# Patient Record
Sex: FEMALE | Race: WHITE | NOT HISPANIC OR LATINO | Employment: OTHER | URBAN - NONMETROPOLITAN AREA
[De-identification: names, ages, dates, MRNs, and addresses within clinical notes are randomized per-mention and may not be internally consistent; named-entity substitution may affect disease eponyms.]

---

## 2017-01-04 RX ORDER — SPIRONOLACTONE 25 MG/1
TABLET ORAL
Qty: 30 TABLET | Refills: 6 | Status: SHIPPED | OUTPATIENT
Start: 2017-01-04 | End: 2017-10-24 | Stop reason: SDUPTHER

## 2017-01-17 ENCOUNTER — OFFICE VISIT (OUTPATIENT)
Dept: CARDIOLOGY | Facility: CLINIC | Age: 32
End: 2017-01-17

## 2017-01-17 VITALS
DIASTOLIC BLOOD PRESSURE: 84 MMHG | OXYGEN SATURATION: 98 % | HEIGHT: 64 IN | SYSTOLIC BLOOD PRESSURE: 163 MMHG | HEART RATE: 125 BPM | WEIGHT: 224 LBS | BODY MASS INDEX: 38.24 KG/M2

## 2017-01-17 DIAGNOSIS — R00.0 INAPPROPRIATE SINUS TACHYCARDIA: ICD-10-CM

## 2017-01-17 DIAGNOSIS — Z13.220 SCREENING CHOLESTEROL LEVEL: ICD-10-CM

## 2017-01-17 DIAGNOSIS — I10 ESSENTIAL HYPERTENSION: ICD-10-CM

## 2017-01-17 DIAGNOSIS — R42 DIZZINESS: ICD-10-CM

## 2017-01-17 DIAGNOSIS — R00.2 PALPITATIONS: Primary | ICD-10-CM

## 2017-01-17 DIAGNOSIS — Z13.1 DIABETES MELLITUS SCREENING: ICD-10-CM

## 2017-01-17 LAB
ALBUMIN SERPL-MCNC: 4.1 GM/DL (ref 3.4–4.8)
ALP SERPL-CCNC: 76 U/L (ref 38–126)
ALT SERPL-CCNC: 32 U/L (ref 9–52)
ANION GAP SERPL CALCULATED.3IONS-SCNC: 12 MMOL/L (ref 5–15)
AST SERPL-CCNC: 28 U/L (ref 14–36)
BASOPHILS # BLD AUTO: 0.05 X1000/UL (ref 0–0.2)
BASOPHILS NFR BLD AUTO: 0.5 % (ref 0–2)
BILIRUB SERPL-MCNC: 0.5 MG/DL (ref 0.2–1.3)
BUN SERPL-MCNC: 11 MG/DL (ref 7–21)
CALCIUM SERPL-MCNC: 9.4 MG/DL (ref 8.4–10.2)
CHLORIDE SERPL-SCNC: 100 MMOL/L (ref 95–110)
CHOLEST SERPL-MCNC: 245 MG/DL (ref 0–199)
CO2 SERPL-SCNC: 28 MMOL/L (ref 22–31)
CONV AUTO IG#: 0.04 X1000/UL (ref 0.01–0.02)
CONV AUTO IG%: 0.4 % (ref 0–0.5)
CREAT SERPL-MCNC: 0.8 MG/DL (ref 0.5–1)
EOSINOPHIL # BLD AUTO: 0.07 X1000/UL (ref 0–0.7)
EOSINOPHIL NFR BLD AUTO: 0.7 % (ref 0–7)
ERYTHROCYTE [DISTWIDTH] IN BLOOD: 12.6 % (ref 11.5–14.5)
GLUCOSE SERPL-MCNC: 103 MG/DL (ref 60–100)
GRANULOCYTES # BLD AUTO: 5.96 X1000/UL (ref 2–8.6)
GRANULOCYTES NFR BLD AUTO: 58 % (ref 37–80)
HBA1C MFR BLD CALC: 5.4 %TOTHGB (ref 4–5.6)
HCT VFR BLD CALC: 40.1 % (ref 35–45)
HDLC SERPL-MCNC: 38 MG/DL (ref 60–200)
HGB BLD-MCNC: 13.5 GM/DL (ref 12–15.5)
HIV1+2 AB SERPL QL IA: NON REACTIVE
LDLC SERPL CALC-MCNC: 148 MG/DL (ref 0–129)
LYMPHOCYTES # BLD AUTO: 3.5 X1000/UL (ref 0.6–4.2)
LYMPHOCYTES NFR BLD AUTO: 34 % (ref 10–50)
MCH RBC QN: 29.2 PG (ref 26–34)
MCHC RBC-ENTMCNC: 33.7 GM/DL (ref 31.4–36)
MCV RBC: 86.6 FL (ref 80–98)
MONOCYTES # BLD AUTO: 0.66 X1000/UL (ref 0–0.9)
MONOCYTES NFR BLD AUTO: 6.4 % (ref 0–12)
PLATELET # BLD: 338 X1000/MM3 (ref 150–450)
PMV BLD: 8.9 FL (ref 8–12)
POTASSIUM SERPL-SCNC: 4.2 MMOL/L (ref 3.5–5.1)
PROT SERPL-MCNC: 8.3 GM/DL (ref 6.3–8.6)
RBC # BLD: 4.63 MEGA/MM3 (ref 3.77–5.16)
SODIUM SERPL-SCNC: 140 MMOL/L (ref 137–145)
TRIGL SERPL-MCNC: 296 MG/DL (ref 20–199)
WBC # BLD: 10.3 X1000/UL (ref 3.2–9.8)

## 2017-01-17 PROCEDURE — 99214 OFFICE O/P EST MOD 30 MIN: CPT | Performed by: INTERNAL MEDICINE

## 2017-01-17 PROCEDURE — 93000 ELECTROCARDIOGRAM COMPLETE: CPT | Performed by: INTERNAL MEDICINE

## 2017-01-17 RX ORDER — TRAZODONE HYDROCHLORIDE 150 MG/1
150 TABLET ORAL
COMMUNITY
End: 2017-10-23 | Stop reason: ALTCHOICE

## 2017-01-17 RX ORDER — LISINOPRIL 10 MG/1
10 TABLET ORAL DAILY
Qty: 30 TABLET | Refills: 11 | Status: SHIPPED | OUTPATIENT
Start: 2017-01-17 | End: 2017-05-03 | Stop reason: CLARIF

## 2017-01-17 RX ORDER — PROMETHAZINE HYDROCHLORIDE 25 MG/1
25 TABLET ORAL EVERY 6 HOURS PRN
COMMUNITY
End: 2017-02-22

## 2017-01-17 NOTE — MR AVS SNAPSHOT
Chelita Rosenbaum   1/17/2017 2:15 PM   Office Visit    Dept Phone:  [phone]   Encounter #:  [id number]    Provider:  Hollis Amin MD PhD   Department:  [facility] CARDIOLOGY                Your Full Care Plan              Today's Medication Changes          These changes are accurate as of: 1/17/17  2:16 PM.  If you have any questions, ask your nurse or doctor.               Medication(s)that have changed:     HYDROcodone-acetaminophen 7.5-325 MG per tablet   Commonly known as:  NORCO   Take 1 tablet by mouth 4 (Four) Times a Day for 30 days.   What changed:  Another medication with the same name was removed. Continue taking this medication, and follow the directions you see here.   Changed by:  Abhishek Altamirano MD         Stop taking medication(s)listed here:     omeprazole 40 MG capsule   Commonly known as:  priLOSEC   Stopped by:  Hollis Amin MD PhD           ondansetron 4 MG tablet   Commonly known as:  ZOFRAN   Stopped by:  Hollis Amin MD PhD           prazosin 5 MG capsule   Commonly known as:  MINIPRESS   Stopped by:  Hollis Amin MD PhD                      Your Updated Medication List          This list is accurate as of: 1/17/17  2:16 PM.  Always use your most recent med list.                albuterol 108 (90 BASE) MCG/ACT inhaler   Commonly known as:  PROVENTIL HFA;VENTOLIN HFA       DULoxetine 60 MG capsule   Commonly known as:  CYMBALTA       HYDROcodone-acetaminophen 7.5-325 MG per tablet   Commonly known as:  NORCO   Take 1 tablet by mouth 4 (Four) Times a Day for 30 days.       metFORMIN 500 MG tablet   Commonly known as:  GLUCOPHAGE       metoprolol succinate  MG 24 hr tablet   Commonly known as:  TOPROL-XL       promethazine 25 MG tablet   Commonly known as:  PHENERGAN       QUEtiapine 400 MG tablet   Commonly known as:  SEROquel       spironolactone 25 MG tablet   Commonly known as:  ALDACTONE   TAKE ONE  TABLET BY MOUTH DAILY       tiZANidine 4 MG tablet   Commonly known as:  ZANAFLEX       traZODone 150 MG tablet   Commonly known as:  DESYREL               We Performed the Following     Adult Transthoracic Echo Complete     CBC & Differential     Comprehensive Metabolic Panel     Duplex Carotid Ultrasound CAR     Hemoglobin A1c     HIV-1 & HIV-2 Antibodies     Holter / Event ZIO Patch     Lipid Panel     MicroAlbumin, Urine, Random     Tilt Table       You Were Diagnosed With        Codes Comments    Palpitations    -  Primary ICD-10-CM: R00.2  ICD-9-CM: 785.1     Essential hypertension     ICD-10-CM: I10  ICD-9-CM: 401.9     Inappropriate sinus tachycardia     ICD-10-CM: R00.0  ICD-9-CM: 427.89     Dizziness     ICD-10-CM: R42  ICD-9-CM: 780.4     Diabetes mellitus screening     ICD-10-CM: Z13.1  ICD-9-CM: V77.1     Screening cholesterol level     ICD-10-CM: Z13.220  ICD-9-CM: V77.91       Instructions     None    Patient Instructions History      Upcoming Appointments     Visit Type Date Time Department    NEW PATIENT 1/17/2017  2:15 PM [facility] CARDIOLOGY [facility]    FOLLOW UP 2/22/2017  3:10 PM [facility] PAIN MNGT [facility]    FOLLOW UP 2/28/2017  3:30 PM [facility] CARDIOLOGY [facility]    OFFICE VISIT 5/30/2017  1:30 PM [facility]      MyChart Signup     Our records indicate that you have declined [facility] [employer] signup. If you would like to sign up for [employer], please email [email or web address] or call [phone] to obtain an activation code.             Other Info from Your Visit           Your Appointments     Feb 22, 2017  3:10 PM CST   Follow Up with Abhishek Altamirano MD   [facility] PAIN MANAGEMENT (--)    [address]  [place] [address] [place] [address] [phone]           Arrive 15 minutes prior to appointment.            Feb 28, 2017  3:30 PM CST   Follow Up with Hollis Amin MD PhD   [facility] CARDIOLOGY (--)    [address] [place] [address] [address]  [address] [place]  [address]  [facility] [address] [phone]           Arrive 15 minutes prior to appointment.            May 30, 2017  1:30 PM CDT   Office Visit with Brian Glover MD   [facility] CARDIOLOGY (--)    [address]  [facility] [address] [phone]           Arrive 15 minutes prior to appointment.              Allergies     Apriso [Mesalamine Er]      Tramadol        Reason for Visit     Rapid Heart Rate     Hypertension     2nd opinion           Vital Signs     Blood Pressure Pulse Height Weight Oxygen Saturation Body Mass Index    163/84 (BP Location: Right arm, Patient Position: Sitting, Cuff Size: Large Adult) 125 64\" (162.6 cm) 224 lb (102 kg) 98% 38.45 kg/m2    Smoking Status                   Former Smoker           Problems and Diagnoses Noted     Palpitations    -  Primary    High blood pressure        Irregular heartbeat        Dizziness        Screening for diabetes mellitus        Screening for cholesterol level            "

## 2017-01-17 NOTE — PROGRESS NOTES
Cardiovascular Medicine   Hollis Amin M.D., Ph.D., [facility]    Thank you for asking me to see Chelita Rosenbaum for Second opinion.    History of Present Illness  This is a 31 y.o. female with:    1. DM2  2. PCOS  3. HTN  4. IST or POTS  A. Toprol XL  B. Was seeing Dr. Glover    Arrhythmia  Patient is a 31 y.o. female who presents for evaluation of IST and palpitations. Patient has a history of  IST.  She had an EPS that did not define her mechanism.  She had a TTE which were structurally normal.  She actually saw Dr. Glover.  He continued her on her home medications and asked her to follow up in 6-8 months.  She presents today for a second opinion. She states she has had a fast heart rate for several years. She has been seeing Dr. Glover. She has increased cardiac awareness. She had a holter with sinus tachycardia (I do not have these records). She has not had a DENI. She was tried on low-dose Corlanor. She tells me this had little effect. She was Flecainide with up-titration, but this did not help. She is now on Toprol XL with little effect. She does snore when she sleeps. No witnessed apnea episodes. She has not had a sleep study. She does endorse dizziness, lightheadedness and episodes of syncope. There are preceding symptoms.     HTN  Concerning the patient's hypertension, the patient is currently taking Toprol and Spironolactone.  The patient is medication compliant.  Denies side effects.  Patient's laboratory evaluations are followed closely by the PCP.      The following portions of the patient's history were reviewed and updated as appropriate: allergies, current medications, past family history, past medical history, past social history, past surgical history and problem list.    Review of Systems - History obtained from chart review and the patient  General ROS: negative  Respiratory ROS: positive for - shortness of breath  Cardiovascular ROS: positive for - palpitations    family history includes Asthma  in her father; Diabetes in her father; Heart disease in her father and mother; Hypertension in her father.     reports that she has quit smoking. She has quit using smokeless tobacco. She reports that she does not drink alcohol or use illicit drugs.    Allergies   Allergen Reactions   • Apriso [Mesalamine Er]    • Tramadol          Current Outpatient Prescriptions:   •  albuterol (PROVENTIL HFA;VENTOLIN HFA) 108 (90 BASE) MCG/ACT inhaler, Inhale 2 puffs every 4 (four) hours as needed for wheezing., Disp: , Rfl:   •  DULoxetine (CYMBALTA) 60 MG capsule, , Disp: , Rfl:   •  HYDROcodone-acetaminophen (NORCO) 7.5-325 MG per tablet, Take 1 tablet by mouth 4 (Four) Times a Day for 30 days., Disp: 120 tablet, Rfl: 0  •  metFORMIN (GLUCOPHAGE) 500 MG tablet, Take 500 mg by mouth 2 (two) times a day with meals., Disp: , Rfl:   •  promethazine (PHENERGAN) 25 MG tablet, Take 25 mg by mouth Every 6 (Six) Hours As Needed for nausea or vomiting., Disp: , Rfl:   •  QUEtiapine (SEROquel) 400 MG tablet, Take 1 tablet by mouth Daily., Disp: , Rfl:   •  spironolactone (ALDACTONE) 25 MG tablet, TAKE ONE TABLET BY MOUTH DAILY, Disp: 30 tablet, Rfl: 6  •  tiZANidine (ZANAFLEX) 4 MG tablet, , Disp: , Rfl:   •  traZODone (DESYREL) 150 MG tablet, Take 150 mg by mouth. 2 tab nightly, Disp: , Rfl:   •  metoprolol succinate XL (TOPROL-XL) 100 MG 24 hr tablet, 100 mg 2 (Two) Times a Day., Disp: , Rfl:     Physical Exam:  Vitals:    01/17/17 1324   BP: 163/84   Pulse: (!) 125   SpO2: 98%     Body mass index is 38.45 kg/(m^2).    GEN: alert, appears stated age and cooperative  Body Habitus: obese  HEENT: Head: Normocephalic, no lesions, without obvious abnormality.  Neck / Thyroid: Supple, no masses, nodes, nodules or enlargement. No arcus senilis, xanthelasma or xanthomas. PERRL. Normal external ears. No drainage. No thyromegaly. Neck supple. No LAD. Trachea midline. Nose, normal.  JVP: 6 cm of water at 45 degrees HJR: absent      Carotid:   Upstroke: easily palpated bilaterally Volume: Normal.    Carotid Bruit:  None  Subclavian Bruit: Not present.    Lymph: No overt LAD.   Back: Normal.  Chest:  Normal Excursion: Good    I:E: Good  Pulmonary:clear to auscultation, no wheezes, rales or rhonchi, symmetric air entry. Equal chest excursion. Chest physical exam is normal. No tenderness.        Precordium:  No palpable heaves or thrusts. P2 is not palpable.   [place]:  normal size and placement Palpable S4: Not present.   Heart rate: normal  Heart Rhythm: regular     Heart Sounds: S1: normal intensity  S2: normal intensity  S3: absent   S4: absent  Opening Snap: Absent  A2-OS:  N/A  Pericardial rub: absent    Ejection click: None      Murmurs: Systolic: none  Diastolic: none  Abdomen; S/NT/ND. (+) BS. I did not appreciate the abdominal aorta. No abdominal bruits.   Extremity: no edema, cyanosis  Trophic changes: None   Pallor on elevation: Absent.    Rubor on dependency: None  Neuro: CN II-XII grossly intact.   Skin: Normal.  Pulses: Right radial artery has 2+ (normal) pulse, Left radial artery has 2+ (normal) pulse, Right posterior tibial artery has 2+ (normal) pulse and Left posterior tibial artery has 2+ (normal) pulse      DATA REVIEWED:     T-CTA THORAX/PULMONARY W/CONTR Patient Name- JE LANGE   Patient Name-  JE LANGE  Patient ID-  [id number]   Ordering-  NATHAN BURRIS MD  Attending-  DR TEMP  Referring-       ------------------------------------------------     PROCEDURE- CT -CTA Thorax/PE with contrast      REASON FOR EXAM- chest pain     FINDINGS- Comparison study dated  January 23, 2014. Axial computer  tomography sequential imaging was performed from the thoracic inlet  through the diaphragms after administration of IV contrast .3-D chest  sagittal and coronal reformates was performed.      The pulmonary arteries appear normal. No filling defect is identified  to suggest pulmonary embolus. Mediastinal structures of the chest  are  normal. No lymphadenopathy or pericardial effusion. Lungs are clear.  Pleural spaces are normal. Visualized upper abdominal structures are  normal. No acute osseous abnormality..     IMPRESSION-   1.No evidence of pulmonary embolus..  2.Unremarkable CT chest study..      3/2016: EPS  1. Normal sinus node recovery time. 2. Antegrade normal AH and HV timing. 3. Antegrade AV node Wenckebach cycle length was approximately 310 milliseconds. 4. Retrograde VA conduction was decremental, midline, and concentric in nature. 5. No evidence of any preexcitation as described above. 6. No tachycardia was induced while pacing from the high right atrium, coronary sinus, and from the right ventricular apex with the protocol as described above at baseline and after starting the patient on IV atropine and epinephrine. 7. Periodic IV heparin was given. 8. Throughout the procedure, patient's heart rate, blood pressure, and saturation were monitored. There was no immediate complication and stable upon discharge to recovery.        Adult ECG Report     Name: Chelita Rosenbaum   Age: 31 y.o.   Gender: female       Rate: 135   Rhythm: sinus tachycardia   QRS Axis: -23   OH Interval: wnl   QRS Duration: 78   QTc: 552   Voltages: wnl   Conduction Disturbances: none   Other Abnormalities: Minimal criteria for LVH     Narrative Interpretation: Performed and interpreted today. No prior. Sinus tach.     Assessment/Plan     1. Sinus tachycardia with palpitations. Reportedly normal EPS. Holter with ST. No DENI. She does have dizziness and some episodes of syncope. This may be IST, but EVELYN will need to excluded. Additionally, this could be POTS, but will need DENI.   TTE, Zio, DENI  For now, continue Toprol XL 100mg BID    2. HTN, essential.  HD BP noted to be well controlled today in office.    DASH; medication compliance  TTE for LVH assessment  Microalbumin, CBC, BMP, EKG   Continue current medications; Start Lisinopril 10mg    3. Cardiac Risk  Assessment: Unknown  Check labs    4. Tobacco status: Non-smoker      Plan for follow-up: in 6 week

## 2017-01-26 ENCOUNTER — TELEPHONE (OUTPATIENT)
Dept: CARDIOLOGY | Facility: CLINIC | Age: 32
End: 2017-01-26

## 2017-01-26 ENCOUNTER — OFFICE VISIT (OUTPATIENT)
Dept: OBSTETRICS AND GYNECOLOGY | Facility: CLINIC | Age: 32
End: 2017-01-26

## 2017-01-26 VITALS — DIASTOLIC BLOOD PRESSURE: 64 MMHG | SYSTOLIC BLOOD PRESSURE: 112 MMHG

## 2017-01-26 DIAGNOSIS — M94.0 COSTOCHONDRITIS: ICD-10-CM

## 2017-01-26 DIAGNOSIS — N64.4 BREAST PAIN: Primary | ICD-10-CM

## 2017-01-26 PROCEDURE — 99212 OFFICE O/P EST SF 10 MIN: CPT | Performed by: OBSTETRICS & GYNECOLOGY

## 2017-01-26 NOTE — PROGRESS NOTES
Subjective   Chief Complaint   Patient presents with   • Breast Problem     Chelita Rosenbaum is a 31 y.o. year old No obstetric history on file..  No LMP recorded (lmp unknown). Patient has had an implant.  She presents with a chief complaint of breast pain.  It's been ongoing for the past 2 months.  Nothing seems to make it better and nothing seems to make it worse.  She describes as sharp.  It's moderate to severe in intensity.  She's had some associated nipple itching but no discharge from the nipples.  She also notes that she's had an increase in her breast size and she had to buy new bras..         Past Medical History   Diagnosis Date   • Arthropathy of lumbar facet joint    • Asthma    • Bipolar disorder    • Cervico-occipital neuralgia    • Constipation    • Disorder of small intestine      thicking jejunum, delayed transit      • Diverticular disease of colon    • Drug therapy      Other long term (current) drug therapy      • Dysphagia    • Encounter for contraceptive management      other   • Encounter for surveillance of contraceptives      other   • Epigastric pain    • Generalized anxiety disorder    • H/O colonoscopy with polypectomy    • Hirsutism    • Hyperprolactinemia    • Low back pain    • Menstruation disorder      Other disorders of menstruation and other abnormal bleeding from female genital tract      • Migraine aura, persistent      Migraine - w/ aura of spots      • Myofascial pain    • Nausea    • Nausea and vomiting    • Pap smear for cervical cancer screening    • Periumbilical pain    • Surgical follow-up care    • Tachycardia      Past Surgical History   Procedure Laterality Date   • Abdominal surgery  2014     Anesth, surgery of abdomen (1)      •  section  2010      Section (3)      • Cholecystectomy  05/15/2003     Cholecystectomy, laparoscopic (1)      • Dilatation and curettage  10/21/2013     D&C (1)      • Endoscopy  2015     EGD w/ biopsy 41944 (1)       • Capsule endoscopy  04/20/2015     GI Imaging, capsule endoscopy 48129 (1)      • Injection of medication  05/19/2016     Injection for nerve block (1)          Current Outpatient Prescriptions:   •  albuterol (PROVENTIL HFA;VENTOLIN HFA) 108 (90 BASE) MCG/ACT inhaler, Inhale 2 puffs every 4 (four) hours as needed for wheezing., Disp: , Rfl:   •  DULoxetine (CYMBALTA) 60 MG capsule, , Disp: , Rfl:   •  lisinopril (PRINIVIL,ZESTRIL) 10 MG tablet, Take 1 tablet by mouth Daily., Disp: 30 tablet, Rfl: 11  •  metFORMIN (GLUCOPHAGE) 500 MG tablet, Take 500 mg by mouth 2 (two) times a day with meals., Disp: , Rfl:   •  metoprolol succinate XL (TOPROL-XL) 100 MG 24 hr tablet, 100 mg 2 (Two) Times a Day., Disp: , Rfl:   •  promethazine (PHENERGAN) 25 MG tablet, Take 25 mg by mouth Every 6 (Six) Hours As Needed for nausea or vomiting., Disp: , Rfl:   •  spironolactone (ALDACTONE) 25 MG tablet, TAKE ONE TABLET BY MOUTH DAILY, Disp: 30 tablet, Rfl: 6  •  tiZANidine (ZANAFLEX) 4 MG tablet, , Disp: , Rfl:   •  traZODone (DESYREL) 150 MG tablet, Take 150 mg by mouth. 2 tab nightly, Disp: , Rfl:   •  QUEtiapine (SEROquel) 400 MG tablet, Take 1 tablet by mouth Daily., Disp: , Rfl:   Allergies   Allergen Reactions   • Apriso [Mesalamine Er]    • Tramadol      Smoking status: Former Smoker                                                              Packs/day: 0.00      Years: 0.00      Smokeless status: Never Used                        Review of Systems   Constitutional: Negative for activity change, appetite change, chills and fever.   Gastrointestinal: Negative for abdominal distention and abdominal pain.   Genitourinary: Negative for difficulty urinating, dysuria, flank pain, genital sores, pelvic pain, vaginal bleeding, vaginal discharge and vaginal pain.           Objective   Visit Vitals   • /64   • LMP  (LMP Unknown)       General:  well developed; well nourished  no acute distress   Thyroid: not examined   Lungs:   breathing is unlabored   Heart:  Not performed.   Breasts:  Examined in supine position  Symmetric without masses or skin dimpling  Nipples normal without inversion, lesions or discharge  There are no palpable axillary nodes  There is tenderness to palpation at the rib external junction   Abdomen: Not performed.   Pelvis: Not performed.     Lab Review   No data reviewed      Imaging   No data reviewed       Assessment      Diagnosis Plan   1. Breast pain     2. Costochondritis            Plan   1. Breast pain secondary to costochondritis.  The patient understands that the primary treatment for this as Motrin, however she can take this due to other medical problems.  So Tylenol as needed.         This note was electronically signed.    Julius Laguerre MD  January 26, 2017

## 2017-02-02 ENCOUNTER — HOSPITAL ENCOUNTER (OUTPATIENT)
Dept: CARDIOLOGY | Facility: HOSPITAL | Age: 32
Discharge: HOME | End: 2017-02-02
Attending: INTERNAL MEDICINE | Admitting: INTERNAL MEDICINE

## 2017-02-02 VITALS
HEIGHT: 64 IN | WEIGHT: 220 LBS | DIASTOLIC BLOOD PRESSURE: 65 MMHG | SYSTOLIC BLOOD PRESSURE: 106 MMHG | BODY MASS INDEX: 37.56 KG/M2 | HEART RATE: 100 BPM

## 2017-02-02 LAB
MAXIMAL PREDICTED HEART RATE: 188 BPM
STRESS TARGET HR: 160 BPM

## 2017-02-02 PROCEDURE — 93660 TILT TABLE EVALUATION: CPT | Performed by: INTERNAL MEDICINE

## 2017-02-02 PROCEDURE — 93660 TILT TABLE EVALUATION: CPT

## 2017-02-06 DIAGNOSIS — R00.0 TACHYCARDIA: ICD-10-CM

## 2017-02-06 DIAGNOSIS — R00.2 HEART PALPITATIONS: Primary | ICD-10-CM

## 2017-02-07 ENCOUNTER — OFFICE VISIT (OUTPATIENT)
Dept: CARDIOLOGY | Facility: CLINIC | Age: 32
End: 2017-02-07

## 2017-02-07 LAB
BH CV ECHO MEAS - ACS: 1.8 CM
BH CV ECHO MEAS - AO MAX PG (FULL): 4.3 MMHG
BH CV ECHO MEAS - AO MAX PG: 6.5 MMHG
BH CV ECHO MEAS - AO MEAN PG (FULL): 3 MMHG
BH CV ECHO MEAS - AO MEAN PG: 4 MMHG
BH CV ECHO MEAS - AO ROOT AREA: 4.9 CM^2
BH CV ECHO MEAS - AO ROOT DIAM: 2.5 CM
BH CV ECHO MEAS - AO V2 MAX: 127 CM/SEC
BH CV ECHO MEAS - AO V2 MEAN: 98.2 CM/SEC
BH CV ECHO MEAS - AO V2 VTI: 20 CM
BH CV ECHO MEAS - AVA(I,A): 2.2 CM^2
BH CV ECHO MEAS - AVA(I,D): 2.2 CM^2
BH CV ECHO MEAS - AVA(V,A): 2.2 CM^2
BH CV ECHO MEAS - AVA(V,D): 2.2 CM^2
BH CV ECHO MEAS - EDV(CUBED): 97.3 ML
BH CV ECHO MEAS - EDV(TEICH): 97.3 ML
BH CV ECHO MEAS - EF(CUBED): 72.3 %
BH CV ECHO MEAS - EF(TEICH): 64 %
BH CV ECHO MEAS - ESV(CUBED): 27 ML
BH CV ECHO MEAS - ESV(TEICH): 35 ML
BH CV ECHO MEAS - FS: 34.8 %
BH CV ECHO MEAS - IVS/LVPW: 1
BH CV ECHO MEAS - IVSD: 0.8 CM
BH CV ECHO MEAS - LA DIMENSION: 3.6 CM
BH CV ECHO MEAS - LA/AO: 1.4
BH CV ECHO MEAS - LV MASS(C)D: 117.9 GRAMS
BH CV ECHO MEAS - LV MAX PG: 2.2 MMHG
BH CV ECHO MEAS - LV MEAN PG: 1 MMHG
BH CV ECHO MEAS - LV V1 MAX: 73.9 CM/SEC
BH CV ECHO MEAS - LV V1 MEAN: 54.3 CM/SEC
BH CV ECHO MEAS - LV V1 VTI: 11.7 CM
BH CV ECHO MEAS - LVIDD: 4.6 CM
BH CV ECHO MEAS - LVIDS: 3 CM
BH CV ECHO MEAS - LVOT AREA (M): 3.8 CM^2
BH CV ECHO MEAS - LVOT AREA: 3.8 CM^2
BH CV ECHO MEAS - LVOT DIAM: 2.2 CM
BH CV ECHO MEAS - LVPWD: 0.8 CM
BH CV ECHO MEAS - MV A MAX VEL: 72.3 CM/SEC
BH CV ECHO MEAS - MV DEC SLOPE: 310 CM/SEC^2
BH CV ECHO MEAS - MV E MAX VEL: 50.4 CM/SEC
BH CV ECHO MEAS - MV E/A: 0.7
BH CV ECHO MEAS - MV P1/2T MAX VEL: 72.6 CM/SEC
BH CV ECHO MEAS - MV P1/2T: 68.6 MSEC
BH CV ECHO MEAS - MVA P1/2T LCG: 3 CM^2
BH CV ECHO MEAS - MVA(P1/2T): 3.2 CM^2
BH CV ECHO MEAS - PA MAX PG: 2 MMHG
BH CV ECHO MEAS - PA V2 MAX: 70.6 CM/SEC
BH CV ECHO MEAS - SV(AO): 98.2 ML
BH CV ECHO MEAS - SV(CUBED): 70.3 ML
BH CV ECHO MEAS - SV(LVOT): 44.5 ML
BH CV ECHO MEAS - SV(TEICH): 62.3 ML
BH CV ECHO MEAS - TR MAX VEL: 222 CM/SEC

## 2017-02-22 ENCOUNTER — OFFICE VISIT (OUTPATIENT)
Dept: PAIN MEDICINE | Facility: CLINIC | Age: 32
End: 2017-02-22

## 2017-02-22 VITALS — BODY MASS INDEX: 37.63 KG/M2 | DIASTOLIC BLOOD PRESSURE: 80 MMHG | SYSTOLIC BLOOD PRESSURE: 142 MMHG | WEIGHT: 219.2 LBS

## 2017-02-22 DIAGNOSIS — M54.16 LUMBAR RADICULOPATHY: ICD-10-CM

## 2017-02-22 DIAGNOSIS — M46.1 SACROILIITIS (HCC): Primary | ICD-10-CM

## 2017-02-22 DIAGNOSIS — Z79.899 HIGH RISK MEDICATIONS (NOT ANTICOAGULANTS) LONG-TERM USE: ICD-10-CM

## 2017-02-22 PROCEDURE — 99213 OFFICE O/P EST LOW 20 MIN: CPT | Performed by: PAIN MEDICINE

## 2017-02-22 RX ORDER — HYDROCODONE BITARTRATE AND ACETAMINOPHEN 7.5; 325 MG/1; MG/1
1 TABLET ORAL 4 TIMES DAILY
Qty: 120 TABLET | Refills: 0 | Status: SHIPPED | OUTPATIENT
Start: 2017-02-22 | End: 2017-02-28

## 2017-02-22 RX ORDER — OMEPRAZOLE 40 MG/1
40 CAPSULE, DELAYED RELEASE ORAL DAILY
COMMUNITY
Start: 2017-02-15 | End: 2021-01-29

## 2017-02-22 RX ORDER — HYDROCODONE BITARTRATE AND ACETAMINOPHEN 7.5; 325 MG/1; MG/1
TABLET ORAL
COMMUNITY
Start: 2017-02-10 | End: 2017-02-28

## 2017-02-22 RX ORDER — QUETIAPINE FUMARATE 300 MG/1
300 TABLET, FILM COATED ORAL
COMMUNITY
Start: 2017-02-07 | End: 2017-10-23 | Stop reason: ALTCHOICE

## 2017-02-22 RX ORDER — HYDROCODONE BITARTRATE AND ACETAMINOPHEN 7.5; 325 MG/1; MG/1
1 TABLET ORAL 4 TIMES DAILY
Qty: 120 TABLET | Refills: 0 | Status: SHIPPED | OUTPATIENT
Start: 2017-02-22 | End: 2017-03-24

## 2017-02-22 RX ORDER — PRAZOSIN HYDROCHLORIDE 5 MG/1
CAPSULE ORAL
COMMUNITY
Start: 2017-02-15 | End: 2017-10-23 | Stop reason: ALTCHOICE

## 2017-02-22 NOTE — PROGRESS NOTES
Chelita Rosenbaum is a 31 y.o. female.   1985    HPI:   Location: lower back and bilateral hip,migraines  Quality: aching, sharp and dull  Severity: 6/10  Timing: constant  Alleviating: pain medication  Aggravating: bending and increased activity    Following up with cardiologist soon.  Has been undergoing a workup.  Patient reports that the opioid medication still provides her good relief and allows increased activity than she would have without the opioid medication.  She denies side effects.  Trying to lose wt.      The following portions of the patient's history were reviewed by me and updated as appropriate: allergies, current medications, past family history, past medical history, past social history, past surgical history and problem list.    Past Medical History   Diagnosis Date   • Arthropathy of lumbar facet joint    • Asthma    • Bipolar disorder    • Cervico-occipital neuralgia    • Constipation    • Disorder of small intestine      thicking jejunum, delayed transit      • Diverticular disease of colon    • Drug therapy      Other long term (current) drug therapy      • Dysphagia    • Encounter for contraceptive management      other   • Encounter for surveillance of contraceptives      other   • Epigastric pain    • Generalized anxiety disorder    • H/O colonoscopy with polypectomy    • Hirsutism    • Hyperprolactinemia    • Low back pain    • Menstruation disorder      Other disorders of menstruation and other abnormal bleeding from female genital tract      • Migraine aura, persistent      Migraine - w/ aura of spots      • Myofascial pain    • Nausea    • Nausea and vomiting    • Pap smear for cervical cancer screening    • Periumbilical pain    • Surgical follow-up care    • Tachycardia        Social History     Social History   • Marital status:      Spouse name: N/A   • Number of children: N/A   • Years of education: N/A     Occupational History   • Not on file.     Social History Main  Topics   • Smoking status: Former Smoker   • Smokeless tobacco: Never Used   • Alcohol use No   • Drug use: No   • Sexual activity: Defer     Other Topics Concern   • Not on file     Social History Narrative       Family History   Problem Relation Age of Onset   • Heart disease Mother    • Asthma Father    • Diabetes Father    • Heart disease Father    • Hypertension Father          Current Outpatient Prescriptions:   •  albuterol (PROVENTIL HFA;VENTOLIN HFA) 108 (90 BASE) MCG/ACT inhaler, Inhale 2 puffs every 4 (four) hours as needed for wheezing., Disp: , Rfl:   •  lisinopril (PRINIVIL,ZESTRIL) 10 MG tablet, Take 1 tablet by mouth Daily., Disp: 30 tablet, Rfl: 11  •  metFORMIN (GLUCOPHAGE) 500 MG tablet, Take 500 mg by mouth 2 (two) times a day with meals., Disp: , Rfl:   •  QUEtiapine (SEROquel) 400 MG tablet, Take 1 tablet by mouth Daily., Disp: , Rfl:   •  spironolactone (ALDACTONE) 25 MG tablet, TAKE ONE TABLET BY MOUTH DAILY, Disp: 30 tablet, Rfl: 6  •  tiZANidine (ZANAFLEX) 4 MG tablet, , Disp: , Rfl:   •  traZODone (DESYREL) 150 MG tablet, Take 150 mg by mouth. 2 tab nightly, Disp: , Rfl:   •  HYDROcodone-acetaminophen (NORCO) 7.5-325 MG per tablet, , Disp: , Rfl:   •  HYDROcodone-acetaminophen (NORCO) 7.5-325 MG per tablet, Take 1 tablet by mouth 4 (Four) Times a Day for 30 days., Disp: 120 tablet, Rfl: 0  •  HYDROcodone-acetaminophen (NORCO) 7.5-325 MG per tablet, Take 1 tablet by mouth 4 (Four) Times a Day for 30 days., Disp: 120 tablet, Rfl: 0  •  omeprazole (priLOSEC) 40 MG capsule, , Disp: , Rfl:   •  prazosin (MINIPRESS) 5 MG capsule, , Disp: , Rfl:   •  QUEtiapine (SEROquel) 300 MG tablet, , Disp: , Rfl:   •  sertraline (ZOLOFT) 50 MG tablet, , Disp: , Rfl:     Allergies   Allergen Reactions   • Apriso [Mesalamine Er]    • Tramadol          Review of Systems   Musculoskeletal: Positive for back pain.        B.hip     Neurological:        Migraines     10 system review of systems was reviewed and  negative except for above.    Physical Exam   Constitutional: She appears well-developed and well-nourished. No distress.   Musculoskeletal:        Lumbar back: She exhibits decreased range of motion (ext to 5 deg with facet loading   flexion to about 30deg).   Neurological: She is alert. She has normal strength. No sensory deficit.   Psychiatric: She has a normal mood and affect. Her behavior is normal. Judgment normal.       Chelita was seen today for back pain, pain and migraine.    Diagnoses and all orders for this visit:    Sacroiliitis    Lumbar radiculopathy    High risk medications (not anticoagulants) long-term use    Other orders  -     HYDROcodone-acetaminophen (NORCO) 7.5-325 MG per tablet; Take 1 tablet by mouth 4 (Four) Times a Day for 30 days.  -     HYDROcodone-acetaminophen (NORCO) 7.5-325 MG per tablet; Take 1 tablet by mouth 4 (Four) Times a Day for 30 days.        Medication: Patient reports no negative side effects, Patient reports appropriate usage and storage habits, Patient's opioid provides enough reflief to be more active and perform activities of daily living with less discomfort. and Refill opioid medication as above    Interventional: none at this time.  No interventions for now.  Pt undergoing cardiac workup.    Rehab: none at this time    Behavioral: No aberrant behavior noted. PREM Report #[id number] was reviewed and is consistent with stated history    Urine drug screen None at this time          This document has been electronically signed by Abhishek Altamirano MD on February 22, 2017 4:10 PM

## 2017-02-28 ENCOUNTER — TELEPHONE (OUTPATIENT)
Dept: CARDIOLOGY | Facility: CLINIC | Age: 32
End: 2017-02-28

## 2017-02-28 ENCOUNTER — OFFICE VISIT (OUTPATIENT)
Dept: CARDIOLOGY | Facility: CLINIC | Age: 32
End: 2017-02-28

## 2017-02-28 VITALS
WEIGHT: 218.2 LBS | DIASTOLIC BLOOD PRESSURE: 89 MMHG | SYSTOLIC BLOOD PRESSURE: 129 MMHG | BODY MASS INDEX: 36.35 KG/M2 | HEART RATE: 130 BPM | OXYGEN SATURATION: 98 % | HEIGHT: 65 IN

## 2017-02-28 DIAGNOSIS — I95.1 ORTHOSTATIC HYPOTENSION: Primary | ICD-10-CM

## 2017-02-28 DIAGNOSIS — R00.0 SINUS TACHYCARDIA: ICD-10-CM

## 2017-02-28 DIAGNOSIS — I10 ESSENTIAL HYPERTENSION: ICD-10-CM

## 2017-02-28 DIAGNOSIS — R06.83 SNORES: ICD-10-CM

## 2017-02-28 PROCEDURE — 99214 OFFICE O/P EST MOD 30 MIN: CPT | Performed by: INTERNAL MEDICINE

## 2017-02-28 RX ORDER — PROPRANOLOL HYDROCHLORIDE 80 MG/1
80 CAPSULE, EXTENDED RELEASE ORAL DAILY
Qty: 31 CAPSULE | Refills: 3 | Status: SHIPPED | OUTPATIENT
Start: 2017-02-28 | End: 2017-03-29

## 2017-02-28 NOTE — PATIENT INSTRUCTIONS
How to Use Compression Stockings  Compression stockings are elastic socks that squeeze the legs. They help to increase blood flow to the legs, decrease swelling in the legs, and reduce the chance of developing blood clots in the lower legs. Compression stockings are often used by people who:  · Are recovering from surgery.  · Have poor circulation in their legs.  · Are prone to getting blood clots in their legs.  · Have varicose veins.  · Sit or stay in bed for long periods of time.  HOW TO USE COMPRESSION STOCKINGS  Before you put on your compression stockings:  · Make sure that they are the correct size. If you do not know your size, ask your health care provider.  · Make sure that they are clean, dry, and in good condition.  · Check them for rips and tears. Do not put them on if they are ripped or torn.  Put your stockings on first thing in the morning, before you get out of bed. Keep them on for as long as your health care provider advises. When you are wearing your stockings:  · Keep them as smooth as possible. Do not allow them to bunch up. It is especially important to prevent the stockings from bunching up around your toes or behind your knees.  · Do not roll the stockings downward and leave them rolled down. This can decrease blood flow to your leg.  · Change them right away if they become wet or dirty.  When you take off your stockings, inspect your legs and feet. Anything that does not seem normal may require medical attention. Look for:  · Open sores.  · Red spots.  · Swelling.  INFORMATION AND TIPS  · Do not stop wearing your compression stockings without talking to your health care provider first.  · Wash your stockings everyday with mild detergent in cold or warm water. Do not use bleach. Air-dry your stockings or dry them in a clothes dryer on low heat.  · Replace your stockings every 3-6 months.  · If skin moisturizing is part of your treatment plan, apply lotion or cream at night so that your skin  will be dry when you put on the stockings in the morning. It is harder to put the stockings on when you have lotion on your legs or feet.  SEEK MEDICAL CARE IF:  Remove your stockings and seek medical care if:  · You have a feeling of pins and needles in your feet or legs.  · You have any new changes in your skin.  · You have skin lesions that are getting worse.  · You have swelling or pain that is getting worse.  SEEK IMMEDIATE MEDICAL CARE IF:  · You have numbness or tingling in your lower legs that does not get better immediately after you take the stockings off.  · Your toes or feet become cold and blue.  · You develop open sores or red spots on your legs that do not go away.  · You see or feel a warm spot on your leg.  · You have new swelling or soreness in your leg.  · You are short of breath or you have chest pain for no reason.  · You have a rapid or irregular heartbeat.  · You feel light-headed or dizzy.     This information is not intended to replace advice given to you by your health care provider. Make sure you discuss any questions you have with your health care provider.     Document Released: 10/15/2010 Document Revised: 05/03/2016 Document Reviewed: 11/25/2015  [employer] Interactive Patient Education ©2016 [employer]  Orthostatic Hypotension  Orthostatic hypotension is a sudden drop in blood pressure. It happens when you quickly stand up from a seated or lying position. You may feel dizzy or light-headed. This can last for just a few seconds or for up to a few minutes. It is usually not a serious problem. However, if this happens frequently or gets worse, it can be a sign of something more serious.  CAUSES   Different things can cause orthostatic hypotension, including:   · Loss of body fluids (dehydration).  · Medicines that lower blood pressure.  · Sudden changes in posture, such as standing up quickly after you have been sitting or lying down.  · Taking too much of your medicine.  SIGNS AND  SYMPTOMS   · Light-headedness or dizziness.    · Fainting or near-fainting.    · A fast heart rate.    · Weakness.    · Feeling tired (fatigue).    DIAGNOSIS   Your health care provider may do several things to help diagnose your condition and identify the cause. These may include:   · Taking a medical history and doing a physical exam.  · Checking your blood pressure. Your health care provider will check your blood pressure when you are:    Lying down.    Sitting.    Standing.  · Using tilt table testing. In this test, you lie down on a table that moves from a lying position to a standing position. You will be strapped onto the table. This test monitors your blood pressure and heart rate when you are in different positions.  TREATMENT   Treatment will vary depending on the cause. Possible treatments include:   · Changing the dosage of your medicines.   · Wearing compression stockings on your lower legs.  · Standing up slowly after sitting or lying down.  · Eating more salt.  · Eating frequent, small meals.  · In some cases, getting IV fluids.  · Taking medicine to enhance fluid retention.  HOME CARE INSTRUCTIONS  · Only take over-the-counter or prescription medicines as directed by your health care provider.    Follow your health care provider's instructions for changing the dosage of your current medicines.    Do not stop or adjust your medicine on your own.  · Stand up slowly after sitting or lying down. This allows your body to adjust to the different position.  · Wear compression stockings as directed.  · Eat extra salt as directed.    Do not add extra salt to your diet unless directed to by your health care provider.  · Eat frequent, small meals.  · Avoid standing suddenly after eating.  · Avoid hot showers or excessive heat as directed by your health care provider.  · Keep all follow-up appointments.  SEEK MEDICAL CARE IF:  · You continue to feel dizzy or light-headed after standing.  · You feel groggy or  confused.  · You feel cold, clammy, or sick to your stomach (nauseous).  · You have blurred vision.  · You feel short of breath.  SEEK IMMEDIATE MEDICAL CARE IF:   · You faint after standing.  · You have chest pain.   · You have difficulty breathing.    · You lose feeling or movement in your arms or legs.    · You have slurred speech or difficulty talking, or you are unable to talk.    MAKE SURE YOU:   · Understand these instructions.  · Will watch your condition.  · Will get help right away if you are not doing well or get worse.     This information is not intended to replace advice given to you by your health care provider. Make sure you discuss any questions you have with your health care provider.     Document Released: 12/08/2003 Document Revised: 12/23/2014 Document Reviewed: 10/10/2014  [employer] Patient Education ©2016 [employer]

## 2017-02-28 NOTE — TELEPHONE ENCOUNTER
Patient contacted with appt date and time to see matt renee at [facility] for eval on lesly. Date is 3/14/17 at 11 am

## 2017-02-28 NOTE — PROGRESS NOTES
Cardiovascular Medicine   Hollis Amin M.D., Ph.D., [facility]      History of Present Illness  This is a 31 y.o. female with:    1. DM2  2. PCOS  3. HTN  4. IST or POTS  A. Toprol XL  HEAVEN Was seeing Dr. Glover    Sinus tachycardia  Patient is a 31 y.o. female who presents for follow-up of IST and palpitations. Patient has a history of IST.  She had an EPS that did not define any significant pathology She had a TTE which was structurally normal.  She actually saw Dr. Glover.  He continued her on her home medications and asked her to follow up in 6-8 months.  She felt this was too far, so she presented last time for second opinion.  She states she has had a fast heart rate for several years. She has increased cardiac awareness. She had a holter with sinus tachycardia (I do not have these records). She was tried on low-dose Corlanor. She tells me this had little effect. She was on Flecainide with up-titration, but this did not help. She is now on Toprol XL with little effect. She does snore when she sleeps. No witnessed apnea episodes. She has not had a sleep study. She does endorse dizziness, lightheadedness and episodes of syncope. There are preceding symptoms.  At her last appointment, I recommended a repeat Holter.  This showed episodes of sinus tachycardia.  I also recommended a tilt table.  This was consistent with OH.     HTN  Concerning the patient's hypertension, the patient is currently taking Toprol and Spironolactone.  The patient is medication compliant.  Denies side effects.  Patient's laboratory evaluations are followed closely by the PCP.      The following portions of the patient's history were reviewed and updated as appropriate: allergies, current medications, past family history, past medical history, past social history, past surgical history and problem list.    Review of Systems - History obtained from chart review and the patient  General ROS: negative  Respiratory ROS: positive for - shortness  of breath  Cardiovascular ROS: positive for - palpitations    family history includes Asthma in her father; Diabetes in her father; Heart disease in her father and mother; Hypertension in her father.     reports that she has quit smoking. She has never used smokeless tobacco. She reports that she does not drink alcohol or use illicit drugs.    Allergies   Allergen Reactions   • Apriso [Mesalamine Er]    • Tramadol          Current Outpatient Prescriptions:   •  albuterol (PROVENTIL HFA;VENTOLIN HFA) 108 (90 BASE) MCG/ACT inhaler, Inhale 2 puffs every 4 (four) hours as needed for wheezing., Disp: , Rfl:   •  HYDROcodone-acetaminophen (NORCO) 7.5-325 MG per tablet, Take 1 tablet by mouth 4 (Four) Times a Day for 30 days., Disp: 120 tablet, Rfl: 0  •  lisinopril (PRINIVIL,ZESTRIL) 10 MG tablet, Take 1 tablet by mouth Daily., Disp: 30 tablet, Rfl: 11  •  metFORMIN (GLUCOPHAGE) 500 MG tablet, Take 500 mg by mouth 2 (two) times a day with meals., Disp: , Rfl:   •  omeprazole (priLOSEC) 40 MG capsule, , Disp: , Rfl:   •  prazosin (MINIPRESS) 5 MG capsule, , Disp: , Rfl:   •  QUEtiapine (SEROquel) 300 MG tablet, , Disp: , Rfl:   •  QUEtiapine (SEROquel) 400 MG tablet, Take 1 tablet by mouth Daily., Disp: , Rfl:   •  sertraline (ZOLOFT) 50 MG tablet, , Disp: , Rfl:   •  spironolactone (ALDACTONE) 25 MG tablet, TAKE ONE TABLET BY MOUTH DAILY, Disp: 30 tablet, Rfl: 6  •  tiZANidine (ZANAFLEX) 4 MG tablet, , Disp: , Rfl:   •  traZODone (DESYREL) 150 MG tablet, Take 150 mg by mouth. 2 tab nightly, Disp: , Rfl:   •  propranolol LA (INDERAL LA) 80 MG 24 hr capsule, Take 1 capsule by mouth Daily., Disp: 31 capsule, Rfl: 3    Physical Exam:  Vitals:    02/28/17 1455   BP: 129/89   Pulse: (!) 130   SpO2: 98%     Body mass index is 36.88 kg/(m^2).    GEN: alert, appears stated age and cooperative  Body Habitus: obese  HEENT: Head: Normocephalic, no lesions, without obvious abnormality.  Neck / Thyroid: Supple, no masses, nodes,  nodules or enlargement. No arcus senilis, xanthelasma or xanthomas. PERRL. Normal external ears. No drainage. No thyromegaly. Neck supple. No LAD. Trachea midline. Nose, normal.  JVP: 6 cm of water at 45 degrees HJR: absent      Carotid:  Upstroke: easily palpated bilaterally Volume: Normal.    Carotid Bruit:  None  Subclavian Bruit: Not present.    Lymph: No overt LAD.   Back: Normal.  Chest:  Normal Excursion: Good    I:E: Good  Pulmonary:clear to auscultation, no wheezes, rales or rhonchi, symmetric air entry. Equal chest excursion. Chest physical exam is normal. No tenderness.        Precordium:  No palpable heaves or thrusts. P2 is not palpable.   Haworth:  normal size and placement Palpable S4: Not present.   Heart rate: normal  Heart Rhythm: regular     Heart Sounds: S1: normal intensity  S2: normal intensity  S3: absent   S4: absent  Opening Snap: Absent  A2-OS:  N/A  Pericardial rub: absent    Ejection click: None      Murmurs: Systolic: none  Diastolic: none  Abdomen; S/NT/ND. (+) BS. I did not appreciate the abdominal aorta. No abdominal bruits.   Extremity: no edema, cyanosis  Trophic changes: None   Pallor on elevation: Absent.    Rubor on dependency: None  Neuro: CN II-XII grossly intact.   Skin: Normal.  Pulses: Right radial artery has 2+ (normal) pulse, Left radial artery has 2+ (normal) pulse, Right posterior tibial artery has 2+ (normal) pulse and Left posterior tibial artery has 2+ (normal) pulse      DATA REVIEWED:     Holter, 2017  · Predominant rhythm sinus tavhycardia with elevated average heart rate in the 109  · Normal burden of ventricular and supraventricular ectopic events  · Symptoms correlated with sinus mechanism      TTE, 2017  · Left ventricular function is normal.  · Estimated EF appears to be in the range of 61 - 65%.  · Left ventricular diastolic function is normal.  · RV is normal in size and shape with normal systolic function  · No evidence of pulmonary hypertension  · No  significant valvular abnormality.      DENI    · DENI test was performed.  · Tilt table was abnormal and consistent with orthostatic hypotension  · Excessive heart rate rise          T-CTA THORAX/PULMONARY W/CONTR Patient Name- JE LANGE   Patient Name-  JE LANGE  Patient ID-  [id number]   Ordering-  NATHAN BURRIS MD  Attending-  DR TEMP  Referring-       ------------------------------------------------     PROCEDURE- CT -CTA Thorax/PE with contrast      REASON FOR EXAM- chest pain     FINDINGS- Comparison study dated  January 23, 2014. Axial computer  tomography sequential imaging was performed from the thoracic inlet  through the diaphragms after administration of IV contrast .3-D chest  sagittal and coronal reformates was performed.      The pulmonary arteries appear normal. No filling defect is identified  to suggest pulmonary embolus. Mediastinal structures of the chest are  normal. No lymphadenopathy or pericardial effusion. Lungs are clear.  Pleural spaces are normal. Visualized upper abdominal structures are  normal. No acute osseous abnormality..     IMPRESSION-   1.No evidence of pulmonary embolus..  2.Unremarkable CT chest study..      3/2016: EPS  1. Normal sinus node recovery time. 2. Antegrade normal AH and HV timing. 3. Antegrade AV node Wenckebach cycle length was approximately 310 milliseconds. 4. Retrograde VA conduction was decremental, midline, and concentric in nature. 5. No evidence of any preexcitation as described above. 6. No tachycardia was induced while pacing from the high right atrium, coronary sinus, and from the right ventricular apex with the protocol as described above at baseline and after starting the patient on IV atropine and epinephrine. 7. Periodic IV heparin was given. 8. Throughout the procedure, patient's heart rate, blood pressure, and saturation were monitored. There was no immediate complication and stable upon discharge to recovery.        Adult ECG  Report     Name: Chelita Rosenbaum   Age: 31 y.o.   Gender: female       Rate: 135   Rhythm: sinus tachycardia   QRS Axis: -23   MS Interval: wnl   QRS Duration: 78   QTc: 552   Voltages: wnl   Conduction Disturbances: none   Other Abnormalities: Minimal criteria for LVH     Narrative Interpretation: Performed and interpreted today. No prior. Sinus tach.     Assessment/Plan     1. Sinus tachycardia with palpitations. Reportedly normal EPS. Holter with ST. DENI consistent with OH, but not POTS.   -Start Inderal  -I recommended she see sleep medicine and have an evaluation of EVELYN    2. OH: Abnormal DENI: She has a mixed picture. There may be an element of POTS.   Because of the patient's postural hypotension and orthostasis, the following information was provided :    *Avoid standing for long periods. If you must stand, make sure that you keep your feet moving to help increase circulation  *Get up slowly from either sitting or lying down (this is especially important when you are getting out of the bath)  *Eat regularly. Avoid long periods between meals; it is better to snack throughout the day  *Avoid hot baths or showers  *Wear loose, comfortable clothing to avoid restricting circulation    If the patient were to feel faint, the following was provided:  sit or lie down and lower your head  take deep breaths  loosen any tight clothing  open windows and move towards circulating air  eat foods rich in iron  -Prescription for compression stockings was provided    3. HTN, essential.  BP noted to be well controlled today in office.  I discussed that Prazosin may worsen her symptoms.   DASH; medication compliance  Continue current medications; Lisinopril 10mg, Aldactone.     3. Cardiac Risk Assessment: Low  No current indication for aspirin or statin  Check labs    4. Tobacco status: Non-smoker      Plan for follow-up:  One month with Meron; 6 months with me

## 2017-03-14 PROBLEM — R40.0 SOMNOLENCE, DAYTIME: Status: ACTIVE | Noted: 2017-03-14

## 2017-03-14 PROBLEM — G47.33 OBSTRUCTIVE SLEEP APNEA (ADULT) (PEDIATRIC): Status: ACTIVE | Noted: 2017-03-14

## 2017-03-14 PROBLEM — R06.83 SNORING: Status: ACTIVE | Noted: 2017-03-14

## 2017-03-14 PROBLEM — G25.81 RESTLESS LEG SYNDROME: Status: ACTIVE | Noted: 2017-03-14

## 2017-03-29 ENCOUNTER — OFFICE VISIT (OUTPATIENT)
Dept: CARDIOLOGY | Facility: CLINIC | Age: 32
End: 2017-03-29

## 2017-03-29 VITALS
BODY MASS INDEX: 37.95 KG/M2 | OXYGEN SATURATION: 98 % | SYSTOLIC BLOOD PRESSURE: 132 MMHG | DIASTOLIC BLOOD PRESSURE: 80 MMHG | HEIGHT: 64 IN | HEART RATE: 120 BPM | WEIGHT: 222.31 LBS

## 2017-03-29 DIAGNOSIS — R42 VERTIGO: ICD-10-CM

## 2017-03-29 DIAGNOSIS — R00.0 TACHYCARDIA: Primary | ICD-10-CM

## 2017-03-29 DIAGNOSIS — E78.2 MIXED HYPERLIPIDEMIA: ICD-10-CM

## 2017-03-29 DIAGNOSIS — J45.30 MILD PERSISTENT ASTHMA WITHOUT COMPLICATION: ICD-10-CM

## 2017-03-29 DIAGNOSIS — I95.1 ORTHOSTATIC HYPOTENSION: ICD-10-CM

## 2017-03-29 PROBLEM — J45.909 ASTHMA: Status: ACTIVE | Noted: 2017-03-29

## 2017-03-29 PROBLEM — E78.5 HLD (HYPERLIPIDEMIA): Status: ACTIVE | Noted: 2017-03-29

## 2017-03-29 PROBLEM — G47.33 OSA (OBSTRUCTIVE SLEEP APNEA): Status: ACTIVE | Noted: 2017-03-29

## 2017-03-29 PROBLEM — E28.2 PCOS (POLYCYSTIC OVARIAN SYNDROME): Status: ACTIVE | Noted: 2017-03-29

## 2017-03-29 PROBLEM — I10 BENIGN ESSENTIAL HTN: Status: ACTIVE | Noted: 2017-03-29

## 2017-03-29 PROCEDURE — 99214 OFFICE O/P EST MOD 30 MIN: CPT | Performed by: NURSE PRACTITIONER

## 2017-03-29 RX ORDER — MECLIZINE HYDROCHLORIDE 25 MG/1
25 TABLET ORAL 3 TIMES DAILY PRN
Qty: 90 TABLET | Refills: 1 | Status: SHIPPED | OUTPATIENT
Start: 2017-03-29 | End: 2022-05-18 | Stop reason: SDUPTHER

## 2017-03-29 RX ORDER — PRAVASTATIN SODIUM 10 MG
10 TABLET ORAL DAILY
Qty: 30 TABLET | Refills: 3 | Status: SHIPPED | OUTPATIENT
Start: 2017-03-29 | End: 2017-10-23 | Stop reason: SDUPTHER

## 2017-03-29 RX ORDER — PROPRANOLOL HYDROCHLORIDE 80 MG/1
80 TABLET ORAL 2 TIMES DAILY
Qty: 60 TABLET | Refills: 3 | Status: SHIPPED | OUTPATIENT
Start: 2017-03-29 | End: 2017-10-23 | Stop reason: CLARIF

## 2017-03-29 NOTE — PROGRESS NOTES
Subjective:     Follow-up (chief complaint)  Vertigo, tachycardia    Dizziness   This is a chronic problem. The current episode started more than 1 year ago. The problem occurs every several days. The problem has been gradually worsening. Associated symptoms include fatigue, headaches, nausea, vertigo and vomiting. Pertinent negatives include no abdominal pain, change in bowel habit, chest pain, chills, coughing, fever, myalgias, rash or weakness. The symptoms are aggravated by bending, exertion, walking and standing (riding in car, motion sickness). She has tried position changes, rest, lying down and drinking for the symptoms. The treatment provided mild relief.     Sinus tachycardia  Patient is a 31 y.o. female who presents for follow-up of IST and palpitations. Patient has a history of IST.  She had an EPS that did not define any significant pathology. She had a TTE which was structurally normal.  She actually saw Dr. Glover.  He continued her on her home medications and asked her to follow up in 6-8 months.  She felt this was too far, and is seeing Dr. Amin for a second opinion. She states she has had a fast heart rate for several years. She has increased cardiac awareness. She was tried on low-dose Corlanor. She tells me this had little effect. She was on Flecainide with up-titration, but this did not help. She tried Toprol XL with little effect. She has tried Labetolol. Sleep study scheduled.   She does endorse dizziness, lightheadedness and episodes of syncope. There are preceding symptoms.    At her last appointment, Dr. Amin repeated a Holter.  This showed episodes of sinus tachycardia.  He also recommended a tilt table. This was consistent with OH.       Review of Systems   Constitution: Positive for fatigue, malaise/fatigue and weight gain. Negative for chills, decreased appetite, fever and weakness.   HENT: Positive for headaches.    Eyes: Negative.    Cardiovascular: Positive for palpitations  and syncope. Negative for chest pain, claudication, dyspnea on exertion, irregular heartbeat and leg swelling.   Respiratory: Positive for sleep disturbances due to breathing and snoring. Negative for cough, shortness of breath and wheezing.    Endocrine: Negative.    Skin: Negative for dry skin, flushing and rash.   Musculoskeletal: Positive for arthritis, back pain and falls. Negative for myalgias.   Gastrointestinal: Positive for nausea and vomiting. Negative for abdominal pain, change in bowel habit and melena.   Genitourinary: Negative for frequency and hematuria.   Neurological: Positive for dizziness, light-headedness, loss of balance and vertigo.   Psychiatric/Behavioral: Negative for altered mental status and memory loss. The patient is not nervous/anxious.        Current Outpatient Prescriptions   Medication Sig Dispense Refill   • albuterol (PROVENTIL HFA;VENTOLIN HFA) 108 (90 BASE) MCG/ACT inhaler Inhale 2 puffs every 4 (four) hours as needed for wheezing.     • lisinopril (PRINIVIL,ZESTRIL) 10 MG tablet Take 1 tablet by mouth Daily. 30 tablet 11   • metFORMIN (GLUCOPHAGE) 500 MG tablet Take 500 mg by mouth 2 (two) times a day with meals.     • omeprazole (priLOSEC) 40 MG capsule      • prazosin (MINIPRESS) 5 MG capsule      • QUEtiapine (SEROquel) 300 MG tablet      • QUEtiapine (SEROquel) 400 MG tablet Take 1 tablet by mouth Daily.     • sertraline (ZOLOFT) 50 MG tablet      • spironolactone (ALDACTONE) 25 MG tablet TAKE ONE TABLET BY MOUTH DAILY 30 tablet 6   • tiZANidine (ZANAFLEX) 4 MG tablet      • traZODone (DESYREL) 150 MG tablet Take 150 mg by mouth. 2 tab nightly     • meclizine (ANTIVERT) 25 MG tablet Take 1 tablet by mouth 3 (Three) Times a Day As Needed for dizziness. 90 tablet 1   • mometasone-formoterol (DULERA 100) 100-5 MCG/ACT inhaler Inhale 2 puffs 2 (Two) Times a Day. 1 inhaler 3   • pravastatin (PRAVACHOL) 10 MG tablet Take 1 tablet by mouth Daily. 30 tablet 3   • propranolol  "(INDERAL) 80 MG tablet Take 1 tablet by mouth 2 (Two) Times a Day. 60 tablet 3     No current facility-administered medications for this visit.         Objective:     Vitals:    03/29/17 1427   BP: 132/80   BP Location: Left arm   Patient Position: Sitting   Cuff Size: Adult   Pulse: 120   SpO2: 98%   Weight: 222 lb 5 oz (101 kg)   Height: 64\" (162.6 cm)        Physical Exam   Constitutional: She is oriented to person, place, and time. She appears well-developed and well-nourished. No distress.   HENT:   Head: Normocephalic.   Neck: No JVD present.   Cardiovascular: Regular rhythm, S1 normal, S2 normal, normal heart sounds and intact distal pulses.  Tachycardia present.    No murmur heard.  Pulmonary/Chest: Effort normal and breath sounds normal. No respiratory distress. She has no wheezes. She has no rales.   Abdominal: Soft. Bowel sounds are normal.   Musculoskeletal: Normal range of motion. She exhibits no edema.   Neurological: She is alert and oriented to person, place, and time.   Skin: Skin is warm and dry. No erythema.   Psychiatric: She has a normal mood and affect. Her behavior is normal. Judgment and thought content normal.     DATA REVIEWED:    Holter, 2017  · Predominant rhythm sinus tavhycardia with elevated average heart rate in the 109  · Normal burden of ventricular and supraventricular ectopic events  · Symptoms correlated with sinus mechanism      TTE, 2017  · Left ventricular function is normal.  · Estimated EF appears to be in the range of 61 - 65%.  · Left ventricular diastolic function is normal.  · RV is normal in size and shape with normal systolic function  · No evidence of pulmonary hypertension  · No significant valvular abnormality.      DENI  · DENI test was performed.  · Tilt table was abnormal and consistent with orthostatic hypotension  · Excessive heart rate rise      T-CTA THORAX/PULMONARY W/CONTR Patient Name- JE LANGE   IMPRESSION-   1.No evidence of pulmonary " embolus..  2.Unremarkable CT chest study..    3/2016: EPS  1. Normal sinus node recovery time.   2. Antegrade normal AH and HV timing.   3. Antegrade AV node Wenckebach cycle length was approximately 310 milliseconds.   4. Retrograde VA conduction was decremental, midline, and concentric in nature.   5. No evidence of any preexcitation as described above.   6. No tachycardia was induced while pacing from the high right atrium, coronary sinus, and from the right ventricular apex with the protocol as described above at baseline and after starting the patient on IV atropine and epinephrine.   7. Periodic IV heparin was given.   8. Throughout the procedure, patient's heart rate, blood pressure, and saturation were monitored. There was no immediate complication and stable upon discharge to recovery.      Adult ECG Report     Name: Chelita Rosenbaum   Age: 31 y.o.   Gender: female   Rate: 135   Rhythm: sinus tachycardia   QRS Axis: -23   HI Interval: wnl   QRS Duration: 78   QTc: 552   Voltages: wnl   Conduction Disturbances: none   Other Abnormalities: Minimal criteria for LVH     Narrative Interpretation: Performed and interpreted today. No prior. Sinus tach.     Lab Review    Ref. Range 1/17/2017 14:59   Glucose Latest Ref Range: 60 - 100 mg/dl 103 (H)   Sodium Latest Ref Range: 137 - 145 mmol/L 140   Potassium Latest Ref Range: 3.5 - 5.1 mmol/L 4.2   CO2 Latest Ref Range: 22 - 31 mmol/L 28   Chloride Latest Ref Range: 95 - 110 mmol/L 100   Anion Gap Latest Ref Range: 5.0 - 15.0 mmol/L 12.0   Creatinine Latest Ref Range: 0.5 - 1.0 mg/dl 0.8   BUN Latest Ref Range: 7 - 21 mg/dl 11   Calcium Latest Ref Range: 8.4 - 10.2 mg/dl 9.4   GFR MDRD Non  Latest Ref Range: 64 - 149 mL/min/1.73 sq.M 83   GFR MDRD  Latest Ref Range: 64 - 149 mL/min/1.73 sq.M 101   Alkaline Phosphatase Latest Ref Range: 38 - 126 U/L 76   Total Protein Latest Ref Range: 6.3 - 8.6 gm/dl 8.3   ALT (SGPT) Latest Ref Range: 9  - 52 U/L 32   AST (SGOT) Latest Ref Range: 14 - 36 U/L 28   Total Bilirubin Latest Ref Range: 0.2 - 1.3 mg/dl 0.5   Albumin Latest Ref Range: 3.4 - 4.8 gm/dl 4.1   Hemoglobin A1C Latest Ref Range: 4.0 - 5.6 %TotHgb 5.4   Total Cholesterol Latest Ref Range: 0 - 199 mg/dl 245 (H)   HDL Cholesterol Latest Ref Range: 60 - 200 mg/dl 38 (L)   LDL Cholesterol  Latest Ref Range: 0 - 129 mg/dl 148 (H)   Triglycerides Latest Ref Range: 20 - 199 mg/dl 296 (H)   WBC Latest Ref Range: 3.2 - 9.8 x1000/uL 10.3 (H)   RBC Latest Ref Range: 3.77 - 5.16 maryellen/mm3 4.63   Hemoglobin Latest Ref Range: 12.0 - 15.5 gm/dl 13.5   Hematocrit Latest Ref Range: 35.0 - 45.0 % 40.1   RDW Latest Ref Range: 11.5 - 14.5 % 12.6   MCV Latest Ref Range: 80.0 - 98.0 fl 86.6   MCH Latest Ref Range: 26.0 - 34.0 pg 29.2   MCHC Latest Ref Range: 31.4 - 36.0 gm/dl 33.7   MPV Latest Ref Range: 8.0 - 12.0 fl 8.9   Platelets Latest Ref Range: 150 - 450 x1000/mm3 338   Neutrophil % Latest Ref Range: 37.0 - 80.0 % 58.0   Lymphocyte % Latest Ref Range: 10.0 - 50.0 % 34.0   Monocyte % Latest Ref Range: 0.0 - 12.0 % 6.4   Eosinophil % Latest Ref Range: 0.0 - 7.0 % 0.7   Basophil % Latest Ref Range: 0.0 - 2.0 % 0.5   Immature Grans % Latest Ref Range: 0.00 - 0.50 % 0.40   Neutrophils, Absolute Latest Ref Range: 2.00 - 8.60 x1000/uL 5.96   Lymphocytes, Absolute Latest Ref Range: 0.60 - 4.20 x1000/uL 3.50   Monocytes, Absolute Latest Ref Range: 0.00 - 0.90 x1000/uL 0.66   Eosinophils, Absolute Latest Ref Range: 0.00 - 0.70 x1000/uL 0.07   Basophils, Absolute Latest Ref Range: 0.00 - 0.20 x1000/uL 0.05   Immature Grans, Absolute Latest Ref Range: 0.005 - 0.022 x1000/uL 0.040 (H)   Creatinine, Urine Latest Units: mg/dl 297.6   Microalbumin/Creatinine Ratio Latest Ref Range: 0 - 30 mg/g 11     The following portions of the patient's history were reviewed and updated as appropriate: allergies, current medications, past family history, past medical history, past social  history, past surgical history and problem list.     Assessment/Plan:        Diagnosis Plan   1. Tachycardia  Sinus. Chronic.   Instructed on the importance of weight loss and conditioning of the heart. Explained the effect that persistent tachycardia can have on the heart muscle.   Increased Propranolol to 80mg BID (d/c the ER capsule)  She has shown resistance to many other rate lowering medications.      2. Orthostatic hypotension  Reviewed with her Dr. Amin's instructions outlined last visit. She is following. Hydration, slow position changes, mindful of bath time, warm weather, and medications that can drop BP quickly.     3. Vertigo  Dizziness and other symptoms provided very consistent with Vertigo. Will try Meclizine 25mg TID PRN to see if any improvement. She was given printed off instructions on head/tilt exercises to improve vertigo symptoms. If symptoms persist, she could benefit from an ENT consult.      4. Mild persistent asthma without complication  She is using Albuterol inhaler daily. Asthma symptoms are now persistent. Rx for low dose Dulera 2puffs BID. Use albuterol inhaler sparingly. May need to change out for Symbicort if no improvement. Defer any needed changes to Dr. Loredo       5. Mixed hyperlipidemia  Dyslipidemia under unsatisfactory control.  Statin:  Yes. Not actually recommended due to age and low cardiac risk. However, she is unable to commit to diet and exercise changes to lower levels. She is requesting Statin Therapy.  Recommended low-intensity statin therapy  Lipid-lowering medications: Pravastatin 10mg QHS         Follow up in one month. Will see sooner if needed for symptoms management. Ms. Rosenbaum uses My Chart and will communicate any needs.

## 2017-03-29 NOTE — PATIENT INSTRUCTIONS
Vertigo  Vertigo is the feeling that you or your surroundings are moving when they are not. Vertigo can be dangerous if it occurs while you are doing something that could endanger you or others, such as driving.  CAUSES  This condition is caused by a disturbance in the signals that are sent by your body's sensory systems to your brain. Different causes of a disturbance can lead to vertigo, including:  · Infections, especially in the inner ear.  · A bad reaction to a drug, or misuse of alcohol and medicines.  · Withdrawal from drugs or alcohol.  · Quickly changing positions, as when lying down or rolling over in bed.  · Migraine headaches.  · Decreased blood flow to the brain.  · Decreased blood pressure.  · Increased pressure in the brain from a head or neck injury, stroke, infection, tumor, or bleeding.  · Central nervous system disorders.  SYMPTOMS  Symptoms of this condition usually occur when you move your head or your eyes in different directions. Symptoms may start suddenly, and they usually last for less than a minute. Symptoms may include:  · Loss of balance and falling.  · Feeling like you are spinning or moving.  · Feeling like your surroundings are spinning or moving.  · Nausea and vomiting.  · Blurred vision or double vision.  · Difficulty hearing.  · Slurred speech.  · Dizziness.  · Involuntary eye movement (nystagmus).  Symptoms can be mild and cause only slight annoyance, or they can be severe and interfere with daily life. Episodes of vertigo may return (recur) over time, and they are often triggered by certain movements. Symptoms may improve over time.  DIAGNOSIS  This condition may be diagnosed based on medical history and the quality of your nystagmus. Your health care provider may test your eye movements by asking you to quickly change positions to trigger the nystagmus. This may be called the Jessica-Hallpike test, head thrust test, or roll test. You may be referred to a health care provider who  specializes in ear, nose, and throat (ENT) problems (otolaryngologist) or a provider who specializes in disorders of the central nervous system (neurologist).  You may have additional testing, including:  · A physical exam.  · Blood tests.  · MRI.  · A CT scan.  · An electrocardiogram (ECG). This records electrical activity in your heart.  · An electroencephalogram (EEG). This records electrical activity in your brain.  · Hearing tests.  TREATMENT  Treatment for this condition depends on the cause and the severity of the symptoms. Treatment options include:  · Medicines to treat nausea or vertigo. These are usually used for severe cases. Some medicines that are used to treat other conditions may also reduce or eliminate vertigo symptoms. These include:    Medicines that control allergies (antihistamines).    Medicines that control seizures (anticonvulsants).    Medicines that relieve depression (antidepressants).    Medicines that relieve anxiety (sedatives).  · Head movements to adjust your inner ear back to normal. If your vertigo is caused by an ear problem, your health care provider may recommend certain movements to correct the problem.  · Surgery. This is rare.  HOME CARE INSTRUCTIONS  Safety   · Move slowly. Avoid sudden body or head movements.  · Avoid driving.  · Avoid operating heavy machinery.  · Avoid doing any tasks that would cause danger to you or others if you would have a vertigo episode during the task.  · If you have trouble walking or keeping your balance, try using a cane for stability. If you feel dizzy or unstable, sit down right away.  · Return to your normal activities as told by your health care provider. Ask your health care provider what activities are safe for you.  General Instructions   · Take over-the-counter and prescription medicines only as told by your health care provider.  · Avoid certain positions or movements as told by your health care provider.  · Drink enough fluid to keep  "your urine clear or pale yellow.  · Keep all follow-up visits as told by your health care provider. This is important.  SEEK MEDICAL CARE IF:  · Your medicines do not relieve your vertigo or they make it worse.  · You have a fever.  · Your condition gets worse or you develop new symptoms.  · Your family or friends notice any behavioral changes.  · Your nausea or vomiting gets worse.  · You have numbness or a \"pins and needles\" sensation in part of your body.  SEEK IMMEDIATE MEDICAL CARE IF:  · You have difficulty moving or speaking.  · You are always dizzy.  · You faint.  · You develop severe headaches.  · You have weakness in your hands, arms, or legs.  · You have changes in your hearing or vision.  · You develop a stiff neck.  · You develop sensitivity to light.     This information is not intended to replace advice given to you by your health care provider. Make sure you discuss any questions you have with your health care provider.     Document Released: 09/27/2006 Document Revised: 01/13/2017 Document Reviewed: 04/11/2016  [employer] Interactive Patient Education ©2016 [employer]  Benign Positional Vertigo  Vertigo is the feeling that you or your surroundings are moving when they are not. Benign positional vertigo is the most common form of vertigo. The cause of this condition is not serious (is benign). This condition is triggered by certain movements and positions (is positional). This condition can be dangerous if it occurs while you are doing something that could endanger you or others, such as driving.   CAUSES  In many cases, the cause of this condition is not known. It may be caused by a disturbance in an area of the inner ear that helps your brain to sense movement and balance. This disturbance can be caused by a viral infection (labyrinthitis), head injury, or repetitive motion.  RISK FACTORS  This condition is more likely to develop in:  · Women.  · People who are 50 years of age or " older.  SYMPTOMS  Symptoms of this condition usually happen when you move your head or your eyes in different directions. Symptoms may start suddenly, and they usually last for less than a minute. Symptoms may include:  · Loss of balance and falling.  · Feeling like you are spinning or moving.  · Feeling like your surroundings are spinning or moving.  · Nausea and vomiting.  · Blurred vision.  · Dizziness.  · Involuntary eye movement (nystagmus).  Symptoms can be mild and cause only slight annoyance, or they can be severe and interfere with daily life. Episodes of benign positional vertigo may return (recur) over time, and they may be triggered by certain movements. Symptoms may improve over time.  DIAGNOSIS  This condition is usually diagnosed by medical history and a physical exam of the head, neck, and ears. You may be referred to a health care provider who specializes in ear, nose, and throat (ENT) problems (otolaryngologist) or a provider who specializes in disorders of the nervous system (neurologist). You may have additional testing, including:  · MRI.  · A CT scan.  · Eye movement tests. Your health care provider may ask you to change positions quickly while he or she watches you for symptoms of benign positional vertigo, such as nystagmus. Eye movement may be tested with an electronystagmogram (ENG), caloric stimulation, the Jessica-Hallpike test, or the roll test.  · An electroencephalogram (EEG). This records electrical activity in your brain.  · Hearing tests.  TREATMENT  Usually, your health care provider will treat this by moving your head in specific positions to adjust your inner ear back to normal. Surgery may be needed in severe cases, but this is rare. In some cases, benign positional vertigo may resolve on its own in 2-4 weeks.  HOME CARE INSTRUCTIONS  Safety  · Move slowly. Avoid sudden body or head movements.  · Avoid driving.  · Avoid operating heavy machinery.  · Avoid doing any tasks that would  "be dangerous to you or others if a vertigo episode would occur.  · If you have trouble walking or keeping your balance, try using a cane for stability. If you feel dizzy or unstable, sit down right away.  · Return to your normal activities as told by your health care provider. Ask your health care provider what activities are safe for you.  General Instructions  · Take over-the-counter and prescription medicines only as told by your health care provider.  · Avoid certain positions or movements as told by your health care provider.  · Drink enough fluid to keep your urine clear or pale yellow.  · Keep all follow-up visits as told by your health care provider. This is important.  SEEK MEDICAL CARE IF:  · You have a fever.  · Your condition gets worse or you develop new symptoms.  · Your family or friends notice any behavioral changes.  · Your nausea or vomiting gets worse.  · You have numbness or a \"pins and needles\" sensation.  SEEK IMMEDIATE MEDICAL CARE IF:  · You have difficulty speaking or moving.  · You are always dizzy.  · You faint.  · You develop severe headaches.  · You have weakness in your legs or arms.  · You have changes in your hearing or vision.  · You develop a stiff neck.  · You develop sensitivity to light.     This information is not intended to replace advice given to you by your health care provider. Make sure you discuss any questions you have with your health care provider.     Document Released: 09/25/2007 Document Revised: 09/07/2016 Document Reviewed: 04/11/2016  [employer] Interactive Patient Education ©2016 [employer]    "

## 2017-04-16 LAB
BH CV XLRA MEAS LEFT DIST CCA EDV: 28 CM/SEC
BH CV XLRA MEAS LEFT DIST CCA PSV: 79 CM/SEC
BH CV XLRA MEAS LEFT DIST ICA EDV: 28 CM/SEC
BH CV XLRA MEAS LEFT DIST ICA PSV: 61 CM/SEC
BH CV XLRA MEAS LEFT MID ICA EDV: 33 CM/SEC
BH CV XLRA MEAS LEFT MID ICA PSV: 63 CM/SEC
BH CV XLRA MEAS LEFT PROX CCA EDV: 29 CM/SEC
BH CV XLRA MEAS LEFT PROX CCA PSV: 99 CM/SEC
BH CV XLRA MEAS LEFT PROX ECA EDV: 16 CM/SEC
BH CV XLRA MEAS LEFT PROX ECA PSV: 92 CM/SEC
BH CV XLRA MEAS LEFT PROX ICA EDV: 24 CM/SEC
BH CV XLRA MEAS LEFT PROX ICA PSV: 56 CM/SEC
BH CV XLRA MEAS LEFT VERTEBRAL A EDV: 11 CM/SEC
BH CV XLRA MEAS LEFT VERTEBRAL A PSV: 44 CM/SEC
BH CV XLRA MEAS RIGHT DIST CCA EDV: 26 CM/SEC
BH CV XLRA MEAS RIGHT DIST CCA PSV: 84 CM/SEC
BH CV XLRA MEAS RIGHT DIST ICA EDV: 40 CM/SEC
BH CV XLRA MEAS RIGHT DIST ICA PSV: 78 CM/SEC
BH CV XLRA MEAS RIGHT MID ICA EDV: 32 CM/SEC
BH CV XLRA MEAS RIGHT MID ICA PSV: 67 CM/SEC
BH CV XLRA MEAS RIGHT PROX CCA EDV: 24 CM/SEC
BH CV XLRA MEAS RIGHT PROX CCA PSV: 93 CM/SEC
BH CV XLRA MEAS RIGHT PROX ECA EDV: 13 CM/SEC
BH CV XLRA MEAS RIGHT PROX ECA PSV: 81 CM/SEC
BH CV XLRA MEAS RIGHT PROX ICA EDV: 38 CM/SEC
BH CV XLRA MEAS RIGHT PROX ICA PSV: 64 CM/SEC
BH CV XLRA MEAS RIGHT VERTEBRAL A EDV: 27 CM/SEC
BH CV XLRA MEAS RIGHT VERTEBRAL A PSV: 64 CM/SEC

## 2017-05-03 ENCOUNTER — HOSPITAL ENCOUNTER (OUTPATIENT)
Dept: PULMONOLOGY | Facility: HOSPITAL | Age: 32
Discharge: HOME | End: 2017-05-03
Admitting: NURSE PRACTITIONER

## 2017-05-03 ENCOUNTER — OFFICE VISIT (OUTPATIENT)
Dept: PAIN MEDICINE | Facility: CLINIC | Age: 32
End: 2017-05-03

## 2017-05-03 ENCOUNTER — APPOINTMENT (OUTPATIENT)
Dept: LAB | Facility: HOSPITAL | Age: 32
End: 2017-05-03

## 2017-05-03 ENCOUNTER — OFFICE VISIT (OUTPATIENT)
Dept: CARDIOLOGY | Facility: CLINIC | Age: 32
End: 2017-05-03

## 2017-05-03 VITALS
WEIGHT: 222.1 LBS | SYSTOLIC BLOOD PRESSURE: 132 MMHG | HEIGHT: 64 IN | BODY MASS INDEX: 37.92 KG/M2 | DIASTOLIC BLOOD PRESSURE: 74 MMHG

## 2017-05-03 VITALS
SYSTOLIC BLOOD PRESSURE: 132 MMHG | HEIGHT: 64 IN | DIASTOLIC BLOOD PRESSURE: 74 MMHG | OXYGEN SATURATION: 98 % | BODY MASS INDEX: 37.95 KG/M2 | HEART RATE: 126 BPM | WEIGHT: 222.31 LBS

## 2017-05-03 DIAGNOSIS — I95.1 ORTHOSTATIC HYPOTENSION: ICD-10-CM

## 2017-05-03 DIAGNOSIS — R00.0 TACHYCARDIA: Primary | ICD-10-CM

## 2017-05-03 DIAGNOSIS — J45.30 MILD PERSISTENT ASTHMA WITHOUT COMPLICATION: ICD-10-CM

## 2017-05-03 DIAGNOSIS — R42 VERTIGO: ICD-10-CM

## 2017-05-03 DIAGNOSIS — I10 BENIGN ESSENTIAL HTN: ICD-10-CM

## 2017-05-03 DIAGNOSIS — M51.36 DDD (DEGENERATIVE DISC DISEASE), LUMBAR: ICD-10-CM

## 2017-05-03 DIAGNOSIS — M46.1 SACROILIITIS (HCC): ICD-10-CM

## 2017-05-03 DIAGNOSIS — Z79.899 HIGH RISK MEDICATIONS (NOT ANTICOAGULANTS) LONG-TERM USE: ICD-10-CM

## 2017-05-03 DIAGNOSIS — M47.817 LUMBOSACRAL SPONDYLOSIS WITHOUT MYELOPATHY: Primary | ICD-10-CM

## 2017-05-03 DIAGNOSIS — G25.81 RLS (RESTLESS LEGS SYNDROME): ICD-10-CM

## 2017-05-03 DIAGNOSIS — J45.20 MILD INTERMITTENT ASTHMA WITHOUT COMPLICATION: ICD-10-CM

## 2017-05-03 PROBLEM — G89.29 CHRONIC PAIN: Status: ACTIVE | Noted: 2017-05-03

## 2017-05-03 PROCEDURE — 94729 DIFFUSING CAPACITY: CPT

## 2017-05-03 PROCEDURE — 94727 GAS DIL/WSHOT DETER LNG VOL: CPT | Performed by: INTERNAL MEDICINE

## 2017-05-03 PROCEDURE — 94729 DIFFUSING CAPACITY: CPT | Performed by: INTERNAL MEDICINE

## 2017-05-03 PROCEDURE — 94060 EVALUATION OF WHEEZING: CPT

## 2017-05-03 PROCEDURE — 99214 OFFICE O/P EST MOD 30 MIN: CPT | Performed by: NURSE PRACTITIONER

## 2017-05-03 PROCEDURE — G0481 DRUG TEST DEF 8-14 CLASSES: HCPCS | Performed by: PAIN MEDICINE

## 2017-05-03 PROCEDURE — 99214 OFFICE O/P EST MOD 30 MIN: CPT | Performed by: PAIN MEDICINE

## 2017-05-03 PROCEDURE — 80307 DRUG TEST PRSMV CHEM ANLYZR: CPT | Performed by: PAIN MEDICINE

## 2017-05-03 PROCEDURE — 94727 GAS DIL/WSHOT DETER LNG VOL: CPT

## 2017-05-03 PROCEDURE — 94060 EVALUATION OF WHEEZING: CPT | Performed by: INTERNAL MEDICINE

## 2017-05-03 RX ORDER — HYDROCODONE BITARTRATE AND ACETAMINOPHEN 7.5; 325 MG/1; MG/1
1 TABLET ORAL EVERY 6 HOURS PRN
COMMUNITY
End: 2019-04-11

## 2017-05-03 RX ORDER — HYDROCODONE BITARTRATE AND ACETAMINOPHEN 7.5; 325 MG/1; MG/1
1 TABLET ORAL 4 TIMES DAILY
Qty: 120 TABLET | Refills: 0 | Status: SHIPPED | OUTPATIENT
Start: 2017-05-03 | End: 2017-06-02

## 2017-05-03 RX ORDER — PRAMIPEXOLE DIHYDROCHLORIDE 0.25 MG/1
0.12 TABLET ORAL NIGHTLY
Qty: 30 TABLET | Refills: 3 | Status: SHIPPED | OUTPATIENT
Start: 2017-05-03 | End: 2017-10-23 | Stop reason: SDUPTHER

## 2017-05-03 RX ORDER — DILTIAZEM HYDROCHLORIDE 120 MG/1
120 CAPSULE, COATED, EXTENDED RELEASE ORAL DAILY
Qty: 30 CAPSULE | Refills: 6 | Status: SHIPPED | OUTPATIENT
Start: 2017-05-03 | End: 2017-10-23 | Stop reason: ALTCHOICE

## 2017-05-11 ENCOUNTER — APPOINTMENT (OUTPATIENT)
Dept: INTERVENTIONAL RADIOLOGY/VASCULAR | Facility: HOSPITAL | Age: 32
End: 2017-05-11
Attending: PAIN MEDICINE

## 2017-05-11 ENCOUNTER — HOSPITAL ENCOUNTER (OUTPATIENT)
Dept: INTERVENTIONAL RADIOLOGY/VASCULAR | Facility: HOSPITAL | Age: 32
Discharge: HOME | End: 2017-05-11
Attending: PAIN MEDICINE | Admitting: PAIN MEDICINE

## 2017-05-11 VITALS
DIASTOLIC BLOOD PRESSURE: 88 MMHG | RESPIRATION RATE: 20 BRPM | SYSTOLIC BLOOD PRESSURE: 123 MMHG | OXYGEN SATURATION: 97 % | HEART RATE: 107 BPM

## 2017-05-11 DIAGNOSIS — M47.817 LUMBOSACRAL SPONDYLOSIS WITHOUT MYELOPATHY: ICD-10-CM

## 2017-05-11 LAB — CONV REPORT SUMMARY: NORMAL

## 2017-05-11 PROCEDURE — 64636 DESTROY L/S FACET JNT ADDL: CPT | Performed by: PAIN MEDICINE

## 2017-05-11 PROCEDURE — 25010000002 METHYLPREDNISOLONE PER 40 MG: Performed by: PAIN MEDICINE

## 2017-05-11 PROCEDURE — 64635 DESTROY LUMB/SAC FACET JNT: CPT | Performed by: PAIN MEDICINE

## 2017-05-11 RX ORDER — BUPIVACAINE HYDROCHLORIDE 5 MG/ML
INJECTION, SOLUTION PERINEURAL
Status: DISCONTINUED | OUTPATIENT
Start: 2017-05-11 | End: 2017-05-12 | Stop reason: HOSPADM

## 2017-05-11 RX ORDER — METHYLPREDNISOLONE ACETATE 40 MG/ML
INJECTION, SUSPENSION INTRA-ARTICULAR; INTRALESIONAL; INTRAMUSCULAR; SOFT TISSUE
Status: DISCONTINUED | OUTPATIENT
Start: 2017-05-11 | End: 2017-05-12 | Stop reason: HOSPADM

## 2017-05-11 RX ORDER — LIDOCAINE HYDROCHLORIDE 10 MG/ML
INJECTION, SOLUTION INFILTRATION; PERINEURAL
Status: DISCONTINUED | OUTPATIENT
Start: 2017-05-11 | End: 2017-05-12 | Stop reason: HOSPADM

## 2017-05-11 RX ADMIN — METHYLPREDNISOLONE ACETATE 24 MG: 40 INJECTION, SUSPENSION INTRA-ARTICULAR; INTRALESIONAL; INTRAMUSCULAR; SOFT TISSUE at 13:41

## 2017-05-11 RX ADMIN — LIDOCAINE HYDROCHLORIDE 12 ML: 10 INJECTION, SOLUTION INFILTRATION; PERINEURAL at 13:40

## 2017-05-11 RX ADMIN — BUPIVACAINE HYDROCHLORIDE 6 ML: 5 INJECTION, SOLUTION PERINEURAL at 13:41

## 2017-06-27 ENCOUNTER — OFFICE VISIT (OUTPATIENT)
Dept: PAIN MEDICINE | Facility: CLINIC | Age: 32
End: 2017-06-27

## 2017-06-27 VITALS
SYSTOLIC BLOOD PRESSURE: 122 MMHG | DIASTOLIC BLOOD PRESSURE: 84 MMHG | HEIGHT: 64 IN | WEIGHT: 221.2 LBS | BODY MASS INDEX: 37.76 KG/M2

## 2017-06-27 DIAGNOSIS — G89.29 CHRONIC LOW BACK PAIN, UNSPECIFIED BACK PAIN LATERALITY, WITH SCIATICA PRESENCE UNSPECIFIED: ICD-10-CM

## 2017-06-27 DIAGNOSIS — M51.36 DDD (DEGENERATIVE DISC DISEASE), LUMBAR: Primary | ICD-10-CM

## 2017-06-27 DIAGNOSIS — M47.817 LUMBOSACRAL SPONDYLOSIS WITHOUT MYELOPATHY: ICD-10-CM

## 2017-06-27 DIAGNOSIS — M54.5 CHRONIC LOW BACK PAIN, UNSPECIFIED BACK PAIN LATERALITY, WITH SCIATICA PRESENCE UNSPECIFIED: ICD-10-CM

## 2017-06-27 DIAGNOSIS — Z79.891 LONG TERM (CURRENT) USE OF OPIATE ANALGESIC: ICD-10-CM

## 2017-06-27 DIAGNOSIS — M79.18 MYOFACIAL MUSCLE PAIN: ICD-10-CM

## 2017-06-27 PROCEDURE — 99213 OFFICE O/P EST LOW 20 MIN: CPT | Performed by: NURSE PRACTITIONER

## 2017-06-27 RX ORDER — PROMETHAZINE HYDROCHLORIDE 25 MG/1
TABLET ORAL
COMMUNITY
Start: 2017-02-15 | End: 2019-04-11 | Stop reason: SDUPTHER

## 2017-06-27 NOTE — PROGRESS NOTES
Chelita Rosenbaum is a 31 y.o. female.   1985    HPI:   Location: lower back migranesQuality: aching, sharp and dull  Severity: 6/10  Timing: constant  Alleviating: pain medication  Aggravating: bending and increased activity    Pt reports recent RF 80% effective til this point. Pt recently had Laparoscopic Hysterectomy May 26th, remains healing well. Opiate medications provides enough relief for daily activity and ambulation. NO SE. Pt happy with pain management visit and regimen.         The following portions of the patient's history were reviewed by me and updated as appropriate: allergies, current medications, past family history, past medical history, past social history, past surgical history and problem list.    Past Medical History:   Diagnosis Date   • Arthropathy of lumbar facet joint    • Asthma    • Bipolar disorder    • Cervico-occipital neuralgia    • Constipation    • Disorder of small intestine     thicking jejunum, delayed transit      • Diverticular disease of colon    • Drug therapy     Other long term (current) drug therapy      • Dysphagia    • Encounter for contraceptive management     other   • Encounter for surveillance of contraceptives     other   • Epigastric pain    • Generalized anxiety disorder    • H/O colonoscopy with polypectomy    • Hirsutism    • Hyperprolactinemia    • Low back pain    • Menstruation disorder     Other disorders of menstruation and other abnormal bleeding from female genital tract      • Migraine aura, persistent     Migraine - w/ aura of spots      • Myofascial pain    • Nausea    • Nausea and vomiting    • Pap smear for cervical cancer screening    • Periumbilical pain    • Surgical follow-up care    • Tachycardia        Social History     Social History   • Marital status:      Spouse name: N/A   • Number of children: N/A   • Years of education: N/A     Occupational History   • Not on file.     Social History Main Topics   • Smoking status: Former  Smoker   • Smokeless tobacco: Never Used   • Alcohol use No   • Drug use: No   • Sexual activity: Defer     Other Topics Concern   • Not on file     Social History Narrative       Family History   Problem Relation Age of Onset   • Heart disease Mother    • Asthma Father    • Diabetes Father    • Heart disease Father    • Hypertension Father          Current Outpatient Prescriptions:   •  albuterol (PROVENTIL HFA;VENTOLIN HFA) 108 (90 BASE) MCG/ACT inhaler, Inhale 2 puffs every 4 (four) hours as needed for wheezing., Disp: , Rfl:   •  diltiaZEM CD (CARDIZEM CD) 120 MG 24 hr capsule, Take 1 capsule by mouth Daily., Disp: 30 capsule, Rfl: 6  •  HYDROcodone-acetaminophen (NORCO) 7.5-325 MG per tablet, Take 1 tablet by mouth Every 6 (Six) Hours As Needed for Moderate Pain (4-6)., Disp: , Rfl:   •  meclizine (ANTIVERT) 25 MG tablet, Take 1 tablet by mouth 3 (Three) Times a Day As Needed for dizziness., Disp: 90 tablet, Rfl: 1  •  metFORMIN (GLUCOPHAGE) 500 MG tablet, Take 500 mg by mouth 2 (two) times a day with meals., Disp: , Rfl:   •  mometasone-formoterol (DULERA 100) 100-5 MCG/ACT inhaler, Inhale 2 puffs 2 (Two) Times a Day., Disp: 1 inhaler, Rfl: 3  •  omeprazole (priLOSEC) 40 MG capsule, , Disp: , Rfl:   •  pramipexole (MIRAPEX) 0.25 MG tablet, Take 0.5 tablets by mouth Every Night. After 1 week, may take whole tablet if not improvement, Disp: 30 tablet, Rfl: 3  •  pravastatin (PRAVACHOL) 10 MG tablet, Take 1 tablet by mouth Daily., Disp: 30 tablet, Rfl: 3  •  prazosin (MINIPRESS) 5 MG capsule, , Disp: , Rfl:   •  promethazine (PHENERGAN) 25 MG tablet, Take  by mouth., Disp: , Rfl:   •  propranolol (INDERAL) 80 MG tablet, Take 1 tablet by mouth 2 (Two) Times a Day., Disp: 60 tablet, Rfl: 3  •  QUEtiapine (SEROquel) 300 MG tablet, , Disp: , Rfl:   •  QUEtiapine (SEROquel) 400 MG tablet, Take 1 tablet by mouth Daily., Disp: , Rfl:   •  sertraline (ZOLOFT) 50 MG tablet, , Disp: , Rfl:   •  spironolactone (ALDACTONE)  25 MG tablet, TAKE ONE TABLET BY MOUTH DAILY, Disp: 30 tablet, Rfl: 6  •  tiZANidine (ZANAFLEX) 4 MG tablet, , Disp: , Rfl:   •  traZODone (DESYREL) 150 MG tablet, Take 150 mg by mouth. 2 tab nightly, Disp: , Rfl:     Allergies   Allergen Reactions   • Apriso [Mesalamine Er] Nausea And Vomiting     Vomiting    • Tramadol Other (See Comments)     Hard to breath       Review of Systems   Musculoskeletal: Positive for back pain (lower).   Neurological: Positive for headaches (migranes).   All other systems reviewed and are negative.    All systems reviewed and negative except for above.    Physical Exam   Constitutional: She is oriented to person, place, and time. She appears well-developed and well-nourished. No distress.   Neck: Normal range of motion.   Cardiovascular: Normal rate.    Pulmonary/Chest: Effort normal and breath sounds normal. No respiratory distress. She has no wheezes.   Abdominal: Bowel sounds are normal.   Recent laparoscopic surgical scar healing well   Musculoskeletal:        Lumbar back: She exhibits decreased range of motion (Flex 40 deg and 10 deg ext with maggie FL- passive rom) and tenderness ( midline and TTP SI joint maggie).   Neurological: She is alert and oriented to person, place, and time. She has normal strength. She displays no tremor. No sensory deficit. She displays no seizure activity. Coordination and gait normal.   Reflex Scores:       Tricep reflexes are 2+ on the right side and 2+ on the left side.       Bicep reflexes are 2+ on the right side and 2+ on the left side.       Brachioradialis reflexes are 2+ on the right side and 2+ on the left side.       Patellar reflexes are 2+ on the right side and 2+ on the left side.       Achilles reflexes are 1+ on the right side and 1+ on the left side.  Skin: Skin is warm and dry. No rash noted. No pallor.   Psychiatric: She has a normal mood and affect. Her behavior is normal. Judgment normal. She expresses no homicidal and no suicidal  ideation. She expresses no suicidal plans and no homicidal plans.   Nursing note and vitals reviewed.      Chelita was seen today for back pain and migraine.    Diagnoses and all orders for this visit:    DDD (degenerative disc disease), lumbar    Lumbosacral spondylosis without myelopathy    Chronic low back pain, unspecified back pain laterality, with sciatica presence unspecified    Myofacial muscle pain    Long term (current) use of opiate analgesic      Medication: Patient reports no negative side effects, Patient reports appropriate usage and storage habits and Patient's opioid provides enough reflief to be more active and perform activities of daily living with less discomfort. norco 7.5mg tid. Discussed case with Donell Altamirano, opiate medication refilled ×2 hand written scripts.      Interventional: Recovering from Lap hysterectomy as well as RF (80% effective). GLENN and any neurological impairments discussed with patient that may need of emergency evaluation.      Rehab: home stretching to back when able.     Behavioral: No aberrant behavior noted. PREM Report # [id number]  was reviewed and is consistent with stated history    Urine drug screen None at this time          This document has been electronically signed by ARIELLE Santizo on June 27, 2017 12:05 PM          This document has been electronically signed by ARIELLE Santizo on June 27, 2017 12:05 PM

## 2017-07-05 DIAGNOSIS — R00.0 TACHYCARDIA: Primary | ICD-10-CM

## 2017-07-06 ENCOUNTER — OFFICE VISIT (OUTPATIENT)
Dept: CARDIOLOGY | Facility: CLINIC | Age: 32
End: 2017-07-06

## 2017-07-06 VITALS
HEIGHT: 64 IN | OXYGEN SATURATION: 98 % | SYSTOLIC BLOOD PRESSURE: 118 MMHG | WEIGHT: 222.3 LBS | BODY MASS INDEX: 37.95 KG/M2 | HEART RATE: 118 BPM | DIASTOLIC BLOOD PRESSURE: 90 MMHG

## 2017-07-06 DIAGNOSIS — G25.81 RESTLESS LEG SYNDROME: ICD-10-CM

## 2017-07-06 DIAGNOSIS — R00.0 SINUS TACHYCARDIA: Primary | ICD-10-CM

## 2017-07-06 PROCEDURE — 93000 ELECTROCARDIOGRAM COMPLETE: CPT | Performed by: INTERNAL MEDICINE

## 2017-07-06 PROCEDURE — 99214 OFFICE O/P EST MOD 30 MIN: CPT | Performed by: INTERNAL MEDICINE

## 2017-07-06 NOTE — PROGRESS NOTES
Cardiovascular Medicine   Hollis Amin M.D., Ph.D., [facility]      History of Present Illness  This is a 31 y.o. female with:    1. DM2  2. PCOS  3. HTN  4. IST or POTS  A. Toprol XL  HEAVEN Was seeing Dr. Glover    Sinus tachycardia  Patient is a 31 y.o. female who presents for follow-up of IST and palpitations. Patient has a history of IST.  She had an EPS that did not define any significant pathology She had a TTE which was structurally normal.  She actually saw Dr. Glover.  He continued her on her home medications and asked her to follow up in 6-8 months.  She felt this was too far, so she presented last time for second opinion.  She states she has had a fast heart rate for several years. She has increased cardiac awareness. She had a holter with sinus tachycardia (I do not have these records). She was tried on low-dose Corlanor. She tells me this had little effect. She was on Flecainide with up-titration, but this did not help. She is now on Toprol XL with little effect. She does snore when she sleeps. No witnessed apnea episodes. She has not had a sleep study. She does endorse dizziness, lightheadedness and episodes of syncope. There are preceding symptoms.  At her last appointment, I recommended a repeat Holter.  This showed episodes of sinus tachycardia.  I also recommended a tilt table.  This was consistent with OH.  Since her last visit, she is now on Propranolol and Diltiazem at night. She was told she has RLS.     The following portions of the patient's history were reviewed and updated as appropriate: allergies, current medications, past family history, past medical history, past social history, past surgical history and problem list.    Review of Systems - History obtained from chart review and the patient  General ROS: negative  Respiratory ROS: positive for - shortness of breath  Cardiovascular ROS: positive for - palpitations    family history includes Asthma in her father; Diabetes in her father;  Heart disease in her father and mother; Hypertension in her father.     reports that she has quit smoking. She has never used smokeless tobacco. She reports that she does not drink alcohol or use illicit drugs.    Allergies   Allergen Reactions   • Apriso [Mesalamine Er] Nausea And Vomiting     Vomiting    • Tramadol Other (See Comments)     Hard to breath         Current Outpatient Prescriptions:   •  albuterol (PROVENTIL HFA;VENTOLIN HFA) 108 (90 BASE) MCG/ACT inhaler, Inhale 2 puffs every 4 (four) hours as needed for wheezing., Disp: , Rfl:   •  diltiaZEM CD (CARDIZEM CD) 120 MG 24 hr capsule, Take 1 capsule by mouth Daily., Disp: 30 capsule, Rfl: 6  •  HYDROcodone-acetaminophen (NORCO) 7.5-325 MG per tablet, Take 1 tablet by mouth Every 6 (Six) Hours As Needed for Moderate Pain (4-6)., Disp: , Rfl:   •  meclizine (ANTIVERT) 25 MG tablet, Take 1 tablet by mouth 3 (Three) Times a Day As Needed for dizziness., Disp: 90 tablet, Rfl: 1  •  metFORMIN (GLUCOPHAGE) 500 MG tablet, Take 500 mg by mouth 2 (two) times a day with meals., Disp: , Rfl:   •  mometasone-formoterol (DULERA 100) 100-5 MCG/ACT inhaler, Inhale 2 puffs 2 (Two) Times a Day., Disp: 1 inhaler, Rfl: 3  •  omeprazole (priLOSEC) 40 MG capsule, , Disp: , Rfl:   •  pramipexole (MIRAPEX) 0.25 MG tablet, Take 0.5 tablets by mouth Every Night. After 1 week, may take whole tablet if not improvement, Disp: 30 tablet, Rfl: 3  •  pravastatin (PRAVACHOL) 10 MG tablet, Take 1 tablet by mouth Daily., Disp: 30 tablet, Rfl: 3  •  prazosin (MINIPRESS) 5 MG capsule, , Disp: , Rfl:   •  promethazine (PHENERGAN) 25 MG tablet, Take  by mouth., Disp: , Rfl:   •  propranolol (INDERAL) 80 MG tablet, Take 1 tablet by mouth 2 (Two) Times a Day., Disp: 60 tablet, Rfl: 3  •  QUEtiapine (SEROquel) 300 MG tablet, , Disp: , Rfl:   •  QUEtiapine (SEROquel) 400 MG tablet, Take 1 tablet by mouth Daily., Disp: , Rfl:   •  sertraline (ZOLOFT) 50 MG tablet, , Disp: , Rfl:   •   spironolactone (ALDACTONE) 25 MG tablet, TAKE ONE TABLET BY MOUTH DAILY, Disp: 30 tablet, Rfl: 6  •  tiZANidine (ZANAFLEX) 4 MG tablet, , Disp: , Rfl:   •  traZODone (DESYREL) 150 MG tablet, Take 150 mg by mouth. 2 tab nightly, Disp: , Rfl:     Physical Exam:  Vitals:    07/06/17 1328   BP: 118/90   Pulse: 118   SpO2: 98%     Body mass index is 38.16 kg/(m^2).    GEN: alert, appears stated age and cooperative  Body Habitus: obese  JVP: 6 cm of water at 45 degrees HJR: absent      Carotid:  Upstroke: easily palpated bilaterally Volume: Normal.    Carotid Bruit:  None  Subclavian Bruit: Not present.    Lymph: No overt LAD.   Back: Normal.  Chest:  Normal Excursion: Good    I:E: Good  Pulmonary:clear to auscultation, no wheezes, rales or rhonchi, symmetric air entry. Equal chest excursion. Chest physical exam is normal. No tenderness.        Precordium:  No palpable heaves or thrusts. P2 is not palpable.   [place]:  normal size and placement Palpable S4: Not present.   Heart rate: normal  Heart Rhythm: regular     Heart Sounds: S1: normal intensity  S2: normal intensity  S3: absent   S4: absent  Opening Snap: Absent  A2-OS:  N/A  Pericardial rub: absent    Ejection click: None      Murmurs: Systolic: none  Extremity: no edema, cyanosis    Pulses: Right radial artery has 2+ (normal) pulse, Left radial artery has 2+ (normal) pulse, Right posterior tibial artery has 2+ (normal) pulse and Left posterior tibial artery has 2+ (normal) pulse      DATA REVIEWED:     Holter, 2017  · Predominant rhythm sinus tavhycardia with elevated average heart rate in the 109  · Normal burden of ventricular and supraventricular ectopic events  · Symptoms correlated with sinus mechanism      TTE, 2017  · Left ventricular function is normal.  · Estimated EF appears to be in the range of 61 - 65%.  · Left ventricular diastolic function is normal.  · RV is normal in size and shape with normal systolic function  · No evidence of pulmonary  hypertension  · No significant valvular abnormality.      DENI    · DENI test was performed.  · Tilt table was abnormal and consistent with orthostatic hypotension  · Excessive heart rate rise          T-CTA THORAX/PULMONARY W/CONTR Patient Name- JE LANGE   Patient Name-  JE LANGE  Patient ID-  [id number]   Ordering-  NATHAN BURRIS MD  Attending-  DR TEMP  Referring-       ------------------------------------------------     PROCEDURE- CT -CTA Thorax/PE with contrast      REASON FOR EXAM- chest pain     FINDINGS- Comparison study dated  January 23, 2014. Axial computer  tomography sequential imaging was performed from the thoracic inlet  through the diaphragms after administration of IV contrast .3-D chest  sagittal and coronal reformates was performed.      The pulmonary arteries appear normal. No filling defect is identified  to suggest pulmonary embolus. Mediastinal structures of the chest are  normal. No lymphadenopathy or pericardial effusion. Lungs are clear.  Pleural spaces are normal. Visualized upper abdominal structures are  normal. No acute osseous abnormality..     IMPRESSION-   1.No evidence of pulmonary embolus..  2.Unremarkable CT chest study..      3/2016: EPS  1. Normal sinus node recovery time. 2. Antegrade normal AH and HV timing. 3. Antegrade AV node Wenckebach cycle length was approximately 310 milliseconds. 4. Retrograde VA conduction was decremental, midline, and concentric in nature. 5. No evidence of any preexcitation as described above. 6. No tachycardia was induced while pacing from the high right atrium, coronary sinus, and from the right ventricular apex with the protocol as described above at baseline and after starting the patient on IV atropine and epinephrine. 7. Periodic IV heparin was given. 8. Throughout the procedure, patient's heart rate, blood pressure, and saturation were monitored. There was no immediate complication and stable upon discharge to recovery.      EKG: Sinus tach.       Narrative Interpretation: Performed and interpreted today. No prior. Sinus tach.     Assessment/Plan     1. Sinus tachycardia with palpitations. Reportedly normal EPS. Holter with ST. DENI consistent with OH, but not POTS. I still do not think this is IST. She likely has sleep disordered breathing, as well remains obese with little physicial activity. I do not want to initiate Corlanor at this point. I do think she should see sleep medicine, as she has not had a formal sleep study.   -Continue her current medications.   -I recommended she see sleep medicine and have an evaluation of EVELYN    2. OH: Abnormal DENI: She has a mixed picture.  Because of the patient's postural hypotension and orthostasis, the following information was provided :    *Avoid standing for long periods. If you must stand, make sure that you keep your feet moving to help increase circulation  *Get up slowly from either sitting or lying down (this is especially important when you are getting out of the bath)  *Eat regularly. Avoid long periods between meals; it is better to snack throughout the day  *Avoid hot baths or showers  *Wear loose, comfortable clothing to avoid restricting circulation    If the patient were to feel faint, the following was provided:  sit or lie down and lower your head  take deep breaths  loosen any tight clothing  open windows and move towards circulating air  eat foods rich in iron  -Prescription for compression stockings was provided    3. Cardiac Risk Assessment: Low  No current indication for aspirin or statin  Check labs    4. Tobacco status: Non-smoker      Plan for follow-up:  Three months with Meron

## 2017-08-17 ENCOUNTER — APPOINTMENT (OUTPATIENT)
Dept: LAB | Facility: HOSPITAL | Age: 32
End: 2017-08-17

## 2017-08-17 ENCOUNTER — CONSULT (OUTPATIENT)
Dept: SLEEP MEDICINE | Facility: HOSPITAL | Age: 32
End: 2017-08-17

## 2017-08-17 VITALS
OXYGEN SATURATION: 98 % | DIASTOLIC BLOOD PRESSURE: 72 MMHG | HEIGHT: 64 IN | BODY MASS INDEX: 37.56 KG/M2 | SYSTOLIC BLOOD PRESSURE: 132 MMHG | WEIGHT: 220 LBS | HEART RATE: 123 BPM

## 2017-08-17 DIAGNOSIS — G47.51 CONFUSIONAL AROUSALS: ICD-10-CM

## 2017-08-17 DIAGNOSIS — F51.4 SLEEP TERRORS: ICD-10-CM

## 2017-08-17 DIAGNOSIS — G47.33 OBSTRUCTIVE SLEEP APNEA, ADULT: Primary | ICD-10-CM

## 2017-08-17 DIAGNOSIS — E66.09 NON MORBID OBESITY DUE TO EXCESS CALORIES: ICD-10-CM

## 2017-08-17 DIAGNOSIS — G47.54 PARASOMNIA IN CONDITIONS CLASSIFIED ELSEWHERE: ICD-10-CM

## 2017-08-17 DIAGNOSIS — G25.81 RESTLESS LEGS SYNDROME (RLS): ICD-10-CM

## 2017-08-17 LAB — FERRITIN SERPL-MCNC: 55.7 NG/ML (ref 6.2–137)

## 2017-08-17 PROCEDURE — 82728 ASSAY OF FERRITIN: CPT | Performed by: INTERNAL MEDICINE

## 2017-08-17 PROCEDURE — 99203 OFFICE O/P NEW LOW 30 MIN: CPT | Performed by: INTERNAL MEDICINE

## 2017-08-17 PROCEDURE — 36415 COLL VENOUS BLD VENIPUNCTURE: CPT | Performed by: INTERNAL MEDICINE
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
## 2017-09-06 ENCOUNTER — HOSPITAL ENCOUNTER (OUTPATIENT)
Dept: SLEEP MEDICINE | Facility: HOSPITAL | Age: 32
Discharge: HOME | End: 2017-09-06
Admitting: INTERNAL MEDICINE

## 2017-09-06 VITALS — BODY MASS INDEX: 37.56 KG/M2 | WEIGHT: 220 LBS | HEIGHT: 64 IN

## 2017-09-06 DIAGNOSIS — G47.33 OBSTRUCTIVE SLEEP APNEA, ADULT: ICD-10-CM

## 2017-09-06 PROCEDURE — 95810 POLYSOM 6/> YRS 4/> PARAM: CPT

## 2017-09-06 PROCEDURE — 95810 POLYSOM 6/> YRS 4/> PARAM: CPT | Performed by: INTERNAL MEDICINE

## 2017-09-11 DIAGNOSIS — G47.33 OSA (OBSTRUCTIVE SLEEP APNEA): Primary | ICD-10-CM

## 2017-09-25 ENCOUNTER — APPOINTMENT (OUTPATIENT)
Dept: LAB | Facility: HOSPITAL | Age: 32
End: 2017-09-25

## 2017-09-25 ENCOUNTER — OFFICE VISIT (OUTPATIENT)
Dept: PAIN MEDICINE | Facility: CLINIC | Age: 32
End: 2017-09-25

## 2017-09-25 VITALS
HEIGHT: 64 IN | SYSTOLIC BLOOD PRESSURE: 120 MMHG | WEIGHT: 214.5 LBS | BODY MASS INDEX: 36.62 KG/M2 | DIASTOLIC BLOOD PRESSURE: 70 MMHG

## 2017-09-25 DIAGNOSIS — M51.36 DDD (DEGENERATIVE DISC DISEASE), LUMBAR: Primary | ICD-10-CM

## 2017-09-25 DIAGNOSIS — M79.18 MYOFACIAL MUSCLE PAIN: ICD-10-CM

## 2017-09-25 DIAGNOSIS — M47.817 LUMBOSACRAL SPONDYLOSIS WITHOUT MYELOPATHY: ICD-10-CM

## 2017-09-25 DIAGNOSIS — G89.29 CHRONIC LOW BACK PAIN, UNSPECIFIED BACK PAIN LATERALITY, WITH SCIATICA PRESENCE UNSPECIFIED: ICD-10-CM

## 2017-09-25 DIAGNOSIS — Z79.899 HIGH RISK MEDICATIONS (NOT ANTICOAGULANTS) LONG-TERM USE: ICD-10-CM

## 2017-09-25 DIAGNOSIS — M54.5 CHRONIC LOW BACK PAIN, UNSPECIFIED BACK PAIN LATERALITY, WITH SCIATICA PRESENCE UNSPECIFIED: ICD-10-CM

## 2017-09-25 PROCEDURE — 99214 OFFICE O/P EST MOD 30 MIN: CPT | Performed by: PAIN MEDICINE

## 2017-09-25 PROCEDURE — G0481 DRUG TEST DEF 8-14 CLASSES: HCPCS | Performed by: NURSE PRACTITIONER

## 2017-09-25 PROCEDURE — 80307 DRUG TEST PRSMV CHEM ANLYZR: CPT | Performed by: NURSE PRACTITIONER

## 2017-09-25 RX ORDER — TIZANIDINE 4 MG/1
2 TABLET ORAL 2 TIMES DAILY PRN
Qty: 60 TABLET | Refills: 1 | Status: SHIPPED | OUTPATIENT
Start: 2017-09-25 | End: 2019-04-11 | Stop reason: SDUPTHER

## 2017-09-25 NOTE — PROGRESS NOTES
"Chelita Rosenbaum is a 31 y.o. female.   1985    HPI:   Location: lower back,migranes  Quality: aching, sharp and dull  Severity: 6/10  Timing: constant  Alleviating: pain medication  Aggravating: bending and increased activity    Pt reports continuing coverage from RF- 5/11/2017. Will revisit. Pt reports \"missed appt due to sleep apnea\"- She has stretched out med BID for 2 weeks, pt reports did decent BID instead of qid. I will restart her Norco 7.5mg 2-3 x day PRN.  Opiate medications providing just  enough relief for daily activities and ambulation. NO SE noted. Pt happy with pain management regimen and treatment.       The following portions of the patient's history were reviewed by me and updated as appropriate: allergies, current medications, past family history, past medical history, past social history, past surgical history and problem list.    Past Medical History:   Diagnosis Date   • Arthropathy of lumbar facet joint    • Asthma    • Bipolar disorder    • Cervico-occipital neuralgia    • Constipation    • Disorder of small intestine     thicking jejunum, delayed transit      • Diverticular disease of colon    • Drug therapy     Other long term (current) drug therapy      • Dysphagia    • Encounter for contraceptive management     other   • Encounter for surveillance of contraceptives     other   • Epigastric pain    • Generalized anxiety disorder    • H/O colonoscopy with polypectomy    • Hirsutism    • Hyperprolactinemia    • Low back pain    • Menstruation disorder     Other disorders of menstruation and other abnormal bleeding from female genital tract      • Migraine aura, persistent     Migraine - w/ aura of spots      • Myofascial pain    • Nausea    • Nausea and vomiting    • Pap smear for cervical cancer screening    • Periumbilical pain    • Surgical follow-up care    • Tachycardia        Social History     Social History   • Marital status:      Spouse name: N/A   • Number of " children: N/A   • Years of education: N/A     Occupational History   • Not on file.     Social History Main Topics   • Smoking status: Former Smoker   • Smokeless tobacco: Never Used   • Alcohol use No   • Drug use: No   • Sexual activity: Defer     Other Topics Concern   • Not on file     Social History Narrative       Family History   Problem Relation Age of Onset   • Heart disease Mother    • Asthma Father    • Diabetes Father    • Heart disease Father    • Hypertension Father          Current Outpatient Prescriptions:   •  albuterol (PROVENTIL HFA;VENTOLIN HFA) 108 (90 BASE) MCG/ACT inhaler, Inhale 2 puffs every 4 (four) hours as needed for wheezing., Disp: , Rfl:   •  diltiaZEM CD (CARDIZEM CD) 120 MG 24 hr capsule, Take 1 capsule by mouth Daily., Disp: 30 capsule, Rfl: 6  •  HYDROcodone-acetaminophen (NORCO) 7.5-325 MG per tablet, Take 1 tablet by mouth Every 6 (Six) Hours As Needed for Moderate Pain (4-6)., Disp: , Rfl:   •  meclizine (ANTIVERT) 25 MG tablet, Take 1 tablet by mouth 3 (Three) Times a Day As Needed for dizziness., Disp: 90 tablet, Rfl: 1  •  metFORMIN (GLUCOPHAGE) 500 MG tablet, Take 500 mg by mouth 2 (two) times a day with meals., Disp: , Rfl:   •  mometasone-formoterol (DULERA 100) 100-5 MCG/ACT inhaler, Inhale 2 puffs 2 (Two) Times a Day., Disp: 1 inhaler, Rfl: 3  •  omeprazole (priLOSEC) 40 MG capsule, , Disp: , Rfl:   •  pramipexole (MIRAPEX) 0.25 MG tablet, Take 0.5 tablets by mouth Every Night. After 1 week, may take whole tablet if not improvement, Disp: 30 tablet, Rfl: 3  •  pravastatin (PRAVACHOL) 10 MG tablet, Take 1 tablet by mouth Daily., Disp: 30 tablet, Rfl: 3  •  prazosin (MINIPRESS) 5 MG capsule, , Disp: , Rfl:   •  promethazine (PHENERGAN) 25 MG tablet, Take  by mouth., Disp: , Rfl:   •  propranolol (INDERAL) 80 MG tablet, Take 1 tablet by mouth 2 (Two) Times a Day., Disp: 60 tablet, Rfl: 3  •  QUEtiapine (SEROquel) 300 MG tablet, 300 mg. 2 hs, Disp: , Rfl:   •  sertraline  (ZOLOFT) 50 MG tablet, , Disp: , Rfl:   •  spironolactone (ALDACTONE) 25 MG tablet, TAKE ONE TABLET BY MOUTH DAILY, Disp: 30 tablet, Rfl: 6  •  tiZANidine (ZANAFLEX) 4 MG tablet, Take 0.5 tablets by mouth 2 (Two) Times a Day As Needed for Muscle Spasms., Disp: 60 tablet, Rfl: 1  •  traZODone (DESYREL) 150 MG tablet, Take 150 mg by mouth. 2 tab nightly, Disp: , Rfl:     Allergies   Allergen Reactions   • Apriso [Mesalamine Er] Nausea And Vomiting     Vomiting    • Tramadol Other (See Comments)     Hard to breath       Review of Systems   Musculoskeletal: Positive for back pain.   Neurological: Positive for headaches.   All other systems reviewed and are negative.    All systems reviewed and negative except for above.    Physical Exam   Constitutional: She is oriented to person, place, and time. She appears well-developed and well-nourished. No distress.   Cardiovascular: Normal rate.    Pulmonary/Chest: Effort normal. No respiratory distress.   Abdominal: Bowel sounds are normal.   Musculoskeletal:        Lumbar back: She exhibits decreased range of motion (Flex 60 deg and 10 deg ext with bilateral facet loading) and tenderness (mildine TTP).   Neurological: She is alert and oriented to person, place, and time. She has normal strength. She displays no tremor. No sensory deficit. She displays no seizure activity. Coordination and gait normal.   Reflex Scores:       Tricep reflexes are 2+ on the right side and 2+ on the left side.       Bicep reflexes are 2+ on the right side and 2+ on the left side.       Brachioradialis reflexes are 2+ on the right side and 2+ on the left side.       Patellar reflexes are 2+ on the right side and 2+ on the left side.       Achilles reflexes are 1+ on the right side and 1+ on the left side.  Skin: Skin is warm and dry. No rash noted. No pallor.   Psychiatric: She has a normal mood and affect. Her behavior is normal. Judgment normal. She expresses no homicidal and no suicidal ideation.  She expresses no suicidal plans and no homicidal plans.   Nursing note and vitals reviewed.      Chelita was seen today for migraine and back pain.    Diagnoses and all orders for this visit:    DDD (degenerative disc disease), lumbar    Lumbosacral spondylosis without myelopathy    Chronic low back pain, unspecified back pain laterality, with sciatica presence unspecified    Myofacial muscle pain  -     tiZANidine (ZANAFLEX) 4 MG tablet; Take 0.5 tablets by mouth 2 (Two) Times a Day As Needed for Muscle Spasms.    High risk medications (not anticoagulants) long-term use  -     ToxASSURE Select 13 (MW)        Medication: Patient reports no negative side effects, Patient reports appropriate usage and storage habits and Patient's opioid provides enough relief to be more active and perform activities of daily living with less discomfort. Norco 7.5mg 2-3 x day PRN #75 . Discussed case with Donell Altamirano, opiate medication refilled ×2 hand written scripts. I have refilled zanaflex 2mg bid with no SE noted.       Interventional: Pt recently moved to [place]= family PCP french now.  Will restart Norco 7.5mg 2-3 x day PRN  - eval next visit. GLENN and any neurological impairments discussed with patient that may need of emergency evaluation.      Rehab: home stretching exercises.     Behavioral: No aberrant behavior noted. PREM Report #[id number]  was reviewed and is consistent with stated history    Urine drug screen Ordered today to test for drugs of abuse and prescribed medications        This document has been electronically signed by ARIELLE Santizo on September 25, 2017 11:23 AM          This document has been electronically signed by ARIELLE Santizo on September 25, 2017 11:23 AM

## 2017-10-01 LAB — CONV REPORT SUMMARY: NORMAL

## 2017-10-23 ENCOUNTER — OFFICE VISIT (OUTPATIENT)
Dept: CARDIOLOGY | Facility: CLINIC | Age: 32
End: 2017-10-23

## 2017-10-23 ENCOUNTER — LAB (OUTPATIENT)
Dept: LAB | Facility: HOSPITAL | Age: 32
End: 2017-10-23

## 2017-10-23 VITALS
HEIGHT: 64 IN | SYSTOLIC BLOOD PRESSURE: 110 MMHG | DIASTOLIC BLOOD PRESSURE: 80 MMHG | WEIGHT: 220 LBS | OXYGEN SATURATION: 96 % | BODY MASS INDEX: 37.56 KG/M2 | HEART RATE: 133 BPM

## 2017-10-23 DIAGNOSIS — I95.1 ORTHOSTATIC HYPOTENSION: Primary | ICD-10-CM

## 2017-10-23 DIAGNOSIS — Z13.6 ENCOUNTER FOR LIPID SCREENING FOR CARDIOVASCULAR DISEASE: ICD-10-CM

## 2017-10-23 DIAGNOSIS — R00.0 TACHYCARDIA: ICD-10-CM

## 2017-10-23 DIAGNOSIS — Z13.220 ENCOUNTER FOR LIPID SCREENING FOR CARDIOVASCULAR DISEASE: ICD-10-CM

## 2017-10-23 DIAGNOSIS — E11.9 TYPE 2 DIABETES MELLITUS WITHOUT COMPLICATION, WITHOUT LONG-TERM CURRENT USE OF INSULIN (HCC): ICD-10-CM

## 2017-10-23 DIAGNOSIS — G47.33 OSA (OBSTRUCTIVE SLEEP APNEA): ICD-10-CM

## 2017-10-23 DIAGNOSIS — I95.1 ORTHOSTATIC HYPOTENSION: ICD-10-CM

## 2017-10-23 LAB
ALBUMIN SERPL-MCNC: 4.4 G/DL (ref 3.4–4.8)
ALBUMIN/GLOB SERPL: 1 G/DL (ref 1.1–1.8)
ALP SERPL-CCNC: 101 U/L (ref 38–126)
ALT SERPL W P-5'-P-CCNC: 38 U/L (ref 9–52)
ANION GAP SERPL CALCULATED.3IONS-SCNC: 14 MMOL/L (ref 5–15)
ARTICHOKE IGE QN: 191 MG/DL (ref 1–129)
AST SERPL-CCNC: 28 U/L (ref 14–36)
BILIRUB SERPL-MCNC: 0.4 MG/DL (ref 0.2–1.3)
BUN BLD-MCNC: 8 MG/DL (ref 7–21)
BUN/CREAT SERPL: 11 (ref 7–25)
CALCIUM SPEC-SCNC: 9.6 MG/DL (ref 8.4–10.2)
CHLORIDE SERPL-SCNC: 99 MMOL/L (ref 95–110)
CHOLEST SERPL-MCNC: 277 MG/DL (ref 0–199)
CO2 SERPL-SCNC: 27 MMOL/L (ref 22–31)
CREAT BLD-MCNC: 0.73 MG/DL (ref 0.5–1)
DEPRECATED RDW RBC AUTO: 42.6 FL (ref 36.4–46.3)
ERYTHROCYTE [DISTWIDTH] IN BLOOD BY AUTOMATED COUNT: 13.7 % (ref 11.5–14.5)
GFR SERPL CREATININE-BSD FRML MDRD: 92 ML/MIN/1.73 (ref 64–149)
GLOBULIN UR ELPH-MCNC: 4.4 GM/DL (ref 2.3–3.5)
GLUCOSE BLD-MCNC: 102 MG/DL (ref 60–100)
HBA1C MFR BLD: 5.5 % (ref 4–5.6)
HCT VFR BLD AUTO: 40.8 % (ref 35–45)
HDLC SERPL-MCNC: 42 MG/DL (ref 60–200)
HGB BLD-MCNC: 13.9 G/DL (ref 12–15.5)
LDLC/HDLC SERPL: 4.27 {RATIO} (ref 0–3.22)
MCH RBC QN AUTO: 28.7 PG (ref 26.5–34)
MCHC RBC AUTO-ENTMCNC: 34.1 G/DL (ref 31.4–36)
MCV RBC AUTO: 84.3 FL (ref 80–98)
PLATELET # BLD AUTO: 405 10*3/MM3 (ref 150–450)
PMV BLD AUTO: 8.4 FL (ref 8–12)
POTASSIUM BLD-SCNC: 4.3 MMOL/L (ref 3.5–5.1)
PROT SERPL-MCNC: 8.8 G/DL (ref 6.3–8.6)
RBC # BLD AUTO: 4.84 10*6/MM3 (ref 3.77–5.16)
SODIUM BLD-SCNC: 140 MMOL/L (ref 137–145)
TRIGL SERPL-MCNC: 278 MG/DL (ref 20–199)
TSH SERPL DL<=0.05 MIU/L-ACNC: 2.95 MIU/ML (ref 0.46–4.68)
WBC NRBC COR # BLD: 13.12 10*3/MM3 (ref 3.2–9.8)

## 2017-10-23 PROCEDURE — 80053 COMPREHEN METABOLIC PANEL: CPT | Performed by: NURSE PRACTITIONER

## 2017-10-23 PROCEDURE — 85027 COMPLETE CBC AUTOMATED: CPT | Performed by: NURSE PRACTITIONER

## 2017-10-23 PROCEDURE — 84443 ASSAY THYROID STIM HORMONE: CPT | Performed by: NURSE PRACTITIONER

## 2017-10-23 PROCEDURE — 36415 COLL VENOUS BLD VENIPUNCTURE: CPT

## 2017-10-23 PROCEDURE — 99214 OFFICE O/P EST MOD 30 MIN: CPT | Performed by: NURSE PRACTITIONER

## 2017-10-23 PROCEDURE — 83036 HEMOGLOBIN GLYCOSYLATED A1C: CPT | Performed by: NURSE PRACTITIONER

## 2017-10-23 PROCEDURE — 80061 LIPID PANEL: CPT | Performed by: NURSE PRACTITIONER

## 2017-10-23 RX ORDER — CHLORPROMAZINE HYDROCHLORIDE 100 MG/1
100 TABLET, FILM COATED ORAL 3 TIMES DAILY
COMMUNITY
End: 2019-04-11

## 2017-10-23 RX ORDER — PRAMIPEXOLE DIHYDROCHLORIDE 0.25 MG/1
0.25 TABLET ORAL NIGHTLY
Qty: 30 TABLET | Refills: 6 | Status: SHIPPED | OUTPATIENT
Start: 2017-10-23 | End: 2019-06-17 | Stop reason: SDUPTHER

## 2017-10-23 RX ORDER — PRAVASTATIN SODIUM 10 MG
40 TABLET ORAL DAILY
Qty: 30 TABLET | Refills: 11 | Status: SHIPPED | OUTPATIENT
Start: 2017-10-23 | End: 2017-10-24 | Stop reason: SDUPTHER

## 2017-10-23 RX ORDER — LANCETS
EACH MISCELLANEOUS
Qty: 102 EACH | Refills: 11 | Status: SHIPPED | OUTPATIENT
Start: 2017-10-23 | End: 2022-05-18 | Stop reason: ALTCHOICE

## 2017-10-23 RX ORDER — ATENOLOL 25 MG/1
25 TABLET ORAL DAILY
Qty: 30 TABLET | Refills: 11 | Status: SHIPPED | OUTPATIENT
Start: 2017-10-23 | End: 2019-04-11 | Stop reason: DRUGHIGH

## 2017-10-23 NOTE — PROGRESS NOTES
Subjective:     Follow-up (chief complaint)  Vertigo, tachycardia    Palpitations    Associated symptoms include dizziness, malaise/fatigue, nausea, syncope and vomiting. Pertinent negatives include no anxiety, chest pain, coughing, fever, irregular heartbeat, shortness of breath or weakness.   Dizziness   This is a chronic problem. The current episode started more than 1 year ago. The problem occurs every several days. The problem has been gradually worsening. Associated symptoms include fatigue, headaches, nausea, vertigo and vomiting. Pertinent negatives include no abdominal pain, change in bowel habit, chest pain, chills, coughing, fever, myalgias, rash or weakness. The symptoms are aggravated by bending, exertion, walking and standing (riding in car, motion sickness). She has tried position changes, rest, lying down and drinking for the symptoms. The treatment provided mild relief.     Sinus tachycardia  Patient is a 31 y.o. female who presents for follow-up of IST and palpitations. Patient has a history of IST.  She had an EPS that did not define any significant pathology. She had a TTE which was structurally normal.  She actually saw Dr. Glover.  He continued her on her home medications and asked her to follow up in 6-8 months.  She felt this was too far, and is seeing Dr. Amin for a second opinion. She states she has had a fast heart rate for several years. She has increased cardiac awareness. She was tried on low-dose Corlanor. She tells me this had little effect. She was on Flecainide with up-titration, but this did not help. She tried Toprol XL with little effect. She has tried Labetolol. She has tried Propranolol. She has tried this in combination with Cardizem.  Sleep study completed.  She does endorse dizziness, lightheadedness and episodes of syncope/vertigo. There are preceding symptoms.    At her last appointment, Dr. Amin repeated a Holter.  This showed episodes of sinus tachycardia.  He  also recommended a tilt table. This was consistent with OH.     Persistent tachycardia at rest. She was 133 on arrival to office today.     Review of Systems   Constitution: Positive for fatigue, malaise/fatigue and weight gain. Negative for chills, decreased appetite, fever and weakness.   Eyes: Negative.    Cardiovascular: Positive for palpitations and syncope. Negative for chest pain, claudication, dyspnea on exertion, irregular heartbeat and leg swelling.   Respiratory: Positive for sleep disturbances due to breathing and snoring. Negative for cough, shortness of breath and wheezing.    Endocrine: Negative.    Skin: Negative for dry skin, flushing and rash.   Musculoskeletal: Positive for arthritis, back pain and falls. Negative for myalgias.   Gastrointestinal: Positive for nausea and vomiting. Negative for abdominal pain, change in bowel habit and melena.   Genitourinary: Negative for frequency and hematuria.   Neurological: Positive for dizziness, headaches, light-headedness, loss of balance and vertigo.   Psychiatric/Behavioral: Negative for altered mental status and memory loss. The patient is not nervous/anxious.        Current Outpatient Prescriptions   Medication Sig Dispense Refill   • albuterol (PROVENTIL HFA;VENTOLIN HFA) 108 (90 BASE) MCG/ACT inhaler Inhale 2 puffs every 4 (four) hours as needed for wheezing.     • chlorproMAZINE (THORAZINE) 100 MG tablet Take 100 mg by mouth 3 (Three) Times a Day. Take 1 tablet AM, 1 tablet at noon, and  2 tablets at night     • HYDROcodone-acetaminophen (NORCO) 7.5-325 MG per tablet Take 1 tablet by mouth Every 6 (Six) Hours As Needed for Moderate Pain (4-6).     • meclizine (ANTIVERT) 25 MG tablet Take 1 tablet by mouth 3 (Three) Times a Day As Needed for dizziness. 90 tablet 1   • metFORMIN (GLUCOPHAGE) 500 MG tablet Take 500 mg by mouth 2 (two) times a day with meals.     • mometasone-formoterol (DULERA 100) 100-5 MCG/ACT inhaler Inhale 2 puffs 2 (Two) Times a  "Day. 1 inhaler 3   • omeprazole (priLOSEC) 40 MG capsule      • pramipexole (MIRAPEX) 0.25 MG tablet Take 1 tablet by mouth Every Night. 30 tablet 6   • pravastatin (PRAVACHOL) 10 MG tablet Take 1 tablet by mouth Daily. 30 tablet 3   • promethazine (PHENERGAN) 25 MG tablet Take  by mouth.     • spironolactone (ALDACTONE) 25 MG tablet TAKE ONE TABLET BY MOUTH DAILY 30 tablet 6   • tiZANidine (ZANAFLEX) 4 MG tablet Take 0.5 tablets by mouth 2 (Two) Times a Day As Needed for Muscle Spasms. 60 tablet 1   • atenolol (TENORMIN) 25 MG tablet Take 1 tablet by mouth Daily. 30 tablet 11     No current facility-administered medications for this visit.         Objective:     Vitals:    10/23/17 1344   BP: 110/80   BP Location: Left arm   Patient Position: Sitting   Cuff Size: Large Adult   Pulse: (!) 133   SpO2: 96%   Weight: 220 lb (99.8 kg)   Height: 64\" (162.6 cm)        Physical Exam   Constitutional: She is oriented to person, place, and time. She appears well-developed and well-nourished. No distress.   HENT:   Head: Normocephalic.   Neck: No JVD present.   Cardiovascular: Normal rate, regular rhythm, S1 normal, S2 normal, normal heart sounds and intact distal pulses.    No murmur heard.  Pulmonary/Chest: Effort normal and breath sounds normal. No respiratory distress. She has no wheezes. She has no rales.   Abdominal: Soft. Bowel sounds are normal.   Musculoskeletal: Normal range of motion. She exhibits no edema.   Neurological: She is alert and oriented to person, place, and time.   Skin: Skin is warm and dry. No erythema.   Psychiatric: She has a normal mood and affect. Her behavior is normal. Judgment and thought content normal.     DATA REVIEWED:    Holter, 2/7/2017  · Predominant rhythm sinus tavhycardia with elevated average heart rate in the 109  · Normal burden of ventricular and supraventricular ectopic events  · Symptoms correlated with sinus mechanism      TTE, 2/7/2017  · Left ventricular function is " normal.  · Estimated EF appears to be in the range of 61 - 65%.  · Left ventricular diastolic function is normal.  · RV is normal in size and shape with normal systolic function  · No evidence of pulmonary hypertension  · No significant valvular abnormality.      DENI 2/2/2017  · DENI test was performed.  · Tilt table was abnormal and consistent with orthostatic hypotension  · Excessive heart rate rise      CTA THORAX/PULMONARY W/CONTR 10/26/2016  IMPRESSION-   1.No evidence of pulmonary embolus..  2.Unremarkable CT chest study..    3/2016: EPS  1. Normal sinus node recovery time.   2. Antegrade normal AH and HV timing.   3. Antegrade AV node Wenckebach cycle length was approximately 310 milliseconds.   4. Retrograde VA conduction was decremental, midline, and concentric in nature.   5. No evidence of any preexcitation as described above.   6. No tachycardia was induced while pacing from the high right atrium, coronary sinus, and from the right ventricular apex with the protocol as described above at baseline and after starting the patient on IV atropine and epinephrine.   7. Periodic IV heparin was given.   8. Throughout the procedure, patient's heart rate, blood pressure, and saturation were monitored. There was no immediate complication and stable upon discharge to recovery.      Adult ECG Report     Name: Chelita Rosenbaum   Age: 31 y.o.   Gender: female   Rate: 135   Rhythm: sinus tachycardia   QRS Axis: -23   IN Interval: wnl   QRS Duration: 78   QTc: 552   Voltages: wnl   Conduction Disturbances: none   Other Abnormalities: Minimal criteria for LVH     Narrative Interpretation: Performed and interpreted today. No prior. Sinus tach.     Carotid Duplex 2/7/2017  Interpretation Summary   · Normal proximal right internal carotid artery.  · Normal proximal left internal carotid artery.           Lab Review    Ref. Range 10/23/2017 14:40   Glucose Latest Ref Range: 60 - 100 mg/dL 102 (H)   Sodium Latest Ref Range: 137  - 145 mmol/L 140   Potassium Latest Ref Range: 3.5 - 5.1 mmol/L 4.3   CO2 Latest Ref Range: 22.0 - 31.0 mmol/L 27.0   Chloride Latest Ref Range: 95 - 110 mmol/L 99   Anion Gap Latest Ref Range: 5.0 - 15.0 mmol/L 14.0   Creatinine Latest Ref Range: 0.50 - 1.00 mg/dL 0.73   BUN Latest Ref Range: 7 - 21 mg/dL 8   BUN/Creatinine Ratio Latest Ref Range: 7.0 - 25.0  11.0   Calcium Latest Ref Range: 8.4 - 10.2 mg/dL 9.6   eGFR Non African Amer Latest Ref Range: 64 - 149 mL/min/1.73 92   Alkaline Phosphatase Latest Ref Range: 38 - 126 U/L 101   Total Protein Latest Ref Range: 6.3 - 8.6 g/dL 8.8 (H)   ALT (SGPT) Latest Ref Range: 9 - 52 U/L 38   AST (SGOT) Latest Ref Range: 14 - 36 U/L 28   Total Bilirubin Latest Ref Range: 0.2 - 1.3 mg/dL 0.4   Albumin Latest Ref Range: 3.40 - 4.80 g/dL 4.40   Globulin Latest Ref Range: 2.3 - 3.5 gm/dL 4.4 (H)   A/G Ratio Latest Ref Range: 1.1 - 1.8 g/dL 1.0 (L)   TSH Baseline Latest Ref Range: 0.460 - 4.680 mIU/mL 2.950   Total Cholesterol Latest Ref Range: 0 - 199 mg/dL 277 (H)   HDL Cholesterol Latest Ref Range: 60 - 200 mg/dL 42 (L)   LDL Cholesterol  Latest Ref Range: 1 - 129 mg/dL 191 (H)   Triglycerides Latest Ref Range: 20 - 199 mg/dL 278 (H)   LDL/HDL Ratio Latest Ref Range: 0.00 - 3.22  4.27 (H)   WBC Latest Ref Range: 3.20 - 9.80 10*3/mm3 13.12 (H)   RBC Latest Ref Range: 3.77 - 5.16 10*6/mm3 4.84   Hemoglobin Latest Ref Range: 12.0 - 15.5 g/dL 13.9   Hematocrit Latest Ref Range: 35.0 - 45.0 % 40.8   RDW Latest Ref Range: 11.5 - 14.5 % 13.7   MCV Latest Ref Range: 80.0 - 98.0 fL 84.3   MCH Latest Ref Range: 26.5 - 34.0 pg 28.7   MCHC Latest Ref Range: 31.4 - 36.0 g/dL 34.1   MPV Latest Ref Range: 8.0 - 12.0 fL 8.4   Platelets Latest Ref Range: 150 - 450 10*3/mm3 405     The following portions of the patient's history were reviewed and updated as appropriate: allergies, current medications, past family history, past medical history, past social history, past surgical history and  "problem list.     Assessment/Plan:   Miss Rosenbaum has recently moved to [place] and will be establishing care with Dr. Gannon. Dr. Gannon did not order primary labs on Ms. Rosenbaum. Labs ordered today due to the complexity of her medication list and ongoing symptoms. He would also not reorder her glucose strips and lancets. This service was provided today.     Reportedly normal EPS. Holter with ST. DENI consistent with OH, but not POTS. I still do not think this is Inappropriate Sinus Tach syndrome. I do not want to initiate Corlanor at this point, as she has tried in past.      Diagnosis Plan   1. Tachycardia  Sinus. Chronic.   Instructed on the importance of weight loss and conditioning of the heart. Explained the effect that persistent tachycardia can have on the heart muscle.     Atenolol 25mg daily. Will increase dose as BP tolerates    She has shown resistance to many other rate lowering medications listed in HPI.     TSH  CMP  CBC  Lipid  A1c        EVELYN with CPAP   Repeated sleep study here at [facility] showed multiple apneic episodes. Wearing CPAP now. With automatic settings she is still \"having 13 episodes a night\".        2. Orthostatic hypotension  Reviewed with her Dr. Amin's instructions outlined last visit. She is following. Hydration, slow position changes, mindful of bath time, warm weather, and medications that can drop BP quickly.       3. Vertigo  Dizziness and other symptoms provided very consistent with Vertigo.   Meclizine 25mg TID PRN to see if any improvement.   She was given printed off instructions on head/tilt exercises to improve vertigo symptoms. If symptoms persist, she could benefit from an ENT consult.        4. Mild persistent asthma without complication  She is using Albuterol inhaler daily. Asthma symptoms are now persistent. Rx for low dose Dulera 2puffs BID. Use albuterol inhaler sparingly.  PFTs without abnormalities         5. Mixed hyperlipidemia  Dyslipidemia under " "unsatisfactory control.  Statin:  Yes. Not actually recommended due to age and low cardiac risk. However, she is unable to commit to diet and exercise changes to lower levels. She is requesting Statin Therapy.  Recommended low-intensity statin therapy  Lipid-lowering medications: Pravastatin 10mg QHS   Repeated Lipid profile- will increase Pravastatin to 40mg.        6. Restless Leg syndrome Sleep study she had done at home was interpretted as \"excessive snoring and severe restless leg syndrome\". She is requesting medication for restless leg due to recommendation by Sleep Study physician.  Rx for Mirapex 0.25mg nightly.           Referral to EP in [place]. [facility] does not offer this service, so referral made to Dr. Duarte from [facility].   "

## 2017-10-24 ENCOUNTER — TELEPHONE (OUTPATIENT)
Dept: CARDIOLOGY | Facility: CLINIC | Age: 32
End: 2017-10-24

## 2017-10-24 RX ORDER — PRAVASTATIN SODIUM 40 MG
40 TABLET ORAL NIGHTLY
Qty: 30 TABLET | Refills: 11 | Status: SHIPPED | OUTPATIENT
Start: 2017-10-24 | End: 2018-11-21 | Stop reason: SDUPTHER

## 2017-10-24 NOTE — TELEPHONE ENCOUNTER
Called with lab results. Will mail results so she can share with PCP.   Increased Pravastatin to 40mg nightly due to LDL of 191.   Recheck in 3-6 months to see if additional lipid lowering medications are required.     appt with dr. gibbons in [place], [place]. EP

## 2017-10-25 ENCOUNTER — DOCUMENTATION (OUTPATIENT)
Dept: OBSTETRICS AND GYNECOLOGY | Facility: CLINIC | Age: 32
End: 2017-10-25

## 2017-10-25 RX ORDER — SPIRONOLACTONE 25 MG/1
TABLET ORAL
Qty: 30 TABLET | Refills: 4 | Status: SHIPPED | OUTPATIENT
Start: 2017-10-25 | End: 2018-11-21 | Stop reason: SDUPTHER

## 2017-10-25 NOTE — PROGRESS NOTES
Refill request was sent from [email or web address].  It was approved.  After speaking with the patient, she has been seeing another facility for her GYN care.  The pharmacy was called and refills were cancelled.

## 2017-10-31 RX ORDER — SPIRONOLACTONE 25 MG/1
TABLET ORAL
Qty: 30 TABLET | Refills: 4 | OUTPATIENT
Start: 2017-10-31

## 2017-11-02 PROBLEM — R00.0 TACHYCARDIA: Status: ACTIVE | Noted: 2017-11-02

## 2017-11-21 ENCOUNTER — OFFICE VISIT (OUTPATIENT)
Dept: PAIN MEDICINE | Facility: CLINIC | Age: 32
End: 2017-11-21

## 2017-11-21 ENCOUNTER — OFFICE VISIT (OUTPATIENT)
Dept: SLEEP MEDICINE | Facility: HOSPITAL | Age: 32
End: 2017-11-21

## 2017-11-21 VITALS
BODY MASS INDEX: 36.88 KG/M2 | SYSTOLIC BLOOD PRESSURE: 122 MMHG | WEIGHT: 216 LBS | HEIGHT: 64 IN | DIASTOLIC BLOOD PRESSURE: 80 MMHG

## 2017-11-21 VITALS
BODY MASS INDEX: 37.25 KG/M2 | DIASTOLIC BLOOD PRESSURE: 72 MMHG | SYSTOLIC BLOOD PRESSURE: 128 MMHG | WEIGHT: 218.2 LBS | HEIGHT: 64 IN

## 2017-11-21 DIAGNOSIS — M51.36 DDD (DEGENERATIVE DISC DISEASE), LUMBAR: Primary | ICD-10-CM

## 2017-11-21 DIAGNOSIS — M79.18 MYOFACIAL MUSCLE PAIN: ICD-10-CM

## 2017-11-21 DIAGNOSIS — M47.817 LUMBOSACRAL SPONDYLOSIS WITHOUT MYELOPATHY: ICD-10-CM

## 2017-11-21 DIAGNOSIS — M79.18 MYOFASCIAL PAIN: ICD-10-CM

## 2017-11-21 DIAGNOSIS — F51.04 PSYCHOPHYSIOLOGICAL INSOMNIA: ICD-10-CM

## 2017-11-21 DIAGNOSIS — G25.81 RESTLESS LEGS SYNDROME (RLS): ICD-10-CM

## 2017-11-21 DIAGNOSIS — G47.33 OBSTRUCTIVE SLEEP APNEA, ADULT: Primary | ICD-10-CM

## 2017-11-21 PROCEDURE — 99214 OFFICE O/P EST MOD 30 MIN: CPT | Performed by: NURSE PRACTITIONER

## 2017-11-21 PROCEDURE — 99213 OFFICE O/P EST LOW 20 MIN: CPT | Performed by: INTERNAL MEDICINE

## 2017-11-21 RX ORDER — HYDROCODONE BITARTRATE AND ACETAMINOPHEN 7.5; 325 MG/1; MG/1
TABLET ORAL
Qty: 75 TABLET | Refills: 0 | Status: SHIPPED | OUTPATIENT
Start: 2017-11-21 | End: 2019-04-11

## 2017-11-21 RX ORDER — TIZANIDINE 4 MG/1
4 TABLET ORAL DAILY PRN
Qty: 30 TABLET | Refills: 2 | Status: SHIPPED | OUTPATIENT
Start: 2017-11-21 | End: 2017-12-21

## 2017-11-21 NOTE — PROGRESS NOTES
CHIEF COMPLAINT: follow up sleep study results    HPI:The patient is a 32 y.o. female.  She returns to sleep clinic to discuss the results of the recent sleep study.  She had a diagnostic polysomnogram on 09/08/2017.  The AHI was 6.9, REM AHI 19.8. The patient had a periodic limb movement index of 18.31.  The patient did not have any significant cardiac arrhythmias    INTERVAL MEDICAL HISTORY: her trazodone ans seroquel are being held. She is sleeping less and has started on CPAP. Her FFM is to large and she is working on getting a better fit. She has had very dry mouth and nose (nose bleeds).    MEDICATIONS:   Current Outpatient Prescriptions:   •  albuterol (PROVENTIL HFA;VENTOLIN HFA) 108 (90 BASE) MCG/ACT inhaler, Inhale 2 puffs every 4 (four) hours as needed for wheezing., Disp: , Rfl:   •  atenolol (TENORMIN) 25 MG tablet, Take 1 tablet by mouth Daily., Disp: 30 tablet, Rfl: 11  •  Blood Glucose Monitoring Suppl (ONE TOUCH BASIC SYSTEM) w/Device kit, 1 Device Daily., Disp: 1 each, Rfl: 0  •  chlorproMAZINE (THORAZINE) 100 MG tablet, Take 100 mg by mouth 3 (Three) Times a Day. Take 1 tablet AM, 1 tablet at noon, and  2 tablets at night, Disp: , Rfl:   •  glucose blood (ONE TOUCH ULTRA TEST) test strip, Use as instructed, Disp: 50 each, Rfl: 12  •  HYDROcodone-acetaminophen (NORCO) 7.5-325 MG per tablet, Take 1 tablet by mouth Every 6 (Six) Hours As Needed for Moderate Pain (4-6)., Disp: , Rfl:   •  Lancets (ACCU-CHEK MULTICLIX) lancets, BID blood sugar check, Disp: 102 each, Rfl: 11  •  meclizine (ANTIVERT) 25 MG tablet, Take 1 tablet by mouth 3 (Three) Times a Day As Needed for dizziness., Disp: 90 tablet, Rfl: 1  •  metFORMIN (GLUCOPHAGE) 500 MG tablet, Take 500 mg by mouth 2 (two) times a day with meals., Disp: , Rfl:   •  mometasone-formoterol (DULERA 100) 100-5 MCG/ACT inhaler, Inhale 2 puffs 2 (Two) Times a Day., Disp: 1 inhaler, Rfl: 3  •  omeprazole (priLOSEC) 40 MG capsule, , Disp: , Rfl:   •   pramipexole (MIRAPEX) 0.25 MG tablet, Take 1 tablet by mouth Every Night., Disp: 30 tablet, Rfl: 6  •  pravastatin (PRAVACHOL) 40 MG tablet, Take 1 tablet by mouth Every Night., Disp: 30 tablet, Rfl: 11  •  promethazine (PHENERGAN) 25 MG tablet, Take  by mouth., Disp: , Rfl:   •  spironolactone (ALDACTONE) 25 MG tablet, TAKE ONE TABLET BY MOUTH EVERY DAY, Disp: 30 tablet, Rfl: 4  •  tiZANidine (ZANAFLEX) 4 MG tablet, Take 0.5 tablets by mouth 2 (Two) Times a Day As Needed for Muscle Spasms., Disp: 60 tablet, Rfl: 1    PHYSICAL EXAM  Vital Signs (last 24 hours)       11/20 0700  -  11/21 0659 11/21 0700  -  11/21 1011   Most Recent    BP     122/80     122/80          Gen:  No distress, appears stated age, alert, oriented to person, place, and time    Heent:   NC/AT, PERRLA, EOMI, anicteric sclera    External ears/nose normal, OP clear, Mallamati 4    LUNGS: Clear breath sounds bilaterally, nonlabored breathing    CV:  Normal S1/S2, without murmur, no peripheral edema    ABD:  Non tender, no enlarged liver or masses, Bowel sounds not appreciated    EXT:  No cyanosis or clubbing      IMPRESSION AND PLAN:    1. Obstructive sleep apnea   1. New mask, waiting on compliance report  2. Needs better seal  2. Restless leg syndrome/ /Doe-Ekbom disease (RLS/WED)   1. Still present, will increase mirapex up to 1 mg   3. Parasomnia - Night terrors (still present)  1. on prazosin  4. PTSD and depression -   1. Being managed by [facility]  5. Insomnia -   1. Suggest resuming seroquel and trazodone if ok with psychiatry  2. RTC in 3 months  3.         All of the patient's questions were answered. She states understanding and agreement with my assessment and plan as above.  Total time 15 min, more than half spent in face to face counseling and coordination of care.           This document has been electronically signed by Mega Romero MD on November 21, 2017           CC: Best Gannon MD          No ref.  provider found

## 2017-11-21 NOTE — PROGRESS NOTES
Chelita Rosenbaum is a 32 y.o. female.   1985    HPI:   Location: lower back,migraines  Quality: aching, sharp and dull  Severity: 6/10  Timing: constant  Alleviating: pain medication  Aggravating: bending and increased activity    Patient denies side effects, she reports that her opioid medication still provides significant relief and allows her to be more active.      The following portions of the patient's history were reviewed by me and updated as appropriate: allergies, current medications, past family history, past medical history, past social history, past surgical history and problem list.    Past Medical History:   Diagnosis Date   • Arthropathy of lumbar facet joint    • Asthma    • Bipolar disorder    • Cervico-occipital neuralgia    • Constipation    • Disorder of small intestine     thicking jejunum, delayed transit      • Diverticular disease of colon    • Drug therapy     Other long term (current) drug therapy      • Dysphagia    • Encounter for contraceptive management     other   • Encounter for surveillance of contraceptives     other   • Epigastric pain    • Generalized anxiety disorder    • H/O colonoscopy with polypectomy    • Hirsutism    • Hyperprolactinemia    • Low back pain    • Menstruation disorder     Other disorders of menstruation and other abnormal bleeding from female genital tract      • Migraine aura, persistent     Migraine - w/ aura of spots      • Myofascial pain    • Nausea    • Nausea and vomiting    • Pap smear for cervical cancer screening    • Periumbilical pain    • Surgical follow-up care    • Tachycardia        Social History     Social History   • Marital status:      Spouse name: N/A   • Number of children: N/A   • Years of education: N/A     Occupational History   • Not on file.     Social History Main Topics   • Smoking status: Former Smoker   • Smokeless tobacco: Never Used   • Alcohol use No   • Drug use: No   • Sexual activity: Defer     Other Topics  Concern   • Not on file     Social History Narrative       Family History   Problem Relation Age of Onset   • Heart disease Mother    • Asthma Father    • Diabetes Father    • Heart disease Father    • Hypertension Father          Current Outpatient Prescriptions:   •  albuterol (PROVENTIL HFA;VENTOLIN HFA) 108 (90 BASE) MCG/ACT inhaler, Inhale 2 puffs every 4 (four) hours as needed for wheezing., Disp: , Rfl:   •  atenolol (TENORMIN) 25 MG tablet, Take 1 tablet by mouth Daily., Disp: 30 tablet, Rfl: 11  •  Blood Glucose Monitoring Suppl (ONE TOUCH BASIC SYSTEM) w/Device kit, 1 Device Daily., Disp: 1 each, Rfl: 0  •  chlorproMAZINE (THORAZINE) 100 MG tablet, Take 100 mg by mouth 3 (Three) Times a Day. Take 1 tablet AM, 1 tablet at noon, and  2 tablets at night, Disp: , Rfl:   •  glucose blood (ONE TOUCH ULTRA TEST) test strip, Use as instructed, Disp: 50 each, Rfl: 12  •  HYDROcodone-acetaminophen (NORCO) 7.5-325 MG per tablet, Take 1 tablet by mouth Every 6 (Six) Hours As Needed for Moderate Pain (4-6)., Disp: , Rfl:   •  Lancets (ACCU-CHEK MULTICLIX) lancets, BID blood sugar check, Disp: 102 each, Rfl: 11  •  meclizine (ANTIVERT) 25 MG tablet, Take 1 tablet by mouth 3 (Three) Times a Day As Needed for dizziness., Disp: 90 tablet, Rfl: 1  •  metFORMIN (GLUCOPHAGE) 500 MG tablet, Take 500 mg by mouth 2 (two) times a day with meals., Disp: , Rfl:   •  mometasone-formoterol (DULERA 100) 100-5 MCG/ACT inhaler, Inhale 2 puffs 2 (Two) Times a Day., Disp: 1 inhaler, Rfl: 3  •  omeprazole (priLOSEC) 40 MG capsule, , Disp: , Rfl:   •  pramipexole (MIRAPEX) 0.25 MG tablet, Take 1 tablet by mouth Every Night., Disp: 30 tablet, Rfl: 6  •  pravastatin (PRAVACHOL) 40 MG tablet, Take 1 tablet by mouth Every Night., Disp: 30 tablet, Rfl: 11  •  promethazine (PHENERGAN) 25 MG tablet, Take  by mouth., Disp: , Rfl:   •  spironolactone (ALDACTONE) 25 MG tablet, TAKE ONE TABLET BY MOUTH EVERY DAY, Disp: 30 tablet, Rfl: 4  •  tiZANidine  (ZANAFLEX) 4 MG tablet, Take 0.5 tablets by mouth 2 (Two) Times a Day As Needed for Muscle Spasms., Disp: 60 tablet, Rfl: 1  •  HYDROcodone-acetaminophen (NORCO) 7.5-325 MG per tablet, 1 po 2-3x per day, Disp: 75 tablet, Rfl: 0  •  HYDROcodone-acetaminophen (NORCO) 7.5-325 MG per tablet, 1 po 2-3x per day, Disp: 75 tablet, Rfl: 0  •  tiZANidine (ZANAFLEX) 4 MG tablet, Take 1 tablet by mouth Daily As Needed for Muscle Spasms for up to 30 days., Disp: 30 tablet, Rfl: 2    Allergies   Allergen Reactions   • Apriso [Mesalamine Er] Nausea And Vomiting     Vomiting    • Tramadol Other (See Comments)     Hard to breath   • Lithium Nausea And Vomiting       Review of Systems   Musculoskeletal: Positive for back pain.   Neurological: Positive for headaches.   All other systems reviewed and are negative.    All systems reviewed and negative except for above.    Physical Exam   Constitutional: She appears well-developed and well-nourished. No distress.   Musculoskeletal:        Lumbar back: She exhibits decreased range of motion (flexion to about 45 deg with ext limited to approx 15 deg with pain in both directions.).   ttp b si joints   Neurological: She is alert. She has normal strength. No sensory deficit.   Psychiatric: She has a normal mood and affect. Her behavior is normal. Judgment normal.       Chelita was seen today for back pain and headache.    Diagnoses and all orders for this visit:    DDD (degenerative disc disease), lumbar  -     Ambulatory Referral to Neurosurgery    Lumbosacral spondylosis without myelopathy  -     Ambulatory Referral to Neurosurgery    Myofascial pain    Myofacial muscle pain    Other orders  -     tiZANidine (ZANAFLEX) 4 MG tablet; Take 1 tablet by mouth Daily As Needed for Muscle Spasms for up to 30 days.  -     HYDROcodone-acetaminophen (NORCO) 7.5-325 MG per tablet; 1 po 2-3x per day  -     HYDROcodone-acetaminophen (NORCO) 7.5-325 MG per tablet; 1 po 2-3x per day        Medication: Patient  reports no negative side effects, Patient reports appropriate usage and storage habits, Patient's opioid provides enough relief to be more active and perform activities of daily living with less discomfort. and Refill opioid medication as above.  Pt would like second opinion on epidural lipomatosis.  I will be happy to arrange.   I explained that the pain clinic will be closing after the first of the year.  I suggested pt discuss what to do next with  pcp and that pt may call back in about a month to find out if there is going to be a new clinic in the area. .    Interventional: none at this time    Rehab: none at this time    Behavioral: No aberrant behavior noted. PREM Report #[id number]  was reviewed and is consistent with stated history    Urine drug screen Reviewed from last visit and is appropriate          This document has been electronically signed by bAhishek Altamirano MD on November 21, 2017 2:58 PM

## 2017-11-27 RX ORDER — SPIRONOLACTONE 25 MG/1
TABLET ORAL
Qty: 30 TABLET | Refills: 4 | OUTPATIENT
Start: 2017-11-27

## 2017-12-01 DIAGNOSIS — R00.0 TACHYCARDIA: ICD-10-CM

## 2017-12-01 LAB
ANION GAP SERPL CALCULATED.3IONS-SCNC: 13 MMOL/L (ref 7–19)
BUN BLDV-MCNC: 7 MG/DL (ref 6–20)
CALCIUM SERPL-MCNC: 9.2 MG/DL (ref 8.6–10)
CHLORIDE BLD-SCNC: 102 MMOL/L (ref 98–111)
CO2: 25 MMOL/L (ref 22–29)
CREAT SERPL-MCNC: 0.7 MG/DL (ref 0.5–0.9)
GFR NON-AFRICAN AMERICAN: >60
GLUCOSE BLD-MCNC: 81 MG/DL (ref 74–109)
POTASSIUM SERPL-SCNC: 4.2 MMOL/L (ref 3.5–5)
SODIUM BLD-SCNC: 140 MMOL/L (ref 136–145)

## 2017-12-13 ENCOUNTER — OFFICE VISIT (OUTPATIENT)
Dept: NEUROSURGERY | Facility: CLINIC | Age: 32
End: 2017-12-13

## 2017-12-13 VITALS
BODY MASS INDEX: 35.85 KG/M2 | DIASTOLIC BLOOD PRESSURE: 98 MMHG | WEIGHT: 210 LBS | HEIGHT: 64 IN | SYSTOLIC BLOOD PRESSURE: 138 MMHG

## 2017-12-13 DIAGNOSIS — M54.50 CHRONIC BILATERAL LOW BACK PAIN WITHOUT SCIATICA: Primary | ICD-10-CM

## 2017-12-13 DIAGNOSIS — Z87.891 FORMER SMOKER: ICD-10-CM

## 2017-12-13 DIAGNOSIS — G89.29 CHRONIC BILATERAL LOW BACK PAIN WITHOUT SCIATICA: Primary | ICD-10-CM

## 2017-12-13 PROCEDURE — 99214 OFFICE O/P EST MOD 30 MIN: CPT | Performed by: NURSE PRACTITIONER

## 2017-12-13 NOTE — PROGRESS NOTES
Chief complaint:   Chief Complaint   Patient presents with   • Back Pain     Chelita is referred here from Dr. Short for her back, hip and knee pain.  She was in pain managment with Dr. Short but he is leaving his practice and was referred here for follow up, she has an MRI in July done with [facility].  Did try therapy earlier but the therapist discontinued because it was making her worse.        Subjective     HPI: This is a 32-year-old male patient who was referred to us by Dr. Short for back pain.  She is here to be evaluated today.  She says that the pain has been going on for several years.  She denies any recent accident or injury.  She does state that she did have a accident as a child.  She says that the pain in her back is constant.  She says its better with heat and worse with cold..  She says that it is worse when she is walking but does not get any measurable relief whenever she is sitting she states that she can get some radicular pain into her right lower extremity that is intermittent again its better with heat and worse with cold.  Denies any bowel or bladder incontinence.  She has not done any recent chiropractic care or physical therapy.  She says anytime she has done physical therapy so is made her worse.  She was involved with pain management but is now trying to get involved with a new pain management as her doctor is no longer going to be involved with pain management.  She is right-hand dominant.  She currently does not work.  She is .    Review of Systems   Respiratory: Positive for shortness of breath.    Cardiovascular: Positive for palpitations and leg swelling.   Gastrointestinal: Positive for constipation.   Musculoskeletal: Positive for back pain.   Neurological: Positive for dizziness, weakness, light-headedness and numbness.   Psychiatric/Behavioral: Positive for decreased concentration, hallucinations and sleep disturbance. The patient is nervous/anxious.    All other  systems reviewed and are negative.       Past Medical History:   Diagnosis Date   • Arthropathy of lumbar facet joint    • Asthma    • Bipolar disorder    • Cervico-occipital neuralgia    • Constipation    • Disorder of small intestine     thicking jejunum, delayed transit      • Diverticular disease of colon    • Drug therapy     Other long term (current) drug therapy      • Dysphagia    • Encounter for contraceptive management     other   • Encounter for surveillance of contraceptives     other   • Epigastric pain    • Generalized anxiety disorder    • H/O colonoscopy with polypectomy    • Hirsutism    • Hyperprolactinemia    • Low back pain    • Menstruation disorder     Other disorders of menstruation and other abnormal bleeding from female genital tract      • Migraine aura, persistent     Migraine - w/ aura of spots      • Myofascial pain    • Nausea    • Nausea and vomiting    • Pap smear for cervical cancer screening    • Periumbilical pain    • Surgical follow-up care    • Tachycardia      Past Surgical History:   Procedure Laterality Date   • ABDOMINAL SURGERY  2014    Anesth, surgery of abdomen (1)      • CAPSULE ENDOSCOPY  2015    [facility], capsule endoscopy 69536 (1)      •  SECTION  2010     Section (3)      • CHOLECYSTECTOMY  05/15/2003    Cholecystectomy, laparoscopic (1)      • DILATATION AND CURETTAGE  10/21/2013    D&C (1)      • ENDOSCOPY  2015    EGD w/ biopsy [id number] (1)      • INJECTION OF MEDICATION  2016    Injection for nerve block (1)        Family History   Problem Relation Age of Onset   • Heart disease Mother    • Asthma Father    • Diabetes Father    • Heart disease Father    • Hypertension Father    • No Known Problems Brother      Social History   Substance Use Topics   • Smoking status: Former Smoker   • Smokeless tobacco: Never Used   • Alcohol use No       (Not in a hospital admission)  Allergies:  Apriso [mesalamine er]; Tramadol;  "Lithium; and Ultram [tramadol hcl]    Objective      Vital Signs  /98 (BP Location: Right arm, Patient Position: Sitting)  Ht 162.6 cm (64\")  Wt 95.3 kg (210 lb)  BMI 36.05 kg/m2    Physical Exam   Constitutional: She is oriented to person, place, and time. She appears well-developed and well-nourished.   HENT:   Head: Normocephalic.   Eyes: Conjunctivae, EOM and lids are normal. Pupils are equal, round, and reactive to light.   Neck: Normal range of motion.   Cardiovascular: Normal rate, regular rhythm and normal heart sounds.    Pulmonary/Chest: Effort normal and breath sounds normal.   Abdominal: Normal appearance.   Musculoskeletal: Normal range of motion.        Lumbar back: She exhibits pain.   Neurological: She is alert and oriented to person, place, and time. She has normal strength and normal reflexes. She displays normal reflexes. No cranial nerve deficit or sensory deficit. GCS eye subscore is 4. GCS verbal subscore is 5. GCS motor subscore is 6.   Skin: Skin is warm.   Psychiatric: She has a normal mood and affect. Her speech is normal and behavior is normal. Thought content normal. Cognition and memory are normal.       Results Review: MRI from July 2016 shows that the patient does have epidural lipomatosis.  There is no other structural problem with her back.  No significant stenosis present no fracture is less.  No malalignment visualized.          Assessment/Plan: At this point the patient was offered to re-workup regarding her spine however she was told that in order to get a new MRI she will need to go through physical therapy.  She declined doing this at this time and does want to go to pain management at [facility] with Dr. Bhupendra trujillo.  We will set this up for her.  We will need see her on an as any basis.  BMI shows that she is very overweight.  BMI chart was given the patient.  She is a nonsmoker      Chelita was seen today for back pain.    Diagnoses and all orders for this visit:    Chronic " bilateral low back pain without sciatica  -     Ambulatory Referral to Pain Management    BMI 36.0-36.9,adult    Former smoker        I discussed the patients findings and my recommendations with patient    Kieran Parks, APRN  12/13/17  1:36 PM

## 2018-04-17 ENCOUNTER — OFFICE VISIT (OUTPATIENT)
Dept: SLEEP MEDICINE | Facility: HOSPITAL | Age: 33
End: 2018-04-17

## 2018-04-17 VITALS
HEART RATE: 114 BPM | DIASTOLIC BLOOD PRESSURE: 110 MMHG | HEIGHT: 64 IN | BODY MASS INDEX: 39.09 KG/M2 | SYSTOLIC BLOOD PRESSURE: 146 MMHG | WEIGHT: 229 LBS

## 2018-04-17 DIAGNOSIS — G47.33 OBSTRUCTIVE SLEEP APNEA, ADULT: Primary | ICD-10-CM

## 2018-04-17 DIAGNOSIS — F51.04 PSYCHOPHYSIOLOGICAL INSOMNIA: ICD-10-CM

## 2018-04-17 PROCEDURE — 99213 OFFICE O/P EST LOW 20 MIN: CPT | Performed by: INTERNAL MEDICINE

## 2018-04-17 NOTE — PROGRESS NOTES
Sleep Clinic Follow Up    Date: 4/17/2018  Primary Care Physician: Best Gannon MD      Interim History (1/3):  Since the last visit on 11/21/2017, patient has:      1)  EVELYN - had cpap confiscated. She had switched to nasal mask and admits to doing well. She was sleeping at night and not during the day.. Unfortunately, CPAP was confiscated. Shes uses [facility] in [place].    Bed time: 2000  Sleep latency:  minutes  Number of times awakens during the night: 2  Wake time: 8158-1142  Estimated total sleep time at night: 8 hours  Caffeine intake: none  Alcohol intake: none  Nap time: none  Sleepiness with Driving: N/A    2) Patient denies RLS symptoms.     PMHx, FH, SH reviewed and pertinent changes are: unchanged from last office visit on 11/21/2017      REVIEW OF SYSTEMS:   Negative for chest pain, fever, chills, SOA, abdominal pain. Smoking: none      Exam (6-11/12):    Vitals:    04/17/18 1314   BP: (!) 146/110   Pulse: 114     Body mass index is 39.31 kg/m². Patient's Body mass index is 39.31 kg/m². BMI is above normal parameters. Follow-up plan includes:  referral to primary care.      Gen:  No distress, conversant, pleasant, appears stated age, alert, oriented  Eyes:   Anicteric sclera, moist conjunctiva, no lid lag     PERRLA, EOMI   Heent:   NC/AT    Oropharynx clear, Mallampati 4, poor teeth    normal hearing  Lungs:  Normal effort, non-labored breathing    Clear to auscultation    CV:  Normal S1/S2, no murmur    no lower extremity edema  ABD:  Soft, normal bowel sounds    Psych:  Appropriate affect  Neuro:  CN 2-12 intact    Past Medical History:   Diagnosis Date   • Arthropathy of lumbar facet joint    • Asthma    • Bipolar disorder    • Cervico-occipital neuralgia    • Constipation    • Disorder of small intestine     thicking jejunum, delayed transit      • Diverticular disease of colon    • Drug therapy     Other long term (current) drug therapy      • Dysphagia    • Encounter for  contraceptive management     other   • Encounter for surveillance of contraceptives     other   • Epigastric pain    • Generalized anxiety disorder    • H/O colonoscopy with polypectomy    • Hirsutism    • Hyperprolactinemia    • Low back pain    • Menstruation disorder     Other disorders of menstruation and other abnormal bleeding from female genital tract      • Migraine aura, persistent     Migraine - w/ aura of spots      • Myofascial pain    • Nausea    • Nausea and vomiting    • Pap smear for cervical cancer screening    • Periumbilical pain    • Surgical follow-up care    • Tachycardia        Current Outpatient Prescriptions:   •  albuterol (PROVENTIL HFA;VENTOLIN HFA) 108 (90 BASE) MCG/ACT inhaler, Inhale 2 puffs every 4 (four) hours as needed for wheezing., Disp: , Rfl:   •  atenolol (TENORMIN) 25 MG tablet, Take 1 tablet by mouth Daily., Disp: 30 tablet, Rfl: 11  •  Blood Glucose Monitoring Suppl (ONE TOUCH BASIC SYSTEM) w/Device kit, 1 Device Daily., Disp: 1 each, Rfl: 0  •  chlorproMAZINE (THORAZINE) 100 MG tablet, Take 100 mg by mouth 3 (Three) Times a Day. Take 1 tablet AM, 1 tablet at noon, and  2 tablets at night, Disp: , Rfl:   •  glucose blood (ONE TOUCH ULTRA TEST) test strip, Use as instructed, Disp: 50 each, Rfl: 12  •  HYDROcodone-acetaminophen (NORCO) 7.5-325 MG per tablet, Take 1 tablet by mouth Every 6 (Six) Hours As Needed for Moderate Pain (4-6)., Disp: , Rfl:   •  HYDROcodone-acetaminophen (NORCO) 7.5-325 MG per tablet, 1 po 2-3x per day, Disp: 75 tablet, Rfl: 0  •  HYDROcodone-acetaminophen (NORCO) 7.5-325 MG per tablet, 1 po 2-3x per day, Disp: 75 tablet, Rfl: 0  •  Lancets (ACCU-CHEK MULTICLIX) lancets, BID blood sugar check, Disp: 102 each, Rfl: 11  •  meclizine (ANTIVERT) 25 MG tablet, Take 1 tablet by mouth 3 (Three) Times a Day As Needed for dizziness., Disp: 90 tablet, Rfl: 1  •  metFORMIN (GLUCOPHAGE) 500 MG tablet, Take 500 mg by mouth 2 (two) times a day with meals., Disp: ,  Rfl:   •  mometasone-formoterol (DULERA 100) 100-5 MCG/ACT inhaler, Inhale 2 puffs 2 (Two) Times a Day., Disp: 1 inhaler, Rfl: 3  •  omeprazole (priLOSEC) 40 MG capsule, , Disp: , Rfl:   •  pramipexole (MIRAPEX) 0.25 MG tablet, Take 1 tablet by mouth Every Night., Disp: 30 tablet, Rfl: 6  •  pravastatin (PRAVACHOL) 40 MG tablet, Take 1 tablet by mouth Every Night., Disp: 30 tablet, Rfl: 11  •  promethazine (PHENERGAN) 25 MG tablet, Take  by mouth., Disp: , Rfl:   •  spironolactone (ALDACTONE) 25 MG tablet, TAKE ONE TABLET BY MOUTH EVERY DAY, Disp: 30 tablet, Rfl: 4  •  tiZANidine (ZANAFLEX) 4 MG tablet, Take 0.5 tablets by mouth 2 (Two) Times a Day As Needed for Muscle Spasms., Disp: 60 tablet, Rfl: 1      ASSESSMENT / PLAN:     1. Obstructive sleep apnea  1. PSG on 09/08/2017, AHI of 6.9, AHI of 19.8  2. CPAP confiscated  3. Will try to get [employer] machine  2. Restless leg syndrome/ /Doe-Ekbom disease (RLS/WED)  - PLMI of 18.31  3. Night terrors  1. Start melatonin at 8 pm, go to bed after 11 pm  4. PTSD and depression - managed by [facility]   5. Insomnia  1. Not on seroquel or trazodone.  2. Try limiting time in bed to 7 hours  6. PCOS  7. HTN    Total time 15 min, more than half spent in face to face counseling and coordination of care.    RTC in 6 months     This document has been electronically signed by Mega Romero MD on April 17, 2018         CC: Best Gannon MD          No ref. provider found

## 2018-06-21 ENCOUNTER — TELEPHONE (OUTPATIENT)
Dept: CARDIOLOGY | Facility: CLINIC | Age: 33
End: 2018-06-21

## 2018-06-21 NOTE — TELEPHONE ENCOUNTER
Called and spoke to zaida at the pharmacy and informed her that this patient had moved and werent following with us anymore. Zaida was thankful for the phone call and said that she would contact the patient to see who she needed to send her refills to

## 2018-08-26 PROBLEM — L51.1: Status: ACTIVE | Noted: 2018-08-26

## 2018-11-07 RX ORDER — FLUDROCORTISONE ACETATE 0.1 MG/1
0.1 TABLET ORAL DAILY
Qty: 30 TABLET | Refills: 5 | Status: SHIPPED | OUTPATIENT
Start: 2018-11-07 | End: 2019-05-03 | Stop reason: SDUPTHER

## 2018-11-12 RX ORDER — ATENOLOL 50 MG/1
TABLET ORAL
Qty: 60 TABLET | Refills: 5 | Status: SHIPPED | OUTPATIENT
Start: 2018-11-12 | End: 2019-04-11

## 2018-11-21 ENCOUNTER — RESULTS ENCOUNTER (OUTPATIENT)
Dept: FAMILY MEDICINE CLINIC | Facility: CLINIC | Age: 33
End: 2018-11-21

## 2018-11-21 ENCOUNTER — CLINICAL SUPPORT (OUTPATIENT)
Dept: FAMILY MEDICINE CLINIC | Facility: CLINIC | Age: 33
End: 2018-11-21

## 2018-11-21 DIAGNOSIS — I10 BENIGN ESSENTIAL HTN: ICD-10-CM

## 2018-11-21 DIAGNOSIS — E78.5 HYPERLIPIDEMIA, UNSPECIFIED HYPERLIPIDEMIA TYPE: ICD-10-CM

## 2018-11-21 DIAGNOSIS — I10 BENIGN ESSENTIAL HTN: Primary | ICD-10-CM

## 2018-11-21 RX ORDER — PRAVASTATIN SODIUM 40 MG
40 TABLET ORAL NIGHTLY
Qty: 30 TABLET | Refills: 1 | Status: SHIPPED | OUTPATIENT
Start: 2018-11-21 | End: 2019-01-08 | Stop reason: SDUPTHER

## 2018-11-21 RX ORDER — SPIRONOLACTONE 25 MG/1
25 TABLET ORAL DAILY
Qty: 30 TABLET | Refills: 1 | Status: SHIPPED | OUTPATIENT
Start: 2018-11-21 | End: 2019-01-08 | Stop reason: SDUPTHER

## 2018-11-26 ENCOUNTER — TELEPHONE (OUTPATIENT)
Dept: FAMILY MEDICINE CLINIC | Facility: CLINIC | Age: 33
End: 2018-11-26

## 2019-01-08 RX ORDER — SPIRONOLACTONE 25 MG/1
TABLET ORAL
Qty: 30 TABLET | Refills: 1 | Status: SHIPPED | OUTPATIENT
Start: 2019-01-08 | End: 2019-03-08 | Stop reason: SDUPTHER

## 2019-01-08 RX ORDER — PRAVASTATIN SODIUM 40 MG
TABLET ORAL
Qty: 30 TABLET | Refills: 1 | Status: SHIPPED | OUTPATIENT
Start: 2019-01-08 | End: 2019-03-08 | Stop reason: SDUPTHER

## 2019-03-11 RX ORDER — PRAVASTATIN SODIUM 40 MG
TABLET ORAL
Qty: 30 TABLET | Refills: 1 | Status: SHIPPED | OUTPATIENT
Start: 2019-03-11 | End: 2019-05-03 | Stop reason: SDUPTHER

## 2019-03-11 RX ORDER — SPIRONOLACTONE 25 MG/1
TABLET ORAL
Qty: 30 TABLET | Refills: 1 | Status: SHIPPED | OUTPATIENT
Start: 2019-03-11 | End: 2019-05-03 | Stop reason: SDUPTHER

## 2019-04-11 ENCOUNTER — OFFICE VISIT (OUTPATIENT)
Dept: FAMILY MEDICINE CLINIC | Facility: CLINIC | Age: 34
End: 2019-04-11

## 2019-04-11 VITALS
TEMPERATURE: 97.6 F | HEART RATE: 125 BPM | DIASTOLIC BLOOD PRESSURE: 103 MMHG | HEIGHT: 64 IN | WEIGHT: 236.6 LBS | OXYGEN SATURATION: 97 % | BODY MASS INDEX: 40.39 KG/M2 | SYSTOLIC BLOOD PRESSURE: 140 MMHG

## 2019-04-11 DIAGNOSIS — E66.01 MORBIDLY OBESE (HCC): ICD-10-CM

## 2019-04-11 DIAGNOSIS — M79.18 MYOFASCIAL MUSCLE PAIN: ICD-10-CM

## 2019-04-11 DIAGNOSIS — M62.838 MUSCLE SPASMS OF NECK: ICD-10-CM

## 2019-04-11 DIAGNOSIS — I10 ESSENTIAL HYPERTENSION: Primary | ICD-10-CM

## 2019-04-11 DIAGNOSIS — E11.9 TYPE 2 DIABETES MELLITUS WITHOUT COMPLICATION, WITHOUT LONG-TERM CURRENT USE OF INSULIN (HCC): ICD-10-CM

## 2019-04-11 PROCEDURE — 99213 OFFICE O/P EST LOW 20 MIN: CPT | Performed by: NURSE PRACTITIONER

## 2019-04-11 RX ORDER — TIZANIDINE 4 MG/1
2 TABLET ORAL 2 TIMES DAILY PRN
Qty: 90 TABLET | Refills: 1 | Status: SHIPPED | OUTPATIENT
Start: 2019-04-11 | End: 2020-06-10

## 2019-04-11 RX ORDER — PROMETHAZINE HYDROCHLORIDE 25 MG/1
25 TABLET ORAL EVERY 6 HOURS PRN
Qty: 30 TABLET | Refills: 0 | Status: SHIPPED | OUTPATIENT
Start: 2019-04-11 | End: 2020-10-29 | Stop reason: SDUPTHER

## 2019-04-11 RX ORDER — ATENOLOL 50 MG/1
50 TABLET ORAL 2 TIMES DAILY
Qty: 60 TABLET | Refills: 5 | Status: SHIPPED | OUTPATIENT
Start: 2019-04-11 | End: 2019-05-09 | Stop reason: ALTCHOICE

## 2019-04-11 NOTE — PROGRESS NOTES
Chief Complaint   Patient presents with   • Hypertension     Been higher than normal for the past few days        Subjective   Chelita Rosenbaum is a 33 y.o.  female who presents today for hypertension.    HPI:   HTN:  She does not routinely check her blood pressure.  She was out last week and had it checked and it was 150/130 as reported by patient.  She has not been to any ER or Urgent Care for her blood pressure.  She states she can tell when it's high because she becomes short of breath and sometimes is really aware of her heart rate (which is always high).  She says she takes her blood pressure medication daily but cannot recall if it is in the morning or evening.    CERVICAL MUSCLE SPASM:  She has cervical muscle spasms and it is quite bothersome lately.  She has been out of her zanaflex.  She has a history of cervical DDD.    BLOOD SUGAR:  She has a history of checking her blood sugar but she has been out of testing supplies and (per patient) has reached her limit for her insurance so that any additional will have to be private pay and she cannot afford them.    Chelita Rosenbaum  has a past medical history of Arthropathy of lumbar facet joint, Asthma, Bipolar disorder (CMS/HCC), Cervico-occipital neuralgia, Constipation, Disorder of small intestine, Diverticular disease of colon, Drug therapy, Dysphagia, Encounter for contraceptive management, Encounter for surveillance of contraceptives, Epigastric pain, Generalized anxiety disorder, H/O colonoscopy with polypectomy, Hirsutism, Hyperprolactinemia (CMS/HCC), Low back pain, Menstruation disorder, Migraine aura, persistent, Myofascial pain, Nausea, Nausea and vomiting, Pap smear for cervical cancer screening, Periumbilical pain, Surgical follow-up care, and Tachycardia.    Allergies   Allergen Reactions   • Apriso [Mesalamine Er] Nausea And Vomiting     Vomiting    • Lamotrigine Rash   • Lithium Nausea And Vomiting     Difficulty breathing   • Tramadol  Other (See Comments)     Hard to breath   • Ultram [Tramadol Hcl]        Current Outpatient Medications:   •  albuterol (PROVENTIL HFA;VENTOLIN HFA) 108 (90 BASE) MCG/ACT inhaler, Inhale 2 puffs every 4 (four) hours as needed for wheezing., Disp: , Rfl:   •  atenolol (TENORMIN) 50 MG tablet, TAKE ONE TABLET BY MOUTH TWICE A DAY, Disp: 60 tablet, Rfl: 5  •  Blood Glucose Monitoring Suppl (ONE TOUCH BASIC SYSTEM) w/Device kit, 1 Device Daily., Disp: 1 each, Rfl: 0  •  fludrocortisone 0.1 MG tablet, Take 1 tablet by mouth Daily., Disp: 30 tablet, Rfl: 5  •  glucose blood (ONE TOUCH ULTRA TEST) test strip, Use as instructed, Disp: 50 each, Rfl: 12  •  Lancets (ACCU-CHEK MULTICLIX) lancets, BID blood sugar check, Disp: 102 each, Rfl: 11  •  meclizine (ANTIVERT) 25 MG tablet, Take 1 tablet by mouth 3 (Three) Times a Day As Needed for dizziness., Disp: 90 tablet, Rfl: 1  •  metFORMIN (GLUCOPHAGE) 500 MG tablet, TAKE ONE TABLET BY MOUTH TWICE A DAY, Disp: 60 tablet, Rfl: 5  •  mometasone-formoterol (DULERA 100) 100-5 MCG/ACT inhaler, Inhale 2 puffs 2 (Two) Times a Day., Disp: 1 inhaler, Rfl: 3  •  omeprazole (priLOSEC) 40 MG capsule, , Disp: , Rfl:   •  pramipexole (MIRAPEX) 0.25 MG tablet, Take 1 tablet by mouth Every Night., Disp: 30 tablet, Rfl: 6  •  pravastatin (PRAVACHOL) 40 MG tablet, TAKE ONE TABLET BY MOUTH EVERY EVENING, Disp: 30 tablet, Rfl: 1  •  promethazine (PHENERGAN) 25 MG tablet, Take  by mouth., Disp: , Rfl:   •  spironolactone (ALDACTONE) 25 MG tablet, TAKE ONE TABLET BY MOUTH EVERY DAY, Disp: 30 tablet, Rfl: 1  •  tiZANidine (ZANAFLEX) 4 MG tablet, Take 0.5 tablets by mouth 2 (Two) Times a Day As Needed for Muscle Spasms., Disp: 60 tablet, Rfl: 1  Past Medical History:   Diagnosis Date   • Arthropathy of lumbar facet joint    • Asthma    • Bipolar disorder (CMS/HCC)    • Cervico-occipital neuralgia    • Constipation    • Disorder of small intestine     thicking jejunum, delayed transit      •  Diverticular disease of colon    • Drug therapy     Other long term (current) drug therapy      • Dysphagia    • Encounter for contraceptive management     other   • Encounter for surveillance of contraceptives     other   • Epigastric pain    • Generalized anxiety disorder    • H/O colonoscopy with polypectomy    • Hirsutism    • Hyperprolactinemia (CMS/HCC)    • Low back pain    • Menstruation disorder     Other disorders of menstruation and other abnormal bleeding from female genital tract      • Migraine aura, persistent     Migraine - w/ aura of spots      • Myofascial pain    • Nausea    • Nausea and vomiting    • Pap smear for cervical cancer screening    • Periumbilical pain    • Surgical follow-up care    • Tachycardia      Past Surgical History:   Procedure Laterality Date   • ABDOMINAL SURGERY  2014    Anesth, surgery of abdomen (1)      • CAPSULE ENDOSCOPY  2015    [facility], capsule endoscopy 44803 (1)      •  SECTION  2010     Section (3)      • CHOLECYSTECTOMY  05/15/2003    Cholecystectomy, laparoscopic (1)      • DILATATION AND CURETTAGE  10/21/2013    D&C (1)      • ENDOSCOPY  2015    EGD w/ biopsy 20920 (1)      • INJECTION OF MEDICATION  2016    Injection for nerve block (1)        Social History     Socioeconomic History   • Marital status:      Spouse name: Not on file   • Number of children: Not on file   • Years of education: Not on file   • Highest education level: Not on file   Tobacco Use   • Smoking status: Never Smoker   • Smokeless tobacco: Never Used   Substance and Sexual Activity   • Alcohol use: No   • Drug use: No   • Sexual activity: Defer     Family History   Problem Relation Age of Onset   • Heart disease Mother    • Asthma Father    • Diabetes Father    • Heart disease Father    • Hypertension Father    • No Known Problems Brother        Family history, surgical history, past medical history, Allergies and med's reviewed with  "patient today and updated in [facility] EMR.     ROS:  Review of Systems   Constitutional: Negative.    HENT: Negative.    Eyes: Negative.    Respiratory: Positive for chest tightness.    Cardiovascular: Positive for leg swelling.   Gastrointestinal: Negative.    Endocrine: Negative.    Genitourinary: Negative.    Musculoskeletal: Negative.    Skin: Negative.    Allergic/Immunologic: Negative.    Neurological: Negative.    Hematological: Negative.    Psychiatric/Behavioral: Negative.        OBJECTIVE:  Vitals:    04/11/19 1048   BP: (!) 140/103   BP Location: Left arm   Patient Position: Sitting   Cuff Size: Large Adult   Pulse: (!) 125   Temp: 97.6 °F (36.4 °C)   SpO2: 97%   Weight: 107 kg (236 lb 9.6 oz)   Height: 162.6 cm (64.02\")     Physical Exam   Constitutional: She is oriented to person, place, and time. She appears well-developed and well-nourished.   HENT:   Head: Normocephalic and atraumatic.   Eyes: Conjunctivae and EOM are normal. Pupils are equal, round, and reactive to light.   Neck: Normal range of motion. Neck supple.   Cardiovascular: Normal rate, regular rhythm, normal heart sounds and intact distal pulses.   Pulmonary/Chest: Effort normal and breath sounds normal.   Abdominal: Soft. Bowel sounds are normal.   Musculoskeletal: Normal range of motion.   Neurological: She is alert and oriented to person, place, and time.   Skin: Skin is warm and dry. Capillary refill takes less than 2 seconds.   Psychiatric: She has a normal mood and affect. Her behavior is normal. Judgment and thought content normal.   Nursing note and vitals reviewed.      ASSESSMENT/ PLAN:    Chelita was seen today for hypertension.    Diagnoses and all orders for this visit:    Essential hypertension  -     atenolol (TENORMIN) 50 MG tablet; Take 1 tablet by mouth 2 (Two) Times a Day.    Type 2 diabetes mellitus without complication, without long-term current use of insulin (CMS/[facility])    Muscle spasms of neck    Morbidly obese " (CMS/[facility])        Orders Placed Today:     New Medications Ordered This Visit   Medications   • atenolol (TENORMIN) 50 MG tablet     Sig: Take 1 tablet by mouth 2 (Two) Times a Day.     Dispense:  60 tablet     Refill:  5        Management Plan:     An After Visit Summary was printed and given to the patient at discharge.    Follow-up: Return in about 4 weeks (around 5/9/2019) for Annual physical with labs prior.    Idalmis Mcdermott, ARIELLE 4/11/2019 11:22 AM  This note was electronically signed.

## 2019-05-02 ENCOUNTER — CLINICAL SUPPORT (OUTPATIENT)
Dept: FAMILY MEDICINE CLINIC | Facility: CLINIC | Age: 34
End: 2019-05-02

## 2019-05-02 ENCOUNTER — RESULTS ENCOUNTER (OUTPATIENT)
Dept: FAMILY MEDICINE CLINIC | Facility: CLINIC | Age: 34
End: 2019-05-02

## 2019-05-02 DIAGNOSIS — E11.9 TYPE 2 DIABETES MELLITUS WITHOUT COMPLICATION, WITHOUT LONG-TERM CURRENT USE OF INSULIN (HCC): Primary | ICD-10-CM

## 2019-05-02 DIAGNOSIS — E78.2 MIXED HYPERLIPIDEMIA: ICD-10-CM

## 2019-05-02 DIAGNOSIS — Z00.00 ANNUAL PHYSICAL EXAM: ICD-10-CM

## 2019-05-02 DIAGNOSIS — E11.9 TYPE 2 DIABETES MELLITUS WITHOUT COMPLICATION, WITHOUT LONG-TERM CURRENT USE OF INSULIN (HCC): ICD-10-CM

## 2019-05-02 DIAGNOSIS — I10 BENIGN ESSENTIAL HTN: ICD-10-CM

## 2019-05-03 RX ORDER — FLUDROCORTISONE ACETATE 0.1 MG/1
TABLET ORAL
Qty: 90 TABLET | Refills: 1 | Status: SHIPPED | OUTPATIENT
Start: 2019-05-03 | End: 2021-02-22

## 2019-05-03 RX ORDER — SPIRONOLACTONE 25 MG/1
TABLET ORAL
Qty: 90 TABLET | Refills: 1 | Status: SHIPPED | OUTPATIENT
Start: 2019-05-03 | End: 2019-10-28 | Stop reason: SDUPTHER

## 2019-05-03 RX ORDER — PRAVASTATIN SODIUM 40 MG
TABLET ORAL
Qty: 90 TABLET | Refills: 1 | Status: SHIPPED | OUTPATIENT
Start: 2019-05-03 | End: 2019-05-09 | Stop reason: ALTCHOICE

## 2019-05-09 ENCOUNTER — OFFICE VISIT (OUTPATIENT)
Dept: FAMILY MEDICINE CLINIC | Facility: CLINIC | Age: 34
End: 2019-05-09

## 2019-05-09 VITALS
RESPIRATION RATE: 19 BRPM | TEMPERATURE: 97.3 F | DIASTOLIC BLOOD PRESSURE: 84 MMHG | BODY MASS INDEX: 39.78 KG/M2 | HEIGHT: 64 IN | HEART RATE: 132 BPM | SYSTOLIC BLOOD PRESSURE: 123 MMHG | WEIGHT: 233 LBS | OXYGEN SATURATION: 97 %

## 2019-05-09 DIAGNOSIS — Z00.00 ENCOUNTER FOR WELLNESS EXAMINATION IN ADULT: Primary | ICD-10-CM

## 2019-05-09 DIAGNOSIS — I10 ESSENTIAL HYPERTENSION: ICD-10-CM

## 2019-05-09 DIAGNOSIS — Z80.3 FAMILY HISTORY OF BREAST CANCER: ICD-10-CM

## 2019-05-09 DIAGNOSIS — E78.2 MIXED HYPERLIPIDEMIA: ICD-10-CM

## 2019-05-09 DIAGNOSIS — Z12.31 ENCOUNTER FOR MAMMOGRAM TO ESTABLISH BASELINE MAMMOGRAM: ICD-10-CM

## 2019-05-09 DIAGNOSIS — G90.A POTS (POSTURAL ORTHOSTATIC TACHYCARDIA SYNDROME): ICD-10-CM

## 2019-05-09 PROCEDURE — 99395 PREV VISIT EST AGE 18-39: CPT | Performed by: NURSE PRACTITIONER

## 2019-05-09 RX ORDER — TRAZODONE HYDROCHLORIDE 100 MG/1
150 TABLET ORAL
COMMUNITY
Start: 2019-05-04 | End: 2021-01-29 | Stop reason: DRUGHIGH

## 2019-05-09 RX ORDER — DIAZEPAM 5 MG/1
5 TABLET ORAL 3 TIMES DAILY
COMMUNITY
Start: 2019-05-07 | End: 2021-01-29

## 2019-05-09 RX ORDER — LURASIDONE HYDROCHLORIDE 40 MG/1
40 TABLET, FILM COATED ORAL
COMMUNITY
End: 2020-03-25 | Stop reason: DRUGHIGH

## 2019-05-09 RX ORDER — QUETIAPINE FUMARATE 300 MG/1
2 TABLET, FILM COATED ORAL
COMMUNITY
Start: 2019-05-07 | End: 2021-01-29 | Stop reason: DRUGHIGH

## 2019-05-09 RX ORDER — CARVEDILOL 6.25 MG/1
6.25 TABLET ORAL 2 TIMES DAILY WITH MEALS
Qty: 60 TABLET | Refills: 5 | Status: SHIPPED | OUTPATIENT
Start: 2019-05-09 | End: 2019-06-17

## 2019-05-09 RX ORDER — ATORVASTATIN CALCIUM 40 MG/1
40 TABLET, FILM COATED ORAL DAILY
Qty: 30 TABLET | Refills: 5 | Status: SHIPPED | OUTPATIENT
Start: 2019-05-09 | End: 2019-12-23

## 2019-05-09 NOTE — PROGRESS NOTES
Chief Complaint   Patient presents with   • Annual Exam     with possible pap        Subjective   Chelita Rosenbaum is a 33 y.o.  female who presents today for annual wellness.    PATIENT CONCERNS:  Pain = Patient states she has multiple problems with her low back and previously was treated at [facility].  They dropped her when she moved here because she was too far away for random drug testing.  She is interested in establishing somewhere closer for pain management.    Blood Glucose = 120-160 in the morning, fasting is typically her highest number of the day followed by mid afternoon.  She has had blurred vision and dizziness which she relates to her high blood glucose.    POTS = She bends over with her work and she has severe dizziness with these positional changes which is likely from the POTS.  She requires a note for work that she is unable to complete this task.    Patient underwent a Pap 2 years ago prior to her hysterectomy.  She will need another Pap in 3 years.    Chelita Rosenbaum  has a past medical history of Arthropathy of lumbar facet joint, Asthma, Bipolar disorder (CMS/HCC), Cervico-occipital neuralgia, Constipation, Disorder of small intestine, Diverticular disease of colon, Drug therapy, Dysphagia, Encounter for contraceptive management, Encounter for surveillance of contraceptives, Epigastric pain, Generalized anxiety disorder, H/O colonoscopy with polypectomy, Hirsutism, Hyperprolactinemia (CMS/HCC), Low back pain, Menstruation disorder, Migraine aura, persistent, Myofascial pain, Nausea, Nausea and vomiting, Pap smear for cervical cancer screening, PCOS (polycystic ovarian syndrome), Periumbilical pain, POTS (postural orthostatic tachycardia syndrome), Surgical follow-up care, and Tachycardia.    Allergies   Allergen Reactions   • Apriso [Mesalamine Er] Nausea And Vomiting     Vomiting    • Lamotrigine Rash   • Lithium Nausea And Vomiting     Difficulty breathing   •  Tramadol Other (See Comments)     Hard to breath   • Ultram [Tramadol Hcl]        Current Outpatient Medications:   •  albuterol (PROVENTIL HFA;VENTOLIN HFA) 108 (90 BASE) MCG/ACT inhaler, Inhale 2 puffs every 4 (four) hours as needed for wheezing., Disp: , Rfl:   •  Blood Glucose Monitoring Suppl (ONE TOUCH BASIC SYSTEM) w/Device kit, 1 Device Daily., Disp: 1 each, Rfl: 0  •  diazePAM (VALIUM) 5 MG tablet, Take 5 mg by mouth 3 (Three) Times a Day., Disp: , Rfl:   •  fludrocortisone 0.1 MG tablet, TAKE ONE TABLET BY MOUTH EVERY DAY, Disp: 90 tablet, Rfl: 1  •  glucose blood (ONE TOUCH ULTRA TEST) test strip, Use as instructed, Disp: 50 each, Rfl: 12  •  Lancets (ACCU-CHEK MULTICLIX) lancets, BID blood sugar check, Disp: 102 each, Rfl: 11  •  lurasidone (LATUDA) 40 MG tablet tablet, Take 40 mg by mouth every night at bedtime., Disp: , Rfl:   •  meclizine (ANTIVERT) 25 MG tablet, Take 1 tablet by mouth 3 (Three) Times a Day As Needed for dizziness., Disp: 90 tablet, Rfl: 1  •  metFORMIN (GLUCOPHAGE) 500 MG tablet, TAKE ONE TABLET BY MOUTH TWICE A DAY, Disp: 60 tablet, Rfl: 5  •  mometasone-formoterol (DULERA 100) 100-5 MCG/ACT inhaler, Inhale 2 puffs 2 (Two) Times a Day., Disp: 1 inhaler, Rfl: 3  •  omeprazole (priLOSEC) 40 MG capsule, Take 40 mg by mouth Daily., Disp: , Rfl:   •  pramipexole (MIRAPEX) 0.25 MG tablet, Take 1 tablet by mouth Every Night., Disp: 30 tablet, Rfl: 6  •  promethazine (PHENERGAN) 25 MG tablet, Take 1 tablet by mouth Every 6 (Six) Hours As Needed for Nausea or Vomiting., Disp: 30 tablet, Rfl: 0  •  QUEtiapine (SEROquel) 300 MG tablet, Take 2 tablets by mouth every night at bedtime., Disp: , Rfl:   •  spironolactone (ALDACTONE) 25 MG tablet, TAKE ONE TABLET BY MOUTH EVERY DAY, Disp: 90 tablet, Rfl: 1  •  tiZANidine (ZANAFLEX) 4 MG tablet, Take 0.5 tablets by mouth 2 (Two) Times a Day As Needed for Muscle Spasms., Disp: 90 tablet, Rfl: 1  •  traZODone (DESYREL) 100 MG tablet, Take 2 tablets by  mouth every night at bedtime., Disp: , Rfl:   •  atorvastatin (LIPITOR) 40 MG tablet, Take 1 tablet by mouth Daily., Disp: 30 tablet, Rfl: 5  •  carvedilol (COREG) 6.25 MG tablet, Take 1 tablet by mouth 2 (Two) Times a Day With Meals., Disp: 60 tablet, Rfl: 5  Past Medical History:   Diagnosis Date   • Arthropathy of lumbar facet joint    • Asthma    • Bipolar disorder (CMS/HCC)    • Cervico-occipital neuralgia    • Constipation    • Disorder of small intestine     thicking jejunum, delayed transit      • Diverticular disease of colon    • Drug therapy     Other long term (current) drug therapy      • Dysphagia    • Encounter for contraceptive management     other   • Encounter for surveillance of contraceptives     other   • Epigastric pain    • Generalized anxiety disorder    • H/O colonoscopy with polypectomy    • Hirsutism    • Hyperprolactinemia (CMS/HCC)    • Low back pain    • Menstruation disorder     Other disorders of menstruation and other abnormal bleeding from female genital tract      • Migraine aura, persistent     Migraine - w/ aura of spots      • Myofascial pain    • Nausea    • Nausea and vomiting    • Pap smear for cervical cancer screening    • PCOS (polycystic ovarian syndrome)    • Periumbilical pain    • POTS (postural orthostatic tachycardia syndrome)    • Surgical follow-up care    • Tachycardia      Past Surgical History:   Procedure Laterality Date   • ABDOMINAL SURGERY  2014    Anesth, surgery of abdomen (1)      • CAPSULE ENDOSCOPY  2015    GI Imaging, capsule endoscopy 34522 (1)      •  SECTION  2010     Section (3)      • CHOLECYSTECTOMY  05/15/2003    Cholecystectomy, laparoscopic (1)      • COLON RESECTION     • COLONOSCOPY     • DILATATION AND CURETTAGE  10/21/2013    D&C (1)      • ENDOSCOPY  2015    EGD w/ biopsy 16548 (1)      • INJECTION OF MEDICATION  2016    Injection for nerve block (1)      • PARTIAL HYSTERECTOMY       Social  History     Socioeconomic History   • Marital status:      Spouse name: Not on file   • Number of children: Not on file   • Years of education: Not on file   • Highest education level: Not on file   Tobacco Use   • Smoking status: Never Smoker   • Smokeless tobacco: Never Used   Substance and Sexual Activity   • Alcohol use: No   • Drug use: No   • Sexual activity: No     Birth control/protection: Surgical     Family History   Problem Relation Age of Onset   • Heart disease Mother    • Asthma Father    • Diabetes Father    • Heart disease Father    • Hypertension Father    • No Known Problems Brother        Family history, surgical history, past medical history, Allergies and med's reviewed with patient today and updated in [employer] [facility] EMR.     ROS:  Review of Systems   Constitutional: Negative for fatigue, fever and unexpected weight change.   HENT: Negative for facial swelling, sore throat and trouble swallowing.    Eyes: Negative.  Negative for photophobia, discharge and visual disturbance.   Respiratory: Negative.  Negative for cough, chest tightness and shortness of breath.    Cardiovascular: Negative.  Negative for chest pain and palpitations.   Gastrointestinal: Negative.  Negative for abdominal pain, diarrhea, nausea and vomiting.   Endocrine: Negative.  Negative for polydipsia, polyphagia and polyuria.   Genitourinary: Negative.  Negative for dysuria, flank pain and frequency.   Musculoskeletal: Positive for back pain and gait problem. Negative for neck pain.   Skin: Negative.  Negative for rash.   Allergic/Immunologic: Negative.    Neurological: Positive for dizziness, weakness, numbness and headaches. Negative for light-headedness.   Hematological: Negative.    Psychiatric/Behavioral: Negative.  Negative for self-injury and suicidal ideas.       OBJECTIVE:  Vitals:    05/09/19 1338   BP: 123/84   BP Location: Left arm   Patient Position: Sitting   Cuff Size: Large Adult   Pulse: (!) 132   Resp: 19   Temp:  "97.3 °F (36.3 °C)   TempSrc: Tympanic   SpO2: 97%   Weight: 106 kg (233 lb)   Height: 162.6 cm (64.02\")     Physical Exam   Constitutional: She is oriented to person, place, and time. She appears well-developed and well-nourished. No distress.   HENT:   Head: Normocephalic and atraumatic.   Right Ear: External ear normal.   Left Ear: External ear normal.   Nose: Nose normal.   Mouth/Throat: Oropharynx is clear and moist.   Eyes: Conjunctivae and EOM are normal. Pupils are equal, round, and reactive to light.   Neck: Normal range of motion. Neck supple.   Cardiovascular: Regular rhythm, normal heart sounds and intact distal pulses.   No murmur heard.  Tachycardia.   Pulmonary/Chest: Effort normal and breath sounds normal. No respiratory distress.   Normal breast tissue per manual exam.   Abdominal: Soft. Bowel sounds are normal. She exhibits no distension. There is no tenderness.   Genitourinary:   Genitourinary Comments: Deferred.  Hysterectomy, last Pap 2 years ago, negative.   Musculoskeletal: Normal range of motion. She exhibits no edema.   Neurological: She is alert and oriented to person, place, and time.   Skin: Skin is warm and dry. Capillary refill takes less than 2 seconds. She is not diaphoretic. No erythema.   Psychiatric: She has a normal mood and affect. Her behavior is normal. Judgment and thought content normal.   Nursing note and vitals reviewed.      ASSESSMENT/ PLAN:    Chelita was seen today for annual exam.    Diagnoses and all orders for this visit:    Encounter for wellness examination in adult    Encounter for mammogram to establish baseline mammogram  -     Mammo Screening Bilateral With CAD; Future    Family history of breast cancer  -     Mammo Screening Bilateral With CAD; Future    POTS (postural orthostatic tachycardia syndrome)  -     carvedilol (COREG) 6.25 MG tablet; Take 1 tablet by mouth 2 (Two) Times a Day With Meals.    Essential hypertension  -     carvedilol (COREG) 6.25 MG tablet; " Take 1 tablet by mouth 2 (Two) Times a Day With Meals.    Mixed hyperlipidemia  -     atorvastatin (LIPITOR) 40 MG tablet; Take 1 tablet by mouth Daily.        Orders Placed Today:     New Medications Ordered This Visit   Medications   • carvedilol (COREG) 6.25 MG tablet     Sig: Take 1 tablet by mouth 2 (Two) Times a Day With Meals.     Dispense:  60 tablet     Refill:  5     D/C rx for atenolol   • atorvastatin (LIPITOR) 40 MG tablet     Sig: Take 1 tablet by mouth Daily.     Dispense:  30 tablet     Refill:  5     D/C rx for Pravastatin        Management Plan:     An After Visit Summary was printed and given to the patient at discharge.    Follow-up: Return in about 4 weeks (around 6/6/2019) for Next scheduled follow up with Dr. Gannon.    Idalmis Mcdermott, APRN 5/9/2019 2:51 PM  This note was electronically signed.

## 2019-06-17 ENCOUNTER — OFFICE VISIT (OUTPATIENT)
Dept: FAMILY MEDICINE CLINIC | Facility: CLINIC | Age: 34
End: 2019-06-17

## 2019-06-17 ENCOUNTER — RESULTS ENCOUNTER (OUTPATIENT)
Dept: FAMILY MEDICINE CLINIC | Facility: CLINIC | Age: 34
End: 2019-06-17

## 2019-06-17 VITALS
DIASTOLIC BLOOD PRESSURE: 80 MMHG | SYSTOLIC BLOOD PRESSURE: 140 MMHG | TEMPERATURE: 97.9 F | HEIGHT: 64 IN | WEIGHT: 236.4 LBS | OXYGEN SATURATION: 96 % | HEART RATE: 123 BPM | BODY MASS INDEX: 40.36 KG/M2

## 2019-06-17 DIAGNOSIS — M54.50 CHRONIC BILATERAL LOW BACK PAIN WITHOUT SCIATICA: ICD-10-CM

## 2019-06-17 DIAGNOSIS — E11.9 TYPE 2 DIABETES MELLITUS WITHOUT COMPLICATION, WITHOUT LONG-TERM CURRENT USE OF INSULIN (HCC): ICD-10-CM

## 2019-06-17 DIAGNOSIS — G25.81 RLS (RESTLESS LEGS SYNDROME): ICD-10-CM

## 2019-06-17 DIAGNOSIS — I10 ESSENTIAL HYPERTENSION: ICD-10-CM

## 2019-06-17 DIAGNOSIS — G89.29 CHRONIC BILATERAL LOW BACK PAIN WITHOUT SCIATICA: ICD-10-CM

## 2019-06-17 DIAGNOSIS — M51.37 DDD (DEGENERATIVE DISC DISEASE), LUMBOSACRAL: Primary | ICD-10-CM

## 2019-06-17 DIAGNOSIS — G90.A POTS (POSTURAL ORTHOSTATIC TACHYCARDIA SYNDROME): ICD-10-CM

## 2019-06-17 DIAGNOSIS — M46.1 SACROILIITIS (HCC): ICD-10-CM

## 2019-06-17 DIAGNOSIS — M51.37 DDD (DEGENERATIVE DISC DISEASE), LUMBOSACRAL: ICD-10-CM

## 2019-06-17 PROBLEM — K75.9 HEPATITIS: Status: ACTIVE | Noted: 2018-08-29

## 2019-06-17 PROBLEM — R40.0 SOMNOLENCE, DAYTIME: Status: ACTIVE | Noted: 2017-03-14

## 2019-06-17 PROBLEM — L51.1: Status: ACTIVE | Noted: 2018-08-26

## 2019-06-17 PROBLEM — R06.83 SNORING: Status: ACTIVE | Noted: 2017-03-14

## 2019-06-17 PROBLEM — T50.905A DRESS SYNDROME: Status: ACTIVE | Noted: 2018-08-26

## 2019-06-17 PROBLEM — T78.40XA DRUG-INDUCED HYPERSENSITIVITY REACTION: Status: ACTIVE | Noted: 2018-08-29

## 2019-06-17 PROBLEM — D72.12 DRESS SYNDROME: Status: ACTIVE | Noted: 2018-08-26

## 2019-06-17 PROCEDURE — 99214 OFFICE O/P EST MOD 30 MIN: CPT | Performed by: FAMILY MEDICINE

## 2019-06-17 RX ORDER — CARVEDILOL 12.5 MG/1
12.5 TABLET ORAL 2 TIMES DAILY WITH MEALS
Qty: 60 TABLET | Refills: 5 | Status: SHIPPED | OUTPATIENT
Start: 2019-06-17 | End: 2019-08-26

## 2019-06-17 RX ORDER — PRAMIPEXOLE DIHYDROCHLORIDE 0.25 MG/1
0.25 TABLET ORAL NIGHTLY
Qty: 30 TABLET | Refills: 6 | Status: SHIPPED | OUTPATIENT
Start: 2019-06-17 | End: 2020-01-22

## 2019-06-17 RX ORDER — MELOXICAM 7.5 MG/1
7.5 TABLET ORAL 2 TIMES DAILY
Qty: 60 TABLET | Refills: 2 | Status: SHIPPED | OUTPATIENT
Start: 2019-06-17 | End: 2019-07-15 | Stop reason: SINTOL

## 2019-06-17 NOTE — PATIENT INSTRUCTIONS
Diabetes: Insulin non dependent. Nephropathy, Retinopathy, Neuropahty status discussed.  Discussed goals of Diabetes today.  Goal Hgb A1C <7.0 for most patient.  Good BP control is encouraged with Goal BP based on JNC 8 guidelines 2014 <140/90.  Discussed role of Ace-I and ARB with DM.  DM imparts risk equivalence for CAD based on ATP III.  Current guidelines support moderate intensity statin with goal of 30-50% reduction in LDL unless 10 yr risk ASCVD >7.5 then high intensity should be used. Close monitoring of Lipid levels encouraged. Recommend once yearly eye evaluation by optometry or ophthalmology.  Good foot health discussed and foot exam completed.  Recommended toe health, wear good shoes, cut nails straight across and tend calluses if present. Take medications as encouraged.  Monitor blood sugars as encouraged and bring log to future meetings. Weight needs to be monitored. Monitor portions and caloric intake.  Pneumovax frequency discussed.   a. Labs: CMP, Microalbumin, A1C  b. Encouraged pt to bring glucose logs to each appointment  c. Encouraged self foot exams, and yearly eye exams.  d. Encouraged to lose weight/please see information provided  e. Recommend regular exercise   f. Medications as listed in today's visit      Suspect Essential HTN.Good BP control is encouraged with Goal BP based on JNC 8 guidelines 2014 <140/90 for patients with known cardiac disease and diabetes. (IRINA. 2014:322 (5):507-520. doi:10.1001/irina.2013.42284): general population <60 yr old goal BP <140/90 and for those >60 <150/90.  For patients of all ages with Diabetes, CKD, Known CAD <140/90. Recommended to the patient to obtain electronic home BP machine with upper arm blood pressure cuff and to check regularly as instructed.  Keep BP log and bring to subsequent visits. Stable, at goal.  a. LABS: routine for hypertension recommended and ordered if necessary.  b. Recommend if you do not have a home BP machine to obtain an  electronic machine with arm blood pressure cuff.      c. Monitor BP over the next week and keep log to bring back to office. Discussed medication therapy however pt wants to try to control with diet exercise. .  Your provider  has recommended self-monitoring of your blood pressure.  If you do not have a blood pressure cuff you may purchase one from the local pharmacy.  You may ask the pharmacist which brand and model they recommend.  Obtain your blood pressure measurement at least 2x per week.  You should also check your blood pressure if you experience any symptoms of blurred visit, dizziness or headache.  Please record all blood pressure measurements and bring them to next office visit.  If you have any questions about the accuracy of your blood pressure machine please bring it in to the office and our staff will be happy to check accuracy.   d. Encouraged to eat a low sodium heart healthy diet  e. Offered handout on HTN educational topics.  These were provided if patient requested these today.  f. MEDS: as listed in today's visit.  g. Risks/benefits of current and new medications discussed with the patient and or family today.  The patient/family are aware and accept that if there any side effects they should call or return to clinic as soon as possible.  Appropriate F/U discussed for topics addressed today. All questions were answered to the  satisfactory state of patient/family.  Should symptoms fail to improve or worsen they agree to call or return to clinic or to go to the ER. Education handouts were offered on any new Rx if requested.  Discussed the importance of following up with any needed screening tests/labs/specialist appointments and any requested follow-up recommended by me today.  Importance of maintaining follow-up discussed and patient accepts that missed appointments can delay diagnosis and potentially lead to worsening of conditions.      Chronic Back Pain  When back pain lasts longer than 3  months, it is called chronic back pain. The cause of your back pain may not be known. Some common causes include:  · Wear and tear (degenerative disease) of the bones, ligaments, or disks in your back.  · Inflammation and stiffness in your back (arthritis).    People who have chronic back pain often go through certain periods in which the pain is more intense (flare-ups). Many people can learn to manage the pain with home care.  Follow these instructions at home:  Pay attention to any changes in your symptoms. Take these actions to help with your pain:  Activity  · Avoid bending and other activities that make the problem worse.  · Maintain a proper position when standing or sitting:  ? When standing, keep your upper back and neck straight, with your shoulders pulled back. Avoid slouching.  ? When sitting, keep your back straight and relax your shoulders. Do not round your shoulders or pull them backward.  · Do not sit or  one place for long periods of time.  · Take brief periods of rest throughout the day. This will reduce your pain. Resting in a lying or standing position is usually better than sitting to rest.  · When you are resting for longer periods, mix in some mild activity or stretching between periods of rest. This will help to prevent stiffness and pain.  · Get regular exercise. Ask your health care provider what activities are safe for you.  · Do not lift anything that is heavier than 10 lb (4.5 kg). Always use proper lifting technique, which includes:  ? Bending your knees.  ? Keeping the load close to your body.  ? Avoiding twisting.  · Sleep on a firm mattress in a comfortable position. Try lying on your side with your knees slightly bent. If you lie on your back, put a pillow under your knees.  Managing pain  · If directed, apply ice to the painful area. Your health care provider may recommend applying ice during the first 24-48 hours after a flare-up begins.  ? Put ice in a plastic  bag.  ? Place a towel between your skin and the bag.  ? Leave the ice on for 20 minutes, 2-3 times per day.  · If directed, apply heat to the affected area as often as told by your health care provider. Use the heat source that your health care provider recommends, such as a moist heat pack or a heating pad.  ? Place a towel between your skin and the heat source.  ? Leave the heat on for 20-30 minutes.  ? Remove the heat if your skin turns bright red. This is especially important if you are unable to feel pain, heat, or cold. You may have a greater risk of getting burned.  · Try soaking in a warm tub.  · Take over-the-counter and prescription medicines only as told by your health care provider.  · Keep all follow-up visits as told by your health care provider. This is important.  Contact a health care provider if:  · You have pain that is not relieved with rest or medicine.  Get help right away if:  · You have weakness or numbness in one or both of your legs or feet.  · You have trouble controlling your bladder or your bowels.  · You have nausea or vomiting.  · You have pain in your abdomen.  · You have shortness of breath or you faint.  This information is not intended to replace advice given to you by your health care provider. Make sure you discuss any questions you have with your health care provider.  Document Released: 01/25/2006 Document Revised: 06/27/2018 Document Reviewed: 06/27/2018  [employer] Patient Education © 2019 [employer]

## 2019-06-17 NOTE — PROGRESS NOTES
OFFICE VISIT NOTE:    Chelita Rosenbaum is a 33 y.o. female who presents today for Back Pain (chronic) and Osteoarthritis (chronic).     Needs something to help with her arthritic pain - trying ES Tylenol, but not helping - back, hips, and generalized pain.   Was seeing Dr. Altamirano with pain management in [place].   Also, needs refills for several meds for chronic stable conditions.        Back Pain   This is a chronic problem. The current episode started more than 1 year ago. The problem occurs constantly. The problem is unchanged. The pain is present in the lumbar spine and sacro-iliac. The quality of the pain is described as cramping and aching. The pain does not radiate. The pain is severe. The pain is the same all the time. The symptoms are aggravated by standing, twisting, position and bending. Stiffness is present all day. Pertinent negatives include no abdominal pain, bladder incontinence, bowel incontinence, chest pain, fever, leg pain or weight loss. She has tried analgesics, bed rest and home exercises for the symptoms. The treatment provided moderate relief.        Past medical/surgical history, Family history, Social history, Allergies and Medications have been reviewed with the patient today and are updated in [facility] EMR. See below.    Past Medical History:   Diagnosis Date   • Arthropathy of lumbar facet joint    • Asthma    • Bipolar disorder (CMS/HCC)    • Cervico-occipital neuralgia    • Constipation    • Disorder of small intestine     thicking jejunum, delayed transit      • Diverticular disease of colon    • Drug therapy     Other long term (current) drug therapy      • Dysphagia    • Encounter for contraceptive management     other   • Encounter for surveillance of contraceptives     other   • Epigastric pain    • Generalized anxiety disorder    • H/O colonoscopy with polypectomy    • Hirsutism    • Hyperprolactinemia (CMS/HCC)    • Low back pain    • Menstruation disorder     Other disorders of  menstruation and other abnormal bleeding from female genital tract      • Migraine aura, persistent     Migraine - w/ aura of spots      • Myofascial pain    • Nausea    • Nausea and vomiting    • Pap smear for cervical cancer screening    • PCOS (polycystic ovarian syndrome)    • Periumbilical pain    • POTS (postural orthostatic tachycardia syndrome)    • Surgical follow-up care    • Tachycardia      Past Surgical History:   Procedure Laterality Date   • ABDOMINAL SURGERY  2014    Anesth, surgery of abdomen (1)      • CAPSULE ENDOSCOPY  2015    [facility], capsule endoscopy 88547 (1)      •  SECTION  2010     Section (3)      • CHOLECYSTECTOMY  05/15/2003    Cholecystectomy, laparoscopic (1)      • COLON RESECTION     • COLONOSCOPY     • DILATATION AND CURETTAGE  10/21/2013    D&C (1)      • ENDOSCOPY  2015    EGD w/ biopsy 93495 (1)      • INJECTION OF MEDICATION  2016    Injection for nerve block (1)      • PARTIAL HYSTERECTOMY       Family History   Problem Relation Age of Onset   • Heart disease Mother    • Asthma Father    • Diabetes Father    • Heart disease Father    • Hypertension Father    • No Known Problems Brother      Social History     Tobacco Use   • Smoking status: Never Smoker   • Smokeless tobacco: Never Used   Substance Use Topics   • Alcohol use: No     Frequency: Never     Binge frequency: Never   • Drug use: No       Allergies:  Apriso [mesalamine er]; Lamotrigine; Lithium; Tramadol; and Ultram [tramadol hcl]    Current Meds:    Current Outpatient Medications:   •  albuterol (PROVENTIL HFA;VENTOLIN HFA) 108 (90 BASE) MCG/ACT inhaler, Inhale 2 puffs every 4 (four) hours as needed for wheezing., Disp: , Rfl:   •  atorvastatin (LIPITOR) 40 MG tablet, Take 1 tablet by mouth Daily., Disp: 30 tablet, Rfl: 5  •  Blood Glucose Monitoring Suppl (ONE TOUCH BASIC SYSTEM) w/Device kit, 1 Device Daily., Disp: 1 each, Rfl: 0  •  carvedilol (COREG) 12.5 MG tablet,  Take 1 tablet by mouth 2 (Two) Times a Day With Meals., Disp: 60 tablet, Rfl: 5  •  diazePAM (VALIUM) 5 MG tablet, Take 5 mg by mouth 3 (Three) Times a Day., Disp: , Rfl:   •  glucose blood (ONE TOUCH ULTRA TEST) test strip, Use as instructed, Disp: 50 each, Rfl: 12  •  Lancets (ACCU-CHEK MULTICLIX) lancets, BID blood sugar check, Disp: 102 each, Rfl: 11  •  lurasidone (LATUDA) 40 MG tablet tablet, Take 40 mg by mouth every night at bedtime., Disp: , Rfl:   •  meclizine (ANTIVERT) 25 MG tablet, Take 1 tablet by mouth 3 (Three) Times a Day As Needed for dizziness., Disp: 90 tablet, Rfl: 1  •  metFORMIN (GLUCOPHAGE) 1000 MG tablet, Take 1 tablet by mouth 2 (Two) Times a Day., Disp: 60 tablet, Rfl: 5  •  mometasone-formoterol (DULERA 100) 100-5 MCG/ACT inhaler, Inhale 2 puffs 2 (Two) Times a Day., Disp: 1 inhaler, Rfl: 3  •  omeprazole (priLOSEC) 40 MG capsule, Take 40 mg by mouth Daily., Disp: , Rfl:   •  pramipexole (MIRAPEX) 0.25 MG tablet, Take 1 tablet by mouth Every Night., Disp: 30 tablet, Rfl: 6  •  promethazine (PHENERGAN) 25 MG tablet, Take 1 tablet by mouth Every 6 (Six) Hours As Needed for Nausea or Vomiting., Disp: 30 tablet, Rfl: 0  •  QUEtiapine (SEROquel) 300 MG tablet, Take 2 tablets by mouth every night at bedtime., Disp: , Rfl:   •  spironolactone (ALDACTONE) 25 MG tablet, TAKE ONE TABLET BY MOUTH EVERY DAY, Disp: 90 tablet, Rfl: 1  •  tiZANidine (ZANAFLEX) 4 MG tablet, Take 0.5 tablets by mouth 2 (Two) Times a Day As Needed for Muscle Spasms., Disp: 90 tablet, Rfl: 1  •  traZODone (DESYREL) 100 MG tablet, Take 2 tablets by mouth every night at bedtime., Disp: , Rfl:   •  fludrocortisone 0.1 MG tablet, TAKE ONE TABLET BY MOUTH EVERY DAY, Disp: 90 tablet, Rfl: 1  •  meloxicam (MOBIC) 7.5 MG tablet, Take 1 tablet by mouth 2 (Two) Times a Day., Disp: 60 tablet, Rfl: 2    Review of Systems:  Review of Systems   Constitutional: Negative for activity change, fatigue, fever, unexpected weight gain and  "unexpected weight loss.   Respiratory: Negative for shortness of breath.    Cardiovascular: Negative for chest pain.   Gastrointestinal: Negative for abdominal pain and bowel incontinence.   Genitourinary: Negative for urinary incontinence and difficulty urinating.   Musculoskeletal: Positive for back pain.   Skin: Negative for rash.   Neurological: Negative for syncope and headache.       Physical Examination:  Vital Signs:  /80 (BP Location: Left arm, Patient Position: Sitting, Cuff Size: Adult)   Pulse (!) 123   Temp 97.9 °F (36.6 °C) (Tympanic)   Ht 162.6 cm (64\")   Wt 107 kg (236 lb 6.4 oz)   LMP  (LMP Unknown)   SpO2 96%   Breastfeeding? No   BMI 40.58 kg/m²   Physical Exam   Constitutional: She is oriented to person, place, and time. She appears well-developed and well-nourished. No distress.   HENT:   Head: Normocephalic and atraumatic.   Mouth/Throat: Oropharynx is clear and moist.   Neck: Normal range of motion. Neck supple. No JVD present.   Cardiovascular: Normal rate, regular rhythm, normal heart sounds and intact distal pulses.   Pulmonary/Chest: Effort normal and breath sounds normal. No respiratory distress.   Musculoskeletal: Normal range of motion. She exhibits no edema.   Neurological: She is alert and oriented to person, place, and time. No cranial nerve deficit.   Skin: Skin is warm and dry. Capillary refill takes less than 2 seconds. No rash noted.   Psychiatric: She has a normal mood and affect. Her behavior is normal.   Nursing note and vitals reviewed.      Procedures    ASSESSMENT/ PLAN:        Problem List Items Addressed This Visit        Cardiovascular and Mediastinum    POTS (postural orthostatic tachycardia syndrome)    Overview     With some elements of POTS           Relevant Medications    carvedilol (COREG) 12.5 MG tablet    Essential hypertension    Relevant Medications    carvedilol (COREG) 12.5 MG tablet       Endocrine    Type 2 diabetes mellitus without " complication, without long-term current use of insulin (CMS/HCC)    Relevant Medications    metFORMIN (GLUCOPHAGE) 1000 MG tablet       Nervous and Auditory    Low back pain    Relevant Medications    meloxicam (MOBIC) 7.5 MG tablet       Musculoskeletal and Integument    DDD (degenerative disc disease), lumbosacral - Primary    Overview     Overview:   Now seeing Dr. Álvaro Altamirano at [facility]; formerly saw Dr. Edmondson.         Relevant Medications    meloxicam (MOBIC) 7.5 MG tablet    Sacroiliitis (CMS/HCC)       Other    RLS (restless legs syndrome)    Relevant Medications    pramipexole (MIRAPEX) 0.25 MG tablet        May need to adjust her meds and combine or reduce some that potentially overlap at some point soon.            Specific Patient Instructions:  MEDICATION Instructions: Encouraged patient to continue routine medicines as prescribed and maintain compliance. Patient instructed to report any adverse side effects or reactions to medicines promptly to the office. Patient instructed to make us aware of any OTC or herbal meds or supplement use.  DIET Recommendations: No new recommendations regarding diet/restrictions.  EXERCISE Instructions: No new recommendations.    SMOKING Recommendations: N/A  HEALTH MAINTENANCE:  N/A  MISCELLANEOUS Instructions: N/A      Medications ordered or changed this visit:  New Medications Ordered This Visit   Medications   • carvedilol (COREG) 12.5 MG tablet     Sig: Take 1 tablet by mouth 2 (Two) Times a Day With Meals.     Dispense:  60 tablet     Refill:  5     D/C rx for atenolol   • meloxicam (MOBIC) 7.5 MG tablet     Sig: Take 1 tablet by mouth 2 (Two) Times a Day.     Dispense:  60 tablet     Refill:  2   • pramipexole (MIRAPEX) 0.25 MG tablet     Sig: Take 1 tablet by mouth Every Night.     Dispense:  30 tablet     Refill:  6   • metFORMIN (GLUCOPHAGE) 1000 MG tablet     Sig: Take 1 tablet by mouth 2 (Two) Times a Day.     Dispense:  60 tablet     Refill:  5         FOLLOW-UP:  Return in about 4 weeks (around 7/15/2019) for Recheck.    I discussed the patients findings and my recommendations with patient.  An After Visit Summary (AVS) was printed and given to the patient at discharge.      Best Gannon MD, FAAFP  6/17/2019

## 2019-06-27 ENCOUNTER — TELEPHONE (OUTPATIENT)
Dept: FAMILY MEDICINE CLINIC | Facility: CLINIC | Age: 34
End: 2019-06-27

## 2019-06-27 NOTE — PROGRESS NOTES
Called pt, no answer, left vm for pt to call back to schedule appt for medication review  & to bring her medications

## 2019-06-27 NOTE — TELEPHONE ENCOUNTER
Patient called back to ask why she needed to make an appt.  I advised patient that her UDS came back inconsistent with the medications she stated she was taking and Dr. Gannon wants her to make an appt and bring in all medications she is currently taking.  Chelita asked what was showing on the UDS.  I advised patient that it was positive for Benzo's and patient stated she has started taking Valium that is px by her therapist.  Patient stated she has an appt on 7/15/19 and she would bring her medications in then.

## 2019-07-15 ENCOUNTER — OFFICE VISIT (OUTPATIENT)
Dept: FAMILY MEDICINE CLINIC | Facility: CLINIC | Age: 34
End: 2019-07-15

## 2019-07-15 VITALS
DIASTOLIC BLOOD PRESSURE: 64 MMHG | OXYGEN SATURATION: 98 % | SYSTOLIC BLOOD PRESSURE: 122 MMHG | WEIGHT: 234 LBS | TEMPERATURE: 97.2 F | HEIGHT: 64 IN | BODY MASS INDEX: 39.95 KG/M2 | HEART RATE: 104 BPM

## 2019-07-15 DIAGNOSIS — E11.9 TYPE 2 DIABETES MELLITUS WITHOUT COMPLICATION, WITHOUT LONG-TERM CURRENT USE OF INSULIN (HCC): ICD-10-CM

## 2019-07-15 DIAGNOSIS — G90.A POTS (POSTURAL ORTHOSTATIC TACHYCARDIA SYNDROME): ICD-10-CM

## 2019-07-15 DIAGNOSIS — M51.37 DDD (DEGENERATIVE DISC DISEASE), LUMBOSACRAL: ICD-10-CM

## 2019-07-15 DIAGNOSIS — I10 ESSENTIAL HYPERTENSION: Primary | ICD-10-CM

## 2019-07-15 DIAGNOSIS — K50.919 CROHN'S DISEASE WITH COMPLICATION, UNSPECIFIED GASTROINTESTINAL TRACT LOCATION (HCC): ICD-10-CM

## 2019-07-15 DIAGNOSIS — M46.1 SACROILIITIS (HCC): ICD-10-CM

## 2019-07-15 PROBLEM — G47.00 INSOMNIA: Status: ACTIVE | Noted: 2017-03-14

## 2019-07-15 PROCEDURE — 99214 OFFICE O/P EST MOD 30 MIN: CPT | Performed by: FAMILY MEDICINE

## 2019-07-15 RX ORDER — AMLODIPINE BESYLATE 2.5 MG/1
2.5 TABLET ORAL DAILY
Qty: 30 TABLET | Refills: 5 | Status: SHIPPED | OUTPATIENT
Start: 2019-07-15 | End: 2019-08-12

## 2019-07-15 RX ORDER — BENZTROPINE MESYLATE 2 MG/1
2 TABLET ORAL 2 TIMES DAILY
Refills: 2 | COMMUNITY
Start: 2019-07-01 | End: 2021-07-19 | Stop reason: ALTCHOICE

## 2019-07-15 RX ORDER — TRAMADOL HYDROCHLORIDE 50 MG/1
50 TABLET ORAL EVERY 6 HOURS PRN
Qty: 60 TABLET | Refills: 0 | Status: SHIPPED | OUTPATIENT
Start: 2019-07-15 | End: 2019-07-15

## 2019-07-15 NOTE — PATIENT INSTRUCTIONS
Suspect Essential HTN.Good BP control is encouraged with Goal BP based on JNC 8 guidelines 2014 <140/90 for patients with known cardiac disease and diabetes. (IRINA. 2014:322 (5):507-520. doi:10.1001/irina.2013.99683): general population <60 yr old goal BP <140/90 and for those >60 <150/90.  For patients of all ages with Diabetes, CKD, Known CAD <140/90. Recommended to the patient to obtain electronic home BP machine with upper arm blood pressure cuff and to check regularly as instructed.  Keep BP log and bring to subsequent visits. Stable, at goal.  a. LABS: routine for hypertension recommended and ordered if necessary.  b. Recommend if you do not have a home BP machine to obtain an electronic machine with arm blood pressure cuff.      c. Monitor BP over the next week and keep log to bring back to office. Discussed medication therapy however pt wants to try to control with diet exercise. .  Your provider  has recommended self-monitoring of your blood pressure.  If you do not have a blood pressure cuff you may purchase one from the local pharmacy.  You may ask the pharmacist which brand and model they recommend.  Obtain your blood pressure measurement at least 2x per week.  You should also check your blood pressure if you experience any symptoms of blurred visit, dizziness or headache.  Please record all blood pressure measurements and bring them to next office visit.  If you have any questions about the accuracy of your blood pressure machine please bring it in to the office and our staff will be happy to check accuracy.   d. Encouraged to eat a low sodium heart healthy diet  e. Offered handout on HTN educational topics.  These were provided if patient requested these today.  f. MEDS: as listed in today's visit.  g. Risks/benefits of current and new medications discussed with the patient and or family today.  The patient/family are aware and accept that if there any side effects they should call or return to clinic  as soon as possible.  Appropriate F/U discussed for topics addressed today. All questions were answered to the  satisfactory state of patient/family.  Should symptoms fail to improve or worsen they agree to call or return to clinic or to go to the ER. Education handouts were offered on any new Rx if requested.  Discussed the importance of following up with any needed screening tests/labs/specialist appointments and any requested follow-up recommended by me today.  Importance of maintaining follow-up discussed and patient accepts that missed appointments can delay diagnosis and potentially lead to worsening of conditions.

## 2019-07-15 NOTE — PROGRESS NOTES
OFFICE VISIT NOTE:    Chelita Rosenbaum is a 33 y.o. female who presents today for Rapid Heart Rate (f/u) and Hypertension (f/u).     Mobic isn't helping with the chronic pain she says. Has to take Tylenol with the Mobic and it doesn't help either.   Also requests pain pills, but I'm not comfortable with Rx'ing narcotics due to her other meds and psychiatric issues.      Hypertension   This is a chronic problem. The current episode started more than 1 year ago. The problem is unchanged. The problem is uncontrolled. Associated symptoms include anxiety and malaise/fatigue. Pertinent negatives include no blurred vision, chest pain, headaches, orthopnea, palpitations, peripheral edema, PND or shortness of breath. There are no associated agents to hypertension. Risk factors for coronary artery disease include family history, obesity, sedentary lifestyle and stress. Past treatments include beta blockers. The current treatment provides moderate improvement. Compliance problems include exercise and psychosocial issues.         Past medical/surgical history, Family history, Social history, Allergies and Medications have been reviewed with the patient today and are updated in [facility] EMR. See below.    Past Medical History:   Diagnosis Date   • Arthropathy of lumbar facet joint    • Asthma    • Bipolar disorder (CMS/HCC)    • Cervico-occipital neuralgia    • Constipation    • Disorder of small intestine     thicking jejunum, delayed transit      • Diverticular disease of colon    • Drug therapy     Other long term (current) drug therapy      • Dysphagia    • Encounter for contraceptive management     other   • Encounter for surveillance of contraceptives     other   • Epigastric pain    • Generalized anxiety disorder    • H/O colonoscopy with polypectomy    • Hirsutism    • Hyperprolactinemia (CMS/HCC)    • Low back pain    • Menstruation disorder     Other disorders of menstruation and other abnormal bleeding from female genital  tract      • Migraine aura, persistent     Migraine - w/ aura of spots      • Myofascial pain    • Nausea    • Nausea and vomiting    • Pap smear for cervical cancer screening    • PCOS (polycystic ovarian syndrome)    • Periumbilical pain    • POTS (postural orthostatic tachycardia syndrome)    • Surgical follow-up care    • Tachycardia      Past Surgical History:   Procedure Laterality Date   • ABDOMINAL SURGERY  2014    Anesth, surgery of abdomen (1)      • CAPSULE ENDOSCOPY  2015    GI Imaging, capsule endoscopy 80839 (1)      •  SECTION  2010     Section (3)      • CHOLECYSTECTOMY  05/15/2003    Cholecystectomy, laparoscopic (1)      • COLON RESECTION     • COLONOSCOPY     • DILATATION AND CURETTAGE  10/21/2013    D&C (1)      • ENDOSCOPY  2015    EGD w/ biopsy 34876 (1)      • INJECTION OF MEDICATION  2016    Injection for nerve block (1)      • PARTIAL HYSTERECTOMY       Family History   Problem Relation Age of Onset   • Heart disease Mother    • Asthma Father    • Diabetes Father    • Heart disease Father    • Hypertension Father    • No Known Problems Brother      Social History     Tobacco Use   • Smoking status: Never Smoker   • Smokeless tobacco: Never Used   Substance Use Topics   • Alcohol use: No     Frequency: Never     Binge frequency: Never   • Drug use: No       Allergies:  Apriso [mesalamine er]; Lamotrigine; Lithium; Tramadol; and Ultram [tramadol hcl]    Current Meds:    Current Outpatient Medications:   •  albuterol (PROVENTIL HFA;VENTOLIN HFA) 108 (90 BASE) MCG/ACT inhaler, Inhale 2 puffs every 4 (four) hours as needed for wheezing., Disp: , Rfl:   •  atorvastatin (LIPITOR) 40 MG tablet, Take 1 tablet by mouth Daily., Disp: 30 tablet, Rfl: 5  •  benztropine (COGENTIN) 2 MG tablet, Take 2 mg by mouth 2 (Two) Times a Day., Disp: , Rfl: 2  •  Blood Glucose Monitoring Suppl (ONE TOUCH BASIC SYSTEM) w/Device kit, 1 Device Daily., Disp: 1 each, Rfl: 0  •   carvedilol (COREG) 12.5 MG tablet, Take 1 tablet by mouth 2 (Two) Times a Day With Meals., Disp: 60 tablet, Rfl: 5  •  diazePAM (VALIUM) 5 MG tablet, Take 5 mg by mouth 3 (Three) Times a Day., Disp: , Rfl:   •  glucose blood (ONE TOUCH ULTRA TEST) test strip, Use as instructed, Disp: 50 each, Rfl: 12  •  Lancets (ACCU-CHEK MULTICLIX) lancets, BID blood sugar check, Disp: 102 each, Rfl: 11  •  lurasidone (LATUDA) 40 MG tablet tablet, Take 40 mg by mouth every night at bedtime., Disp: , Rfl:   •  meclizine (ANTIVERT) 25 MG tablet, Take 1 tablet by mouth 3 (Three) Times a Day As Needed for dizziness., Disp: 90 tablet, Rfl: 1  •  metFORMIN (GLUCOPHAGE) 1000 MG tablet, Take 1 tablet by mouth 2 (Two) Times a Day., Disp: 60 tablet, Rfl: 5  •  mometasone-formoterol (DULERA 100) 100-5 MCG/ACT inhaler, Inhale 2 puffs 2 (Two) Times a Day., Disp: 1 inhaler, Rfl: 3  •  omeprazole (priLOSEC) 40 MG capsule, Take 40 mg by mouth Daily., Disp: , Rfl:   •  pramipexole (MIRAPEX) 0.25 MG tablet, Take 1 tablet by mouth Every Night., Disp: 30 tablet, Rfl: 6  •  promethazine (PHENERGAN) 25 MG tablet, Take 1 tablet by mouth Every 6 (Six) Hours As Needed for Nausea or Vomiting., Disp: 30 tablet, Rfl: 0  •  QUEtiapine (SEROquel) 300 MG tablet, Take 2 tablets by mouth every night at bedtime., Disp: , Rfl:   •  spironolactone (ALDACTONE) 25 MG tablet, TAKE ONE TABLET BY MOUTH EVERY DAY, Disp: 90 tablet, Rfl: 1  •  tiZANidine (ZANAFLEX) 4 MG tablet, Take 0.5 tablets by mouth 2 (Two) Times a Day As Needed for Muscle Spasms., Disp: 90 tablet, Rfl: 1  •  traZODone (DESYREL) 100 MG tablet, Take 2 tablets by mouth every night at bedtime., Disp: , Rfl:   •  amLODIPine (NORVASC) 2.5 MG tablet, Take 1 tablet by mouth Daily., Disp: 30 tablet, Rfl: 5  •  fludrocortisone 0.1 MG tablet, TAKE ONE TABLET BY MOUTH EVERY DAY, Disp: 90 tablet, Rfl: 1    Review of Systems:  Review of Systems   Constitutional: Positive for malaise/fatigue. Negative for activity  "change, fatigue, fever, unexpected weight gain and unexpected weight loss.   Eyes: Negative for blurred vision.   Respiratory: Negative for shortness of breath.    Cardiovascular: Negative for chest pain, palpitations, orthopnea and PND.   Gastrointestinal: Negative for abdominal pain.   Genitourinary: Negative for difficulty urinating.   Skin: Negative for rash.   Neurological: Negative for syncope and headache.       Physical Examination:  Vital Signs:  /64 (BP Location: Left arm, Patient Position: Sitting, Cuff Size: Adult)   Pulse 104   Temp 97.2 °F (36.2 °C) (Tympanic)   Ht 162.6 cm (64\")   Wt 106 kg (234 lb)   LMP  (LMP Unknown)   SpO2 98%   Breastfeeding? No   BMI 40.17 kg/m²   Physical Exam   Constitutional: She is oriented to person, place, and time. She appears well-developed and well-nourished. No distress.   HENT:   Head: Normocephalic and atraumatic.   Mouth/Throat: Oropharynx is clear and moist.   Neck: Normal range of motion. Neck supple. No JVD present.   Cardiovascular: Normal rate, regular rhythm, normal heart sounds and intact distal pulses.   Pulmonary/Chest: Effort normal and breath sounds normal. No respiratory distress.   Musculoskeletal: Normal range of motion. She exhibits no edema.   Neurological: She is alert and oriented to person, place, and time. No cranial nerve deficit.   Skin: Skin is warm and dry. Capillary refill takes less than 2 seconds. No rash noted.   Psychiatric: She has a normal mood and affect. Her behavior is normal.   Nursing note and vitals reviewed.      Procedures    ASSESSMENT/ PLAN:        Problem List Items Addressed This Visit        Cardiovascular and Mediastinum    POTS (postural orthostatic tachycardia syndrome)    Overview     With some elements of POTS           Essential hypertension - Primary    Relevant Medications    amLODIPine (NORVASC) 2.5 MG tablet       Digestive    Crohn's disease (CMS/HCC)    Overview     Overview:   Colonoscopy " (10/8/15; Dr. Flores in [place]): poor preparation, mild sigmoid/descending colon diverticulosis, normal mucosa throughout the colon.  Biopsies taken; wait pathology.  Dx 3/2015 by Dr. Longoria (GI).            Endocrine    Type 2 diabetes mellitus without complication, without long-term current use of insulin (CMS/HCC)       Musculoskeletal and Integument    DDD (degenerative disc disease), lumbosacral    Overview     Overview:   Now seeing Dr. Álvaro Altamirano at [facility]; formerly saw Dr. Edmondson.         Relevant Orders    Ambulatory Referral to Pain Management    Sacroiliitis (CMS/HCC)    Relevant Orders    Ambulatory Referral to Pain Management        We were going to use Ultram, but she is allergic she relates. Will await pain management for their suggestions on these meds.            Specific Patient Instructions:  MEDICATION Instructions: Encouraged patient to continue routine medicines as prescribed and maintain compliance. Patient instructed to report any adverse side effects or reactions to medicines promptly to the office. Patient instructed to make us aware of any OTC or herbal meds or supplement use.  DIET Recommendations: No new recommendations regarding diet/restrictions.  EXERCISE Instructions: No new recommendations.    SMOKING Recommendations: N/A  HEALTH MAINTENANCE:  N/A  MISCELLANEOUS Instructions: N/A      Medications ordered or changed this visit:  New Medications Ordered This Visit   Medications   • amLODIPine (NORVASC) 2.5 MG tablet     Sig: Take 1 tablet by mouth Daily.     Dispense:  30 tablet     Refill:  5        FOLLOW-UP:  Return in about 4 weeks (around 8/12/2019) for Recheck.    I discussed the patients findings and my recommendations with patient.  An After Visit Summary (AVS) was printed and given to the patient at discharge.      Best Gannon MD, FAAFP  7/15/2019

## 2019-08-12 ENCOUNTER — OFFICE VISIT (OUTPATIENT)
Dept: FAMILY MEDICINE CLINIC | Facility: CLINIC | Age: 34
End: 2019-08-12

## 2019-08-12 VITALS
HEART RATE: 98 BPM | SYSTOLIC BLOOD PRESSURE: 130 MMHG | TEMPERATURE: 98.8 F | HEIGHT: 64 IN | BODY MASS INDEX: 39.27 KG/M2 | OXYGEN SATURATION: 98 % | DIASTOLIC BLOOD PRESSURE: 62 MMHG | WEIGHT: 230 LBS

## 2019-08-12 DIAGNOSIS — I10 ESSENTIAL HYPERTENSION: ICD-10-CM

## 2019-08-12 PROCEDURE — 99214 OFFICE O/P EST MOD 30 MIN: CPT | Performed by: FAMILY MEDICINE

## 2019-08-12 RX ORDER — AMLODIPINE BESYLATE 5 MG/1
5 TABLET ORAL DAILY
Qty: 30 TABLET | Refills: 5 | Status: SHIPPED | OUTPATIENT
Start: 2019-08-12 | End: 2019-09-30 | Stop reason: SDUPTHER

## 2019-08-12 NOTE — PATIENT INSTRUCTIONS
Suspect Essential HTN.Good BP control is encouraged with Goal BP based on JNC 8 guidelines 2014 <140/90 for patients with known cardiac disease and diabetes. (IRINA. 2014:322 (5):507-520. doi:10.1001/irina.2013.68661): general population <60 yr old goal BP <140/90 and for those >60 <150/90.  For patients of all ages with Diabetes, CKD, Known CAD <140/90. Recommended to the patient to obtain electronic home BP machine with upper arm blood pressure cuff and to check regularly as instructed.  Keep BP log and bring to subsequent visits. Stable, at goal.  a. LABS: routine for hypertension recommended and ordered if necessary.  b. Recommend if you do not have a home BP machine to obtain an electronic machine with arm blood pressure cuff.      c. Monitor BP over the next week and keep log to bring back to office. Discussed medication therapy however pt wants to try to control with diet exercise. .  Your provider  has recommended self-monitoring of your blood pressure.  If you do not have a blood pressure cuff you may purchase one from the local pharmacy.  You may ask the pharmacist which brand and model they recommend.  Obtain your blood pressure measurement at least 2x per week.  You should also check your blood pressure if you experience any symptoms of blurred visit, dizziness or headache.  Please record all blood pressure measurements and bring them to next office visit.  If you have any questions about the accuracy of your blood pressure machine please bring it in to the office and our staff will be happy to check accuracy.   d. Encouraged to eat a low sodium heart healthy diet  e. Offered handout on HTN educational topics.  These were provided if patient requested these today.  f. MEDS: as listed in today's visit.  g. Risks/benefits of current and new medications discussed with the patient and or family today.  The patient/family are aware and accept that if there any side effects they should call or return to clinic  as soon as possible.  Appropriate F/U discussed for topics addressed today. All questions were answered to the  satisfactory state of patient/family.  Should symptoms fail to improve or worsen they agree to call or return to clinic or to go to the ER. Education handouts were offered on any new Rx if requested.  Discussed the importance of following up with any needed screening tests/labs/specialist appointments and any requested follow-up recommended by me today.  Importance of maintaining follow-up discussed and patient accepts that missed appointments can delay diagnosis and potentially lead to worsening of conditions.

## 2019-08-12 NOTE — PROGRESS NOTES
OFFICE VISIT NOTE:    Chelita Rosenbaum is a 33 y.o. female who presents today for Hypertension (4 week check up) and Chest Pain (stabbing pain , intermittent, accompanied by dizziness and vomiting ).     Chest pain intermittently over the last 4 weeks, comes and goes - associated with dizziness and N/V. BP during those times is around 140'2/100's. No true syncope - just fatigued. Some dyspnea, but no cough.       Hypertension   This is a chronic problem. The current episode started more than 1 year ago. The problem is unchanged. The problem is controlled. Associated symptoms include chest pain. Pertinent negatives include no headaches, orthopnea, palpitations, peripheral edema, PND or shortness of breath. There are no associated agents to hypertension. The current treatment provides significant improvement. There are no compliance problems.    Chest Pain    This is a chronic problem. The current episode started 1 to 4 weeks ago. The onset quality is gradual. The problem occurs intermittently. The problem has been waxing and waning. The pain is present in the substernal region. The pain is moderate. The quality of the pain is described as dull and pressure. The pain does not radiate. Pertinent negatives include no abdominal pain, fever, headaches, orthopnea, palpitations, PND or shortness of breath. The pain is aggravated by exertion. She has tried rest for the symptoms. The treatment provided moderate relief.        Past medical/surgical history, Family history, Social history, Allergies and Medications have been reviewed with the patient today and are updated in [facility] EMR. See below.    Past Medical History:   Diagnosis Date   • Arthropathy of lumbar facet joint    • Asthma    • Bipolar disorder (CMS/HCC)    • Cervico-occipital neuralgia    • Constipation    • Disorder of small intestine     thicking jejunum, delayed transit      • Diverticular disease of colon    • Drug therapy     Other long term (current) drug  therapy      • Dysphagia    • Encounter for contraceptive management     other   • Encounter for surveillance of contraceptives     other   • Epigastric pain    • Generalized anxiety disorder    • H/O colonoscopy with polypectomy    • Hirsutism    • Hyperprolactinemia (CMS/HCC)    • Low back pain    • Menstruation disorder     Other disorders of menstruation and other abnormal bleeding from female genital tract      • Migraine aura, persistent     Migraine - w/ aura of spots      • Myofascial pain    • Nausea    • Nausea and vomiting    • Pap smear for cervical cancer screening    • PCOS (polycystic ovarian syndrome)    • Periumbilical pain    • POTS (postural orthostatic tachycardia syndrome)    • Surgical follow-up care    • Tachycardia      Past Surgical History:   Procedure Laterality Date   • ABDOMINAL SURGERY  2014    Anesth, surgery of abdomen (1)      • CAPSULE ENDOSCOPY  2015    GI Imaging, capsule endoscopy 71738 (1)      •  SECTION  2010     Section (3)      • CHOLECYSTECTOMY  05/15/2003    Cholecystectomy, laparoscopic (1)      • COLON RESECTION     • COLONOSCOPY     • DILATATION AND CURETTAGE  10/21/2013    D&C (1)      • ENDOSCOPY  2015    EGD w/ biopsy 20916 (1)      • INJECTION OF MEDICATION  2016    Injection for nerve block (1)      • PARTIAL HYSTERECTOMY       Family History   Problem Relation Age of Onset   • Heart disease Mother    • Asthma Father    • Diabetes Father    • Heart disease Father    • Hypertension Father    • No Known Problems Brother      Social History     Tobacco Use   • Smoking status: Never Smoker   • Smokeless tobacco: Never Used   Substance Use Topics   • Alcohol use: No     Frequency: Never     Binge frequency: Never   • Drug use: No       Allergies:  Apriso [mesalamine er]; Lamotrigine; Lithium; Tramadol; and Ultram [tramadol hcl]    Current Meds:    Current Outpatient Medications:   •  albuterol (PROVENTIL HFA;VENTOLIN HFA) 108  (90 BASE) MCG/ACT inhaler, Inhale 2 puffs every 4 (four) hours as needed for wheezing., Disp: , Rfl:   •  amLODIPine (NORVASC) 5 MG tablet, Take 1 tablet by mouth Daily., Disp: 30 tablet, Rfl: 5  •  atorvastatin (LIPITOR) 40 MG tablet, Take 1 tablet by mouth Daily., Disp: 30 tablet, Rfl: 5  •  benztropine (COGENTIN) 2 MG tablet, Take 2 mg by mouth 2 (Two) Times a Day., Disp: , Rfl: 2  •  Blood Glucose Monitoring Suppl (ONE TOUCH BASIC SYSTEM) w/Device kit, 1 Device Daily., Disp: 1 each, Rfl: 0  •  carvedilol (COREG) 12.5 MG tablet, Take 1 tablet by mouth 2 (Two) Times a Day With Meals., Disp: 60 tablet, Rfl: 5  •  diazePAM (VALIUM) 5 MG tablet, Take 5 mg by mouth 3 (Three) Times a Day., Disp: , Rfl:   •  fludrocortisone 0.1 MG tablet, TAKE ONE TABLET BY MOUTH EVERY DAY, Disp: 90 tablet, Rfl: 1  •  glucose blood (ONE TOUCH ULTRA TEST) test strip, Use as instructed, Disp: 50 each, Rfl: 12  •  Lancets (ACCU-CHEK MULTICLIX) lancets, BID blood sugar check, Disp: 102 each, Rfl: 11  •  lurasidone (LATUDA) 40 MG tablet tablet, Take 40 mg by mouth every night at bedtime., Disp: , Rfl:   •  meclizine (ANTIVERT) 25 MG tablet, Take 1 tablet by mouth 3 (Three) Times a Day As Needed for dizziness., Disp: 90 tablet, Rfl: 1  •  metFORMIN (GLUCOPHAGE) 1000 MG tablet, Take 1 tablet by mouth 2 (Two) Times a Day., Disp: 60 tablet, Rfl: 5  •  mometasone-formoterol (DULERA 100) 100-5 MCG/ACT inhaler, Inhale 2 puffs 2 (Two) Times a Day., Disp: 1 inhaler, Rfl: 3  •  omeprazole (priLOSEC) 40 MG capsule, Take 40 mg by mouth Daily., Disp: , Rfl:   •  pramipexole (MIRAPEX) 0.25 MG tablet, Take 1 tablet by mouth Every Night., Disp: 30 tablet, Rfl: 6  •  promethazine (PHENERGAN) 25 MG tablet, Take 1 tablet by mouth Every 6 (Six) Hours As Needed for Nausea or Vomiting., Disp: 30 tablet, Rfl: 0  •  QUEtiapine (SEROquel) 300 MG tablet, Take 2 tablets by mouth every night at bedtime., Disp: , Rfl:   •  spironolactone (ALDACTONE) 25 MG tablet, TAKE  "ONE TABLET BY MOUTH EVERY DAY, Disp: 90 tablet, Rfl: 1  •  tiZANidine (ZANAFLEX) 4 MG tablet, Take 0.5 tablets by mouth 2 (Two) Times a Day As Needed for Muscle Spasms., Disp: 90 tablet, Rfl: 1  •  traZODone (DESYREL) 100 MG tablet, Take 2 tablets by mouth every night at bedtime., Disp: , Rfl:     Review of Systems:  Review of Systems   Constitutional: Negative for activity change, fatigue, fever, unexpected weight gain and unexpected weight loss.   Respiratory: Negative for shortness of breath.    Cardiovascular: Positive for chest pain. Negative for palpitations, orthopnea and PND.   Gastrointestinal: Negative for abdominal pain.   Genitourinary: Negative for difficulty urinating.   Skin: Negative for rash.   Neurological: Negative for syncope and headache.       Physical Examination:  Vital Signs:  /62 (BP Location: Left arm, Patient Position: Sitting, Cuff Size: Adult)   Pulse 98   Temp 98.8 °F (37.1 °C) (Tympanic)   Ht 162.6 cm (64\")   Wt 104 kg (230 lb)   LMP  (LMP Unknown)   SpO2 98%   Breastfeeding? No   BMI 39.48 kg/m²   Physical Exam   Constitutional: She is oriented to person, place, and time. She appears well-developed and well-nourished. No distress.   HENT:   Head: Normocephalic and atraumatic.   Mouth/Throat: Oropharynx is clear and moist.   Neck: Normal range of motion. Neck supple. No JVD present.   Cardiovascular: Normal rate, regular rhythm, normal heart sounds and intact distal pulses.   Pulmonary/Chest: Effort normal and breath sounds normal. No respiratory distress.   Musculoskeletal: Normal range of motion. She exhibits no edema.   Neurological: She is alert and oriented to person, place, and time. No cranial nerve deficit.   Skin: Skin is warm and dry. Capillary refill takes less than 2 seconds. No rash noted.   Psychiatric: She has a normal mood and affect. Her behavior is normal.   Nursing note and vitals reviewed.      Procedures    ASSESSMENT/ PLAN:        Problem List Items " Addressed This Visit        Cardiovascular and Mediastinum    Essential hypertension    Relevant Medications    amLODIPine (NORVASC) 5 MG tablet                   Specific Patient Instructions:  MEDICATION Instructions: Encouraged patient to continue routine medicines as prescribed and maintain compliance. Patient instructed to report any adverse side effects or reactions to medicines promptly to the office. Patient instructed to make us aware of any OTC or herbal meds or supplement use.  DIET Recommendations: Patient instructed and provided information on the following nutrition and DIET(s): Low sodium/Na+. Calorie restriction for weight reduction and maintenance. Necessity for adequate daily intake of fluids/water.  EXERCISE Instructions: Discussed with patient the need for routine aerobic activity for cardiovascular fitness, 3 times a week for about 30 minutes. Daily exercise for increased fitness and weight reduction goals.    Patient's Body mass index is 39.48 kg/m². BMI is above normal parameters. Recommendations include: exercise counseling and nutrition counseling.      SMOKING Recommendations: N/A  HEALTH MAINTENANCE:  N/A  MISCELLANEOUS Instructions: N/A      Medications ordered or changed this visit:  New Medications Ordered This Visit   Medications   • amLODIPine (NORVASC) 5 MG tablet     Sig: Take 1 tablet by mouth Daily.     Dispense:  30 tablet     Refill:  5        FOLLOW-UP:  Return in about 2 weeks (around 8/26/2019) for Recheck.    I discussed the patients findings and my recommendations with patient.  An After Visit Summary (AVS) was printed and given to the patient at discharge.      Best Gannon MD, FAAFP  8/14/2019

## 2019-08-16 ENCOUNTER — TELEPHONE (OUTPATIENT)
Dept: FAMILY MEDICINE CLINIC | Facility: CLINIC | Age: 34
End: 2019-08-16

## 2019-08-16 NOTE — TELEPHONE ENCOUNTER
Pt father called. He is concerned about her BP reading this morning. He stated that it was 174/128, pulse of 124. Advised that if Pt is feeling bad, she should seek attention at ER. He voiced understanding and stated he would advise Pt.

## 2019-08-26 ENCOUNTER — OFFICE VISIT (OUTPATIENT)
Dept: FAMILY MEDICINE CLINIC | Facility: CLINIC | Age: 34
End: 2019-08-26

## 2019-08-26 VITALS
TEMPERATURE: 98.7 F | BODY MASS INDEX: 39.44 KG/M2 | WEIGHT: 231 LBS | HEART RATE: 124 BPM | DIASTOLIC BLOOD PRESSURE: 80 MMHG | SYSTOLIC BLOOD PRESSURE: 138 MMHG | OXYGEN SATURATION: 98 % | HEIGHT: 64 IN

## 2019-08-26 DIAGNOSIS — E11.9 TYPE 2 DIABETES MELLITUS WITHOUT COMPLICATION, WITHOUT LONG-TERM CURRENT USE OF INSULIN (HCC): ICD-10-CM

## 2019-08-26 DIAGNOSIS — R00.0 TACHYCARDIA: ICD-10-CM

## 2019-08-26 DIAGNOSIS — Z12.31 ENCOUNTER FOR MAMMOGRAM TO ESTABLISH BASELINE MAMMOGRAM: ICD-10-CM

## 2019-08-26 DIAGNOSIS — G90.A POTS (POSTURAL ORTHOSTATIC TACHYCARDIA SYNDROME): ICD-10-CM

## 2019-08-26 DIAGNOSIS — Z80.3 FAMILY HISTORY OF BREAST CANCER: ICD-10-CM

## 2019-08-26 DIAGNOSIS — Z80.3 FAMILY HISTORY OF MALIGNANT NEOPLASM OF BREAST: ICD-10-CM

## 2019-08-26 DIAGNOSIS — I10 ESSENTIAL HYPERTENSION: Primary | ICD-10-CM

## 2019-08-26 PROCEDURE — 99214 OFFICE O/P EST MOD 30 MIN: CPT | Performed by: FAMILY MEDICINE

## 2019-08-26 RX ORDER — CARVEDILOL 25 MG/1
25 TABLET ORAL 2 TIMES DAILY WITH MEALS
Qty: 60 TABLET | Refills: 5 | Status: SHIPPED | OUTPATIENT
Start: 2019-08-26 | End: 2019-09-30 | Stop reason: SDUPTHER

## 2019-08-26 NOTE — PATIENT INSTRUCTIONS
Suspect Essential HTN.Good BP control is encouraged with Goal BP based on JNC 8 guidelines 2014 <140/90 for patients with known cardiac disease and diabetes. (IRINA. 2014:322 (5):507-520. doi:10.1001/irina.2013.30083): general population <60 yr old goal BP <140/90 and for those >60 <150/90.  For patients of all ages with Diabetes, CKD, Known CAD <140/90. Recommended to the patient to obtain electronic home BP machine with upper arm blood pressure cuff and to check regularly as instructed.  Keep BP log and bring to subsequent visits. Stable, at goal.  a. LABS: routine for hypertension recommended and ordered if necessary.  b. Recommend if you do not have a home BP machine to obtain an electronic machine with arm blood pressure cuff.      c. Monitor BP over the next week and keep log to bring back to office. Discussed medication therapy however pt wants to try to control with diet exercise. .  Your provider  has recommended self-monitoring of your blood pressure.  If you do not have a blood pressure cuff you may purchase one from the local pharmacy.  You may ask the pharmacist which brand and model they recommend.  Obtain your blood pressure measurement at least 2x per week.  You should also check your blood pressure if you experience any symptoms of blurred visit, dizziness or headache.  Please record all blood pressure measurements and bring them to next office visit.  If you have any questions about the accuracy of your blood pressure machine please bring it in to the office and our staff will be happy to check accuracy.   d. Encouraged to eat a low sodium heart healthy diet  e. Offered handout on HTN educational topics.  These were provided if patient requested these today.  f. MEDS: as listed in today's visit.  g. Risks/benefits of current and new medications discussed with the patient and or family today.  The patient/family are aware and accept that if there any side effects they should call or return to clinic  as soon as possible.  Appropriate F/U discussed for topics addressed today. All questions were answered to the  satisfactory state of patient/family.  Should symptoms fail to improve or worsen they agree to call or return to clinic or to go to the ER. Education handouts were offered on any new Rx if requested.  Discussed the importance of following up with any needed screening tests/labs/specialist appointments and any requested follow-up recommended by me today.  Importance of maintaining follow-up discussed and patient accepts that missed appointments can delay diagnosis and potentially lead to worsening of conditions.

## 2019-08-26 NOTE — PROGRESS NOTES
OFFICE VISIT NOTE:    Chelita Rosenbaum is a 33 y.o. female who presents today for Hypertension (medication f/u).     BP at her class last week was 174/128 and  then. Fluctuating since then 144/99, 164/88, etc. Lowest it was had been 129/100 she relates.   Even despite the increase last visit with the Norvasc - not the best control of BP yet.   Rides the [employer] van - not the best transportation situation - so difficult to obtain rides here she says. Does have chest pain/pressure and fingers tingling with elevated BP.   Bilateral heels are cracking on her - skin is quite dry and cracking.   Aunt has had concerns for breast and ovarian cancer testing and recommended for her to have this testing done. Mammogram and breast exam has been done last month by [facility]. BRCA testing is needed.       Hypertension   This is a chronic problem. The current episode started more than 1 year ago. The problem is unchanged. The problem is uncontrolled. Pertinent negatives include no chest pain, headaches, orthopnea, palpitations, peripheral edema, PND or shortness of breath. There are no associated agents to hypertension. Risk factors for coronary artery disease include obesity, stress and family history. The current treatment provides significant improvement. Compliance problems include exercise and diet.         Past medical/surgical history, Family history, Social history, Allergies and Medications have been reviewed with the patient today and are updated in [facility] EMR. See below.    Past Medical History:   Diagnosis Date   • Arthropathy of lumbar facet joint    • Asthma    • Bipolar disorder (CMS/HCC)    • Cervico-occipital neuralgia    • Constipation    • Disorder of small intestine     thicking jejunum, delayed transit      • Diverticular disease of colon    • Drug therapy     Other long term (current) drug therapy      • Dysphagia    • Encounter for contraceptive management     other   • Encounter for surveillance of  contraceptives     other   • Epigastric pain    • Generalized anxiety disorder    • H/O colonoscopy with polypectomy    • Hirsutism    • Hyperprolactinemia (CMS/HCC)    • Low back pain    • Menstruation disorder     Other disorders of menstruation and other abnormal bleeding from female genital tract      • Migraine aura, persistent     Migraine - w/ aura of spots      • Myofascial pain    • Nausea    • Nausea and vomiting    • Pap smear for cervical cancer screening    • PCOS (polycystic ovarian syndrome)    • Periumbilical pain    • POTS (postural orthostatic tachycardia syndrome)    • Surgical follow-up care    • Tachycardia      Past Surgical History:   Procedure Laterality Date   • ABDOMINAL SURGERY  2014    Anesth, surgery of abdomen (1)      • CAPSULE ENDOSCOPY  2015    GI Imaging, capsule endoscopy 95570 (1)      •  SECTION  2010     Section (3)      • CHOLECYSTECTOMY  05/15/2003    Cholecystectomy, laparoscopic (1)      • COLON RESECTION     • COLONOSCOPY     • DILATATION AND CURETTAGE  10/21/2013    D&C (1)      • ENDOSCOPY  2015    EGD w/ biopsy [id number] (1)      • INJECTION OF MEDICATION  2016    Injection for nerve block (1)      • PARTIAL HYSTERECTOMY       Family History   Problem Relation Age of Onset   • Heart disease Mother    • Asthma Father    • Diabetes Father    • Heart disease Father    • Hypertension Father    • No Known Problems Brother      Social History     Tobacco Use   • Smoking status: Never Smoker   • Smokeless tobacco: Never Used   Substance Use Topics   • Alcohol use: No     Frequency: Never     Binge frequency: Never   • Drug use: No       Allergies:  Apriso [mesalamine er]; Lamotrigine; Lithium; Tramadol; and Ultram [tramadol hcl]    Current Meds:    Current Outpatient Medications:   •  albuterol (PROVENTIL HFA;VENTOLIN HFA) 108 (90 BASE) MCG/ACT inhaler, Inhale 2 puffs every 4 (four) hours as needed for wheezing., Disp: , Rfl:   •   amLODIPine (NORVASC) 5 MG tablet, Take 1 tablet by mouth Daily., Disp: 30 tablet, Rfl: 5  •  atorvastatin (LIPITOR) 40 MG tablet, Take 1 tablet by mouth Daily., Disp: 30 tablet, Rfl: 5  •  benztropine (COGENTIN) 2 MG tablet, Take 2 mg by mouth 2 (Two) Times a Day., Disp: , Rfl: 2  •  Blood Glucose Monitoring Suppl (ONE TOUCH BASIC SYSTEM) w/Device kit, 1 Device Daily., Disp: 1 each, Rfl: 0  •  carvedilol (COREG) 25 MG tablet, Take 1 tablet by mouth 2 (Two) Times a Day With Meals., Disp: 60 tablet, Rfl: 5  •  diazePAM (VALIUM) 5 MG tablet, Take 5 mg by mouth 3 (Three) Times a Day., Disp: , Rfl:   •  fludrocortisone 0.1 MG tablet, TAKE ONE TABLET BY MOUTH EVERY DAY, Disp: 90 tablet, Rfl: 1  •  glucose blood (ONE TOUCH ULTRA TEST) test strip, Use as instructed, Disp: 50 each, Rfl: 12  •  Lancets (ACCU-CHEK MULTICLIX) lancets, BID blood sugar check, Disp: 102 each, Rfl: 11  •  lurasidone (LATUDA) 40 MG tablet tablet, Take 40 mg by mouth every night at bedtime., Disp: , Rfl:   •  meclizine (ANTIVERT) 25 MG tablet, Take 1 tablet by mouth 3 (Three) Times a Day As Needed for dizziness., Disp: 90 tablet, Rfl: 1  •  metFORMIN (GLUCOPHAGE) 1000 MG tablet, Take 1 tablet by mouth 2 (Two) Times a Day., Disp: 60 tablet, Rfl: 5  •  mometasone-formoterol (DULERA 100) 100-5 MCG/ACT inhaler, Inhale 2 puffs 2 (Two) Times a Day., Disp: 1 inhaler, Rfl: 3  •  omeprazole (priLOSEC) 40 MG capsule, Take 40 mg by mouth Daily., Disp: , Rfl:   •  pramipexole (MIRAPEX) 0.25 MG tablet, Take 1 tablet by mouth Every Night., Disp: 30 tablet, Rfl: 6  •  promethazine (PHENERGAN) 25 MG tablet, Take 1 tablet by mouth Every 6 (Six) Hours As Needed for Nausea or Vomiting., Disp: 30 tablet, Rfl: 0  •  QUEtiapine (SEROquel) 300 MG tablet, Take 2 tablets by mouth every night at bedtime., Disp: , Rfl:   •  spironolactone (ALDACTONE) 25 MG tablet, TAKE ONE TABLET BY MOUTH EVERY DAY, Disp: 90 tablet, Rfl: 1  •  tiZANidine (ZANAFLEX) 4 MG tablet, Take 0.5  "tablets by mouth 2 (Two) Times a Day As Needed for Muscle Spasms., Disp: 90 tablet, Rfl: 1  •  traZODone (DESYREL) 100 MG tablet, Take 2 tablets by mouth every night at bedtime., Disp: , Rfl:     Review of Systems:  Review of Systems   Constitutional: Negative for activity change, fatigue, fever, unexpected weight gain and unexpected weight loss.   Respiratory: Negative for shortness of breath.    Cardiovascular: Negative for chest pain, palpitations, orthopnea and PND.   Gastrointestinal: Negative for abdominal pain.   Genitourinary: Negative for difficulty urinating.   Skin: Negative for rash.   Neurological: Negative for syncope and headache.       Physical Examination:  Vital Signs:  /80 (BP Location: Left arm, Patient Position: Sitting, Cuff Size: Adult)   Pulse (!) 124   Temp 98.7 °F (37.1 °C) (Tympanic)   Ht 162.6 cm (64\")   Wt 105 kg (231 lb)   LMP  (LMP Unknown)   SpO2 98%   Breastfeeding? No   BMI 39.65 kg/m²   Physical Exam   Constitutional: She is oriented to person, place, and time. She appears well-developed and well-nourished. No distress.   HENT:   Head: Normocephalic and atraumatic.   Mouth/Throat: Oropharynx is clear and moist.   Neck: Normal range of motion. Neck supple. No JVD present.   Cardiovascular: Normal rate, regular rhythm, normal heart sounds and intact distal pulses.   Pulmonary/Chest: Effort normal and breath sounds normal. No respiratory distress.   Musculoskeletal: Normal range of motion. She exhibits no edema.   Neurological: She is alert and oriented to person, place, and time. No cranial nerve deficit.   Skin: Skin is warm and dry. Capillary refill takes less than 2 seconds. No rash noted.   Psychiatric: She has a normal mood and affect. Her behavior is normal.   Nursing note and vitals reviewed.      Procedures    ASSESSMENT/ PLAN:        Problem List Items Addressed This Visit        Cardiovascular and Mediastinum    Tachycardia    POTS (postural orthostatic " tachycardia syndrome)    Overview     With some elements of POTS           Relevant Medications    carvedilol (COREG) 25 MG tablet    Essential hypertension - Primary    Relevant Medications    carvedilol (COREG) 25 MG tablet       Endocrine    Type 2 diabetes mellitus without complication, without long-term current use of insulin (CMS/[facility])       Other    Family history of breast cancer      Other Visit Diagnoses     Encounter for mammogram to establish baseline mammogram                       Specific Patient Instructions:  MEDICATION Instructions: Encouraged patient to continue routine medicines as prescribed and maintain compliance. Patient instructed to report any adverse side effects or reactions to medicines promptly to the office. Patient instructed to make us aware of any OTC or herbal meds or supplement use.  DIET Recommendations: No new recommendations regarding diet/restrictions.  EXERCISE Instructions: No new recommendations.    Patient's Body mass index is 39.65 kg/m². BMI is above normal parameters. Recommendations include: exercise counseling and nutrition counseling.      SMOKING Recommendations: N/A  HEALTH MAINTENANCE:  N/A  MISCELLANEOUS Instructions: N/A      Medications ordered or changed this visit:  New Medications Ordered This Visit   Medications   • carvedilol (COREG) 25 MG tablet     Sig: Take 1 tablet by mouth 2 (Two) Times a Day With Meals.     Dispense:  60 tablet     Refill:  5     D/C rx for atenolol        FOLLOW-UP:  Return in about 4 weeks (around 9/23/2019) for Recheck.    I discussed the patients findings and my recommendations with patient.  An After Visit Summary (AVS) was printed and given to the patient at discharge.      Best Gannon MD, FAAFP  8/28/2019

## 2019-09-30 ENCOUNTER — OFFICE VISIT (OUTPATIENT)
Dept: FAMILY MEDICINE CLINIC | Facility: CLINIC | Age: 34
End: 2019-09-30

## 2019-09-30 VITALS
DIASTOLIC BLOOD PRESSURE: 98 MMHG | OXYGEN SATURATION: 98 % | WEIGHT: 240.4 LBS | HEART RATE: 124 BPM | HEIGHT: 64 IN | SYSTOLIC BLOOD PRESSURE: 140 MMHG | BODY MASS INDEX: 41.04 KG/M2

## 2019-09-30 DIAGNOSIS — G90.A POTS (POSTURAL ORTHOSTATIC TACHYCARDIA SYNDROME): ICD-10-CM

## 2019-09-30 DIAGNOSIS — E11.9 TYPE 2 DIABETES MELLITUS WITHOUT COMPLICATION, WITHOUT LONG-TERM CURRENT USE OF INSULIN (HCC): ICD-10-CM

## 2019-09-30 DIAGNOSIS — I10 ESSENTIAL HYPERTENSION: Primary | ICD-10-CM

## 2019-09-30 DIAGNOSIS — R00.0 TACHYCARDIA: ICD-10-CM

## 2019-09-30 PROCEDURE — 99214 OFFICE O/P EST MOD 30 MIN: CPT | Performed by: FAMILY MEDICINE

## 2019-09-30 RX ORDER — CARVEDILOL 12.5 MG/1
12.5 TABLET ORAL 2 TIMES DAILY WITH MEALS
Refills: 5 | COMMUNITY
Start: 2019-09-09 | End: 2019-09-30

## 2019-09-30 RX ORDER — DICLOFENAC SODIUM 75 MG/1
1 TABLET, DELAYED RELEASE ORAL 2 TIMES DAILY
COMMUNITY
Start: 2019-09-06 | End: 2021-01-29

## 2019-09-30 RX ORDER — AMLODIPINE BESYLATE 10 MG/1
10 TABLET ORAL DAILY
Qty: 30 TABLET | Refills: 5 | Status: SHIPPED | OUTPATIENT
Start: 2019-09-30 | End: 2020-03-19

## 2019-09-30 RX ORDER — CARVEDILOL 25 MG/1
25 TABLET ORAL 2 TIMES DAILY WITH MEALS
Qty: 60 TABLET | Refills: 5 | Status: SHIPPED | OUTPATIENT
Start: 2019-09-30 | End: 2021-07-19 | Stop reason: ALTCHOICE

## 2019-09-30 NOTE — PATIENT INSTRUCTIONS
Suspect Essential HTN.Good BP control is encouraged with Goal BP based on JNC 8 guidelines 2014 <140/90 for patients with known cardiac disease and diabetes. (IRINA. 2014:322 (5):507-520. doi:10.1001/irina.2013.95141): general population <60 yr old goal BP <140/90 and for those >60 <150/90.  For patients of all ages with Diabetes, CKD, Known CAD <140/90. Recommended to the patient to obtain electronic home BP machine with upper arm blood pressure cuff and to check regularly as instructed.  Keep BP log and bring to subsequent visits. Stable, at goal.  a. LABS: routine for hypertension recommended and ordered if necessary.  b. Recommend if you do not have a home BP machine to obtain an electronic machine with arm blood pressure cuff.      c. Monitor BP over the next week and keep log to bring back to office. Discussed medication therapy however pt wants to try to control with diet exercise. .  Your provider  has recommended self-monitoring of your blood pressure.  If you do not have a blood pressure cuff you may purchase one from the local pharmacy.  You may ask the pharmacist which brand and model they recommend.  Obtain your blood pressure measurement at least 2x per week.  You should also check your blood pressure if you experience any symptoms of blurred visit, dizziness or headache.  Please record all blood pressure measurements and bring them to next office visit.  If you have any questions about the accuracy of your blood pressure machine please bring it in to the office and our staff will be happy to check accuracy.   d. Encouraged to eat a low sodium heart healthy diet  e. Offered handout on HTN educational topics.  These were provided if patient requested these today.  f. MEDS: as listed in today's visit.  g. Risks/benefits of current and new medications discussed with the patient and or family today.  The patient/family are aware and accept that if there any side effects they should call or return to clinic  as soon as possible.  Appropriate F/U discussed for topics addressed today. All questions were answered to the  satisfactory state of patient/family.  Should symptoms fail to improve or worsen they agree to call or return to clinic or to go to the ER. Education handouts were offered on any new Rx if requested.  Discussed the importance of following up with any needed screening tests/labs/specialist appointments and any requested follow-up recommended by me today.  Importance of maintaining follow-up discussed and patient accepts that missed appointments can delay diagnosis and potentially lead to worsening of conditions.

## 2019-09-30 NOTE — PROGRESS NOTES
OFFICE VISIT NOTE:    Chelita Rosenbaum is a 33 y.o. female who presents today for Hypertension (f/u).     Hypertension   This is a chronic problem. The current episode started more than 1 year ago. The problem has been waxing and waning since onset. The problem is uncontrolled. Associated symptoms include chest pain (rare, but atypical for angina). Pertinent negatives include no blurred vision, headaches, orthopnea, palpitations, peripheral edema, PND or shortness of breath. There are no associated agents to hypertension. The current treatment provides significant improvement. There are no compliance problems.         Past medical/surgical history, Family history, Social history, Allergies and Medications have been reviewed with the patient today and are updated in [facility] EMR. See below.    Past Medical History:   Diagnosis Date   • Arthropathy of lumbar facet joint    • Asthma    • Bipolar disorder (CMS/HCC)    • Cervico-occipital neuralgia    • Constipation    • Disorder of small intestine     thicking jejunum, delayed transit      • Diverticular disease of colon    • Drug therapy     Other long term (current) drug therapy      • Dysphagia    • Encounter for contraceptive management     other   • Encounter for surveillance of contraceptives     other   • Epigastric pain    • Generalized anxiety disorder    • H/O colonoscopy with polypectomy    • Hirsutism    • Hyperprolactinemia (CMS/HCC)    • Low back pain    • Menstruation disorder     Other disorders of menstruation and other abnormal bleeding from female genital tract      • Migraine aura, persistent     Migraine - w/ aura of spots      • Myofascial pain    • Nausea    • Nausea and vomiting    • Pap smear for cervical cancer screening    • PCOS (polycystic ovarian syndrome)    • Periumbilical pain    • POTS (postural orthostatic tachycardia syndrome)    • Surgical follow-up care    • Tachycardia      Past Surgical History:   Procedure Laterality Date   • ABDOMINAL  SURGERY  2014    Anesth, surgery of abdomen (1)      • CAPSULE ENDOSCOPY  2015    GI Imaging, capsule endoscopy 62188 (1)      •  SECTION  2010     Section (3)      • CHOLECYSTECTOMY  05/15/2003    Cholecystectomy, laparoscopic (1)      • COLON RESECTION     • COLONOSCOPY     • DILATATION AND CURETTAGE  10/21/2013    D&C (1)      • ENDOSCOPY  2015    EGD w/ biopsy [id number] (1)      • INJECTION OF MEDICATION  2016    Injection for nerve block (1)      • PARTIAL HYSTERECTOMY       Family History   Problem Relation Age of Onset   • Heart disease Mother    • Asthma Father    • Diabetes Father    • Heart disease Father    • Hypertension Father    • No Known Problems Brother      Social History     Tobacco Use   • Smoking status: Never Smoker   • Smokeless tobacco: Never Used   Substance Use Topics   • Alcohol use: No     Frequency: Never     Binge frequency: Never   • Drug use: No       Allergies:  Apriso [mesalamine er]; Lamotrigine; Lithium; Tramadol; and Ultram [tramadol hcl]    Current Meds:    Current Outpatient Medications:   •  albuterol (PROVENTIL HFA;VENTOLIN HFA) 108 (90 BASE) MCG/ACT inhaler, Inhale 2 puffs every 4 (four) hours as needed for wheezing., Disp: , Rfl:   •  amLODIPine (NORVASC) 10 MG tablet, Take 1 tablet by mouth Daily., Disp: 30 tablet, Rfl: 5  •  atorvastatin (LIPITOR) 40 MG tablet, Take 1 tablet by mouth Daily., Disp: 30 tablet, Rfl: 5  •  benztropine (COGENTIN) 2 MG tablet, Take 2 mg by mouth 2 (Two) Times a Day., Disp: , Rfl: 2  •  Blood Glucose Monitoring Suppl (ONE TOUCH [email or web address] SYSTEM) w/Device kit, 1 Device Daily., Disp: 1 each, Rfl: 0  •  carvedilol (COREG) 25 MG tablet, Take 1 tablet by mouth 2 (Two) Times a Day With Meals., Disp: 60 tablet, Rfl: 5  •  diazePAM (VALIUM) 5 MG tablet, Take 5 mg by mouth 3 (Three) Times a Day., Disp: , Rfl:   •  diclofenac (VOLTAREN) 75 MG EC tablet, Take 1 tablet by mouth 2 (Two) Times a Day., Disp: , Rfl:   •   fludrocortisone 0.1 MG tablet, TAKE ONE TABLET BY MOUTH EVERY DAY, Disp: 90 tablet, Rfl: 1  •  glucose blood (ONE TOUCH ULTRA TEST) test strip, Use as instructed, Disp: 50 each, Rfl: 12  •  Lancets (ACCU-CHEK MULTICLIX) lancets, BID blood sugar check, Disp: 102 each, Rfl: 11  •  lurasidone (LATUDA) 40 MG tablet tablet, Take 40 mg by mouth every night at bedtime., Disp: , Rfl:   •  meclizine (ANTIVERT) 25 MG tablet, Take 1 tablet by mouth 3 (Three) Times a Day As Needed for dizziness., Disp: 90 tablet, Rfl: 1  •  metFORMIN (GLUCOPHAGE) 1000 MG tablet, Take 1 tablet by mouth 2 (Two) Times a Day., Disp: 60 tablet, Rfl: 5  •  mometasone-formoterol (DULERA 100) 100-5 MCG/ACT inhaler, Inhale 2 puffs 2 (Two) Times a Day., Disp: 1 inhaler, Rfl: 3  •  omeprazole (priLOSEC) 40 MG capsule, Take 40 mg by mouth Daily., Disp: , Rfl:   •  pramipexole (MIRAPEX) 0.25 MG tablet, Take 1 tablet by mouth Every Night., Disp: 30 tablet, Rfl: 6  •  promethazine (PHENERGAN) 25 MG tablet, Take 1 tablet by mouth Every 6 (Six) Hours As Needed for Nausea or Vomiting., Disp: 30 tablet, Rfl: 0  •  QUEtiapine (SEROquel) 300 MG tablet, Take 2 tablets by mouth every night at bedtime., Disp: , Rfl:   •  spironolactone (ALDACTONE) 25 MG tablet, TAKE ONE TABLET BY MOUTH EVERY DAY, Disp: 90 tablet, Rfl: 1  •  tiZANidine (ZANAFLEX) 4 MG tablet, Take 0.5 tablets by mouth 2 (Two) Times a Day As Needed for Muscle Spasms., Disp: 90 tablet, Rfl: 1  •  traZODone (DESYREL) 100 MG tablet, Take 2 tablets by mouth every night at bedtime., Disp: , Rfl:     Review of Systems:  Review of Systems   Constitutional: Negative for activity change, fatigue, fever, unexpected weight gain and unexpected weight loss.   Eyes: Negative for blurred vision.   Respiratory: Negative for shortness of breath.    Cardiovascular: Positive for chest pain (rare, but atypical for angina). Negative for palpitations, orthopnea and PND.   Gastrointestinal: Negative for abdominal pain.  "  Genitourinary: Negative for difficulty urinating.   Skin: Negative for rash.   Neurological: Negative for syncope and headache.       Physical Examination:  Vital Signs:  /98 (BP Location: Left arm, Patient Position: Sitting, Cuff Size: Adult)   Pulse (!) 124   Ht 162.6 cm (64\")   Wt 109 kg (240 lb 6.4 oz)   LMP  (LMP Unknown)   SpO2 98%   Breastfeeding? No   BMI 41.26 kg/m²   Physical Exam   Constitutional: She is oriented to person, place, and time. She appears well-developed and well-nourished. No distress.   HENT:   Head: Normocephalic and atraumatic.   Mouth/Throat: Oropharynx is clear and moist.   Neck: Normal range of motion. Neck supple. No JVD present.   Cardiovascular: Normal rate, regular rhythm, normal heart sounds and intact distal pulses.   Pulmonary/Chest: Effort normal and breath sounds normal. No respiratory distress.   Musculoskeletal: Normal range of motion. She exhibits no edema.   Neurological: She is alert and oriented to person, place, and time. No cranial nerve deficit.   Skin: Skin is warm and dry. Capillary refill takes less than 2 seconds. No rash noted.   Psychiatric: She has a normal mood and affect. Her behavior is normal.   Nursing note and vitals reviewed.      Procedures    ASSESSMENT/ PLAN:        Problem List Items Addressed This Visit        Cardiovascular and Mediastinum    Tachycardia    POTS (postural orthostatic tachycardia syndrome)    Overview     With some elements of POTS           Relevant Medications    carvedilol (COREG) 25 MG tablet    Essential hypertension - Primary    Relevant Medications    amLODIPine (NORVASC) 10 MG tablet    carvedilol (COREG) 25 MG tablet       Endocrine    Type 2 diabetes mellitus without complication, without long-term current use of insulin (CMS/[facility])                   Specific Patient Instructions:  MEDICATION Instructions: Encouraged patient to continue routine medicines as prescribed and maintain compliance. Patient " instructed to report any adverse side effects or reactions to medicines promptly to the office. Patient instructed to make us aware of any OTC or herbal meds or supplement use.  DIET Recommendations: No new recommendations regarding diet/restrictions.  EXERCISE Instructions: No new recommendations.    Patient's Body mass index is 41.26 kg/m². BMI is above normal parameters. Recommendations include: exercise counseling and nutrition counseling.      SMOKING Recommendations: N/A  HEALTH MAINTENANCE:  N/A  MISCELLANEOUS Instructions: N/A      Medications ordered or changed this visit:  New Medications Ordered This Visit   Medications   • amLODIPine (NORVASC) 10 MG tablet     Sig: Take 1 tablet by mouth Daily.     Dispense:  30 tablet     Refill:  5   • carvedilol (COREG) 25 MG tablet     Sig: Take 1 tablet by mouth 2 (Two) Times a Day With Meals.     Dispense:  60 tablet     Refill:  5     D/C rx for atenolol        FOLLOW-UP:  Return in about 4 weeks (around 10/28/2019) for Recheck.    I discussed the patients findings and my recommendations with patient.  An After Visit Summary (AVS) was printed and given to the patient at discharge.      Best Gannon MD, FAAFP  9/30/2019

## 2019-10-28 ENCOUNTER — OFFICE VISIT (OUTPATIENT)
Dept: FAMILY MEDICINE CLINIC | Facility: CLINIC | Age: 34
End: 2019-10-28

## 2019-10-28 VITALS
WEIGHT: 242 LBS | OXYGEN SATURATION: 99 % | DIASTOLIC BLOOD PRESSURE: 82 MMHG | HEART RATE: 125 BPM | HEIGHT: 64 IN | SYSTOLIC BLOOD PRESSURE: 138 MMHG | BODY MASS INDEX: 41.32 KG/M2

## 2019-10-28 DIAGNOSIS — I10 ESSENTIAL HYPERTENSION: ICD-10-CM

## 2019-10-28 DIAGNOSIS — J20.9 ACUTE BRONCHITIS, UNSPECIFIED ORGANISM: Primary | ICD-10-CM

## 2019-10-28 DIAGNOSIS — R00.0 TACHYCARDIA: ICD-10-CM

## 2019-10-28 DIAGNOSIS — J01.00 ACUTE NON-RECURRENT MAXILLARY SINUSITIS: ICD-10-CM

## 2019-10-28 DIAGNOSIS — E66.01 MORBIDLY OBESE (HCC): ICD-10-CM

## 2019-10-28 DIAGNOSIS — G90.A POTS (POSTURAL ORTHOSTATIC TACHYCARDIA SYNDROME): ICD-10-CM

## 2019-10-28 DIAGNOSIS — E11.9 TYPE 2 DIABETES MELLITUS WITHOUT COMPLICATION, WITHOUT LONG-TERM CURRENT USE OF INSULIN (HCC): ICD-10-CM

## 2019-10-28 PROCEDURE — 99214 OFFICE O/P EST MOD 30 MIN: CPT | Performed by: FAMILY MEDICINE

## 2019-10-28 RX ORDER — SPIRONOLACTONE 25 MG/1
25 TABLET ORAL 2 TIMES DAILY
Qty: 180 TABLET | Refills: 1 | Status: SHIPPED | OUTPATIENT
Start: 2019-10-28 | End: 2020-07-15

## 2019-10-28 RX ORDER — INFLUENZA A VIRUS A/SINGAPORE/GP1908/2015 IVR-180A (H1N1) ANTIGEN (PROPIOLACTONE INACTIVATED), INFLUENZA A VIRUS A/SINGAPORE/INFIMH-16-0019/2016 IVR-186 (H3N2) ANTIGEN (PROPIOLACTONE INACTIVATED), INFLUENZA B VIRUS B/MARYLAND/15/2016 ANTIGEN (PROPIOLACTONE INACTIVATED), AND INFLUENZA B VIRUS B/PHUKET/3073/2013 BVR-1B ANTIGEN (PROPIOLACTONE INACTIVATED) 15; 15; 15; 15 UG/.5ML; UG/.5ML; UG/.5ML; UG/.5ML
INJECTION, SUSPENSION INTRAMUSCULAR
COMMUNITY
Start: 2019-10-16 | End: 2019-10-28

## 2019-10-28 RX ORDER — LORATADINE 10 MG/1
10 TABLET ORAL DAILY
Qty: 30 TABLET | Refills: 5 | Status: SHIPPED | OUTPATIENT
Start: 2019-10-28 | End: 2021-01-29

## 2019-10-28 RX ORDER — AMOXICILLIN 500 MG/1
1000 CAPSULE ORAL 3 TIMES DAILY
Qty: 30 CAPSULE | Refills: 0 | Status: SHIPPED | OUTPATIENT
Start: 2019-10-28 | End: 2019-11-18

## 2019-10-28 NOTE — PROGRESS NOTES
OFFICE VISIT NOTE:    Chelita Rosenbaum is a 33 y.o. female who presents today for Hypertension (4 week f/u) and URI (x 3 days).     Hurts to take a breath now. HR has been doing 100-130's usually.      Hypertension   This is a chronic problem. The current episode started more than 1 year ago. The problem has been gradually worsening since onset. The problem is uncontrolled. Associated symptoms include chest pain (pleuritic), headaches, palpitations and shortness of breath. Pertinent negatives include no orthopnea, peripheral edema or PND. There are no associated agents to hypertension. The current treatment provides moderate improvement. There are no compliance problems.    URI    This is a new problem. The current episode started in the past 7 days. The problem has been gradually worsening. There has been no fever. Associated symptoms include chest pain (pleuritic), coughing (NP), headaches, rhinorrhea, sinus pain and sneezing. Pertinent negatives include no abdominal pain, rash, vomiting or wheezing. She has tried acetaminophen and increased fluids for the symptoms. The treatment provided mild relief.        Past medical/surgical history, Family history, Social history, Allergies and Medications have been reviewed with the patient today and are updated in [facility] EMR. See below.    Past Medical History:   Diagnosis Date   • Arthropathy of lumbar facet joint    • Asthma    • Bipolar disorder (CMS/HCC)    • Cervico-occipital neuralgia    • Constipation    • Disorder of small intestine     thicking jejunum, delayed transit      • Diverticular disease of colon    • Drug therapy     Other long term (current) drug therapy      • Dysphagia    • Encounter for contraceptive management     other   • Encounter for surveillance of contraceptives     other   • Epigastric pain    • Generalized anxiety disorder    • H/O colonoscopy with polypectomy    • Hirsutism    • Hyperprolactinemia (CMS/HCC)    • Low back pain    • Menstruation  disorder     Other disorders of menstruation and other abnormal bleeding from female genital tract      • Migraine aura, persistent     Migraine - w/ aura of spots      • Myofascial pain    • Nausea    • Nausea and vomiting    • Pap smear for cervical cancer screening    • PCOS (polycystic ovarian syndrome)    • Periumbilical pain    • POTS (postural orthostatic tachycardia syndrome)    • Surgical follow-up care    • Tachycardia      Past Surgical History:   Procedure Laterality Date   • ABDOMINAL SURGERY  2014    Anesth, surgery of abdomen (1)      • CAPSULE ENDOSCOPY  2015    [facility] Imaging, capsule endoscopy 12547 (1)      •  SECTION  2010     Section (3)      • CHOLECYSTECTOMY  05/15/2003    Cholecystectomy, laparoscopic (1)      • COLON RESECTION     • COLONOSCOPY     • DILATATION AND CURETTAGE  10/21/2013    D&C (1)      • ENDOSCOPY  2015    EGD w/ biopsy 11681 (1)      • INJECTION OF MEDICATION  2016    Injection for nerve block (1)      • PARTIAL HYSTERECTOMY       Family History   Problem Relation Age of Onset   • Heart disease Mother    • Asthma Father    • Diabetes Father    • Heart disease Father    • Hypertension Father    • No Known Problems Brother      Social History     Tobacco Use   • Smoking status: Never Smoker   • Smokeless tobacco: Never Used   Substance Use Topics   • Alcohol use: No     Frequency: Never     Binge frequency: Never   • Drug use: No       ALLERGIES:  Apriso [mesalamine er]; Lamotrigine; Lithium; Tramadol; and Ultram [tramadol hcl]    CURRENT MEDS:    Current Outpatient Medications:   •  albuterol (PROVENTIL HFA;VENTOLIN HFA) 108 (90 BASE) MCG/ACT inhaler, Inhale 2 puffs every 4 (four) hours as needed for wheezing., Disp: , Rfl:   •  amLODIPine (NORVASC) 10 MG tablet, Take 1 tablet by mouth Daily., Disp: 30 tablet, Rfl: 5  •  atorvastatin (LIPITOR) 40 MG tablet, Take 1 tablet by mouth Daily., Disp: 30 tablet, Rfl: 5  •  benztropine  (COGENTIN) 2 MG tablet, Take 2 mg by mouth 2 (Two) Times a Day., Disp: , Rfl: 2  •  Blood Glucose Monitoring Suppl (ONE TOUCH BASIC SYSTEM) w/Device kit, 1 Device Daily., Disp: 1 each, Rfl: 0  •  carvedilol (COREG) 25 MG tablet, Take 1 tablet by mouth 2 (Two) Times a Day With Meals., Disp: 60 tablet, Rfl: 5  •  diazePAM (VALIUM) 5 MG tablet, Take 5 mg by mouth 3 (Three) Times a Day., Disp: , Rfl:   •  diclofenac (VOLTAREN) 75 MG EC tablet, Take 1 tablet by mouth 2 (Two) Times a Day., Disp: , Rfl:   •  fludrocortisone 0.1 MG tablet, TAKE ONE TABLET BY MOUTH EVERY DAY, Disp: 90 tablet, Rfl: 1  •  glucose blood (ONE TOUCH ULTRA TEST) test strip, Use as instructed, Disp: 50 each, Rfl: 12  •  Lancets (ACCU-CHEK MULTICLIX) lancets, BID blood sugar check, Disp: 102 each, Rfl: 11  •  lurasidone (LATUDA) 40 MG tablet tablet, Take 40 mg by mouth every night at bedtime., Disp: , Rfl:   •  meclizine (ANTIVERT) 25 MG tablet, Take 1 tablet by mouth 3 (Three) Times a Day As Needed for dizziness., Disp: 90 tablet, Rfl: 1  •  metFORMIN (GLUCOPHAGE) 1000 MG tablet, Take 1 tablet by mouth 2 (Two) Times a Day., Disp: 60 tablet, Rfl: 5  •  mometasone-formoterol (DULERA 100) 100-5 MCG/ACT inhaler, Inhale 2 puffs 2 (Two) Times a Day., Disp: 1 inhaler, Rfl: 3  •  omeprazole (priLOSEC) 40 MG capsule, Take 40 mg by mouth Daily., Disp: , Rfl:   •  pramipexole (MIRAPEX) 0.25 MG tablet, Take 1 tablet by mouth Every Night., Disp: 30 tablet, Rfl: 6  •  promethazine (PHENERGAN) 25 MG tablet, Take 1 tablet by mouth Every 6 (Six) Hours As Needed for Nausea or Vomiting., Disp: 30 tablet, Rfl: 0  •  QUEtiapine (SEROquel) 300 MG tablet, Take 2 tablets by mouth every night at bedtime., Disp: , Rfl:   •  spironolactone (ALDACTONE) 25 MG tablet, Take 1 tablet by mouth 2 (Two) Times a Day., Disp: 180 tablet, Rfl: 1  •  tiZANidine (ZANAFLEX) 4 MG tablet, Take 0.5 tablets by mouth 2 (Two) Times a Day As Needed for Muscle Spasms., Disp: 90 tablet, Rfl: 1  •   "traZODone (DESYREL) 100 MG tablet, Take 2 tablets by mouth every night at bedtime., Disp: , Rfl:   •  amoxicillin (AMOXIL) 500 MG capsule, Take 2 capsules by mouth 3 (Three) Times a Day., Disp: 30 capsule, Rfl: 0  •  loratadine (CLARITIN) 10 MG tablet, Take 1 tablet by mouth Daily., Disp: 30 tablet, Rfl: 5    REVIEW OF SYSTEMS:  Review of Systems   Constitutional: Negative for activity change, fatigue, fever, unexpected weight gain and unexpected weight loss.   HENT: Positive for rhinorrhea and sneezing.    Respiratory: Positive for cough (NP) and shortness of breath. Negative for wheezing.    Cardiovascular: Positive for chest pain (pleuritic) and palpitations. Negative for orthopnea and PND.   Gastrointestinal: Negative for abdominal pain and vomiting.   Genitourinary: Negative for difficulty urinating.   Skin: Negative for rash.   Neurological: Negative for syncope and headache.       PHYSICAL EXAMINATION:  Vital Signs:  /82 (BP Location: Left arm, Patient Position: Sitting, Cuff Size: Large Adult)   Pulse (!) 125   Ht 162.6 cm (64\")   Wt 110 kg (242 lb)   LMP  (LMP Unknown)   SpO2 99%   Breastfeeding? No   BMI 41.54 kg/m²   Physical Exam   Constitutional: She is oriented to person, place, and time. She appears well-developed and well-nourished. No distress.   HENT:   Head: Normocephalic and atraumatic.   Right Ear: A middle ear effusion is present.   Left Ear: A middle ear effusion is present.   Nose: Mucosal edema and rhinorrhea present. Right sinus exhibits maxillary sinus tenderness. Left sinus exhibits maxillary sinus tenderness.   Mouth/Throat: Posterior oropharyngeal edema present. No oropharyngeal exudate or posterior oropharyngeal erythema.   Neck: Normal range of motion. Neck supple. No JVD present.   Cardiovascular: Normal rate, regular rhythm, normal heart sounds and intact distal pulses.   Pulmonary/Chest: Effort normal and breath sounds normal. No respiratory distress. "   Musculoskeletal: Normal range of motion. She exhibits no edema.   Neurological: She is alert and oriented to person, place, and time. No cranial nerve deficit.   Skin: Skin is warm and dry. Capillary refill takes less than 2 seconds. No rash noted.   Psychiatric: She has a normal mood and affect. Her behavior is normal.   Nursing note and vitals reviewed.      Procedures    ASSESSMENT/ PLAN:  Problem List Items Addressed This Visit        Cardiovascular and Mediastinum    Tachycardia    POTS (postural orthostatic tachycardia syndrome)    Overview     With some elements of POTS           Essential hypertension    Relevant Medications    spironolactone (ALDACTONE) 25 MG tablet       Digestive    Morbidly obese (CMS/HCC)       Endocrine    Type 2 diabetes mellitus without complication, without long-term current use of insulin (CMS/[facility])      Other Visit Diagnoses     Acute bronchitis, unspecified organism    -  Primary    Relevant Medications    loratadine (CLARITIN) 10 MG tablet    amoxicillin (AMOXIL) 500 MG capsule    Acute non-recurrent maxillary sinusitis        Relevant Medications    loratadine (CLARITIN) 10 MG tablet    amoxicillin (AMOXIL) 500 MG capsule            Specific Patient Instructions:  MEDICATION Instructions: Encouraged patient to continue routine medicines as prescribed and maintain compliance. Patient instructed to report any adverse side effects or reactions to medicines promptly to the office. Patient instructed to make us aware of any OTC or herbal meds or supplement use.  DIET Recommendations: Patient instructed and provided information on the following nutrition and DIET(s): Calorie restriction for weight reduction and maintenance. Necessity for adequate daily intake of fluids/water.  EXERCISE Instructions: Discussed with patient the need for routine aerobic activity for cardiovascular fitness, 3 times a week for about 30 minutes. Daily exercise for increased fitness and weight reduction  goals.    Patient's Body mass index is 41.54 kg/m². BMI is above normal parameters. Recommendations include: exercise counseling and nutrition counseling.      SMOKING Recommendations: Counseled patient and encouraged them on smoking cessation. Discussed the benefits to all body systems with smoking cessation, including decreased cardiac/lung/stroke/cancer risk.  HEALTH MAINTENANCE:  Counseling provided to patient/family about routine health maintenance and ANNUAL physicals/labs. Counseling on recommended Vaccinations appropriate for age needed.  MISCELLANEOUS Instructions: N/A      Medications or Orders placed this visit:  No orders of the defined types were placed in this encounter.      Medications DISCONTINUED this visit:  Medications Discontinued During This Encounter   Medication Reason   • AFLURIA QUADRIVALENT suspension IM suspension *Therapy completed   • spironolactone (ALDACTONE) 25 MG tablet Reorder       FOLLOW-UP:  Return in about 2 weeks (around 11/11/2019) for Recheck.    I discussed the patients findings and my recommendations with patient.  An After Visit Summary (AVS) was printed and given to the patient at discharge.      Best Gannon MD, FAAFP  10/28/2019

## 2019-10-28 NOTE — PATIENT INSTRUCTIONS
Sinusitis, Adult  Sinusitis is soreness and inflammation of your sinuses. Sinuses are hollow spaces in the bones around your face. Your sinuses are located:  · Around your eyes.  · In the middle of your forehead.  · Behind your nose.  · In your cheekbones.  Your sinuses and nasal passages are lined with a stringy fluid (mucus). Mucus normally drains out of your sinuses. When your nasal tissues become inflamed or swollen, mucus can become trapped or blocked. Blocked or trapped mucus makes it difficult for air to flow through your sinuses. This allows bacteria, viruses, and funguses to grow, which leads to infection. Most infections of the sinuses are caused by a virus.  Sinusitis can develop quickly. It can last for 7?10 days (acute) or for more than 12 weeks (chronic). Sinusitis often develops after a cold.  What are the causes?  This condition is caused by anything that creates swelling in the sinuses or stops mucus from draining, including:  · Allergies.  · Asthma.  · Bacterial or viral infection.  · Abnormally shaped bones between the nasal passages.  · Nasal growths that contain mucus (nasal polyps).  · Narrow sinus openings.  · Pollutants, such as chemicals or irritants in the air.  · A foreign object stuck in the nose.  · A fungal infection. This is rare.  What increases the risk?  The following factors may make you more likely to develop this condition:  · Having allergies or asthma.  · Having had a recent cold or respiratory tract infection.  · Having structural deformities or blockages in your nose or sinuses.  · Having a weak immune system.  · Doing a lot of swimming or diving.  · Overusing nasal sprays.  · Smoking.  What are the signs or symptoms?  The main symptoms of this condition are pain and a feeling of pressure around the affected sinuses. Other symptoms include:  · Upper toothache.  · Earache.  · Headache.  · Bad breath.  · Decreased sense of smell and taste.  · A cough that may get worse at  night.  · Fatigue.  · Fever.  · Thick drainage from your nose. The drainage is often green and it may contain pus (purulent).  · Stuffy nose or congestion.  · Postnasal drip. This is when extra mucus collects in the throat or back of the nose.  · Swelling and warmth over the affected sinuses.  · Sore throat.  · Sensitivity to light.  How is this diagnosed?  This condition is diagnosed based on symptoms, a medical history, and a physical exam. To find out if your condition is acute or chronic, your health care provider may:  · Look in your nose for signs of nasal polyps.  · Tap over the affected sinus to check for signs of infection.  · View the inside of your sinuses using an imaging device that has a light attached (endoscope).  If your health care provider suspects that you have chronic sinusitis, you may also:  · Be tested for allergies.  · Have a sample of mucus taken from your nose (nasal culture) and checked for bacteria.  · Have a mucus sample examined to see if your sinusitis is related to an allergy.  If your sinusitis does not respond to treatment and it lasts longer than 8 weeks, you may have an MRI or CT scan to check your sinuses. These scans also help to determine how severe your infection is.  In rare cases, a bone biopsy may be done to rule out more serious types of fungal sinus disease.  How is this treated?  Treatment for sinusitis depends on the cause and whether your condition is chronic or acute. If a virus is causing your sinusitis, your symptoms will go away on their own within 10 days. You may be given medicines to relieve your symptoms, including:  · Topical nasal decongestants. They shrink swollen nasal passages and let mucus drain from your sinuses.  · Antihistamines. These drugs block inflammation that is triggered by allergies. This can help to ease swelling in your nose and sinuses.  · Topical nasal corticosteroids. These are nasal sprays that ease inflammation and swelling in your nose  and sinuses.  · Nasal saline washes. These rinses can help to get rid of thick mucus in your nose.  If your condition is caused by bacteria, your health care provider may recommend waiting to see if your symptoms improve. Most bacterial infections will get better without antibiotic medicine. If you have a severe infection or a weak immune system, you may be prescribed antibiotics.  Surgery may be needed to correct underlying conditions, such as narrow nasal passages. Surgery may also be needed to remove polyps.  Follow these instructions at home:  Medicines  · Take, use, or apply over-the-counter and prescription medicines only as told by your health care provider. These may include nasal sprays.  · If you were prescribed an antibiotic, take it as told by your health care provider. Do not stop taking the antibiotic even if you start to feel better.  Hydrate and Humidify  · Drink enough water to keep your urine clear or pale yellow. Staying hydrated will help to thin your mucus.  · Use a cool mist humidifier to keep the humidity level in your home above 50%.  · Inhale steam for 10-15 minutes, 3-4 times a day or as told by your health care provider. You can do this in the bathroom while a hot shower is running.  · Limit your exposure to cool or dry air.  Rest  · Rest as much as possible.  · Sleep with your head raised (elevated).  · Make sure to get enough sleep each night.  General instructions  · Apply a warm, moist washcloth to your face 3-4 times a day or as told by your health care provider. This will help with discomfort.  · Wash your hands often with soap and water to reduce your exposure to viruses and other germs. If soap and water are not available, use hand .  · Do not smoke. Avoid being around people who are smoking (secondhand smoke).  · Keep all follow-up visits as told by your health care provider. This is important.  Contact a health care provider if:  · You have a fever.  · Your symptoms get  worse.  · Your symptoms do not improve within 10 days.  Get help right away if:  · You have a severe headache.  · You have persistent vomiting.  · You have pain or swelling around your face or eyes.  · You have vision problems.  · You develop confusion.  · Your neck is stiff.  · You have trouble breathing.  This information is not intended to replace advice given to you by your health care provider. Make sure you discuss any questions you have with your health care provider.  Document Released: 12/18/2006 Document Revised: 06/28/2018 Document Reviewed: 10/12/2016  [employer] Interactive Patient Education © 2019 [employer]      Acute Bronchitis, Adult  Acute bronchitis is when air tubes (bronchi) in the lungs suddenly get swollen. The condition can make it hard to breathe. It can also cause these symptoms:  · A cough.  · Coughing up clear, yellow, or green mucus.  · Wheezing.  · Chest congestion.  · Shortness of breath.  · A fever.  · Body aches.  · Chills.  · A sore throat.  Follow these instructions at home:    Medicines  · Take over-the-counter and prescription medicines only as told by your doctor.  · If you were prescribed an antibiotic medicine, take it as told by your doctor. Do not stop taking the antibiotic even if you start to feel better.  General instructions  · Rest.  · Drink enough fluids to keep your pee (urine) pale yellow.  · Avoid smoking and secondhand smoke. If you smoke and you need help quitting, ask your doctor. Quitting will help your lungs heal faster.  · Use an inhaler, cool mist vaporizer, or humidifier as told by your doctor.  · Keep all follow-up visits as told by your doctor. This is important.  How is this prevented?  To lower your risk of getting this condition again:  · Wash your hands often with soap and water. If you cannot use soap and water, use hand .  · Avoid contact with people who have cold symptoms.  · Try not to touch your hands to your mouth, nose, or  eyes.  · Make sure to get the flu shot every year.  Contact a doctor if:  · Your symptoms do not get better in 2 weeks.  Get help right away if:  · You cough up blood.  · You have chest pain.  · You have very bad shortness of breath.  · You become dehydrated.  · You faint (pass out) or keep feeling like you are going to pass out.  · You keep throwing up (vomiting).  · You have a very bad headache.  · Your fever or chills gets worse.  This information is not intended to replace advice given to you by your health care provider. Make sure you discuss any questions you have with your health care provider.  Document Released: 06/05/2009 Document Revised: 08/01/2018 Document Reviewed: 06/07/2017  [employer] Interactive Patient Education © 2019 [employer]

## 2019-11-18 ENCOUNTER — OFFICE VISIT (OUTPATIENT)
Dept: FAMILY MEDICINE CLINIC | Facility: CLINIC | Age: 34
End: 2019-11-18

## 2019-11-18 VITALS
HEIGHT: 64 IN | SYSTOLIC BLOOD PRESSURE: 132 MMHG | HEART RATE: 142 BPM | OXYGEN SATURATION: 97 % | DIASTOLIC BLOOD PRESSURE: 62 MMHG | WEIGHT: 246 LBS | BODY MASS INDEX: 42 KG/M2

## 2019-11-18 DIAGNOSIS — J45.40 MODERATE PERSISTENT ASTHMA WITHOUT COMPLICATION: ICD-10-CM

## 2019-11-18 DIAGNOSIS — G90.A POTS (POSTURAL ORTHOSTATIC TACHYCARDIA SYNDROME): ICD-10-CM

## 2019-11-18 DIAGNOSIS — I10 ESSENTIAL HYPERTENSION: ICD-10-CM

## 2019-11-18 DIAGNOSIS — R00.0 TACHYCARDIA: Primary | ICD-10-CM

## 2019-11-18 DIAGNOSIS — F41.1 ANXIETY STATE: ICD-10-CM

## 2019-11-18 PROCEDURE — 99214 OFFICE O/P EST MOD 30 MIN: CPT | Performed by: FAMILY MEDICINE

## 2019-11-18 PROCEDURE — 93000 ELECTROCARDIOGRAM COMPLETE: CPT | Performed by: FAMILY MEDICINE

## 2019-11-18 RX ORDER — NABUMETONE 500 MG/1
1 TABLET, FILM COATED ORAL 2 TIMES DAILY
COMMUNITY
Start: 2019-11-09 | End: 2020-03-25 | Stop reason: DRUGHIGH

## 2019-11-18 RX ORDER — ALBUTEROL SULFATE 90 UG/1
2 AEROSOL, METERED RESPIRATORY (INHALATION) EVERY 4 HOURS PRN
Qty: 18 G | Refills: 5 | Status: SHIPPED | OUTPATIENT
Start: 2019-11-18 | End: 2021-02-22 | Stop reason: SDUPTHER

## 2019-11-18 NOTE — PROGRESS NOTES
OFFICE VISIT NOTE:    Chelita Rosenbaum is a 34 y.o. female who presents today for Hypertension (f/u); Chest Pain; and Syncope (at home yesterday).     Had seen a cardiologist at [facility] years ago who wouldn't follow her due to POTS syndrome. But no follow up with cardiology since then - now with persistent tachycardia.       Hypertension   This is a chronic problem. The current episode started more than 1 year ago. The problem has been waxing and waning since onset. The problem is uncontrolled. Associated symptoms include chest pain, palpitations and shortness of breath. Pertinent negatives include no headaches, orthopnea, peripheral edema or PND. There are no associated agents to hypertension. The current treatment provides significant improvement. There are no compliance problems.    Chest Pain    This is a recurrent problem. The current episode started in the past 7 days. The onset quality is gradual. The problem occurs intermittently. The problem has been waxing and waning. The pain is present in the substernal region and epigastric region. The pain is moderate. The quality of the pain is described as dull, heavy and tightness. The pain does not radiate. Associated symptoms include diaphoresis, palpitations, shortness of breath and syncope. Pertinent negatives include no abdominal pain, fever, headaches, hemoptysis, orthopnea or PND. The pain is aggravated by exertion and emotional upset. She has tried acetaminophen and rest for the symptoms. The treatment provided moderate relief.   Syncope   This is a chronic problem. The current episode started more than 1 year ago. The problem occurs intermittently. The problem has been waxing and waning. There was no loss of consciousness. The symptoms are aggravated by exertion. Associated symptoms include an aura, chest pain, diaphoresis and palpitations. Pertinent negatives include no abdominal pain, fever or headaches. She has tried acetaminophen, position change and bed  rest for the symptoms. The treatment provided moderate relief.        Past medical/surgical history, Family history, Social history, Allergies and Medications have been reviewed with the patient today and are updated in [facility] EMR. See below.    Past Medical History:   Diagnosis Date   • Arthropathy of lumbar facet joint    • Asthma    • Bipolar disorder (CMS/HCC)    • Cervico-occipital neuralgia    • Constipation    • Disorder of small intestine     thicking jejunum, delayed transit      • Diverticular disease of colon    • Drug therapy     Other long term (current) drug therapy      • Dysphagia    • Encounter for contraceptive management     other   • Encounter for surveillance of contraceptives     other   • Epigastric pain    • Generalized anxiety disorder    • H/O colonoscopy with polypectomy    • Hirsutism    • Hyperprolactinemia (CMS/HCC)    • Low back pain    • Menstruation disorder     Other disorders of menstruation and other abnormal bleeding from female genital tract      • Migraine aura, persistent     Migraine - w/ aura of spots      • Myofascial pain    • Nausea    • Nausea and vomiting    • Pap smear for cervical cancer screening    • PCOS (polycystic ovarian syndrome)    • Periumbilical pain    • POTS (postural orthostatic tachycardia syndrome)    • Surgical follow-up care    • Tachycardia      Past Surgical History:   Procedure Laterality Date   • ABDOMINAL SURGERY  2014    Anesth, surgery of abdomen (1)      • CAPSULE ENDOSCOPY  2015    [facility], capsule endoscopy 24711 (1)      •  SECTION  2010     Section (3)      • CHOLECYSTECTOMY  05/15/2003    Cholecystectomy, laparoscopic (1)      • COLON RESECTION     • COLONOSCOPY     • DILATATION AND CURETTAGE  10/21/2013    D&C (1)      • ENDOSCOPY  2015    EGD w/ biopsy 22618 (1)      • INJECTION OF MEDICATION  2016    Injection for nerve block (1)      • PARTIAL HYSTERECTOMY       Family History   Problem  Relation Age of Onset   • Heart disease Mother    • Asthma Father    • Diabetes Father    • Heart disease Father    • Hypertension Father    • No Known Problems Brother      Social History     Tobacco Use   • Smoking status: Never Smoker   • Smokeless tobacco: Never Used   Substance Use Topics   • Alcohol use: No     Frequency: Never     Binge frequency: Never   • Drug use: No       ALLERGIES:  Apriso [mesalamine er]; Lamotrigine; Lithium; Tramadol; and Ultram [tramadol hcl]    CURRENT MEDS:    Current Outpatient Medications:   •  albuterol sulfate  (90 Base) MCG/ACT inhaler, Inhale 2 puffs Every 4 (Four) Hours As Needed for Wheezing., Disp: 18 g, Rfl: 5  •  amLODIPine (NORVASC) 10 MG tablet, Take 1 tablet by mouth Daily., Disp: 30 tablet, Rfl: 5  •  atorvastatin (LIPITOR) 40 MG tablet, Take 1 tablet by mouth Daily., Disp: 30 tablet, Rfl: 5  •  benztropine (COGENTIN) 2 MG tablet, Take 2 mg by mouth 2 (Two) Times a Day., Disp: , Rfl: 2  •  Blood Glucose Monitoring Suppl (ONE TOUCH BASIC SYSTEM) w/Device kit, 1 Device Daily., Disp: 1 each, Rfl: 0  •  carvedilol (COREG) 25 MG tablet, Take 1 tablet by mouth 2 (Two) Times a Day With Meals., Disp: 60 tablet, Rfl: 5  •  diazePAM (VALIUM) 5 MG tablet, Take 5 mg by mouth 3 (Three) Times a Day., Disp: , Rfl:   •  diclofenac (VOLTAREN) 75 MG EC tablet, Take 1 tablet by mouth 2 (Two) Times a Day., Disp: , Rfl:   •  fludrocortisone 0.1 MG tablet, TAKE ONE TABLET BY MOUTH EVERY DAY, Disp: 90 tablet, Rfl: 1  •  glucose blood (ONE TOUCH ULTRA TEST) test strip, Use as instructed, Disp: 50 each, Rfl: 12  •  Lancets (ACCU-CHEK MULTICLIX) lancets, BID blood sugar check, Disp: 102 each, Rfl: 11  •  loratadine (CLARITIN) 10 MG tablet, Take 1 tablet by mouth Daily., Disp: 30 tablet, Rfl: 5  •  lurasidone (LATUDA) 40 MG tablet tablet, Take 40 mg by mouth every night at bedtime., Disp: , Rfl:   •  meclizine (ANTIVERT) 25 MG tablet, Take 1 tablet by mouth 3 (Three) Times a Day As  "Needed for dizziness., Disp: 90 tablet, Rfl: 1  •  metFORMIN (GLUCOPHAGE) 1000 MG tablet, Take 1 tablet by mouth 2 (Two) Times a Day., Disp: 60 tablet, Rfl: 5  •  mometasone-formoterol (DULERA 100) 100-5 MCG/ACT inhaler, Inhale 2 puffs 2 (Two) Times a Day., Disp: 1 inhaler, Rfl: 3  •  nabumetone (RELAFEN) 500 MG tablet, Take 1 tablet by mouth 2 (Two) Times a Day., Disp: , Rfl:   •  omeprazole (priLOSEC) 40 MG capsule, Take 40 mg by mouth Daily., Disp: , Rfl:   •  pramipexole (MIRAPEX) 0.25 MG tablet, Take 1 tablet by mouth Every Night., Disp: 30 tablet, Rfl: 6  •  promethazine (PHENERGAN) 25 MG tablet, Take 1 tablet by mouth Every 6 (Six) Hours As Needed for Nausea or Vomiting., Disp: 30 tablet, Rfl: 0  •  QUEtiapine (SEROquel) 300 MG tablet, Take 2 tablets by mouth every night at bedtime., Disp: , Rfl:   •  spironolactone (ALDACTONE) 25 MG tablet, Take 1 tablet by mouth 2 (Two) Times a Day., Disp: 180 tablet, Rfl: 1  •  tiZANidine (ZANAFLEX) 4 MG tablet, Take 0.5 tablets by mouth 2 (Two) Times a Day As Needed for Muscle Spasms., Disp: 90 tablet, Rfl: 1  •  traZODone (DESYREL) 100 MG tablet, Take 2 tablets by mouth every night at bedtime., Disp: , Rfl:     REVIEW OF SYSTEMS:  Review of Systems   Constitutional: Positive for diaphoresis. Negative for activity change, fatigue, fever, unexpected weight gain and unexpected weight loss.   Respiratory: Positive for shortness of breath. Negative for hemoptysis.    Cardiovascular: Positive for chest pain, palpitations and syncope. Negative for orthopnea and PND.   Gastrointestinal: Negative for abdominal pain.   Genitourinary: Negative for difficulty urinating.   Skin: Negative for rash.   Neurological: Negative for syncope and headache.       PHYSICAL EXAMINATION:  Vital Signs:  /62 (BP Location: Left arm, Patient Position: Sitting, Cuff Size: Large Adult)   Pulse (!) 142   Ht 162.6 cm (64\")   Wt 112 kg (246 lb)   LMP  (LMP Unknown)   SpO2 97%   Breastfeeding? " No   BMI 42.23 kg/m²   Physical Exam   Constitutional: She is oriented to person, place, and time. She appears well-developed and well-nourished. No distress.   HENT:   Head: Normocephalic and atraumatic.   Mouth/Throat: Oropharynx is clear and moist.   Neck: Normal range of motion. Neck supple. No JVD present.   Cardiovascular: Normal rate, regular rhythm, normal heart sounds and intact distal pulses.   Pulmonary/Chest: Effort normal and breath sounds normal. No respiratory distress.   Musculoskeletal: Normal range of motion. She exhibits no edema.   Neurological: She is alert and oriented to person, place, and time. No cranial nerve deficit.   Skin: Skin is warm and dry. Capillary refill takes less than 2 seconds. No rash noted.   Psychiatric: She has a normal mood and affect. Her behavior is normal.   Nursing note and vitals reviewed.        ECG 12 Lead  Performed by: Best Gannon MD  Authorized by: Best Gannon MD   Comparison: not compared with previous ECG   Rhythm: sinus tachycardia and atrial fibrillation  Rate: tachycardic  Conduction: 1st degree AV block  ST Segments: ST segments normal  T Waves: T waves normal  QRS axis: normal  Other: no other findings    Clinical impression: normal ECG            ASSESSMENT/ PLAN:  Problem List Items Addressed This Visit        Cardiovascular and Mediastinum    Tachycardia - Primary    Relevant Orders    Ambulatory Referral to Cardiology    ECG 12 Lead    POTS (postural orthostatic tachycardia syndrome)    Overview     With some elements of POTS           Relevant Orders    Ambulatory Referral to Cardiology    Essential hypertension    Relevant Orders    Ambulatory Referral to Cardiology       Respiratory    Asthma    Relevant Medications    albuterol sulfate  (90 Base) MCG/ACT inhaler       Other    Anxiety state    Overview     Overview:   Dr. Edmundo Ruelas (Psych) in [place].                 Specific Patient Instructions:  MEDICATION  Instructions: Encouraged patient to continue routine medicines as prescribed and maintain compliance. Patient instructed to report any adverse side effects or reactions to medicines promptly to the office. Patient instructed to make us aware of any OTC or herbal meds or supplement use.  DIET Recommendations: Patient instructed and provided information on the following nutrition and DIET(s): Calorie restriction for weight reduction and maintenance. Necessity for adequate daily intake of fluids/water.  EXERCISE Instructions: Discussed with patient the need for routine aerobic activity for cardiovascular fitness, 3 times a week for about 30 minutes. Daily exercise for increased fitness and weight reduction goals.    Patient's Body mass index is 42.23 kg/m². BMI is above normal parameters. Recommendations include: exercise counseling and nutrition counseling.      SMOKING Recommendations: Counseled patient and encouraged them on smoking cessation. Discussed the benefits to all body systems with smoking cessation, including decreased cardiac/lung/stroke/cancer risk.  HEALTH MAINTENANCE:  Counseling provided to patient/family about routine health maintenance and ANNUAL physicals/labs. Counseling on recommended Vaccinations appropriate for age needed.  MISCELLANEOUS Instructions: N/A      Medications or Orders placed this visit:  Orders Placed This Encounter   Procedures   • Ambulatory Referral to Cardiology     Referral Priority:   Routine     Referral Type:   Consultation     Referral Reason:   Specialty Services Required     Requested Specialty:   Cardiology     Number of Visits Requested:   1   • ECG 12 Lead     Order Specific Question:   Reason for Exam:     Answer:   syncope   • ECG 12 Lead     This order was created via procedure documentation       Medications DISCONTINUED this visit:  Medications Discontinued During This Encounter   Medication Reason   • amoxicillin (AMOXIL) 500 MG capsule *Therapy completed   •  albuterol (PROVENTIL HFA;VENTOLIN HFA) 108 (90 BASE) MCG/ACT inhaler Reorder       FOLLOW-UP:  Return in about 8 weeks (around 1/13/2020) for Recheck.    I discussed the patients findings and my recommendations with patient.  An After Visit Summary (AVS) was printed and given to the patient at discharge.      Best Gannon MD, FAAFP  11/21/2019

## 2019-11-18 NOTE — PATIENT INSTRUCTIONS
Suspect Essential HTN.Good BP control is encouraged with Goal BP based on JNC 8 guidelines 2014 <140/90 for patients with known cardiac disease and diabetes. (IRINA. 2014:322 (5):507-520. doi:10.1001/irina.2013.34528): general population <60 yr old goal BP <140/90 and for those >60 <150/90.  For patients of all ages with Diabetes, CKD, Known CAD <140/90. Recommended to the patient to obtain electronic home BP machine with upper arm blood pressure cuff and to check regularly as instructed.  Keep BP log and bring to subsequent visits. Stable, at goal.  a. LABS: routine for hypertension recommended and ordered if necessary.  b. Recommend if you do not have a home BP machine to obtain an electronic machine with arm blood pressure cuff.      c. Monitor BP over the next week and keep log to bring back to office. Discussed medication therapy however pt wants to try to control with diet exercise. .  Your provider  has recommended self-monitoring of your blood pressure.  If you do not have a blood pressure cuff you may purchase one from the local pharmacy.  You may ask the pharmacist which brand and model they recommend.  Obtain your blood pressure measurement at least 2x per week.  You should also check your blood pressure if you experience any symptoms of blurred visit, dizziness or headache.  Please record all blood pressure measurements and bring them to next office visit.  If you have any questions about the accuracy of your blood pressure machine please bring it in to the office and our staff will be happy to check accuracy.   d. Encouraged to eat a low sodium heart healthy diet  e. Offered handout on HTN educational topics.  These were provided if patient requested these today.  f. MEDS: as listed in today's visit.  g. Risks/benefits of current and new medications discussed with the patient and or family today.  The patient/family are aware and accept that if there any side effects they should call or return to clinic  as soon as possible.  Appropriate F/U discussed for topics addressed today. All questions were answered to the  satisfactory state of patient/family.  Should symptoms fail to improve or worsen they agree to call or return to clinic or to go to the ER. Education handouts were offered on any new Rx if requested.  Discussed the importance of following up with any needed screening tests/labs/specialist appointments and any requested follow-up recommended by me today.  Importance of maintaining follow-up discussed and patient accepts that missed appointments can delay diagnosis and potentially lead to worsening of conditions.

## 2019-12-23 DIAGNOSIS — E78.2 MIXED HYPERLIPIDEMIA: ICD-10-CM

## 2019-12-23 RX ORDER — ATORVASTATIN CALCIUM 40 MG/1
TABLET, FILM COATED ORAL
Qty: 90 TABLET | Refills: 1 | Status: SHIPPED | OUTPATIENT
Start: 2019-12-23 | End: 2020-06-11 | Stop reason: SDUPTHER

## 2019-12-26 DIAGNOSIS — E11.9 TYPE 2 DIABETES MELLITUS WITHOUT COMPLICATION, WITHOUT LONG-TERM CURRENT USE OF INSULIN (HCC): ICD-10-CM

## 2019-12-30 ENCOUNTER — OFFICE VISIT (OUTPATIENT)
Dept: CARDIOLOGY | Facility: CLINIC | Age: 34
End: 2019-12-30

## 2019-12-30 VITALS
HEIGHT: 64 IN | DIASTOLIC BLOOD PRESSURE: 112 MMHG | HEART RATE: 103 BPM | SYSTOLIC BLOOD PRESSURE: 172 MMHG | WEIGHT: 243 LBS | OXYGEN SATURATION: 99 % | BODY MASS INDEX: 41.48 KG/M2

## 2019-12-30 DIAGNOSIS — Z87.898 HISTORY OF SYNCOPE: ICD-10-CM

## 2019-12-30 DIAGNOSIS — G90.A POTS (POSTURAL ORTHOSTATIC TACHYCARDIA SYNDROME): Primary | ICD-10-CM

## 2019-12-30 DIAGNOSIS — Z79.899 POLYPHARMACY: ICD-10-CM

## 2019-12-30 DIAGNOSIS — E78.5 HYPERLIPIDEMIA, UNSPECIFIED HYPERLIPIDEMIA TYPE: ICD-10-CM

## 2019-12-30 DIAGNOSIS — G47.33 OSA (OBSTRUCTIVE SLEEP APNEA): ICD-10-CM

## 2019-12-30 DIAGNOSIS — E66.01 MORBIDLY OBESE (HCC): ICD-10-CM

## 2019-12-30 DIAGNOSIS — I10 ESSENTIAL HYPERTENSION: ICD-10-CM

## 2019-12-30 PROBLEM — R06.83 SNORING: Status: RESOLVED | Noted: 2017-03-14 | Resolved: 2019-12-30

## 2019-12-30 PROCEDURE — 93000 ELECTROCARDIOGRAM COMPLETE: CPT | Performed by: INTERNAL MEDICINE

## 2019-12-30 PROCEDURE — 99204 OFFICE O/P NEW MOD 45 MIN: CPT | Performed by: INTERNAL MEDICINE

## 2019-12-30 NOTE — PROGRESS NOTES
Reason for Visit: Hypertension, tachycardia    HPI:  Chelita Rosenbaum is a 34 y.o. female is being seen for consultation today at the request of Dr Gannon.  She has followed with cardiac electrophysiology with Dr. Glover and Dr Jacob.  When she followed with Dr Glover and cardiology in [place] it was felt that her tachycardia was chronic and not due to POTS.  When she had seen Dr Jacob at [facility] she was diagnosed with POTS.  She reports she is tachycardic much of the time.  She has frequent palpitations and dizziness spells.  She has had history of syncope.  She tries to drink plenty of water.  Her fluid intake mainly consists of water and lemonade.  She exercises 2 days a week.  She reports being on disability due to psychiatric problems.  Her blood pressure has been running high.    Previous Cardiac Testing and Procedures:  -EP study (3/2016)  1. Normal sinus node recovery time.   2. Antegrade normal AH and HV timing.   3. Antegrade AV node Wenckebach cycle length was approximately 310 milliseconds.   4. Retrograde VA conduction was decremental, midline, and concentric in nature.   5. No evidence of any preexcitation as described above.   6. No tachycardia was induced while pacing from the high right atrium, coronary sinus, and from the right ventricular apex with the protocol as described above at baseline and after starting the patient on IV atropine and epinephrine.   -Tilt table test (2/2/2017) consistent with orthostatic hypotension with excessive heart rate rise  -Carotid ultrasound (2/7/2017) normal left and right internal carotid arteries  -Holter monitor (2/7/2017) sinus tachycardia with an average heart rate of 109 bpm, normal burden of ventricular and atrial ectopic beats, symptoms associated with sinus rhythm  -Echo (2/7/2017) EF 61 to 65%, normal diastolic function, normal RV size and function, no significant valvular abnormality        Patient Active Problem List   Diagnosis   •  Osteoarthritis of spine with myelopathy, lumbosacral region   • Low back pain   • Tachycardia   • Palpitation   • Intercostal pain   • Vertigo   • PCOS (polycystic ovarian syndrome)   • POTS (postural orthostatic tachycardia syndrome)   • Asthma   • HLD (hyperlipidemia)   • EVELYN (obstructive sleep apnea)   • Essential hypertension   • Chronic pain   • RLS (restless legs syndrome)   • Type 2 diabetes mellitus without complication, without long-term current use of insulin (CMS/HCC)   • BMI 36.0-36.9,adult   • Former smoker   • Morbidly obese (CMS/HCC)   • Anxiety state   • Basilar artery migraine, intractable   • Chronic tension headaches   • Crohn's disease (CMS/HCC)   • DDD (degenerative disc disease), lumbosacral   • Depressive disorder   • DRESS syndrome   • Drug-induced hypersensitivity reaction   • Hepatitis   • History of cholecystectomy   • S/P dilatation of esophageal stricture   • History of gestational diabetes   • Iron deficiency anemia   • Right carpal tunnel syndrome   • Preventative health care   • S/P partial resection of colon   • Somnolence, daytime   • 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)   • Sacroiliitis (CMS/HCC)   • Insomnia   • Family history of breast cancer       Social History     Tobacco Use   • Smoking status: Never Smoker   • Smokeless tobacco: Never Used   Substance Use Topics   • Alcohol use: No     Frequency: Never     Binge frequency: Never   • Drug use: No       Family History   Problem Relation Age of Onset   • Heart disease Mother    • Asthma Father    • Diabetes Father    • Heart disease Father    • Hypertension Father    • No Known Problems Brother        The following portions of the patient's history were reviewed and updated as appropriate: allergies, current medications, past family history, past medical history, past social history, past surgical history and problem list.      Current Outpatient Medications:   •  albuterol  sulfate  (90 Base) MCG/ACT inhaler, Inhale 2 puffs Every 4 (Four) Hours As Needed for Wheezing., Disp: 18 g, Rfl: 5  •  amLODIPine (NORVASC) 10 MG tablet, Take 1 tablet by mouth Daily., Disp: 30 tablet, Rfl: 5  •  atorvastatin (LIPITOR) 40 MG tablet, TAKE 1 TABLET BY MOUTH EVERY DAY, Disp: 90 tablet, Rfl: 1  •  benztropine (COGENTIN) 2 MG tablet, Take 2 mg by mouth 2 (Two) Times a Day., Disp: , Rfl: 2  •  Blood Glucose Monitoring Suppl (ONE TOUCH BASIC SYSTEM) w/Device kit, 1 Device Daily., Disp: 1 each, Rfl: 0  •  carvedilol (COREG) 25 MG tablet, Take 1 tablet by mouth 2 (Two) Times a Day With Meals., Disp: 60 tablet, Rfl: 5  •  diazePAM (VALIUM) 5 MG tablet, Take 5 mg by mouth 3 (Three) Times a Day., Disp: , Rfl:   •  glucose blood (ONE TOUCH ULTRA TEST) test strip, Use as instructed, Disp: 50 each, Rfl: 12  •  Lancets (ACCU-CHEK MULTICLIX) lancets, BID blood sugar check, Disp: 102 each, Rfl: 11  •  loratadine (CLARITIN) 10 MG tablet, Take 1 tablet by mouth Daily., Disp: 30 tablet, Rfl: 5  •  lurasidone (LATUDA) 40 MG tablet tablet, Take 40 mg by mouth every night at bedtime., Disp: , Rfl:   •  meclizine (ANTIVERT) 25 MG tablet, Take 1 tablet by mouth 3 (Three) Times a Day As Needed for dizziness., Disp: 90 tablet, Rfl: 1  •  metFORMIN (GLUCOPHAGE) 1000 MG tablet, TAKE 1 TABLET BY MOUTH TWICE DAILY, Disp: 60 tablet, Rfl: 5  •  mometasone-formoterol (DULERA 100) 100-5 MCG/ACT inhaler, Inhale 2 puffs 2 (Two) Times a Day., Disp: 1 inhaler, Rfl: 3  •  nabumetone (RELAFEN) 500 MG tablet, Take 1 tablet by mouth 2 (Two) Times a Day., Disp: , Rfl:   •  omeprazole (priLOSEC) 40 MG capsule, Take 40 mg by mouth Daily., Disp: , Rfl:   •  pramipexole (MIRAPEX) 0.25 MG tablet, Take 1 tablet by mouth Every Night., Disp: 30 tablet, Rfl: 6  •  promethazine (PHENERGAN) 25 MG tablet, Take 1 tablet by mouth Every 6 (Six) Hours As Needed for Nausea or Vomiting., Disp: 30 tablet, Rfl: 0  •  QUEtiapine (SEROquel) 300 MG tablet,  "Take 2 tablets by mouth every night at bedtime., Disp: , Rfl:   •  spironolactone (ALDACTONE) 25 MG tablet, Take 1 tablet by mouth 2 (Two) Times a Day., Disp: 180 tablet, Rfl: 1  •  tiZANidine (ZANAFLEX) 4 MG tablet, Take 0.5 tablets by mouth 2 (Two) Times a Day As Needed for Muscle Spasms., Disp: 90 tablet, Rfl: 1  •  traZODone (DESYREL) 100 MG tablet, Take 2 tablets by mouth every night at bedtime., Disp: , Rfl:   •  diclofenac (VOLTAREN) 75 MG EC tablet, Take 1 tablet by mouth 2 (Two) Times a Day., Disp: , Rfl:   •  fludrocortisone 0.1 MG tablet, TAKE ONE TABLET BY MOUTH EVERY DAY, Disp: 90 tablet, Rfl: 1    Review of Systems   Constitution: Negative for chills, fever and weight loss.   HENT: Negative for sore throat.    Eyes: Negative for blurred vision and visual disturbance.   Cardiovascular: Positive for irregular heartbeat, palpitations and syncope. Negative for chest pain, dyspnea on exertion, leg swelling and paroxysmal nocturnal dyspnea.   Respiratory: Negative for cough and shortness of breath.    Endocrine: Negative for cold intolerance and polyuria.   Skin: Negative for itching and rash.   Musculoskeletal: Negative for joint swelling and myalgias.   Gastrointestinal: Negative for abdominal pain, diarrhea and vomiting.   Genitourinary: Negative for dysuria and hematuria.   Neurological: Negative for dizziness, headaches and numbness.   Psychiatric/Behavioral: Negative for depression. The patient is not nervous/anxious.    Allergic/Immunologic: Negative for hives.       Objective   BP (!) 172/112 (BP Location: Left arm, Patient Position: Sitting, Cuff Size: Adult)   Pulse 103   Ht 162.6 cm (64\")   Wt 110 kg (243 lb)   LMP  (LMP Unknown)   SpO2 99%   BMI 41.71 kg/m²   Physical Exam   Constitutional: She is oriented to person, place, and time. She appears well-developed and well-nourished.   HENT:   Head: Normocephalic and atraumatic.   Eyes: Pupils are equal, round, and reactive to light. " Conjunctivae and EOM are normal.   Neck: Normal range of motion. Neck supple. No JVD present. No thyromegaly present.   Cardiovascular: Regular rhythm and normal heart sounds. Tachycardia present.   No murmur heard.  Pulmonary/Chest: Effort normal and breath sounds normal. She has no wheezes. She has no rales.   Abdominal: Soft. Bowel sounds are normal. She exhibits distension (obese). There is no tenderness.   Musculoskeletal: Normal range of motion. She exhibits no edema.   Neurological: She is alert and oriented to person, place, and time. Coordination normal.   Skin: Skin is warm and dry. No rash noted.   Psychiatric: She has a normal mood and affect. Her behavior is normal.       ECG 12 Lead  Date/Time: 12/30/2019 9:22 AM  Performed by: Anders Juarez MD  Authorized by: Anders Juarez MD   Comparison: compared with previous ECG from 11/18/2019  Similar to previous ECG  Rhythm: sinus tachycardia  Ectopy: infrequent PVCs              ICD-10-CM ICD-9-CM   1. POTS (postural orthostatic tachycardia syndrome) R00.0 427.89    I95.1    2. Essential hypertension I10 401.9   3. Hyperlipidemia, unspecified hyperlipidemia type E78.5 272.4   4. EVELYN (obstructive sleep apnea) G47.33 327.23   5. Morbidly obese (CMS/[facility]) E66.01 278.01   6. History of syncope Z87.898 V15.89   7. Polypharmacy Z79.899 V58.69         Assessment/Plan:  1.  Postural orthostatic tachycardia syndrome: There is some discrepancy in this diagnosis between her previous electrophysiologist she is seen.  The mainstay of treatment needs to focus on lifestyle modification.  She is currently only exercising about 2 days a week, which is an adequate.  She is encouraged to gradually increase her duration and amount of exercise.  She also needs to focus on weight loss.  She is currently on both fludrocortisone as well as diuretic and Spironolactone.  The combination of these 2 medications is counterproductive.  Will discuss adjustment of therapy at  follow-up.    2.  Essential hypertension: Blood pressure is significantly elevated today.  Given her frequent dizziness and history of syncope as well as previous diagnosis of POTS, will avoid any adjustment of medications today.  She currently is taking both a volume expansion medication and fludrocortisone as well as a diuretic and spironolactone.  The combination of these 2 medications is counterproductive.    3.  Hyperlipidemia: Managed on atorvastatin.    4.  Obstructive sleep apnea: Encourage CPAP use.    5.  Morbid obesity: Patient's Body mass index is 41.71 kg/m². BMI is above normal parameters. Recommendations include: exercise counseling and nutrition counseling.  Discussed with patient that weight loss is critical for variety of her medical illnesses.  Discussed lifestyle modification and also referred to bariatric weight loss surgery program.    6.  History of syncope: Likely multifactorial.  Her autonomic dysfunction may be playing a role as well as her polypharmacy and psychiatric issues.    7.  Polypharmacy

## 2020-01-22 DIAGNOSIS — G25.81 RLS (RESTLESS LEGS SYNDROME): ICD-10-CM

## 2020-01-22 RX ORDER — PRAMIPEXOLE DIHYDROCHLORIDE 0.25 MG/1
TABLET ORAL
Qty: 30 TABLET | Refills: 1 | Status: SHIPPED | OUTPATIENT
Start: 2020-01-22 | End: 2020-03-19

## 2020-01-27 ENCOUNTER — OFFICE VISIT (OUTPATIENT)
Dept: FAMILY MEDICINE CLINIC | Facility: CLINIC | Age: 35
End: 2020-01-27

## 2020-01-27 VITALS
HEART RATE: 117 BPM | WEIGHT: 247 LBS | BODY MASS INDEX: 42.17 KG/M2 | DIASTOLIC BLOOD PRESSURE: 62 MMHG | HEIGHT: 64 IN | TEMPERATURE: 98.4 F | OXYGEN SATURATION: 98 % | SYSTOLIC BLOOD PRESSURE: 118 MMHG

## 2020-01-27 DIAGNOSIS — R00.0 TACHYCARDIA: ICD-10-CM

## 2020-01-27 DIAGNOSIS — E66.01 MORBIDLY OBESE (HCC): ICD-10-CM

## 2020-01-27 DIAGNOSIS — G90.A POTS (POSTURAL ORTHOSTATIC TACHYCARDIA SYNDROME): ICD-10-CM

## 2020-01-27 DIAGNOSIS — E78.2 MIXED HYPERLIPIDEMIA: ICD-10-CM

## 2020-01-27 DIAGNOSIS — E11.9 TYPE 2 DIABETES MELLITUS WITHOUT COMPLICATION, WITHOUT LONG-TERM CURRENT USE OF INSULIN (HCC): ICD-10-CM

## 2020-01-27 DIAGNOSIS — I10 ESSENTIAL HYPERTENSION: Primary | ICD-10-CM

## 2020-01-27 PROCEDURE — 99214 OFFICE O/P EST MOD 30 MIN: CPT | Performed by: FAMILY MEDICINE

## 2020-01-27 RX ORDER — LANCETS 30 GAUGE
1 EACH MISCELLANEOUS DAILY
Qty: 100 EACH | Refills: 12 | Status: SHIPPED | OUTPATIENT
Start: 2020-01-27 | End: 2021-01-04 | Stop reason: SDUPTHER

## 2020-01-27 NOTE — PROGRESS NOTES
"OFFICE VISIT NOTE:    Chelita Rosenbaum is a 34 y.o. female who presents today for Rapid Heart Rate (f/u).     HR was up at home last week and \"passed out\" from this. Is dizzy more - the cardiologist we referred to related they weren't as expert in POTS, so thought [facility] would be better suited for this, but no referral made from them.     Palpitations    This is a chronic problem. The current episode started more than 1 year ago. The problem occurs intermittently. The problem has been waxing and waning. Pertinent negatives include no chest pain, fever or shortness of breath. She has tried beta blockers and deep relaxation for the symptoms. The treatment provided mild relief.        Past medical/surgical history, Family history, Social history, Allergies and Medications have been reviewed with the patient today and are updated in [facility] EMR. See below.    Past Medical History:   Diagnosis Date   • Arrhythmia    • Arthropathy of lumbar facet joint    • Asthma    • Bipolar disorder (CMS/HCC)    • Cervico-occipital neuralgia    • Constipation    • Diabetes mellitus (CMS/HCC)    • Disorder of small intestine     thicking jejunum, delayed transit      • Diverticular disease of colon    • Drug therapy     Other long term (current) drug therapy      • Dysphagia    • Encounter for contraceptive management     other   • Encounter for surveillance of contraceptives     other   • Epigastric pain    • Generalized anxiety disorder    • H/O colonoscopy with polypectomy    • Hirsutism    • Hyperlipidemia    • Hyperprolactinemia (CMS/HCC)    • Hypertension    • Low back pain    • Menstruation disorder     Other disorders of menstruation and other abnormal bleeding from female genital tract      • Migraine aura, persistent     Migraine - w/ aura of spots      • Myofascial pain    • Nausea    • Nausea and vomiting    • Pap smear for cervical cancer screening    • PCOS (polycystic ovarian syndrome)    • Periumbilical pain    • POTS " (postural orthostatic tachycardia syndrome)    • Sleep apnea    • Surgical follow-up care    • Tachycardia      Past Surgical History:   Procedure Laterality Date   • ABDOMINAL SURGERY  2014    Anesth, surgery of abdomen (1)      • CAPSULE ENDOSCOPY  2015    [facility] Imaging, capsule endoscopy 56815 (1)      •  SECTION  2010     Section (3)      • CHOLECYSTECTOMY  05/15/2003    Cholecystectomy, laparoscopic (1)      • COLON RESECTION     • COLONOSCOPY     • DILATATION AND CURETTAGE  10/21/2013    D&C (1)      • ENDOSCOPY  2015    EGD w/ biopsy 66376 (1)      • INJECTION OF MEDICATION  2016    Injection for nerve block (1)      • SUBTOTAL HYSTERECTOMY       Family History   Problem Relation Age of Onset   • Heart disease Mother    • Asthma Father    • Diabetes Father    • Heart disease Father    • Hypertension Father    • No Known Problems Brother      Social History     Tobacco Use   • Smoking status: Never Smoker   • Smokeless tobacco: Never Used   Substance Use Topics   • Alcohol use: No     Frequency: Never     Binge frequency: Never   • Drug use: No       ALLERGIES:  Apriso [mesalamine er]; Lamotrigine; Lithium; Tramadol; and Ultram [tramadol hcl]    CURRENT MEDS:    Current Outpatient Medications:   •  albuterol sulfate  (90 Base) MCG/ACT inhaler, Inhale 2 puffs Every 4 (Four) Hours As Needed for Wheezing., Disp: 18 g, Rfl: 5  •  amLODIPine (NORVASC) 10 MG tablet, Take 1 tablet by mouth Daily., Disp: 30 tablet, Rfl: 5  •  atorvastatin (LIPITOR) 40 MG tablet, TAKE 1 TABLET BY MOUTH EVERY DAY, Disp: 90 tablet, Rfl: 1  •  benztropine (COGENTIN) 2 MG tablet, Take 2 mg by mouth 2 (Two) Times a Day., Disp: , Rfl: 2  •  Blood Glucose Monitoring Suppl (ONE TOUCH BASIC SYSTEM) w/Device kit, 1 Device Daily., Disp: 1 each, Rfl: 0  •  carvedilol (COREG) 25 MG tablet, Take 1 tablet by mouth 2 (Two) Times a Day With Meals., Disp: 60 tablet, Rfl: 5  •  diazePAM (VALIUM) 5 MG tablet,  Take 5 mg by mouth 3 (Three) Times a Day., Disp: , Rfl:   •  diclofenac (VOLTAREN) 75 MG EC tablet, Take 1 tablet by mouth 2 (Two) Times a Day., Disp: , Rfl:   •  fludrocortisone 0.1 MG tablet, TAKE ONE TABLET BY MOUTH EVERY DAY, Disp: 90 tablet, Rfl: 1  •  glucose blood (ONE TOUCH ULTRA TEST) test strip, Use as instructed, Disp: 50 each, Rfl: 12  •  Lancets (ACCU-CHEK MULTICLIX) lancets, BID blood sugar check, Disp: 102 each, Rfl: 11  •  loratadine (CLARITIN) 10 MG tablet, Take 1 tablet by mouth Daily., Disp: 30 tablet, Rfl: 5  •  lurasidone (LATUDA) 40 MG tablet tablet, Take 40 mg by mouth every night at bedtime., Disp: , Rfl:   •  meclizine (ANTIVERT) 25 MG tablet, Take 1 tablet by mouth 3 (Three) Times a Day As Needed for dizziness., Disp: 90 tablet, Rfl: 1  •  metFORMIN (GLUCOPHAGE) 1000 MG tablet, TAKE 1 TABLET BY MOUTH TWICE DAILY, Disp: 60 tablet, Rfl: 5  •  mometasone-formoterol (DULERA 100) 100-5 MCG/ACT inhaler, Inhale 2 puffs 2 (Two) Times a Day., Disp: 1 inhaler, Rfl: 3  •  nabumetone (RELAFEN) 500 MG tablet, Take 1 tablet by mouth 2 (Two) Times a Day., Disp: , Rfl:   •  omeprazole (priLOSEC) 40 MG capsule, Take 40 mg by mouth Daily., Disp: , Rfl:   •  pramipexole (MIRAPEX) 0.25 MG tablet, TAKE 1 TABLET BY MOUTH EVERY EVENING, Disp: 30 tablet, Rfl: 1  •  promethazine (PHENERGAN) 25 MG tablet, Take 1 tablet by mouth Every 6 (Six) Hours As Needed for Nausea or Vomiting., Disp: 30 tablet, Rfl: 0  •  QUEtiapine (SEROquel) 300 MG tablet, Take 2 tablets by mouth every night at bedtime., Disp: , Rfl:   •  spironolactone (ALDACTONE) 25 MG tablet, Take 1 tablet by mouth 2 (Two) Times a Day., Disp: 180 tablet, Rfl: 1  •  tiZANidine (ZANAFLEX) 4 MG tablet, Take 0.5 tablets by mouth 2 (Two) Times a Day As Needed for Muscle Spasms., Disp: 90 tablet, Rfl: 1  •  traZODone (DESYREL) 100 MG tablet, Take 2 tablets by mouth every night at bedtime., Disp: , Rfl:   •  glucose blood test strip, 1 each by Other route Daily.  "Use as instructed, Disp: 50 each, Rfl: 12  •  Lancets misc, 1 each Daily., Disp: 100 each, Rfl: 12    REVIEW OF SYSTEMS:  Review of Systems   Constitutional: Negative for activity change, fatigue, fever, unexpected weight gain and unexpected weight loss.   Respiratory: Negative for shortness of breath.    Cardiovascular: Positive for palpitations. Negative for chest pain.   Gastrointestinal: Negative for abdominal pain.   Genitourinary: Negative for difficulty urinating.   Skin: Negative for rash.   Neurological: Negative for syncope and headache.       PHYSICAL EXAMINATION:  Vital Signs:  /62 (BP Location: Left arm, Patient Position: Sitting, Cuff Size: Large Adult)   Pulse 117   Temp 98.4 °F (36.9 °C) (Temporal)   Ht 162.6 cm (64\")   Wt 112 kg (247 lb)   LMP  (LMP Unknown)   SpO2 98%   BMI 42.40 kg/m²   Vitals:    01/27/20 1150   Patient Position: Sitting       Physical Exam   Constitutional: She is oriented to person, place, and time. She appears well-developed and well-nourished. No distress.   HENT:   Head: Normocephalic and atraumatic.   Mouth/Throat: Oropharynx is clear and moist.   Neck: Normal range of motion. Neck supple. No JVD present.   Cardiovascular: Normal rate, regular rhythm, normal heart sounds and intact distal pulses.   Pulmonary/Chest: Effort normal and breath sounds normal. No respiratory distress.   Musculoskeletal: Normal range of motion. She exhibits no edema.   Neurological: She is alert and oriented to person, place, and time. No cranial nerve deficit.   Skin: Skin is warm and dry. Capillary refill takes less than 2 seconds. No rash noted.   Psychiatric: She has a normal mood and affect. Her behavior is normal.   Nursing note and vitals reviewed.      Procedures    ASSESSMENT/ PLAN:  Problem List Items Addressed This Visit        Cardiovascular and Mediastinum    Tachycardia    Relevant Orders    Ambulatory Referral to Cardiology (Completed)    POTS (postural orthostatic " tachycardia syndrome)    Overview     With some elements of POTS           Relevant Orders    Comprehensive Metabolic Panel    T4 & TSH ([facility])    CBC (No Diff)    Ambulatory Referral to Cardiology (Completed)    HLD (hyperlipidemia)    Relevant Orders    Comprehensive Metabolic Panel    T4 & TSH ([facility])    CBC (No Diff)    Essential hypertension - Primary    Relevant Orders    Comprehensive Metabolic Panel    T4 & TSH ([facility])    CBC (No Diff)    Ambulatory Referral to Cardiology (Completed)       Digestive    Morbidly obese (CMS/HCC)    Relevant Orders    Comprehensive Metabolic Panel    T4 & TSH ([facility])    CBC (No Diff)       Endocrine    Type 2 diabetes mellitus without complication, without long-term current use of insulin (CMS/[facility])    Relevant Medications    glucose blood test strip    Lancets misc    Other Relevant Orders    Comprehensive Metabolic Panel    Hemoglobin A1c    T4 & TSH ([facility])    CBC (No Diff)    Miscellaneous DME            Specific Patient Instructions:  MEDICATION Instructions: Encouraged patient to continue routine medicines as prescribed and maintain compliance. Patient instructed to report any adverse side effects or reactions to medicines promptly to the office. Patient instructed to make us aware of any OTC or herbal meds or supplement use.    DIET Recommendations: Patient instructed and provided information on the following nutrition and diet recommendations: Calorie restriction for weight reduction and maintenance. Necessity for adequate daily intake of fluids/water. Also, diet information provided in AVS for specifics.    EXERCISE Instructions: Discussed with patient the need for routine aerobic activity for cardiovascular fitness, 3 times a week for about 30 minutes. Daily exercise for increased fitness and weight reduction goals.    SMOKING Recommendations: Counseled patient and encouraged them on smoking and tobacco cessation if applicable. Discussed the benefits to all  body systems with smoking/tobacco cessation, including decreased cardiac/lung/stroke/cancer risk. Encouraged no vaping use.    HEALTH MAINTENANCE:  Counseling provided to patient/family about routine health maintenance and ANNUAL physicals/labs. Counseling on recommended Vaccinations appropriate for age needed.        Patient's Body mass index is 42.4 kg/m². BMI is above normal parameters. Recommendations include: exercise counseling and nutrition counseling.      Medications or Orders placed this visit:  Orders Placed This Encounter   Procedures   • Miscellaneous DME     Order Specific Question:   Type of DME     Answer:   blood sugar monitor     Order Specific Question:   Length of Need (99 Months = Lifetime)     Answer:   99 Months = Lifetime   • Comprehensive Metabolic Panel   • Hemoglobin A1c   • T4 & TSH ([facility])   • CBC (No Diff)   • Ambulatory Referral to Cardiology     Referral Priority:   Routine     Referral Type:   Consultation     Referral Reason:   Specialty Services Required     Requested Specialty:   Cardiology     Number of Visits Requested:   1       Medications DISCONTINUED this visit:  There are no discontinued medications.    FOLLOW-UP:  Return in about 3 months (around 4/27/2020) for Recheck.    I discussed the patients findings and my recommendations with patient.  An After Visit Summary (AVS) was printed and given to the patient at discharge.      Best Gannon MD, FAAFP  1/31/2020

## 2020-01-27 NOTE — PATIENT INSTRUCTIONS
Sinus Tachycardia    Sinus tachycardia is a kind of fast heartbeat. In sinus tachycardia, the heart beats more than 100 times a minute. Sinus tachycardia starts in a part of the heart called the sinus node. Sinus tachycardia may be harmless, or it may be a sign of a serious condition.  What are the causes?  This condition may be caused by:  · Exercise or exertion.  · A fever.  · Pain.  · Loss of body fluids (dehydration).  · Severe bleeding (hemorrhage).  · Anxiety and stress.  · Certain substances, including:  ? Alcohol.  ? Caffeine.  ? Tobacco and nicotine products.  ? Cold medicines.  ? Illegal drugs.  · Medical conditions including:  ? Heart disease.  ? An infection.  ? An overactive thyroid (hyperthyroidism).  ? A lack of red blood cells (anemia).  What are the signs or symptoms?  Symptoms of this condition include:  · A feeling that the heart is beating quickly (palpitations).  · Suddenly noticing your heartbeat (cardiac awareness).  · Dizziness.  · Tiredness (fatigue).  · Shortness of breath.  · Chest pain.  · Nausea.  · Fainting.  How is this diagnosed?  This condition is diagnosed with:  · A physical exam.  · Other tests, such as:  ? Blood tests.  ? An electrocardiogram (ECG). This test measures the electrical activity of the heart.  ? Ambulatory cardiac monitor. This records your heartbeats for 24 hours or more.  You may be referred to a heart specialist (cardiologist).  How is this treated?  Treatment for this condition depends on the cause or the underlying condition. Treatment may involve:  · Treating the underlying condition.  · Taking new medicines or changing your current medicines as told by your health care provider.  · Making changes to your diet or lifestyle.  Follow these instructions at home:  Lifestyle    · Do not use any products that contain nicotine or tobacco, such as cigarettes and e-cigarettes. If you need help quitting, ask your health care provider.  · Do not use illegal drugs, such as  cocaine.  · Learn relaxation methods to help you when you get stressed or anxious. These include deep breathing.  · Avoid caffeine or other stimulants.  Alcohol use    · Do not drink alcohol if:  ? Your health care provider tells you not to drink.  ? You are pregnant, may be pregnant, or are planning to become pregnant.  · If you drink alcohol, limit how much you have:  ? 0-1 drink a day for women.  ? 0-2 drinks a day for men.  · Be aware of how much alcohol is in your drink. In the [place] one drink equals one typical bottle of beer (12 oz), one-half glass of wine (5 oz), or one shot of hard liquor (1½ oz).  General instructions  · Drink enough fluids to keep your urine pale yellow.  · Take over-the-counter and prescription medicines only as told by your health care provider.  · Keep all follow-up visits as told by your health care provider. This is important.  Contact a health care provider if you have:  · A fever.  · Vomiting or diarrhea that does not go away.  Get help right away if you:  · Have pain in your chest, upper arms, jaw, or neck.  · Become weak or dizzy.  · Feel faint.  · Have palpitations that do not go away.  Summary  · In sinus tachycardia, the heart beats more than 100 times a minute.  · Sinus tachycardia may be harmless, or it may be a sign of a serious condition.  · Treatment for this condition depends on the cause or the underlying condition.  · Get help right away if you have pain in your chest, upper arms, jaw, or neck.  This information is not intended to replace advice given to you by your health care provider. Make sure you discuss any questions you have with your health care provider.  Document Released: 01/25/2006 Document Revised: 02/06/2019 Document Reviewed: 02/06/2019  [employer] Interactive Patient Education © 2019 [employer]

## 2020-02-06 ENCOUNTER — CLINICAL SUPPORT (OUTPATIENT)
Dept: FAMILY MEDICINE CLINIC | Facility: CLINIC | Age: 35
End: 2020-02-06

## 2020-02-06 DIAGNOSIS — E78.2 MIXED HYPERLIPIDEMIA: Primary | ICD-10-CM

## 2020-02-06 DIAGNOSIS — I10 ESSENTIAL HYPERTENSION: ICD-10-CM

## 2020-02-07 LAB
ALBUMIN SERPL-MCNC: 4.1 G/DL (ref 3.8–4.8)
ALBUMIN/GLOB SERPL: 1.2 {RATIO} (ref 1.2–2.2)
ALP SERPL-CCNC: 102 IU/L (ref 39–117)
ALT SERPL-CCNC: 23 IU/L (ref 0–32)
AMBIG ABBREV LP DEFAULT: NORMAL
AST SERPL-CCNC: 19 IU/L (ref 0–40)
BILIRUB SERPL-MCNC: 0.3 MG/DL (ref 0–1.2)
BUN SERPL-MCNC: 15 MG/DL (ref 6–20)
BUN/CREAT SERPL: 17 (ref 9–23)
CALCIUM SERPL-MCNC: 9.3 MG/DL (ref 8.7–10.2)
CHLORIDE SERPL-SCNC: 99 MMOL/L (ref 96–106)
CHOLEST SERPL-MCNC: 246 MG/DL (ref 100–199)
CO2 SERPL-SCNC: 23 MMOL/L (ref 20–29)
CREAT SERPL-MCNC: 0.88 MG/DL (ref 0.57–1)
ERYTHROCYTE [DISTWIDTH] IN BLOOD BY AUTOMATED COUNT: 13.6 % (ref 11.7–15.4)
GLOBULIN SER CALC-MCNC: 3.5 G/DL (ref 1.5–4.5)
GLUCOSE SERPL-MCNC: 87 MG/DL (ref 65–99)
HBA1C MFR BLD: 5.5 % (ref 4.8–5.6)
HCT VFR BLD AUTO: 44.7 % (ref 34–46.6)
HDLC SERPL-MCNC: 45 MG/DL
HGB BLD-MCNC: 14.4 G/DL (ref 11.1–15.9)
LDLC SERPL CALC-MCNC: 149 MG/DL (ref 0–99)
MCH RBC QN AUTO: 27.5 PG (ref 26.6–33)
MCHC RBC AUTO-ENTMCNC: 32.2 G/DL (ref 31.5–35.7)
MCV RBC AUTO: 85 FL (ref 79–97)
PLATELET # BLD AUTO: 432 X10E3/UL (ref 150–450)
POTASSIUM SERPL-SCNC: 4.4 MMOL/L (ref 3.5–5.2)
PROT SERPL-MCNC: 7.6 G/DL (ref 6–8.5)
RBC # BLD AUTO: 5.24 X10E6/UL (ref 3.77–5.28)
SODIUM SERPL-SCNC: 140 MMOL/L (ref 134–144)
T4 SERPL-MCNC: 7.3 UG/DL (ref 4.5–12)
TRIGL SERPL-MCNC: 261 MG/DL (ref 0–149)
TSH SERPL DL<=0.005 MIU/L-ACNC: 2.23 UIU/ML (ref 0.45–4.5)
VLDLC SERPL CALC-MCNC: 52 MG/DL (ref 5–40)
WBC # BLD AUTO: 12.5 X10E3/UL (ref 3.4–10.8)

## 2020-03-10 ENCOUNTER — OFFICE VISIT (OUTPATIENT)
Dept: FAMILY MEDICINE CLINIC | Facility: CLINIC | Age: 35
End: 2020-03-10

## 2020-03-10 VITALS
HEART RATE: 116 BPM | HEIGHT: 64 IN | SYSTOLIC BLOOD PRESSURE: 150 MMHG | BODY MASS INDEX: 41.93 KG/M2 | WEIGHT: 245.6 LBS | OXYGEN SATURATION: 98 % | DIASTOLIC BLOOD PRESSURE: 95 MMHG

## 2020-03-10 DIAGNOSIS — M46.1 SACROILIITIS (HCC): ICD-10-CM

## 2020-03-10 DIAGNOSIS — E11.9 TYPE 2 DIABETES MELLITUS WITHOUT COMPLICATION, WITHOUT LONG-TERM CURRENT USE OF INSULIN (HCC): ICD-10-CM

## 2020-03-10 DIAGNOSIS — M51.37 DDD (DEGENERATIVE DISC DISEASE), LUMBOSACRAL: ICD-10-CM

## 2020-03-10 DIAGNOSIS — I10 ESSENTIAL HYPERTENSION: Primary | ICD-10-CM

## 2020-03-10 DIAGNOSIS — K50.919 CROHN'S DISEASE WITH COMPLICATION, UNSPECIFIED GASTROINTESTINAL TRACT LOCATION (HCC): ICD-10-CM

## 2020-03-10 DIAGNOSIS — G90.A POTS (POSTURAL ORTHOSTATIC TACHYCARDIA SYNDROME): ICD-10-CM

## 2020-03-10 PROCEDURE — 99214 OFFICE O/P EST MOD 30 MIN: CPT | Performed by: FAMILY MEDICINE

## 2020-03-10 RX ORDER — ISOSORBIDE MONONITRATE 30 MG/1
30 TABLET, EXTENDED RELEASE ORAL DAILY
Qty: 30 TABLET | Refills: 5 | Status: SHIPPED | OUTPATIENT
Start: 2020-03-10 | End: 2020-06-11 | Stop reason: SDUPTHER

## 2020-03-10 NOTE — PROGRESS NOTES
OFFICE VISIT NOTE:    Chelita Rosenbaum is a 34 y.o. female who presents today for Blood Pressure Check.     She relates we need to download a form from [employer] for the orthostatics and Fax to them for appt to be scheduled.   Does report that she has occasional chest pain and numbness in legs due to elevated BP. Will add a nitrate. Is on Coreg and Norvasc now.     Hypertension   This is a chronic problem. The current episode started more than 1 year ago. The problem is unchanged. The problem is uncontrolled. Pertinent negatives include no chest pain, headaches, orthopnea, palpitations, peripheral edema, PND or shortness of breath. There are no associated agents to hypertension. The current treatment provides significant improvement. There are no compliance problems.         Past medical/surgical history, Family history, Social history, Allergies and Medications have been reviewed with the patient today and are updated in [facility] EMR. See below.    Past Medical History:   Diagnosis Date   • Arrhythmia    • Arthropathy of lumbar facet joint    • Asthma    • Bipolar disorder (CMS/HCC)    • Cervico-occipital neuralgia    • Constipation    • Diabetes mellitus (CMS/HCC)    • Disorder of small intestine     thicking jejunum, delayed transit      • Diverticular disease of colon    • Drug therapy     Other long term (current) drug therapy      • Dysphagia    • Encounter for contraceptive management     other   • Encounter for surveillance of contraceptives     other   • Epigastric pain    • Generalized anxiety disorder    • H/O colonoscopy with polypectomy    • Hirsutism    • Hyperlipidemia    • Hyperprolactinemia (CMS/HCC)    • Hypertension    • Low back pain    • Menstruation disorder     Other disorders of menstruation and other abnormal bleeding from female genital tract      • Migraine aura, persistent     Migraine - w/ aura of spots      • Myofascial pain    • Nausea    • Nausea and vomiting    • Pap smear for cervical cancer  screening    • PCOS (polycystic ovarian syndrome)    • Periumbilical pain    • POTS (postural orthostatic tachycardia syndrome)    • Sleep apnea    • Surgical follow-up care    • Tachycardia      Past Surgical History:   Procedure Laterality Date   • ABDOMINAL SURGERY  2014    Anesth, surgery of abdomen (1)      • CAPSULE ENDOSCOPY  2015    [facility], capsule endoscopy 77134 (1)      •  SECTION  2010     Section (3)      • CHOLECYSTECTOMY  05/15/2003    Cholecystectomy, laparoscopic (1)      • COLON RESECTION     • COLONOSCOPY     • DILATATION AND CURETTAGE  10/21/2013    D&C (1)      • ENDOSCOPY  2015    EGD w/ biopsy 18844 (1)      • INJECTION OF MEDICATION  2016    Injection for nerve block (1)      • SUBTOTAL HYSTERECTOMY       Family History   Problem Relation Age of Onset   • Heart disease Mother    • Asthma Father    • Diabetes Father    • Heart disease Father    • Hypertension Father    • No Known Problems Brother      Social History     Tobacco Use   • Smoking status: Never Smoker   • Smokeless tobacco: Never Used   Substance Use Topics   • Alcohol use: No     Frequency: Never     Binge frequency: Never   • Drug use: No       ALLERGIES:  Apriso [mesalamine er]; Lamotrigine; Lithium; Tramadol; and Ultram [tramadol hcl]    CURRENT MEDS:    Current Outpatient Medications:   •  albuterol sulfate  (90 Base) MCG/ACT inhaler, Inhale 2 puffs Every 4 (Four) Hours As Needed for Wheezing., Disp: 18 g, Rfl: 5  •  amLODIPine (NORVASC) 10 MG tablet, Take 1 tablet by mouth Daily., Disp: 30 tablet, Rfl: 5  •  atorvastatin (LIPITOR) 40 MG tablet, TAKE 1 TABLET BY MOUTH EVERY DAY, Disp: 90 tablet, Rfl: 1  •  benztropine (COGENTIN) 2 MG tablet, Take 2 mg by mouth 2 (Two) Times a Day., Disp: , Rfl: 2  •  Blood Glucose Monitoring Suppl (ONE TOUCH BASIC SYSTEM) w/Device kit, 1 Device Daily., Disp: 1 each, Rfl: 0  •  carvedilol (COREG) 25 MG tablet, Take 1 tablet by mouth 2 (Two)  Times a Day With Meals., Disp: 60 tablet, Rfl: 5  •  diazePAM (VALIUM) 5 MG tablet, Take 5 mg by mouth 3 (Three) Times a Day., Disp: , Rfl:   •  diclofenac (VOLTAREN) 75 MG EC tablet, Take 1 tablet by mouth 2 (Two) Times a Day., Disp: , Rfl:   •  fludrocortisone 0.1 MG tablet, TAKE ONE TABLET BY MOUTH EVERY DAY, Disp: 90 tablet, Rfl: 1  •  glucose blood (ONE TOUCH ULTRA TEST) test strip, Use as instructed, Disp: 50 each, Rfl: 12  •  glucose blood test strip, 1 each by Other route Daily. Use as instructed, Disp: 50 each, Rfl: 12  •  Lancets (ACCU-CHEK MULTICLIX) lancets, BID blood sugar check, Disp: 102 each, Rfl: 11  •  Lancets misc, 1 each Daily., Disp: 100 each, Rfl: 12  •  loratadine (CLARITIN) 10 MG tablet, Take 1 tablet by mouth Daily., Disp: 30 tablet, Rfl: 5  •  lurasidone (LATUDA) 40 MG tablet tablet, Take 40 mg by mouth every night at bedtime., Disp: , Rfl:   •  meclizine (ANTIVERT) 25 MG tablet, Take 1 tablet by mouth 3 (Three) Times a Day As Needed for dizziness., Disp: 90 tablet, Rfl: 1  •  metFORMIN (GLUCOPHAGE) 1000 MG tablet, TAKE 1 TABLET BY MOUTH TWICE DAILY, Disp: 60 tablet, Rfl: 5  •  mometasone-formoterol (DULERA 100) 100-5 MCG/ACT inhaler, Inhale 2 puffs 2 (Two) Times a Day., Disp: 1 inhaler, Rfl: 3  •  nabumetone (RELAFEN) 500 MG tablet, Take 1 tablet by mouth 2 (Two) Times a Day., Disp: , Rfl:   •  omeprazole (priLOSEC) 40 MG capsule, Take 40 mg by mouth Daily., Disp: , Rfl:   •  pramipexole (MIRAPEX) 0.25 MG tablet, TAKE 1 TABLET BY MOUTH EVERY EVENING, Disp: 30 tablet, Rfl: 1  •  promethazine (PHENERGAN) 25 MG tablet, Take 1 tablet by mouth Every 6 (Six) Hours As Needed for Nausea or Vomiting., Disp: 30 tablet, Rfl: 0  •  QUEtiapine (SEROquel) 300 MG tablet, Take 2 tablets by mouth every night at bedtime., Disp: , Rfl:   •  spironolactone (ALDACTONE) 25 MG tablet, Take 1 tablet by mouth 2 (Two) Times a Day., Disp: 180 tablet, Rfl: 1  •  tiZANidine (ZANAFLEX) 4 MG tablet, Take 0.5 tablets by  "mouth 2 (Two) Times a Day As Needed for Muscle Spasms., Disp: 90 tablet, Rfl: 1  •  traZODone (DESYREL) 100 MG tablet, Take 2 tablets by mouth every night at bedtime., Disp: , Rfl:   •  isosorbide mononitrate (IMDUR) 30 MG 24 hr tablet, Take 1 tablet by mouth Daily., Disp: 30 tablet, Rfl: 5    REVIEW OF SYSTEMS:  Review of Systems   Constitutional: Negative for activity change, fatigue, fever, unexpected weight gain and unexpected weight loss.   Respiratory: Negative for shortness of breath.    Cardiovascular: Negative for chest pain, palpitations, orthopnea and PND.   Gastrointestinal: Negative for abdominal pain.   Genitourinary: Negative for difficulty urinating.   Skin: Negative for rash.   Neurological: Negative for syncope and headache.       PHYSICAL EXAMINATION:  Vital Signs:  /95 (BP Location: Left arm, Patient Position: Lying, Cuff Size: Large Adult)   Pulse 116   Ht 162.6 cm (64\")   Wt 111 kg (245 lb 9.6 oz)   LMP  (LMP Unknown)   SpO2 98%   BMI 42.16 kg/m²   Vitals:    03/10/20 1247 03/10/20 1249 03/10/20 1250   Patient Position: Sitting Standing Lying       Physical Exam   Constitutional: She is oriented to person, place, and time. She appears well-developed and well-nourished. No distress.   HENT:   Head: Normocephalic and atraumatic.   Mouth/Throat: Oropharynx is clear and moist.   Neck: Normal range of motion. Neck supple. No JVD present.   Cardiovascular: Normal rate, regular rhythm, normal heart sounds and intact distal pulses.   Pulmonary/Chest: Effort normal and breath sounds normal. No respiratory distress.   Musculoskeletal: Normal range of motion. She exhibits no edema.   Neurological: She is alert and oriented to person, place, and time. No cranial nerve deficit.   Skin: Skin is warm and dry. Capillary refill takes less than 2 seconds. No rash noted.   Psychiatric: She has a normal mood and affect. Her behavior is normal.   Nursing note and vitals " reviewed.      Procedures    ASSESSMENT/ PLAN:  Problem List Items Addressed This Visit        Cardiovascular and Mediastinum    POTS (postural orthostatic tachycardia syndrome)    Overview     With some elements of POTS           Relevant Medications    isosorbide mononitrate (IMDUR) 30 MG 24 hr tablet    Essential hypertension - Primary    Relevant Medications    isosorbide mononitrate (IMDUR) 30 MG 24 hr tablet       Digestive    Crohn's disease (CMS/HCC)    Overview     Overview:   Colonoscopy (10/8/15; Dr. Flores in [place]): poor preparation, mild sigmoid/descending colon diverticulosis, normal mucosa throughout the colon.  Biopsies taken; wait pathology.  Dx 3/2015 by Dr. Longoria (GI).            Endocrine    Type 2 diabetes mellitus without complication, without long-term current use of insulin (CMS/HCC)       Musculoskeletal and Integument    DDD (degenerative disc disease), lumbosacral    Overview     Overview:   Now seeing Dr. Álvaro Altamirano at [facility]; formerly saw Dr. Edmondson.         Sacroiliitis (CMS/HCC)            Specific Patient Instructions:  MEDICATION Instructions: Encouraged patient to continue routine medicines as prescribed and maintain compliance. Patient instructed to report any adverse side effects or reactions to medicines promptly to the office. Patient instructed to make us aware of any OTC or herbal meds or supplement use.    DIET Recommendations: Patient instructed and provided information on the following nutrition and diet recommendations: Calorie restriction for weight reduction and maintenance. Necessity for adequate daily intake of fluids/water. Also, diet information provided in AVS for specifics.    EXERCISE Instructions: Discussed with patient the need for routine aerobic activity for cardiovascular fitness, 3 times a week for about 30 minutes. Daily exercise for increased fitness and weight reduction goals.    SMOKING Recommendations: Counseled patient and encouraged them  on smoking and tobacco cessation if applicable. Discussed the benefits to all body systems with smoking/tobacco cessation, including decreased cardiac/lung/stroke/cancer risk. Encouraged no vaping use.    HEALTH MAINTENANCE:  Counseling provided to patient/family about routine health maintenance and ANNUAL physicals/labs. Counseling on recommended Vaccinations appropriate for age needed.        Patient's Body mass index is 42.16 kg/m². BMI is above normal parameters. Recommendations include: exercise counseling and nutrition counseling.      Medications or Orders placed this visit:  No orders of the defined types were placed in this encounter.      Medications DISCONTINUED this visit:  There are no discontinued medications.    FOLLOW-UP:  Return in about 3 months (around 6/10/2020) for Recheck.    I discussed the patients findings and my recommendations with patient.  An After Visit Summary (AVS) was printed and given to the patient at discharge.      Best Gannon MD, FAAFP  3/12/2020

## 2020-03-10 NOTE — PATIENT INSTRUCTIONS
Suspect Essential HTN.Good BP control is encouraged with Goal BP based on JNC 8 guidelines 2014 <140/90 for patients with known cardiac disease and diabetes. (IRINA. 2014:322 (5):507-520. doi:10.1001/irina.2013.25709): general population <60 yr old goal BP <140/90 and for those >60 <150/90.  For patients of all ages with Diabetes, CKD, Known CAD <140/90. Recommended to the patient to obtain electronic home BP machine with upper arm blood pressure cuff and to check regularly as instructed.  Keep BP log and bring to subsequent visits. Stable, at goal.  a. LABS: routine for hypertension recommended and ordered if necessary.  b. Recommend if you do not have a home BP machine to obtain an electronic machine with arm blood pressure cuff.      c. Monitor BP over the next week and keep log to bring back to office. Discussed medication therapy however pt wants to try to control with diet exercise. .  Your provider  has recommended self-monitoring of your blood pressure.  If you do not have a blood pressure cuff you may purchase one from the local pharmacy.  You may ask the pharmacist which brand and model they recommend.  Obtain your blood pressure measurement at least 2x per week.  You should also check your blood pressure if you experience any symptoms of blurred visit, dizziness or headache.  Please record all blood pressure measurements and bring them to next office visit.  If you have any questions about the accuracy of your blood pressure machine please bring it in to the office and our staff will be happy to check accuracy.   d. Encouraged to eat a low sodium heart healthy diet  e. Offered handout on HTN educational topics.  These were provided if patient requested these today.  f. MEDS: as listed in today's visit.  g. Risks/benefits of current and new medications discussed with the patient and or family today.  The patient/family are aware and accept that if there any side effects they should call or return to clinic  as soon as possible.  Appropriate F/U discussed for topics addressed today. All questions were answered to the  satisfactory state of patient/family.  Should symptoms fail to improve or worsen they agree to call or return to clinic or to go to the ER. Education handouts were offered on any new Rx if requested.  Discussed the importance of following up with any needed screening tests/labs/specialist appointments and any requested follow-up recommended by me today.  Importance of maintaining follow-up discussed and patient accepts that missed appointments can delay diagnosis and potentially lead to worsening of conditions.

## 2020-03-18 ENCOUNTER — TELEPHONE (OUTPATIENT)
Dept: FAMILY MEDICINE CLINIC | Facility: CLINIC | Age: 35
End: 2020-03-18

## 2020-03-18 NOTE — TELEPHONE ENCOUNTER
PATIENT STATED THAT HER NEWEST MEDICATION MADE HER START VOMITING AND IT HAD BLOOD IN IT AND SHE HAD A JUAN NOUS MIGRAINE.  PATIENT STATED SHE CONTACTED THE PHARMACY AND THEY ADVISED HER TO DC MEDICATION WHICH SHE HAS DONE.  VOMITING HAS STOPPED.  PLEASE ADVISE?

## 2020-03-19 DIAGNOSIS — G25.81 RLS (RESTLESS LEGS SYNDROME): ICD-10-CM

## 2020-03-19 DIAGNOSIS — I10 ESSENTIAL HYPERTENSION: ICD-10-CM

## 2020-03-19 RX ORDER — AMLODIPINE BESYLATE 10 MG/1
10 TABLET ORAL DAILY
Qty: 30 TABLET | Refills: 1 | Status: SHIPPED | OUTPATIENT
Start: 2020-03-19 | End: 2020-06-10

## 2020-03-19 RX ORDER — PRAMIPEXOLE DIHYDROCHLORIDE 0.25 MG/1
TABLET ORAL
Qty: 30 TABLET | Refills: 1 | Status: SHIPPED | OUTPATIENT
Start: 2020-03-19 | End: 2020-06-10

## 2020-03-23 NOTE — TELEPHONE ENCOUNTER
I printed required paperwork off and gave to [facility] so she and Dr. Gannon could have this for her next visit.  Currently no one can find the papers and I will fax as soon as someone gives them to me.

## 2020-03-23 NOTE — TELEPHONE ENCOUNTER
After looking at the needed worksheet and the patient does need to be seen on 3 separate occasions for orthostatics. I called and spoke with the patient and relayed this information to her. Patient has been scheduled for Wednesday, March 25th at 1:00pm with Dr. Gannon for this.

## 2020-03-23 NOTE — TELEPHONE ENCOUNTER
Patients follow up appointment was reschedule in the office's effort to keep well patients out of the office. [facility] is requiring paperwork to be filled out before they can schedule the referral. Can we fill this out for the patient without an office visit or do you want us to schedule appt this week?

## 2020-03-25 ENCOUNTER — OFFICE VISIT (OUTPATIENT)
Dept: FAMILY MEDICINE CLINIC | Facility: CLINIC | Age: 35
End: 2020-03-25

## 2020-03-25 VITALS
BODY MASS INDEX: 42.24 KG/M2 | HEIGHT: 64 IN | SYSTOLIC BLOOD PRESSURE: 127 MMHG | DIASTOLIC BLOOD PRESSURE: 86 MMHG | HEART RATE: 130 BPM | WEIGHT: 247.4 LBS | TEMPERATURE: 98.4 F

## 2020-03-25 DIAGNOSIS — E11.9 TYPE 2 DIABETES MELLITUS WITHOUT COMPLICATION, WITHOUT LONG-TERM CURRENT USE OF INSULIN (HCC): ICD-10-CM

## 2020-03-25 DIAGNOSIS — R00.2 PALPITATION: ICD-10-CM

## 2020-03-25 DIAGNOSIS — G90.A POTS (POSTURAL ORTHOSTATIC TACHYCARDIA SYNDROME): Primary | ICD-10-CM

## 2020-03-25 DIAGNOSIS — I10 ESSENTIAL HYPERTENSION: ICD-10-CM

## 2020-03-25 PROCEDURE — 99213 OFFICE O/P EST LOW 20 MIN: CPT | Performed by: FAMILY MEDICINE

## 2020-03-25 RX ORDER — LURASIDONE HYDROCHLORIDE 60 MG/1
1 TABLET, FILM COATED ORAL
COMMUNITY
Start: 2020-03-10 | End: 2020-06-11 | Stop reason: SDUPTHER

## 2020-03-25 RX ORDER — NABUMETONE 750 MG/1
750 TABLET, FILM COATED ORAL 2 TIMES DAILY
COMMUNITY
End: 2021-01-29

## 2020-03-25 NOTE — PROGRESS NOTES
OFFICE VISIT NOTE:    Chelita Rosenbaum is a 34 y.o. female who presents today for Blood Pressure Check.     Couldn't tolerate the Imdur due to headaches and vomiting. DC'd the med after talking with the pharmacist.   Reports her sugars are running 120-130 early in the am's.   Orthostatics reviewed.     Hypertension   This is a chronic problem. The current episode started more than 1 year ago. The problem is unchanged. The problem is uncontrolled. Associated symptoms include palpitations. Pertinent negatives include no chest pain or shortness of breath. There are no associated agents to hypertension. The current treatment provides significant improvement. Compliance problems include diet.         Past medical/surgical history, Family history, Social history, Allergies and Medications have been reviewed with the patient today and are updated in [facility] EMR. See below.    Past Medical History:   Diagnosis Date   • Arrhythmia    • Arthropathy of lumbar facet joint    • Asthma    • Bipolar disorder (CMS/HCC)    • Cervico-occipital neuralgia    • Constipation    • Diabetes mellitus (CMS/HCC)    • Disorder of small intestine     thicking jejunum, delayed transit      • Diverticular disease of colon    • Drug therapy     Other long term (current) drug therapy      • Dysphagia    • Encounter for contraceptive management     other   • Encounter for surveillance of contraceptives     other   • Epigastric pain    • Generalized anxiety disorder    • H/O colonoscopy with polypectomy    • Hirsutism    • Hyperlipidemia    • Hyperprolactinemia (CMS/HCC)    • Hypertension    • Low back pain    • Menstruation disorder     Other disorders of menstruation and other abnormal bleeding from female genital tract      • Migraine aura, persistent     Migraine - w/ aura of spots      • Myofascial pain    • Nausea    • Nausea and vomiting    • Pap smear for cervical cancer screening    • PCOS (polycystic ovarian syndrome)    • Periumbilical pain     • POTS (postural orthostatic tachycardia syndrome)    • Sleep apnea    • Surgical follow-up care    • Tachycardia      Past Surgical History:   Procedure Laterality Date   • ABDOMINAL SURGERY  2014    Anesth, surgery of abdomen (1)      • CAPSULE ENDOSCOPY  2015    GI Imaging, capsule endoscopy 66866 (1)      •  SECTION  2010     Section (3)      • CHOLECYSTECTOMY  05/15/2003    Cholecystectomy, laparoscopic (1)      • COLON RESECTION     • COLONOSCOPY     • DILATATION AND CURETTAGE  10/21/2013    D&C (1)      • ENDOSCOPY  2015    EGD w/ biopsy 98644 (1)      • INJECTION OF MEDICATION  2016    Injection for nerve block (1)      • SUBTOTAL HYSTERECTOMY       Family History   Problem Relation Age of Onset   • Heart disease Mother    • Asthma Father    • Diabetes Father    • Heart disease Father    • Hypertension Father    • No Known Problems Brother      Social History     Tobacco Use   • Smoking status: Never Smoker   • Smokeless tobacco: Never Used   Substance Use Topics   • Alcohol use: No     Frequency: Never     Binge frequency: Never   • Drug use: No       ALLERGIES:  Apriso [mesalamine er]; Lamotrigine; Lithium; Tramadol; and Ultram [tramadol hcl]    CURRENT MEDS:    Current Outpatient Medications:   •  albuterol sulfate  (90 Base) MCG/ACT inhaler, Inhale 2 puffs Every 4 (Four) Hours As Needed for Wheezing., Disp: 18 g, Rfl: 5  •  amLODIPine (NORVASC) 10 MG tablet, TAKE 1 TABLET BY MOUTH DAILY., Disp: 30 tablet, Rfl: 1  •  atorvastatin (LIPITOR) 40 MG tablet, TAKE 1 TABLET BY MOUTH EVERY DAY, Disp: 90 tablet, Rfl: 1  •  benztropine (COGENTIN) 2 MG tablet, Take 2 mg by mouth 2 (Two) Times a Day., Disp: , Rfl: 2  •  Blood Glucose Monitoring Suppl (ONE TOUCH BASIC SYSTEM) w/Device kit, 1 Device Daily., Disp: 1 each, Rfl: 0  •  carvedilol (COREG) 25 MG tablet, Take 1 tablet by mouth 2 (Two) Times a Day With Meals., Disp: 60 tablet, Rfl: 5  •  diazePAM (VALIUM) 5  MG tablet, Take 5 mg by mouth 3 (Three) Times a Day., Disp: , Rfl:   •  diclofenac (VOLTAREN) 75 MG EC tablet, Take 1 tablet by mouth 2 (Two) Times a Day., Disp: , Rfl:   •  fludrocortisone 0.1 MG tablet, TAKE ONE TABLET BY MOUTH EVERY DAY, Disp: 90 tablet, Rfl: 1  •  glucose blood (ONE TOUCH ULTRA TEST) test strip, Use as instructed, Disp: 50 each, Rfl: 12  •  glucose blood test strip, 1 each by Other route Daily. Use as instructed, Disp: 50 each, Rfl: 12  •  Lancets (ACCU-CHEK MULTICLIX) lancets, BID blood sugar check, Disp: 102 each, Rfl: 11  •  Lancets misc, 1 each Daily., Disp: 100 each, Rfl: 12  •  LATUDA 60 MG tablet tablet, Take 1 tablet by mouth every night at bedtime., Disp: , Rfl:   •  loratadine (CLARITIN) 10 MG tablet, Take 1 tablet by mouth Daily., Disp: 30 tablet, Rfl: 5  •  meclizine (ANTIVERT) 25 MG tablet, Take 1 tablet by mouth 3 (Three) Times a Day As Needed for dizziness., Disp: 90 tablet, Rfl: 1  •  metFORMIN (GLUCOPHAGE) 1000 MG tablet, TAKE 1 TABLET BY MOUTH TWICE DAILY, Disp: 60 tablet, Rfl: 5  •  mometasone-formoterol (DULERA 100) 100-5 MCG/ACT inhaler, Inhale 2 puffs 2 (Two) Times a Day., Disp: 1 inhaler, Rfl: 3  •  nabumetone (RELAFEN) 750 MG tablet, Take 750 mg by mouth 2 (Two) Times a Day., Disp: , Rfl:   •  omeprazole (priLOSEC) 40 MG capsule, Take 40 mg by mouth Daily., Disp: , Rfl:   •  pramipexole (MIRAPEX) 0.25 MG tablet, TAKE 1 TABLET BY MOUTH EVERY EVENING, Disp: 30 tablet, Rfl: 1  •  promethazine (PHENERGAN) 25 MG tablet, Take 1 tablet by mouth Every 6 (Six) Hours As Needed for Nausea or Vomiting., Disp: 30 tablet, Rfl: 0  •  QUEtiapine (SEROquel) 300 MG tablet, Take 2 tablets by mouth every night at bedtime., Disp: , Rfl:   •  spironolactone (ALDACTONE) 25 MG tablet, Take 1 tablet by mouth 2 (Two) Times a Day., Disp: 180 tablet, Rfl: 1  •  tiZANidine (ZANAFLEX) 4 MG tablet, Take 0.5 tablets by mouth 2 (Two) Times a Day As Needed for Muscle Spasms., Disp: 90 tablet, Rfl: 1  •   "traZODone (DESYREL) 100 MG tablet, Take 150 mg by mouth every night at bedtime., Disp: , Rfl:   •  isosorbide mononitrate (IMDUR) 30 MG 24 hr tablet, Take 1 tablet by mouth Daily., Disp: 30 tablet, Rfl: 5    REVIEW OF SYSTEMS:  Review of Systems   Constitutional: Negative for activity change, fatigue, fever, unexpected weight gain and unexpected weight loss.   Respiratory: Negative for shortness of breath.    Cardiovascular: Positive for palpitations. Negative for chest pain.   Gastrointestinal: Negative for abdominal pain.   Genitourinary: Negative for difficulty urinating.   Skin: Negative for rash.   Neurological: Negative for syncope and headache.       PHYSICAL EXAMINATION:  Vital Signs:  /86 (BP Location: Left arm, Patient Position: Standing, Cuff Size: Large Adult)   Pulse (!) 130   Temp 98.4 °F (36.9 °C) (Temporal)   Ht 162.6 cm (64\")   Wt 112 kg (247 lb 6.4 oz)   LMP  (LMP Unknown)   BMI 42.47 kg/m²   Vitals:    03/25/20 1210 03/25/20 1239 03/25/20 1242 03/25/20 1249   Patient Position: Lying Standing Standing Standing       Physical Exam   Constitutional: She is oriented to person, place, and time. She appears well-developed and well-nourished. No distress.   HENT:   Head: Normocephalic and atraumatic.   Mouth/Throat: Oropharynx is clear and moist.   Neck: Normal range of motion. Neck supple. No JVD present.   Cardiovascular: Normal rate, regular rhythm, normal heart sounds and intact distal pulses.   Pulmonary/Chest: Effort normal and breath sounds normal. No respiratory distress.   Musculoskeletal: Normal range of motion. She exhibits no edema.   Neurological: She is alert and oriented to person, place, and time. No cranial nerve deficit.   Skin: Skin is warm and dry. Capillary refill takes less than 2 seconds. No rash noted.   Psychiatric: She has a normal mood and affect. Her behavior is normal.   Nursing note and vitals reviewed.      Procedures    ASSESSMENT/ PLAN:  Problem List Items " Addressed This Visit        Cardiovascular and Mediastinum    Palpitation    POTS (postural orthostatic tachycardia syndrome) - Primary    Overview     With some elements of POTS           Essential hypertension       Endocrine    Type 2 diabetes mellitus without complication, without long-term current use of insulin (CMS/[facility])            Specific Patient Instructions:  MEDICATION Instructions: Encouraged patient to continue routine medicines as prescribed and maintain compliance. Patient instructed to report any adverse side effects or reactions to medicines promptly to the office. Patient instructed to make us aware of any OTC or herbal meds or supplement use.    DIET Recommendations: Patient instructed and provided information on the following nutrition and diet recommendations: Calorie restriction for weight reduction and maintenance. Necessity for adequate daily intake of fluids/water. Also, diet information provided in AVS for specifics.    EXERCISE Instructions: Discussed with patient the need for routine aerobic activity for cardiovascular fitness, 3 times a week for about 30 minutes. Daily exercise for increased fitness and weight reduction goals.    SMOKING Recommendations: Counseled patient and encouraged them on smoking and tobacco cessation if applicable. Discussed the benefits to all body systems with smoking/tobacco cessation, including decreased cardiac/lung/stroke/cancer risk. Encouraged no vaping use.    HEALTH MAINTENANCE:  Counseling provided to patient/family about routine health maintenance and ANNUAL physicals/labs. Counseling on recommended Vaccinations appropriate for age needed.        Patient's Body mass index is 42.47 kg/m². BMI is above normal parameters. Recommendations include: exercise counseling and nutrition counseling.      Medications or Orders placed this visit:  No orders of the defined types were placed in this encounter.      Medications DISCONTINUED this visit:  Medications  Discontinued During This Encounter   Medication Reason   • lurasidone (LATUDA) 40 MG tablet tablet Dose adjustment   • nabumetone (RELAFEN) 500 MG tablet Dose adjustment       FOLLOW-UP:  Return in about 1 day (around 3/26/2020) for Recheck BP.    I discussed the patients findings and my recommendations with patient.  An After Visit Summary (AVS) was printed and given to the patient at discharge.      Best Gannon MD, FAAFP  3/30/2020

## 2020-03-25 NOTE — PATIENT INSTRUCTIONS
Suspect Essential HTN.Good BP control is encouraged with Goal BP based on JNC 8 guidelines 2014 <140/90 for patients with known cardiac disease and diabetes. (IRINA. 2014:322 (5):507-520. doi:10.1001/irina.2013.90304): general population <60 yr old goal BP <140/90 and for those >60 <150/90.  For patients of all ages with Diabetes, CKD, Known CAD <140/90. Recommended to the patient to obtain electronic home BP machine with upper arm blood pressure cuff and to check regularly as instructed.  Keep BP log and bring to subsequent visits. Stable, at goal.  a. LABS: routine for hypertension recommended and ordered if necessary.  b. Recommend if you do not have a home BP machine to obtain an electronic machine with arm blood pressure cuff.      c. Monitor BP over the next week and keep log to bring back to office. Discussed medication therapy however pt wants to try to control with diet exercise. .  Your provider  has recommended self-monitoring of your blood pressure.  If you do not have a blood pressure cuff you may purchase one from the local pharmacy.  You may ask the pharmacist which brand and model they recommend.  Obtain your blood pressure measurement at least 2x per week.  You should also check your blood pressure if you experience any symptoms of blurred visit, dizziness or headache.  Please record all blood pressure measurements and bring them to next office visit.  If you have any questions about the accuracy of your blood pressure machine please bring it in to the office and our staff will be happy to check accuracy.   d. Encouraged to eat a low sodium heart healthy diet  e. Offered handout on HTN educational topics.  These were provided if patient requested these today.  f. MEDS: as listed in today's visit.  g. Risks/benefits of current and new medications discussed with the patient and or family today.  The patient/family are aware and accept that if there any side effects they should call or return to clinic  as soon as possible.  Appropriate F/U discussed for topics addressed today. All questions were answered to the  satisfactory state of patient/family.  Should symptoms fail to improve or worsen they agree to call or return to clinic or to go to the ER. Education handouts were offered on any new Rx if requested.  Discussed the importance of following up with any needed screening tests/labs/specialist appointments and any requested follow-up recommended by me today.  Importance of maintaining follow-up discussed and patient accepts that missed appointments can delay diagnosis and potentially lead to worsening of conditions.

## 2020-03-26 ENCOUNTER — OFFICE VISIT (OUTPATIENT)
Dept: FAMILY MEDICINE CLINIC | Facility: CLINIC | Age: 35
End: 2020-03-26

## 2020-03-26 VITALS
SYSTOLIC BLOOD PRESSURE: 146 MMHG | HEART RATE: 143 BPM | BODY MASS INDEX: 41.83 KG/M2 | HEIGHT: 64 IN | WEIGHT: 245 LBS | OXYGEN SATURATION: 96 % | TEMPERATURE: 98 F | DIASTOLIC BLOOD PRESSURE: 103 MMHG

## 2020-03-26 DIAGNOSIS — G90.A POTS (POSTURAL ORTHOSTATIC TACHYCARDIA SYNDROME): ICD-10-CM

## 2020-03-26 DIAGNOSIS — I10 UNCONTROLLED HYPERTENSION: ICD-10-CM

## 2020-03-26 DIAGNOSIS — I10 ESSENTIAL HYPERTENSION: Primary | ICD-10-CM

## 2020-03-26 PROCEDURE — 99213 OFFICE O/P EST LOW 20 MIN: CPT | Performed by: NURSE PRACTITIONER

## 2020-03-26 NOTE — PATIENT INSTRUCTIONS
"DASH Eating Plan  DASH stands for \"Dietary Approaches to Stop Hypertension.\" The DASH eating plan is a healthy eating plan that has been shown to reduce high blood pressure (hypertension). It may also reduce your risk for type 2 diabetes, heart disease, and stroke. The DASH eating plan may also help with weight loss.  What are tips for following this plan?    General guidelines  · Avoid eating more than 2,300 mg (milligrams) of salt (sodium) a day. If you have hypertension, you may need to reduce your sodium intake to 1,500 mg a day.  · Limit alcohol intake to no more than 1 drink a day for nonpregnant women and 2 drinks a day for men. One drink equals 12 oz of beer, 5 oz of wine, or 1½ oz of hard liquor.  · Work with your health care provider to maintain a healthy body weight or to lose weight. Ask what an ideal weight is for you.  · Get at least 30 minutes of exercise that causes your heart to beat faster (aerobic exercise) most days of the week. Activities may include walking, swimming, or biking.  · Work with your health care provider or diet and nutrition specialist (dietitian) to adjust your eating plan to your individual calorie needs.  Reading food labels    · Check food labels for the amount of sodium per serving. Choose foods with less than 5 percent of the Daily Value of sodium. Generally, foods with less than 300 mg of sodium per serving fit into this eating plan.  · To find whole grains, look for the word \"whole\" as the first word in the ingredient list.  Shopping  · Buy products labeled as \"low-sodium\" or \"no salt added.\"  · Buy fresh foods. Avoid canned foods and premade or frozen meals.  Cooking  · Avoid adding salt when cooking. Use salt-free seasonings or herbs instead of table salt or sea salt. Check with your health care provider or pharmacist before using salt substitutes.  · Do not dawn foods. Cook foods using healthy methods such as baking, boiling, grilling, and broiling instead.  · Cook with " heart-healthy oils, such as olive, canola, soybean, or sunflower oil.  Meal planning  · Eat a balanced diet that includes:  ? 5 or more servings of fruits and vegetables each day. At each meal, try to fill half of your plate with fruits and vegetables.  ? Up to 6-8 servings of whole grains each day.  ? Less than 6 oz of lean meat, poultry, or fish each day. A 3-oz serving of meat is about the same size as a deck of cards. One egg equals 1 oz.  ? 2 servings of low-fat dairy each day.  ? A serving of nuts, seeds, or beans 5 times each week.  ? Heart-healthy fats. Healthy fats called Omega-3 fatty acids are found in foods such as flaxseeds and coldwater fish, like sardines, salmon, and mackerel.  · Limit how much you eat of the following:  ? Canned or prepackaged foods.  ? Food that is high in trans fat, such as fried foods.  ? Food that is high in saturated fat, such as fatty meat.  ? Sweets, desserts, sugary drinks, and other foods with added sugar.  ? Full-fat dairy products.  · Do not salt foods before eating.  · Try to eat at least 2 vegetarian meals each week.  · Eat more home-cooked food and less restaurant, buffet, and fast food.  · When eating at a restaurant, ask that your food be prepared with less salt or no salt, if possible.  What foods are recommended?  The items listed may not be a complete list. Talk with your dietitian about what dietary choices are best for you.  Grains  Whole-grain or whole-wheat bread. Whole-grain or whole-wheat pasta. Brown rice. Oatmeal. Quinoa. Bulgur. Whole-grain and low-sodium cereals. Shandra bread. Low-fat, low-sodium crackers. Whole-wheat flour tortillas.  Vegetables  Fresh or frozen vegetables (raw, steamed, roasted, or grilled). Low-sodium or reduced-sodium tomato and vegetable juice. Low-sodium or reduced-sodium tomato sauce and tomato paste. Low-sodium or reduced-sodium canned vegetables.  Fruits  All fresh, dried, or frozen fruit. Canned fruit in natural juice (without  added sugar).  Meat and other protein foods  Skinless chicken or turkey. Ground chicken or turkey. Pork with fat trimmed off. Fish and seafood. Egg whites. Dried beans, peas, or lentils. Unsalted nuts, nut butters, and seeds. Unsalted canned beans. Lean cuts of beef with fat trimmed off. Low-sodium, lean deli meat.  Dairy  Low-fat (1%) or fat-free (skim) milk. Fat-free, low-fat, or reduced-fat cheeses. Nonfat, low-sodium ricotta or cottage cheese. Low-fat or nonfat yogurt. Low-fat, low-sodium cheese.  Fats and oils  Soft margarine without trans fats. Vegetable oil. Low-fat, reduced-fat, or light mayonnaise and salad dressings (reduced-sodium). Canola, safflower, olive, soybean, and sunflower oils. Avocado.  Seasoning and other foods  Herbs. Spices. Seasoning mixes without salt. Unsalted popcorn and pretzels. Fat-free sweets.  What foods are not recommended?  The items listed may not be a complete list. Talk with your dietitian about what dietary choices are best for you.  Grains  Baked goods made with fat, such as croissants, muffins, or some breads. Dry pasta or rice meal packs.  Vegetables  Creamed or fried vegetables. Vegetables in a cheese sauce. Regular canned vegetables (not low-sodium or reduced-sodium). Regular canned tomato sauce and paste (not low-sodium or reduced-sodium). Regular tomato and vegetable juice (not low-sodium or reduced-sodium). Pickles. Olives.  Fruits  Canned fruit in a light or heavy syrup. Fried fruit. Fruit in cream or butter sauce.  Meat and other protein foods  Fatty cuts of meat. Ribs. Fried meat. Lovett. Sausage. Bologna and other processed lunch meats. Salami. Fatback. Hotdogs. Bratwurst. Salted nuts and seeds. Canned beans with added salt. Canned or smoked fish. Whole eggs or egg yolks. Chicken or turkey with skin.  Dairy  Whole or 2% milk, cream, and half-and-half. Whole or full-fat cream cheese. Whole-fat or sweetened yogurt. Full-fat cheese. Nondairy creamers. Whipped toppings.  Processed cheese and cheese spreads.  Fats and oils  Butter. Stick margarine. Lard. Shortening. Ghee. Lovett fat. Tropical oils, such as coconut, palm kernel, or palm oil.  Seasoning and other foods  Salted popcorn and pretzels. Onion salt, garlic salt, seasoned salt, table salt, and sea salt. Worcestershire sauce. Tartar sauce. Barbecue sauce. Teriyaki sauce. Soy sauce, including reduced-sodium. Steak sauce. Canned and packaged gravies. Fish sauce. Oyster sauce. Cocktail sauce. Horseradish that you find on the shelf. Ketchup. Mustard. Meat flavorings and tenderizers. Bouillon cubes. Hot sauce and Tabasco sauce. Premade or packaged marinades. Premade or packaged taco seasonings. Relishes. Regular salad dressings.  Where to find more information:  · National Heart, Lung, and Blood [place]: www.nhlbi.nih.gov  · American Heart Association: www.heart.org  Summary  · The DASH eating plan is a healthy eating plan that has been shown to reduce high blood pressure (hypertension). It may also reduce your risk for type 2 diabetes, heart disease, and stroke.  · With the DASH eating plan, you should limit salt (sodium) intake to 2,300 mg a day. If you have hypertension, you may need to reduce your sodium intake to 1,500 mg a day.  · When on the DASH eating plan, aim to eat more fresh fruits and vegetables, whole grains, lean proteins, low-fat dairy, and heart-healthy fats.  · Work with your health care provider or diet and nutrition specialist (dietitian) to adjust your eating plan to your individual calorie needs.  This information is not intended to replace advice given to you by your health care provider. Make sure you discuss any questions you have with your health care provider.  Document Released: 12/06/2012 Document Revised: 12/11/2017 Document Reviewed: 12/11/2017  [employer] Interactive Patient Education © 2020 [employer]

## 2020-03-26 NOTE — PROGRESS NOTES
Chief Complaint   Patient presents with   • Blood Pressure Check        Subjective   Chelita Rosenbaum is a 34 y.o.  female who presents today for blood pressure check.    HPI:  Patient presents today for orthostatic b/p readings.  She is currently treated by Dr. Gannon and most recently did not tolerate the addition of Imdur to her medication profile.  Dr. Gannon is referring her to [facility] and they required daily orthostatic pressure checks and provider documentation of same prior to scheduling her appointment.    Chelita Rosenbaum  has a past medical history of Arrhythmia, Arthropathy of lumbar facet joint, Asthma, Bipolar disorder (CMS/HCC), Cervico-occipital neuralgia, Constipation, Diabetes mellitus (CMS/HCC), Disorder of small intestine, Diverticular disease of colon, Drug therapy, Dysphagia, Encounter for contraceptive management, Encounter for surveillance of contraceptives, Epigastric pain, Generalized anxiety disorder, H/O colonoscopy with polypectomy, Hirsutism, Hyperlipidemia, Hyperprolactinemia (CMS/HCC), Hypertension, Low back pain, Menstruation disorder, Migraine aura, persistent, Myofascial pain, Nausea, Nausea and vomiting, Pap smear for cervical cancer screening, PCOS (polycystic ovarian syndrome), Periumbilical pain, POTS (postural orthostatic tachycardia syndrome), Sleep apnea, Surgical follow-up care, and Tachycardia.    Allergies   Allergen Reactions   • Apriso [Mesalamine Er] Nausea And Vomiting     Vomiting    • Lamotrigine Rash   • Lithium Nausea And Vomiting     Difficulty breathing   • Tramadol Other (See Comments)     Hard to breath   • Ultram [Tramadol Hcl]        Current Outpatient Medications:   •  albuterol sulfate  (90 Base) MCG/ACT inhaler, Inhale 2 puffs Every 4 (Four) Hours As Needed for Wheezing., Disp: 18 g, Rfl: 5  •  amLODIPine (NORVASC) 10 MG tablet, TAKE 1 TABLET BY MOUTH DAILY., Disp: 30 tablet, Rfl: 1  •  atorvastatin (LIPITOR) 40 MG tablet, TAKE 1 TABLET BY  MOUTH EVERY DAY, Disp: 90 tablet, Rfl: 1  •  benztropine (COGENTIN) 2 MG tablet, Take 2 mg by mouth 2 (Two) Times a Day., Disp: , Rfl: 2  •  Blood Glucose Monitoring Suppl (ONE TOUCH BASIC SYSTEM) w/Device kit, 1 Device Daily., Disp: 1 each, Rfl: 0  •  carvedilol (COREG) 25 MG tablet, Take 1 tablet by mouth 2 (Two) Times a Day With Meals., Disp: 60 tablet, Rfl: 5  •  diazePAM (VALIUM) 5 MG tablet, Take 5 mg by mouth 3 (Three) Times a Day., Disp: , Rfl:   •  diclofenac (VOLTAREN) 75 MG EC tablet, Take 1 tablet by mouth 2 (Two) Times a Day., Disp: , Rfl:   •  fludrocortisone 0.1 MG tablet, TAKE ONE TABLET BY MOUTH EVERY DAY, Disp: 90 tablet, Rfl: 1  •  glucose blood (ONE TOUCH ULTRA TEST) test strip, Use as instructed, Disp: 50 each, Rfl: 12  •  glucose blood test strip, 1 each by Other route Daily. Use as instructed, Disp: 50 each, Rfl: 12  •  isosorbide mononitrate (IMDUR) 30 MG 24 hr tablet, Take 1 tablet by mouth Daily., Disp: 30 tablet, Rfl: 5  •  Lancets (ACCU-CHEK MULTICLIX) lancets, BID blood sugar check, Disp: 102 each, Rfl: 11  •  Lancets misc, 1 each Daily., Disp: 100 each, Rfl: 12  •  LATUDA 60 MG tablet tablet, Take 1 tablet by mouth every night at bedtime., Disp: , Rfl:   •  loratadine (CLARITIN) 10 MG tablet, Take 1 tablet by mouth Daily., Disp: 30 tablet, Rfl: 5  •  meclizine (ANTIVERT) 25 MG tablet, Take 1 tablet by mouth 3 (Three) Times a Day As Needed for dizziness., Disp: 90 tablet, Rfl: 1  •  metFORMIN (GLUCOPHAGE) 1000 MG tablet, TAKE 1 TABLET BY MOUTH TWICE DAILY, Disp: 60 tablet, Rfl: 5  •  mometasone-formoterol (DULERA 100) 100-5 MCG/ACT inhaler, Inhale 2 puffs 2 (Two) Times a Day., Disp: 1 inhaler, Rfl: 3  •  nabumetone (RELAFEN) 750 MG tablet, Take 750 mg by mouth 2 (Two) Times a Day., Disp: , Rfl:   •  omeprazole (priLOSEC) 40 MG capsule, Take 40 mg by mouth Daily., Disp: , Rfl:   •  pramipexole (MIRAPEX) 0.25 MG tablet, TAKE 1 TABLET BY MOUTH EVERY EVENING, Disp: 30 tablet, Rfl: 1  •   promethazine (PHENERGAN) 25 MG tablet, Take 1 tablet by mouth Every 6 (Six) Hours As Needed for Nausea or Vomiting., Disp: 30 tablet, Rfl: 0  •  QUEtiapine (SEROquel) 300 MG tablet, Take 2 tablets by mouth every night at bedtime., Disp: , Rfl:   •  spironolactone (ALDACTONE) 25 MG tablet, Take 1 tablet by mouth 2 (Two) Times a Day., Disp: 180 tablet, Rfl: 1  •  tiZANidine (ZANAFLEX) 4 MG tablet, Take 0.5 tablets by mouth 2 (Two) Times a Day As Needed for Muscle Spasms., Disp: 90 tablet, Rfl: 1  •  traZODone (DESYREL) 100 MG tablet, Take 150 mg by mouth every night at bedtime., Disp: , Rfl:   Past Medical History:   Diagnosis Date   • Arrhythmia    • Arthropathy of lumbar facet joint    • Asthma    • Bipolar disorder (CMS/HCC)    • Cervico-occipital neuralgia    • Constipation    • Diabetes mellitus (CMS/HCC)    • Disorder of small intestine     thicking jejunum, delayed transit      • Diverticular disease of colon    • Drug therapy     Other long term (current) drug therapy      • Dysphagia    • Encounter for contraceptive management     other   • Encounter for surveillance of contraceptives     other   • Epigastric pain    • Generalized anxiety disorder    • H/O colonoscopy with polypectomy    • Hirsutism    • Hyperlipidemia    • Hyperprolactinemia (CMS/HCC)    • Hypertension    • Low back pain    • Menstruation disorder     Other disorders of menstruation and other abnormal bleeding from female genital tract      • Migraine aura, persistent     Migraine - w/ aura of spots      • Myofascial pain    • Nausea    • Nausea and vomiting    • Pap smear for cervical cancer screening    • PCOS (polycystic ovarian syndrome)    • Periumbilical pain    • POTS (postural orthostatic tachycardia syndrome)    • Sleep apnea    • Surgical follow-up care    • Tachycardia      Past Surgical History:   Procedure Laterality Date   • ABDOMINAL SURGERY  01/21/2014    Anesth, surgery of abdomen (1)      • CAPSULE ENDOSCOPY  04/20/2015     [facility], capsule endoscopy 48987 (1)      •  SECTION  2010     Section (3)      • CHOLECYSTECTOMY  05/15/2003    Cholecystectomy, laparoscopic (1)      • COLON RESECTION     • COLONOSCOPY     • DILATATION AND CURETTAGE  10/21/2013    D&C (1)      • ENDOSCOPY  2015    EGD w/ biopsy 40275 (1)      • INJECTION OF MEDICATION  2016    Injection for nerve block (1)      • SUBTOTAL HYSTERECTOMY       Social History     Socioeconomic History   • Marital status:      Spouse name: Not on file   • Number of children: Not on file   • Years of education: Not on file   • Highest education level: Not on file   Tobacco Use   • Smoking status: Never Smoker   • Smokeless tobacco: Never Used   Substance and Sexual Activity   • Alcohol use: No     Frequency: Never     Binge frequency: Never   • Drug use: No   • Sexual activity: Never     Birth control/protection: Surgical     Family History   Problem Relation Age of Onset   • Heart disease Mother    • Asthma Father    • Diabetes Father    • Heart disease Father    • Hypertension Father    • No Known Problems Brother        Family history, surgical history, past medical history, Allergies and med's reviewed with patient today and updated in [employer] EMR.     ROS:  Review of Systems   Constitutional: Positive for fatigue. Negative for fever and unexpected weight change.   HENT: Negative.  Negative for facial swelling, sore throat and trouble swallowing.    Eyes: Negative.  Negative for photophobia, discharge and visual disturbance.   Respiratory: Negative.  Negative for cough, chest tightness and shortness of breath.    Cardiovascular: Negative.  Negative for chest pain and palpitations.   Gastrointestinal: Negative.  Negative for abdominal pain, diarrhea, nausea and vomiting.   Endocrine: Negative.  Negative for polydipsia, polyphagia and polyuria.   Genitourinary: Negative.  Negative for dysuria, flank pain and frequency.   Musculoskeletal: Positive  "for back pain and neck pain. Negative for gait problem.        Chronic.   Skin: Negative.  Negative for rash.   Allergic/Immunologic: Negative.    Neurological: Negative.  Negative for dizziness, light-headedness and headaches.   Hematological: Negative.    Psychiatric/Behavioral: Negative.  Negative for self-injury and suicidal ideas.       OBJECTIVE:  Vitals:    03/26/20 0850 03/26/20 0917 03/26/20 0920 03/26/20 0926   BP: 123/88 132/99 130/99 (!) 146/103   BP Location: Left arm Left arm Left arm Left arm   Patient Position: Lying Standing Standing Standing   Cuff Size: Large Adult Large Adult Large Adult Large Adult   Pulse: 108 (!) 134 (!) 139 (!) 143   Temp: 98 °F (36.7 °C)      TempSrc: Temporal      SpO2: 96%      Weight: 111 kg (245 lb)      Height: 162.6 cm (64\")        Physical Exam   Constitutional: She is oriented to person, place, and time. She appears well-developed and well-nourished. No distress.   HENT:   Head: Normocephalic and atraumatic.   Eyes: Pupils are equal, round, and reactive to light. Conjunctivae and EOM are normal.   Neck: Normal range of motion. Neck supple.   Cardiovascular: Regular rhythm, normal heart sounds and intact distal pulses.   No murmur heard.  Tachycardia with regular rhythm.   Pulmonary/Chest: Effort normal and breath sounds normal. No respiratory distress.   Abdominal: Soft. Bowel sounds are normal. She exhibits no distension. There is no tenderness.   Musculoskeletal: Normal range of motion. She exhibits no edema.   Neurological: She is alert and oriented to person, place, and time.   Skin: Skin is warm and dry. Capillary refill takes less than 2 seconds. She is not diaphoretic. No erythema.   Psychiatric: She has a normal mood and affect. Her behavior is normal. Judgment and thought content normal.   Nursing note and vitals reviewed.      ASSESSMENT/ PLAN:    Chelita was seen today for blood pressure check.    Diagnoses and all orders for this visit:    Essential " hypertension    Uncontrolled hypertension    POTS (postural orthostatic tachycardia syndrome)    Documented orthostatic pressures today with variance as above.  This is done in compliance per request of [facility] for referral purposes.    Orders Placed Today:     No orders of the defined types were placed in this encounter.       Management Plan:     An After Visit Summary was printed and given to the patient at discharge.    Follow-up: Return in about 1 day (around 3/27/2020) for Recheck orthostatic blood pressures.    Idalmis Mcdermott, APRN 3/26/2020 12:04  This note was electronically signed.

## 2020-03-27 ENCOUNTER — OFFICE VISIT (OUTPATIENT)
Dept: FAMILY MEDICINE CLINIC | Facility: CLINIC | Age: 35
End: 2020-03-27

## 2020-03-27 VITALS
HEIGHT: 64 IN | OXYGEN SATURATION: 96 % | TEMPERATURE: 98.2 F | SYSTOLIC BLOOD PRESSURE: 174 MMHG | BODY MASS INDEX: 41.83 KG/M2 | DIASTOLIC BLOOD PRESSURE: 102 MMHG | WEIGHT: 245 LBS | HEART RATE: 134 BPM

## 2020-03-27 DIAGNOSIS — I10 UNCONTROLLED HYPERTENSION: Primary | ICD-10-CM

## 2020-03-27 DIAGNOSIS — G90.A POTS (POSTURAL ORTHOSTATIC TACHYCARDIA SYNDROME): ICD-10-CM

## 2020-03-27 DIAGNOSIS — I10 ESSENTIAL HYPERTENSION: ICD-10-CM

## 2020-03-27 PROCEDURE — 99212 OFFICE O/P EST SF 10 MIN: CPT | Performed by: NURSE PRACTITIONER

## 2020-03-27 NOTE — PROGRESS NOTES
Chief Complaint   Patient presents with   • Blood Pressure Check        Subjective   Chelita Rosenbaum is a 34 y.o.  female who presents today for blood pressure check.    HPI:  HTN/POTS: Patient presents today for the 3rd consecutive day for b/p testing per [facility] at [facility].  She states she feels no different than her normal but can tell that her heart is racing all the time.    Chelita Rosenbaum  has a past medical history of Arrhythmia, Arthropathy of lumbar facet joint, Asthma, Bipolar disorder (CMS/HCC), Cervico-occipital neuralgia, Constipation, Diabetes mellitus (CMS/HCC), Disorder of small intestine, Diverticular disease of colon, Drug therapy, Dysphagia, Encounter for contraceptive management, Encounter for surveillance of contraceptives, Epigastric pain, Generalized anxiety disorder, H/O colonoscopy with polypectomy, Hirsutism, Hyperlipidemia, Hyperprolactinemia (CMS/HCC), Hypertension, Low back pain, Menstruation disorder, Migraine aura, persistent, Myofascial pain, Nausea, Nausea and vomiting, Pap smear for cervical cancer screening, PCOS (polycystic ovarian syndrome), Periumbilical pain, POTS (postural orthostatic tachycardia syndrome), Sleep apnea, Surgical follow-up care, and Tachycardia.    Allergies   Allergen Reactions   • Apriso [Mesalamine Er] Nausea And Vomiting     Vomiting    • Lamotrigine Rash   • Lithium Nausea And Vomiting     Difficulty breathing   • Tramadol Other (See Comments)     Hard to breath   • Ultram [Tramadol Hcl]        Current Outpatient Medications:   •  albuterol sulfate  (90 Base) MCG/ACT inhaler, Inhale 2 puffs Every 4 (Four) Hours As Needed for Wheezing., Disp: 18 g, Rfl: 5  •  amLODIPine (NORVASC) 10 MG tablet, TAKE 1 TABLET BY MOUTH DAILY., Disp: 30 tablet, Rfl: 1  •  atorvastatin (LIPITOR) 40 MG tablet, TAKE 1 TABLET BY MOUTH EVERY DAY, Disp: 90 tablet, Rfl: 1  •  benztropine (COGENTIN) 2 MG tablet, Take 2 mg by mouth 2 (Two) Times a  Day., Disp: , Rfl: 2  •  Blood Glucose Monitoring Suppl (ONE TOUCH BASIC SYSTEM) w/Device kit, 1 Device Daily., Disp: 1 each, Rfl: 0  •  carvedilol (COREG) 25 MG tablet, Take 1 tablet by mouth 2 (Two) Times a Day With Meals., Disp: 60 tablet, Rfl: 5  •  diazePAM (VALIUM) 5 MG tablet, Take 5 mg by mouth 3 (Three) Times a Day., Disp: , Rfl:   •  diclofenac (VOLTAREN) 75 MG EC tablet, Take 1 tablet by mouth 2 (Two) Times a Day., Disp: , Rfl:   •  fludrocortisone 0.1 MG tablet, TAKE ONE TABLET BY MOUTH EVERY DAY, Disp: 90 tablet, Rfl: 1  •  glucose blood (ONE TOUCH ULTRA TEST) test strip, Use as instructed, Disp: 50 each, Rfl: 12  •  glucose blood test strip, 1 each by Other route Daily. Use as instructed, Disp: 50 each, Rfl: 12  •  isosorbide mononitrate (IMDUR) 30 MG 24 hr tablet, Take 1 tablet by mouth Daily., Disp: 30 tablet, Rfl: 5  •  Lancets (ACCU-CHEK MULTICLIX) lancets, BID blood sugar check, Disp: 102 each, Rfl: 11  •  Lancets misc, 1 each Daily., Disp: 100 each, Rfl: 12  •  LATUDA 60 MG tablet tablet, Take 1 tablet by mouth every night at bedtime., Disp: , Rfl:   •  loratadine (CLARITIN) 10 MG tablet, Take 1 tablet by mouth Daily., Disp: 30 tablet, Rfl: 5  •  meclizine (ANTIVERT) 25 MG tablet, Take 1 tablet by mouth 3 (Three) Times a Day As Needed for dizziness., Disp: 90 tablet, Rfl: 1  •  metFORMIN (GLUCOPHAGE) 1000 MG tablet, TAKE 1 TABLET BY MOUTH TWICE DAILY, Disp: 60 tablet, Rfl: 5  •  mometasone-formoterol (DULERA 100) 100-5 MCG/ACT inhaler, Inhale 2 puffs 2 (Two) Times a Day., Disp: 1 inhaler, Rfl: 3  •  nabumetone (RELAFEN) 750 MG tablet, Take 750 mg by mouth 2 (Two) Times a Day., Disp: , Rfl:   •  omeprazole (priLOSEC) 40 MG capsule, Take 40 mg by mouth Daily., Disp: , Rfl:   •  pramipexole (MIRAPEX) 0.25 MG tablet, TAKE 1 TABLET BY MOUTH EVERY EVENING, Disp: 30 tablet, Rfl: 1  •  promethazine (PHENERGAN) 25 MG tablet, Take 1 tablet by mouth Every 6 (Six) Hours As Needed for Nausea or Vomiting.,  Disp: 30 tablet, Rfl: 0  •  QUEtiapine (SEROquel) 300 MG tablet, Take 2 tablets by mouth every night at bedtime., Disp: , Rfl:   •  spironolactone (ALDACTONE) 25 MG tablet, Take 1 tablet by mouth 2 (Two) Times a Day., Disp: 180 tablet, Rfl: 1  •  tiZANidine (ZANAFLEX) 4 MG tablet, Take 0.5 tablets by mouth 2 (Two) Times a Day As Needed for Muscle Spasms., Disp: 90 tablet, Rfl: 1  •  traZODone (DESYREL) 100 MG tablet, Take 150 mg by mouth every night at bedtime., Disp: , Rfl:   Past Medical History:   Diagnosis Date   • Arrhythmia    • Arthropathy of lumbar facet joint    • Asthma    • Bipolar disorder (CMS/HCC)    • Cervico-occipital neuralgia    • Constipation    • Diabetes mellitus (CMS/HCC)    • Disorder of small intestine     thicking jejunum, delayed transit      • Diverticular disease of colon    • Drug therapy     Other long term (current) drug therapy      • Dysphagia    • Encounter for contraceptive management     other   • Encounter for surveillance of contraceptives     other   • Epigastric pain    • Generalized anxiety disorder    • H/O colonoscopy with polypectomy    • Hirsutism    • Hyperlipidemia    • Hyperprolactinemia (CMS/HCC)    • Hypertension    • Low back pain    • Menstruation disorder     Other disorders of menstruation and other abnormal bleeding from female genital tract      • Migraine aura, persistent     Migraine - w/ aura of spots      • Myofascial pain    • Nausea    • Nausea and vomiting    • Pap smear for cervical cancer screening    • PCOS (polycystic ovarian syndrome)    • Periumbilical pain    • POTS (postural orthostatic tachycardia syndrome)    • Sleep apnea    • Surgical follow-up care    • Tachycardia      Past Surgical History:   Procedure Laterality Date   • ABDOMINAL SURGERY  2014    Anesth, surgery of abdomen (1)      • CAPSULE ENDOSCOPY  2015    GI Imaging, capsule endoscopy 02615 (1)      •  SECTION  2010     Section (3)      •  CHOLECYSTECTOMY  05/15/2003    Cholecystectomy, laparoscopic (1)      • COLON RESECTION     • COLONOSCOPY     • DILATATION AND CURETTAGE  10/21/2013    D&C (1)      • ENDOSCOPY  03/02/2015    EGD w/ biopsy [id number] (1)      • INJECTION OF MEDICATION  05/19/2016    Injection for nerve block (1)      • SUBTOTAL HYSTERECTOMY       Social History     Socioeconomic History   • Marital status:      Spouse name: Not on file   • Number of children: Not on file   • Years of education: Not on file   • Highest education level: Not on file   Tobacco Use   • Smoking status: Never Smoker   • Smokeless tobacco: Never Used   Substance and Sexual Activity   • Alcohol use: No     Frequency: Never     Binge frequency: Never   • Drug use: No   • Sexual activity: Never     Birth control/protection: Surgical     Family History   Problem Relation Age of Onset   • Heart disease Mother    • Asthma Father    • Diabetes Father    • Heart disease Father    • Hypertension Father    • No Known Problems Brother        Family history, surgical history, past medical history, Allergies and med's reviewed with patient today and updated in [employer] EMR.     ROS:  Review of Systems   Constitutional: Positive for fatigue. Negative for fever and unexpected weight change.   HENT: Negative.  Negative for facial swelling, sore throat and trouble swallowing.    Eyes: Negative.  Negative for photophobia, discharge and visual disturbance.   Respiratory: Negative.  Negative for cough, chest tightness and shortness of breath.    Cardiovascular: Positive for palpitations. Negative for chest pain.   Gastrointestinal: Negative.  Negative for abdominal pain, diarrhea, nausea and vomiting.   Endocrine: Negative.  Negative for polydipsia, polyphagia and polyuria.   Genitourinary: Negative.  Negative for dysuria, flank pain and frequency.   Musculoskeletal: Negative.  Negative for back pain, gait problem and neck pain.   Skin: Negative.  Negative for rash.  "  Allergic/Immunologic: Negative.    Neurological: Negative.  Negative for dizziness, light-headedness and headaches.   Hematological: Negative.    Psychiatric/Behavioral: Negative.  Negative for self-injury and suicidal ideas.       OBJECTIVE:  Vitals:    03/27/20 1307 03/27/20 1323 03/27/20 1326 03/27/20 1332   BP: (!) 151/102 (!) 161/115 (!) 176/115 (!) 174/102   BP Location: Left arm Left arm Left arm Left arm   Patient Position: Lying Standing Standing Standing   Cuff Size: Large Adult Large Adult Large Adult Large Adult   Pulse: 103 (!) 121 (!) 130 (!) 134   Temp: 98.2 °F (36.8 °C)      TempSrc: Temporal      SpO2: 96%      Weight: 111 kg (245 lb)      Height: 162.6 cm (64\")        Physical Exam   Constitutional: She is oriented to person, place, and time. She appears well-developed and well-nourished. No distress.   HENT:   Head: Normocephalic and atraumatic.   Eyes: Pupils are equal, round, and reactive to light. Conjunctivae and EOM are normal.   Neck: Normal range of motion. Neck supple.   Cardiovascular: Regular rhythm, normal heart sounds and intact distal pulses.   No murmur heard.  Tachycardia    Pulmonary/Chest: Effort normal and breath sounds normal. No respiratory distress.   Abdominal: Soft. Bowel sounds are normal. She exhibits no distension. There is no tenderness.   Musculoskeletal: Normal range of motion. She exhibits no edema.   Neurological: She is alert and oriented to person, place, and time.   Skin: Skin is warm and dry. Capillary refill takes less than 2 seconds. She is not diaphoretic. No erythema.   Psychiatric: She has a normal mood and affect. Her behavior is normal. Judgment and thought content normal.   Nursing note and vitals reviewed.      ASSESSMENT/ PLAN:    Cheltia was seen today for blood pressure check.    Diagnoses and all orders for this visit:    Uncontrolled hypertension    Essential hypertension    POTS (postural orthostatic tachycardia syndrome)    Record of pressures are " requested by POTS clinic at [facility] are now complete.    Orders Placed Today:     No orders of the defined types were placed in this encounter.       Management Plan:     An After Visit Summary was printed and given to the patient at discharge.    Follow-up: Return for Keep appt with Dr. Gannon on 4/27/2020.    Idalmis Mcdermott, APRN 3/27/2020 14:14  This note was electronically signed.

## 2020-06-08 DIAGNOSIS — I10 ESSENTIAL HYPERTENSION: ICD-10-CM

## 2020-06-08 DIAGNOSIS — G25.81 RLS (RESTLESS LEGS SYNDROME): ICD-10-CM

## 2020-06-08 DIAGNOSIS — M79.18 MYOFASCIAL MUSCLE PAIN: ICD-10-CM

## 2020-06-10 ENCOUNTER — TELEPHONE (OUTPATIENT)
Dept: FAMILY MEDICINE CLINIC | Facility: CLINIC | Age: 35
End: 2020-06-10

## 2020-06-10 RX ORDER — TIZANIDINE 4 MG/1
TABLET ORAL
Qty: 30 TABLET | Refills: 0 | Status: SHIPPED | OUTPATIENT
Start: 2020-06-10 | End: 2020-07-09

## 2020-06-10 RX ORDER — PRAMIPEXOLE DIHYDROCHLORIDE 0.25 MG/1
TABLET ORAL
Qty: 30 TABLET | Refills: 0 | Status: SHIPPED | OUTPATIENT
Start: 2020-06-10 | End: 2020-06-22

## 2020-06-10 RX ORDER — AMLODIPINE BESYLATE 10 MG/1
10 TABLET ORAL DAILY
Qty: 30 TABLET | Refills: 0 | Status: SHIPPED | OUTPATIENT
Start: 2020-06-10 | End: 2020-06-22

## 2020-06-10 NOTE — TELEPHONE ENCOUNTER
Patient called and stated that [facility] pharmacy has tried reaching out three times to get her medications refilled. So she called to have them refilled but she could not tell me what they were. She only stated that she was out of everything that Dr. Gannon prescribes for her and that she needs them refilled.    Patient can be contacted best at [phone] to clarify and confirm further needed information.

## 2020-06-11 DIAGNOSIS — G90.A POTS (POSTURAL ORTHOSTATIC TACHYCARDIA SYNDROME): ICD-10-CM

## 2020-06-11 DIAGNOSIS — I10 ESSENTIAL HYPERTENSION: ICD-10-CM

## 2020-06-11 DIAGNOSIS — E11.9 TYPE 2 DIABETES MELLITUS WITHOUT COMPLICATION, WITHOUT LONG-TERM CURRENT USE OF INSULIN (HCC): ICD-10-CM

## 2020-06-11 DIAGNOSIS — E78.2 MIXED HYPERLIPIDEMIA: ICD-10-CM

## 2020-06-11 RX ORDER — ISOSORBIDE MONONITRATE 30 MG/1
30 TABLET, EXTENDED RELEASE ORAL DAILY
Qty: 90 TABLET | Refills: 0 | Status: SHIPPED | OUTPATIENT
Start: 2020-06-11 | End: 2020-08-28

## 2020-06-11 RX ORDER — ATORVASTATIN CALCIUM 40 MG/1
40 TABLET, FILM COATED ORAL DAILY
Qty: 90 TABLET | Refills: 0 | Status: SHIPPED | OUTPATIENT
Start: 2020-06-11 | End: 2020-10-01

## 2020-06-11 RX ORDER — LURASIDONE HYDROCHLORIDE 60 MG/1
60 TABLET, FILM COATED ORAL
Qty: 90 TABLET | Refills: 0 | Status: SHIPPED | OUTPATIENT
Start: 2020-06-11 | End: 2021-01-29

## 2020-06-11 NOTE — TELEPHONE ENCOUNTER
A couple of scripts were sent in yesterday by Dr. Gannon.  I have pended the rest to Mrs. Raygoza

## 2020-06-22 DIAGNOSIS — I10 ESSENTIAL HYPERTENSION: ICD-10-CM

## 2020-06-22 DIAGNOSIS — G25.81 RLS (RESTLESS LEGS SYNDROME): ICD-10-CM

## 2020-06-22 RX ORDER — AMLODIPINE BESYLATE 10 MG/1
10 TABLET ORAL DAILY
Qty: 90 TABLET | Refills: 1 | Status: SHIPPED | OUTPATIENT
Start: 2020-06-22 | End: 2020-08-04

## 2020-06-22 RX ORDER — PRAMIPEXOLE DIHYDROCHLORIDE 0.25 MG/1
TABLET ORAL
Qty: 30 TABLET | Refills: 1 | Status: SHIPPED | OUTPATIENT
Start: 2020-06-22 | End: 2020-08-28

## 2020-07-09 DIAGNOSIS — M79.18 MYOFASCIAL MUSCLE PAIN: ICD-10-CM

## 2020-07-09 RX ORDER — TIZANIDINE 4 MG/1
TABLET ORAL
Qty: 30 TABLET | Refills: 1 | Status: SHIPPED | OUTPATIENT
Start: 2020-07-09 | End: 2020-09-03

## 2020-07-15 DIAGNOSIS — I10 ESSENTIAL HYPERTENSION: ICD-10-CM

## 2020-07-15 RX ORDER — SPIRONOLACTONE 25 MG/1
TABLET ORAL
Qty: 30 TABLET | Refills: 0 | Status: SHIPPED | OUTPATIENT
Start: 2020-07-15 | End: 2020-08-04

## 2020-07-31 ENCOUNTER — CLINICAL SUPPORT (OUTPATIENT)
Dept: FAMILY MEDICINE CLINIC | Facility: CLINIC | Age: 35
End: 2020-07-31

## 2020-07-31 DIAGNOSIS — Z00.00 ANNUAL PHYSICAL EXAM: Primary | ICD-10-CM

## 2020-07-31 DIAGNOSIS — E11.9 TYPE 2 DIABETES MELLITUS WITHOUT COMPLICATION, WITHOUT LONG-TERM CURRENT USE OF INSULIN (HCC): ICD-10-CM

## 2020-07-31 DIAGNOSIS — E78.2 MIXED HYPERLIPIDEMIA: ICD-10-CM

## 2020-07-31 DIAGNOSIS — I10 ESSENTIAL HYPERTENSION: ICD-10-CM

## 2020-08-01 LAB
ALBUMIN SERPL-MCNC: 3.7 G/DL (ref 3.8–4.8)
ALBUMIN/GLOB SERPL: 1.1 {RATIO} (ref 1.2–2.2)
ALP SERPL-CCNC: 93 IU/L (ref 39–117)
ALT SERPL-CCNC: 84 IU/L (ref 0–32)
AST SERPL-CCNC: 30 IU/L (ref 0–40)
BASOPHILS # BLD AUTO: 0.1 X10E3/UL (ref 0–0.2)
BASOPHILS NFR BLD AUTO: 1 %
BILIRUB SERPL-MCNC: 0.2 MG/DL (ref 0–1.2)
BUN SERPL-MCNC: 9 MG/DL (ref 6–20)
BUN/CREAT SERPL: 11 (ref 9–23)
CALCIUM SERPL-MCNC: 9.5 MG/DL (ref 8.7–10.2)
CHLORIDE SERPL-SCNC: 102 MMOL/L (ref 96–106)
CHOLEST SERPL-MCNC: 199 MG/DL (ref 100–199)
CO2 SERPL-SCNC: 25 MMOL/L (ref 20–29)
CREAT SERPL-MCNC: 0.82 MG/DL (ref 0.57–1)
EOSINOPHIL # BLD AUTO: 0.2 X10E3/UL (ref 0–0.4)
EOSINOPHIL NFR BLD AUTO: 2 %
ERYTHROCYTE [DISTWIDTH] IN BLOOD BY AUTOMATED COUNT: 13.7 % (ref 11.7–15.4)
GLOBULIN SER CALC-MCNC: 3.4 G/DL (ref 1.5–4.5)
GLUCOSE SERPL-MCNC: 84 MG/DL (ref 65–99)
HBA1C MFR BLD: 5.4 % (ref 4.8–5.6)
HCT VFR BLD AUTO: 39.5 % (ref 34–46.6)
HDLC SERPL-MCNC: 39 MG/DL
HGB BLD-MCNC: 13.2 G/DL (ref 11.1–15.9)
IMM GRANULOCYTES # BLD AUTO: 0.1 X10E3/UL (ref 0–0.1)
IMM GRANULOCYTES NFR BLD AUTO: 1 %
LDLC SERPL CALC-MCNC: 118 MG/DL (ref 0–99)
LDLC/HDLC SERPL: 3 RATIO (ref 0–3.2)
LYMPHOCYTES # BLD AUTO: 3.2 X10E3/UL (ref 0.7–3.1)
LYMPHOCYTES NFR BLD AUTO: 29 %
MCH RBC QN AUTO: 28.6 PG (ref 26.6–33)
MCHC RBC AUTO-ENTMCNC: 33.4 G/DL (ref 31.5–35.7)
MCV RBC AUTO: 86 FL (ref 79–97)
MICROALBUMIN UR-MCNC: 11.9 UG/ML
MONOCYTES # BLD AUTO: 0.6 X10E3/UL (ref 0.1–0.9)
MONOCYTES NFR BLD AUTO: 6 %
NEUTROPHILS # BLD AUTO: 6.9 X10E3/UL (ref 1.4–7)
NEUTROPHILS NFR BLD AUTO: 61 %
PLATELET # BLD AUTO: 408 X10E3/UL (ref 150–450)
POTASSIUM SERPL-SCNC: 4.4 MMOL/L (ref 3.5–5.2)
PROT SERPL-MCNC: 7.1 G/DL (ref 6–8.5)
RBC # BLD AUTO: 4.62 X10E6/UL (ref 3.77–5.28)
SODIUM SERPL-SCNC: 139 MMOL/L (ref 134–144)
T4 SERPL-MCNC: 6.3 UG/DL (ref 4.5–12)
TRIGL SERPL-MCNC: 209 MG/DL (ref 0–149)
TSH SERPL DL<=0.005 MIU/L-ACNC: 3.49 UIU/ML (ref 0.45–4.5)
VLDLC SERPL CALC-MCNC: 42 MG/DL (ref 5–40)
WBC # BLD AUTO: 11.1 X10E3/UL (ref 3.4–10.8)

## 2020-08-03 DIAGNOSIS — I10 ESSENTIAL HYPERTENSION: ICD-10-CM

## 2020-08-04 ENCOUNTER — OFFICE VISIT (OUTPATIENT)
Dept: FAMILY MEDICINE CLINIC | Facility: CLINIC | Age: 35
End: 2020-08-04

## 2020-08-04 VITALS
WEIGHT: 239 LBS | BODY MASS INDEX: 40.8 KG/M2 | HEIGHT: 64 IN | OXYGEN SATURATION: 97 % | DIASTOLIC BLOOD PRESSURE: 97 MMHG | TEMPERATURE: 98.6 F | HEART RATE: 123 BPM | SYSTOLIC BLOOD PRESSURE: 143 MMHG

## 2020-08-04 DIAGNOSIS — E78.5 HYPERLIPIDEMIA, UNSPECIFIED HYPERLIPIDEMIA TYPE: ICD-10-CM

## 2020-08-04 DIAGNOSIS — E11.9 TYPE 2 DIABETES MELLITUS WITHOUT COMPLICATION, WITHOUT LONG-TERM CURRENT USE OF INSULIN (HCC): ICD-10-CM

## 2020-08-04 DIAGNOSIS — Z00.00 ANNUAL PHYSICAL EXAM: Primary | ICD-10-CM

## 2020-08-04 DIAGNOSIS — R00.2 PALPITATION: ICD-10-CM

## 2020-08-04 DIAGNOSIS — G90.A POTS (POSTURAL ORTHOSTATIC TACHYCARDIA SYNDROME): ICD-10-CM

## 2020-08-04 DIAGNOSIS — I10 ESSENTIAL HYPERTENSION: ICD-10-CM

## 2020-08-04 PROCEDURE — 99395 PREV VISIT EST AGE 18-39: CPT | Performed by: NURSE PRACTITIONER

## 2020-08-04 RX ORDER — EZETIMIBE 10 MG/1
10 TABLET ORAL DAILY
Qty: 30 TABLET | Refills: 2 | Status: SHIPPED | OUTPATIENT
Start: 2020-08-04 | End: 2021-01-29

## 2020-08-04 NOTE — PROGRESS NOTES
Chief Complaint   Patient presents with   • Annual Exam     no pap        Subjective   Chelita Rosenbaum is a 34 y.o.  female who presents today for annual wellness.    PATIENT CONCERNS:    POTS: Patient would like for us to resend the referral to [place] to the POTS clinic. She was sent to [facility] by ambulance due to hypertension and tachycardia. She states that they gave her labetalol IV and sent her home.    WEIGHT LOSS: Patient is wanting a medication to aid in weight loss. She states that she has been dieting and exercising more and she feels that she needs something to boost her.     Chelita Rosenbaum  has a past medical history of Arrhythmia, Arthropathy of lumbar facet joint, Asthma, Bipolar disorder (CMS/HCC), Cervico-occipital neuralgia, Constipation, Diabetes mellitus (CMS/HCC), Disorder of small intestine, Diverticular disease of colon, Drug therapy, Dysphagia, Encounter for contraceptive management, Encounter for surveillance of contraceptives, Epigastric pain, Generalized anxiety disorder, H/O colonoscopy with polypectomy, Hirsutism, Hyperlipidemia, Hyperprolactinemia (CMS/HCC), Hypertension, Low back pain, Menstruation disorder, Migraine aura, persistent, Myofascial pain, Nausea, Nausea and vomiting, Pap smear for cervical cancer screening, PCOS (polycystic ovarian syndrome), Periumbilical pain, POTS (postural orthostatic tachycardia syndrome), Sleep apnea, Surgical follow-up care, and Tachycardia.    Allergies   Allergen Reactions   • Apriso [Mesalamine Er] Nausea And Vomiting     Vomiting    • Lamotrigine Rash   • Lithium Nausea And Vomiting     Difficulty breathing   • Tramadol Other (See Comments)     Hard to breath   • Ultram [Tramadol Hcl]        Current Outpatient Medications:   •  albuterol sulfate  (90 Base) MCG/ACT inhaler, Inhale 2 puffs Every 4 (Four) Hours As Needed for Wheezing., Disp: 18 g, Rfl: 5  •  amLODIPine (NORVASC) 10 MG tablet, TAKE 1 TABLET BY MOUTH DAILY, Disp: 90  tablet, Rfl: 1  •  atorvastatin (LIPITOR) 40 MG tablet, Take 1 tablet by mouth Daily., Disp: 90 tablet, Rfl: 0  •  benztropine (COGENTIN) 2 MG tablet, Take 2 mg by mouth 2 (Two) Times a Day., Disp: , Rfl: 2  •  Blood Glucose Monitoring Suppl (ONE TOUCH BASIC SYSTEM) w/Device kit, 1 Device Daily., Disp: 1 each, Rfl: 0  •  carvedilol (COREG) 25 MG tablet, Take 1 tablet by mouth 2 (Two) Times a Day With Meals., Disp: 60 tablet, Rfl: 5  •  diazePAM (VALIUM) 5 MG tablet, Take 5 mg by mouth 3 (Three) Times a Day., Disp: , Rfl:   •  diclofenac (VOLTAREN) 75 MG EC tablet, Take 1 tablet by mouth 2 (Two) Times a Day., Disp: , Rfl:   •  fludrocortisone 0.1 MG tablet, TAKE ONE TABLET BY MOUTH EVERY DAY, Disp: 90 tablet, Rfl: 1  •  glucose blood (ONE TOUCH ULTRA TEST) test strip, Use as instructed, Disp: 50 each, Rfl: 12  •  glucose blood test strip, 1 each by Other route Daily. Use as instructed, Disp: 50 each, Rfl: 12  •  isosorbide mononitrate (IMDUR) 30 MG 24 hr tablet, Take 1 tablet by mouth Daily., Disp: 90 tablet, Rfl: 0  •  Lancets (ACCU-CHEK MULTICLIX) lancets, BID blood sugar check, Disp: 102 each, Rfl: 11  •  Lancets misc, 1 each Daily., Disp: 100 each, Rfl: 12  •  LATUDA 60 MG tablet tablet, Take 1 tablet by mouth every night at bedtime., Disp: 90 tablet, Rfl: 0  •  loratadine (CLARITIN) 10 MG tablet, Take 1 tablet by mouth Daily., Disp: 30 tablet, Rfl: 5  •  meclizine (ANTIVERT) 25 MG tablet, Take 1 tablet by mouth 3 (Three) Times a Day As Needed for dizziness., Disp: 90 tablet, Rfl: 1  •  metFORMIN (GLUCOPHAGE) 1000 MG tablet, Take 1 tablet by mouth 2 (Two) Times a Day., Disp: 180 tablet, Rfl: 0  •  mometasone-formoterol (DULERA 100) 100-5 MCG/ACT inhaler, Inhale 2 puffs 2 (Two) Times a Day., Disp: 1 inhaler, Rfl: 3  •  nabumetone (RELAFEN) 750 MG tablet, Take 750 mg by mouth 2 (Two) Times a Day., Disp: , Rfl:   •  omeprazole (priLOSEC) 40 MG capsule, Take 40 mg by mouth Daily., Disp: , Rfl:   •  pramipexole  (MIRAPEX) 0.25 MG tablet, TAKE 1 TABLET BY MOUTH EVERY EVENING, Disp: 30 tablet, Rfl: 1  •  promethazine (PHENERGAN) 25 MG tablet, Take 1 tablet by mouth Every 6 (Six) Hours As Needed for Nausea or Vomiting., Disp: 30 tablet, Rfl: 0  •  QUEtiapine (SEROquel) 300 MG tablet, Take 2 tablets by mouth every night at bedtime., Disp: , Rfl:   •  spironolactone (ALDACTONE) 25 MG tablet, TAKE 1 TABLET BY MOUTH EVERY DAY, Disp: 30 tablet, Rfl: 0  •  tiZANidine (ZANAFLEX) 4 MG tablet, TAKE 1/2 A TABLET BY MOUTH TWICE DAILY AS NEEDED FOR MUSCLE SPASMS, Disp: 30 tablet, Rfl: 1  •  traZODone (DESYREL) 100 MG tablet, Take 150 mg by mouth every night at bedtime., Disp: , Rfl:   •  ezetimibe (Zetia) 10 MG tablet, Take 1 tablet by mouth Daily., Disp: 30 tablet, Rfl: 2  •  Liraglutide (Victoza) 18 MG/3ML solution pen-injector injection, Inject 0.6 mg daily x 1 week, then 1.2 mg daily x 1 week, then 1.8 mg daily ongoing, Disp: 3 pen, Rfl: 2  Past Medical History:   Diagnosis Date   • Arrhythmia    • Arthropathy of lumbar facet joint    • Asthma    • Bipolar disorder (CMS/HCC)    • Cervico-occipital neuralgia    • Constipation    • Diabetes mellitus (CMS/HCC)    • Disorder of small intestine     thicking jejunum, delayed transit      • Diverticular disease of colon    • Drug therapy     Other long term (current) drug therapy      • Dysphagia    • Encounter for contraceptive management     other   • Encounter for surveillance of contraceptives     other   • Epigastric pain    • Generalized anxiety disorder    • H/O colonoscopy with polypectomy    • Hirsutism    • Hyperlipidemia    • Hyperprolactinemia (CMS/HCC)    • Hypertension    • Low back pain    • Menstruation disorder     Other disorders of menstruation and other abnormal bleeding from female genital tract      • Migraine aura, persistent     Migraine - w/ aura of spots      • Myofascial pain    • Nausea    • Nausea and vomiting    • Pap smear for cervical cancer screening    •  PCOS (polycystic ovarian syndrome)    • Periumbilical pain    • POTS (postural orthostatic tachycardia syndrome)    • Sleep apnea    • Surgical follow-up care    • Tachycardia      Past Surgical History:   Procedure Laterality Date   • ABDOMINAL SURGERY  2014    Anesth, surgery of abdomen (1)      • CAPSULE ENDOSCOPY  2015    [facility], capsule endoscopy 12249 (1)      •  SECTION  2010     Section (3)      • CHOLECYSTECTOMY  05/15/2003    Cholecystectomy, laparoscopic (1)      • COLON RESECTION     • COLONOSCOPY     • DILATATION AND CURETTAGE  10/21/2013    D&C (1)      • ENDOSCOPY  2015    EGD w/ biopsy 68976 (1)      • INJECTION OF MEDICATION  2016    Injection for nerve block (1)      • SUBTOTAL HYSTERECTOMY       Social History     Socioeconomic History   • Marital status:      Spouse name: Not on file   • Number of children: Not on file   • Years of education: Not on file   • Highest education level: Not on file   Tobacco Use   • Smoking status: Never Smoker   • Smokeless tobacco: Never Used   Substance and Sexual Activity   • Alcohol use: No     Frequency: Never     Binge frequency: Never   • Drug use: No   • Sexual activity: Never     Birth control/protection: Surgical     Family History   Problem Relation Age of Onset   • Heart disease Mother    • Asthma Father    • Diabetes Father    • Heart disease Father    • Hypertension Father    • No Known Problems Brother        Family history, surgical history, past medical history, Allergies and med's reviewed with patient today and updated in [employer] EMR.     ROS:  Review of Systems   Constitutional: Negative for fatigue, fever and unexpected weight change.   HENT: Negative for facial swelling, sore throat and trouble swallowing.    Eyes: Negative.  Negative for photophobia, discharge and visual disturbance.   Respiratory: Positive for shortness of breath. Negative for cough and chest tightness.    Cardiovascular:  "Positive for palpitations. Negative for chest pain.   Gastrointestinal: Negative.  Negative for abdominal pain, diarrhea, nausea and vomiting.   Endocrine: Negative.  Negative for polydipsia, polyphagia and polyuria.   Genitourinary: Negative.  Negative for dysuria, flank pain and frequency.   Musculoskeletal: Positive for arthralgias. Negative for back pain, gait problem and neck pain.   Skin: Negative.  Negative for rash.   Allergic/Immunologic: Negative.    Neurological: Positive for dizziness, weakness, light-headedness, numbness and headaches.   Hematological: Negative.    Psychiatric/Behavioral: Negative.  Negative for self-injury and suicidal ideas.       OBJECTIVE:  Vitals:    08/04/20 1310   BP: 143/97   BP Location: Left arm   Patient Position: Sitting   Cuff Size: Large Adult   Pulse: (!) 123   Temp: 98.6 °F (37 °C)   SpO2: 97%   Weight: 108 kg (239 lb)   Height: 162.6 cm (64\")     Physical Exam   Constitutional: She is oriented to person, place, and time. She appears well-developed and well-nourished. No distress.   HENT:   Head: Normocephalic and atraumatic.   Eyes: Pupils are equal, round, and reactive to light. Conjunctivae and EOM are normal.   Neck: Normal range of motion. Neck supple.   Cardiovascular: Regular rhythm, normal heart sounds and intact distal pulses.   No murmur heard.  Tachycardia.   Pulmonary/Chest: Effort normal and breath sounds normal. No respiratory distress.   Abdominal: Soft. Bowel sounds are normal. She exhibits no distension. There is no tenderness.   Musculoskeletal: Normal range of motion. She exhibits no edema.   Neurological: She is alert and oriented to person, place, and time.   Skin: Skin is warm and dry. Capillary refill takes less than 2 seconds. She is not diaphoretic. No erythema.   Psychiatric: She has a normal mood and affect. Her behavior is normal. Judgment and thought content normal.   Nursing note and vitals reviewed.      ASSESSMENT/ PLAN:    Chelita was seen " today for annual exam.    Diagnoses and all orders for this visit:    Annual physical exam    POTS (postural orthostatic tachycardia syndrome)    Type 2 diabetes mellitus without complication, without long-term current use of insulin (CMS/[facility])  -     Liraglutide (Victoza) 18 MG/3ML solution pen-injector injection; Inject 0.6 mg daily x 1 week, then 1.2 mg daily x 1 week, then 1.8 mg daily ongoing    Hyperlipidemia, unspecified hyperlipidemia type  -     ezetimibe (Zetia) 10 MG tablet; Take 1 tablet by mouth Daily.    Essential hypertension    Palpitation    Patient encouraged to participate in low impact activity, as tolerated, to help with regulation of chronic health conditions.  Patient encouraged to continue low carbohydrate diet.  Patient encouraged to follow up with specialists as recommended and scheduled.  Patient encouraged to continue social distancing and use of mask.    Orders Placed Today:     New Medications Ordered This Visit   Medications   • ezetimibe (Zetia) 10 MG tablet     Sig: Take 1 tablet by mouth Daily.     Dispense:  30 tablet     Refill:  2   • Liraglutide (Victoza) 18 MG/3ML solution pen-injector injection     Sig: Inject 0.6 mg daily x 1 week, then 1.2 mg daily x 1 week, then 1.8 mg daily ongoing     Dispense:  3 pen     Refill:  2        Management Plan:   Discussed lab work with patient. Lipid panel is elevated. Discussed adding Zetia to the medication regimen and she is agreeable to this.   Encouraged patient to increase exercise to 3 times a week for weight loss.   Encouraged patient to continue a low carb, high protein diet for weight loss.       An After Visit Summary was printed and given to the patient at discharge.    Follow-up: Return in about 3 months (around 11/4/2020) for Next scheduled follow up with Hgb A1c and CMP prior.    Idalmis Mcdermott, APRN 8/4/2020 17:59  This note was electronically signed.

## 2020-08-05 RX ORDER — SPIRONOLACTONE 25 MG/1
TABLET ORAL
Qty: 30 TABLET | Refills: 1 | Status: SHIPPED | OUTPATIENT
Start: 2020-08-05 | End: 2020-10-01

## 2020-08-05 RX ORDER — AMLODIPINE BESYLATE 10 MG/1
10 TABLET ORAL DAILY
Qty: 30 TABLET | Refills: 1 | Status: SHIPPED | OUTPATIENT
Start: 2020-08-05 | End: 2020-09-22

## 2020-08-28 DIAGNOSIS — G25.81 RLS (RESTLESS LEGS SYNDROME): ICD-10-CM

## 2020-08-28 DIAGNOSIS — G90.A POTS (POSTURAL ORTHOSTATIC TACHYCARDIA SYNDROME): ICD-10-CM

## 2020-08-28 DIAGNOSIS — I10 ESSENTIAL HYPERTENSION: ICD-10-CM

## 2020-08-30 RX ORDER — PRAMIPEXOLE DIHYDROCHLORIDE 0.25 MG/1
TABLET ORAL
Qty: 90 TABLET | Refills: 1 | Status: SHIPPED | OUTPATIENT
Start: 2020-08-30 | End: 2020-09-03

## 2020-08-30 RX ORDER — ISOSORBIDE MONONITRATE 30 MG/1
30 TABLET, EXTENDED RELEASE ORAL DAILY
Qty: 90 TABLET | Refills: 1 | Status: SHIPPED | OUTPATIENT
Start: 2020-08-30 | End: 2020-09-03

## 2020-08-31 DIAGNOSIS — I10 ESSENTIAL HYPERTENSION: ICD-10-CM

## 2020-08-31 DIAGNOSIS — G25.81 RLS (RESTLESS LEGS SYNDROME): ICD-10-CM

## 2020-08-31 DIAGNOSIS — M79.18 MYOFASCIAL MUSCLE PAIN: ICD-10-CM

## 2020-08-31 DIAGNOSIS — G90.A POTS (POSTURAL ORTHOSTATIC TACHYCARDIA SYNDROME): ICD-10-CM

## 2020-09-03 RX ORDER — PRAMIPEXOLE DIHYDROCHLORIDE 0.25 MG/1
TABLET ORAL
Qty: 60 TABLET | Refills: 0 | Status: SHIPPED | OUTPATIENT
Start: 2020-09-03 | End: 2021-01-04 | Stop reason: SDUPTHER

## 2020-09-03 RX ORDER — ISOSORBIDE MONONITRATE 30 MG/1
30 TABLET, EXTENDED RELEASE ORAL DAILY
Qty: 60 TABLET | Refills: 0 | Status: SHIPPED | OUTPATIENT
Start: 2020-09-03 | End: 2021-01-14

## 2020-09-03 RX ORDER — TIZANIDINE 4 MG/1
TABLET ORAL
Qty: 60 TABLET | Refills: 0 | Status: SHIPPED | OUTPATIENT
Start: 2020-09-03 | End: 2020-10-27

## 2020-09-11 ENCOUNTER — TELEPHONE (OUTPATIENT)
Dept: FAMILY MEDICINE CLINIC | Facility: CLINIC | Age: 35
End: 2020-09-11

## 2020-09-11 NOTE — TELEPHONE ENCOUNTER
Advised that Pt file has been reviewed by physician and she has been accepted to move forward with appt. Per Destiny, their office will handle further testing and scheduling from here.

## 2020-09-22 DIAGNOSIS — I10 ESSENTIAL HYPERTENSION: ICD-10-CM

## 2020-09-22 RX ORDER — AMLODIPINE BESYLATE 10 MG/1
10 TABLET ORAL DAILY
Qty: 30 TABLET | Refills: 1 | Status: SHIPPED | OUTPATIENT
Start: 2020-09-22 | End: 2020-10-16

## 2020-09-29 NOTE — TELEPHONE ENCOUNTER
Called patient to ensure someone from [facility] had called her to schedule appt.  Patient stated yes that she has appt for testing with the \[facility]\"" clinic

## 2020-10-01 DIAGNOSIS — E78.2 MIXED HYPERLIPIDEMIA: ICD-10-CM

## 2020-10-01 DIAGNOSIS — E11.9 TYPE 2 DIABETES MELLITUS WITHOUT COMPLICATION, WITHOUT LONG-TERM CURRENT USE OF INSULIN (HCC): ICD-10-CM

## 2020-10-01 DIAGNOSIS — I10 ESSENTIAL HYPERTENSION: ICD-10-CM

## 2020-10-01 RX ORDER — SPIRONOLACTONE 25 MG/1
TABLET ORAL
Qty: 30 TABLET | Refills: 2 | Status: SHIPPED | OUTPATIENT
Start: 2020-10-01 | End: 2021-01-12

## 2020-10-01 RX ORDER — ATORVASTATIN CALCIUM 40 MG/1
40 TABLET, FILM COATED ORAL DAILY
Qty: 30 TABLET | Refills: 2 | Status: SHIPPED | OUTPATIENT
Start: 2020-10-01 | End: 2020-10-27

## 2020-10-15 DIAGNOSIS — I10 ESSENTIAL HYPERTENSION: ICD-10-CM

## 2020-10-16 RX ORDER — AMLODIPINE BESYLATE 10 MG/1
10 TABLET ORAL DAILY
Qty: 30 TABLET | Refills: 2 | Status: SHIPPED | OUTPATIENT
Start: 2020-10-16 | End: 2021-01-18

## 2020-10-26 DIAGNOSIS — E11.9 TYPE 2 DIABETES MELLITUS WITHOUT COMPLICATION, WITHOUT LONG-TERM CURRENT USE OF INSULIN (HCC): ICD-10-CM

## 2020-10-26 DIAGNOSIS — E78.2 MIXED HYPERLIPIDEMIA: ICD-10-CM

## 2020-10-26 DIAGNOSIS — M79.18 MYOFASCIAL MUSCLE PAIN: ICD-10-CM

## 2020-10-27 RX ORDER — TIZANIDINE 4 MG/1
TABLET ORAL
Qty: 30 TABLET | Refills: 1 | Status: SHIPPED | OUTPATIENT
Start: 2020-10-27 | End: 2020-12-14

## 2020-10-27 RX ORDER — ATORVASTATIN CALCIUM 40 MG/1
40 TABLET, FILM COATED ORAL DAILY
Qty: 90 TABLET | Refills: 2 | Status: SHIPPED | OUTPATIENT
Start: 2020-10-27 | End: 2021-04-12

## 2020-10-29 ENCOUNTER — OFFICE VISIT (OUTPATIENT)
Dept: FAMILY MEDICINE CLINIC | Facility: CLINIC | Age: 35
End: 2020-10-29

## 2020-10-29 VITALS
SYSTOLIC BLOOD PRESSURE: 126 MMHG | OXYGEN SATURATION: 98 % | HEART RATE: 130 BPM | BODY MASS INDEX: 36.4 KG/M2 | HEIGHT: 64 IN | WEIGHT: 213.2 LBS | DIASTOLIC BLOOD PRESSURE: 93 MMHG | TEMPERATURE: 97.5 F

## 2020-10-29 DIAGNOSIS — E11.9 TYPE 2 DIABETES MELLITUS WITHOUT COMPLICATION, WITHOUT LONG-TERM CURRENT USE OF INSULIN (HCC): ICD-10-CM

## 2020-10-29 DIAGNOSIS — R11.2 NAUSEA AND VOMITING, INTRACTABILITY OF VOMITING NOT SPECIFIED, UNSPECIFIED VOMITING TYPE: ICD-10-CM

## 2020-10-29 DIAGNOSIS — K21.9 GASTROESOPHAGEAL REFLUX DISEASE, UNSPECIFIED WHETHER ESOPHAGITIS PRESENT: ICD-10-CM

## 2020-10-29 DIAGNOSIS — Z23 NEED FOR IMMUNIZATION AGAINST INFLUENZA: ICD-10-CM

## 2020-10-29 DIAGNOSIS — R10.13 EPIGASTRIC PAIN: Primary | ICD-10-CM

## 2020-10-29 LAB — HBA1C MFR BLD: 5.4 %

## 2020-10-29 PROCEDURE — 90686 IIV4 VACC NO PRSV 0.5 ML IM: CPT | Performed by: NURSE PRACTITIONER

## 2020-10-29 PROCEDURE — 83036 HEMOGLOBIN GLYCOSYLATED A1C: CPT | Performed by: NURSE PRACTITIONER

## 2020-10-29 PROCEDURE — 90471 IMMUNIZATION ADMIN: CPT | Performed by: NURSE PRACTITIONER

## 2020-10-29 PROCEDURE — 99214 OFFICE O/P EST MOD 30 MIN: CPT | Performed by: NURSE PRACTITIONER

## 2020-10-29 RX ORDER — FAMOTIDINE 20 MG/1
20 TABLET, FILM COATED ORAL 2 TIMES DAILY
Qty: 60 TABLET | Refills: 5 | Status: SHIPPED | OUTPATIENT
Start: 2020-10-29 | End: 2021-07-06

## 2020-10-29 RX ORDER — LIRAGLUTIDE 6 MG/ML
INJECTION SUBCUTANEOUS
Qty: 9 PEN | Refills: 1 | Status: SHIPPED | OUTPATIENT
Start: 2020-10-29 | End: 2021-01-04

## 2020-10-29 RX ORDER — SUCRALFATE 1 G/1
1 TABLET ORAL
Qty: 120 TABLET | Refills: 0 | Status: SHIPPED | OUTPATIENT
Start: 2020-10-29 | End: 2021-01-29

## 2020-10-29 RX ORDER — PROMETHAZINE HYDROCHLORIDE 25 MG/1
25 TABLET ORAL EVERY 6 HOURS PRN
Qty: 30 TABLET | Refills: 0 | Status: SHIPPED | OUTPATIENT
Start: 2020-10-29 | End: 2021-02-22 | Stop reason: SDUPTHER

## 2020-10-29 NOTE — PROGRESS NOTES
"Chief Complaint   Patient presents with   • Hypertension     f/u   • Flu Vaccine   • DFE   • Heartburn        Subjective   Chelita Rosenbaum is a 34 y.o.  female who presents today for multiple concerns.    HPI:  HEARTBURN:  Bad recently.  She has a history of hiatal hernia. She has had mid epigastric \"burning\" really bad recently and has vomited on some occasions.  She has a history of cholecystectomy.  She has recently modified her diet and removed greasy foods.  She also complains of some stomach pain although not as much as the epigastric pain.      Chelita Rosenbaum  has a past medical history of Arrhythmia, Arthropathy of lumbar facet joint, Asthma, Bipolar disorder (CMS/HCC), Cervico-occipital neuralgia, Constipation, Diabetes mellitus (CMS/HCC), Disorder of small intestine, Diverticular disease of colon, Drug therapy, Dysphagia, Encounter for contraceptive management, Encounter for surveillance of contraceptives, Epigastric pain, Generalized anxiety disorder, H/O colonoscopy with polypectomy, Hirsutism, Hyperlipidemia, Hyperprolactinemia (CMS/HCC), Hypertension, Low back pain, Menstruation disorder, Migraine aura, persistent, Myofascial pain, Nausea, Nausea and vomiting, Pap smear for cervical cancer screening, PCOS (polycystic ovarian syndrome), Periumbilical pain, POTS (postural orthostatic tachycardia syndrome), Sleep apnea, Surgical follow-up care, and Tachycardia.    Allergies   Allergen Reactions   • Apriso [Mesalamine Er] Nausea And Vomiting     Vomiting    • Lamotrigine Rash   • Lithium Nausea And Vomiting     Difficulty breathing   • Tramadol Other (See Comments)     Hard to breath   • Ultram [Tramadol Hcl]        Current Outpatient Medications:   •  albuterol sulfate  (90 Base) MCG/ACT inhaler, Inhale 2 puffs Every 4 (Four) Hours As Needed for Wheezing., Disp: 18 g, Rfl: 5  •  amLODIPine (NORVASC) 10 MG tablet, TAKE 1 TABLET BY MOUTH DAILY, Disp: 30 tablet, Rfl: 2  •  atorvastatin " (LIPITOR) 40 MG tablet, TAKE 1 TABLET BY MOUTH DAILY, Disp: 90 tablet, Rfl: 2  •  benztropine (COGENTIN) 2 MG tablet, Take 2 mg by mouth 2 (Two) Times a Day., Disp: , Rfl: 2  •  Blood Glucose Monitoring Suppl (ONE TOUCH BASIC SYSTEM) w/Device kit, 1 Device Daily., Disp: 1 each, Rfl: 0  •  carvedilol (COREG) 25 MG tablet, Take 1 tablet by mouth 2 (Two) Times a Day With Meals., Disp: 60 tablet, Rfl: 5  •  diazePAM (VALIUM) 5 MG tablet, Take 5 mg by mouth 3 (Three) Times a Day., Disp: , Rfl:   •  diclofenac (VOLTAREN) 75 MG EC tablet, Take 1 tablet by mouth 2 (Two) Times a Day., Disp: , Rfl:   •  ezetimibe (Zetia) 10 MG tablet, Take 1 tablet by mouth Daily., Disp: 30 tablet, Rfl: 2  •  fludrocortisone 0.1 MG tablet, TAKE ONE TABLET BY MOUTH EVERY DAY, Disp: 90 tablet, Rfl: 1  •  glucose blood (ONE TOUCH ULTRA TEST) test strip, Use as instructed, Disp: 50 each, Rfl: 12  •  glucose blood test strip, 1 each by Other route Daily. Use as instructed, Disp: 50 each, Rfl: 12  •  isosorbide mononitrate (IMDUR) 30 MG 24 hr tablet, TAKE 1 TABLET BY MOUTH DAILY, Disp: 60 tablet, Rfl: 0  •  Lancets (ACCU-CHEK MULTICLIX) lancets, BID blood sugar check, Disp: 102 each, Rfl: 11  •  Lancets misc, 1 each Daily., Disp: 100 each, Rfl: 12  •  LATUDA 60 MG tablet tablet, Take 1 tablet by mouth every night at bedtime., Disp: 90 tablet, Rfl: 0  •  Liraglutide (Victoza) 18 MG/3ML solution pen-injector injection, Inject 0.6 mg daily x 1 week, then 1.2 mg daily x 1 week, then 1.8 mg daily ongoing, Disp: 3 pen, Rfl: 2  •  loratadine (CLARITIN) 10 MG tablet, Take 1 tablet by mouth Daily., Disp: 30 tablet, Rfl: 5  •  meclizine (ANTIVERT) 25 MG tablet, Take 1 tablet by mouth 3 (Three) Times a Day As Needed for dizziness., Disp: 90 tablet, Rfl: 1  •  metFORMIN (GLUCOPHAGE) 1000 MG tablet, TAKE 1 TABLET BY MOUTH 2 (TWO) TIMES A DAY, Disp: 180 tablet, Rfl: 2  •  mometasone-formoterol (DULERA 100) 100-5 MCG/ACT inhaler, Inhale 2 puffs 2 (Two) Times a  Day., Disp: 1 inhaler, Rfl: 3  •  nabumetone (RELAFEN) 750 MG tablet, Take 750 mg by mouth 2 (Two) Times a Day., Disp: , Rfl:   •  omeprazole (priLOSEC) 40 MG capsule, Take 40 mg by mouth Daily., Disp: , Rfl:   •  pramipexole (MIRAPEX) 0.25 MG tablet, TAKE 1 TABLET BY MOUTH EVERY EVENING, Disp: 60 tablet, Rfl: 0  •  promethazine (PHENERGAN) 25 MG tablet, Take 1 tablet by mouth Every 6 (Six) Hours As Needed for Nausea or Vomiting., Disp: 30 tablet, Rfl: 0  •  QUEtiapine (SEROquel) 300 MG tablet, Take 2 tablets by mouth every night at bedtime., Disp: , Rfl:   •  spironolactone (ALDACTONE) 25 MG tablet, TAKE 1 TABLET BY MOUTH EVERY DAY, Disp: 30 tablet, Rfl: 2  •  tiZANidine (ZANAFLEX) 4 MG tablet, TAKE 1/2 A TABLET BY MOUTH TWICE DAILY AS NEEDED FOR MUSCLE SPASMS, Disp: 30 tablet, Rfl: 1  •  traZODone (DESYREL) 100 MG tablet, Take 150 mg by mouth every night at bedtime., Disp: , Rfl:   •  famotidine (Pepcid) 20 MG tablet, Take 1 tablet by mouth 2 (Two) Times a Day., Disp: 60 tablet, Rfl: 5  •  sucralfate (Carafate) 1 g tablet, Take 1 tablet by mouth 4 (Four) Times a Day Before Meals & at Bedtime., Disp: 120 tablet, Rfl: 0  Past Medical History:   Diagnosis Date   • Arrhythmia    • Arthropathy of lumbar facet joint    • Asthma    • Bipolar disorder (CMS/HCC)    • Cervico-occipital neuralgia    • Constipation    • Diabetes mellitus (CMS/HCC)    • Disorder of small intestine     thicking jejunum, delayed transit      • Diverticular disease of colon    • Drug therapy     Other long term (current) drug therapy      • Dysphagia    • Encounter for contraceptive management     other   • Encounter for surveillance of contraceptives     other   • Epigastric pain    • Generalized anxiety disorder    • H/O colonoscopy with polypectomy    • Hirsutism    • Hyperlipidemia    • Hyperprolactinemia (CMS/HCC)    • Hypertension    • Low back pain    • Menstruation disorder     Other disorders of menstruation and other abnormal bleeding  from female genital tract      • Migraine aura, persistent     Migraine - w/ aura of spots      • Myofascial pain    • Nausea    • Nausea and vomiting    • Pap smear for cervical cancer screening    • PCOS (polycystic ovarian syndrome)    • Periumbilical pain    • POTS (postural orthostatic tachycardia syndrome)    • Sleep apnea    • Surgical follow-up care    • Tachycardia      Past Surgical History:   Procedure Laterality Date   • ABDOMINAL SURGERY  2014    Anesth, surgery of abdomen (1)      • CAPSULE ENDOSCOPY  2015    [facility], capsule endoscopy 55947 (1)      •  SECTION  2010     Section (3)      • CHOLECYSTECTOMY  05/15/2003    Cholecystectomy, laparoscopic (1)      • COLON RESECTION     • COLONOSCOPY     • DILATATION AND CURETTAGE  10/21/2013    D&C (1)      • ENDOSCOPY  2015    EGD w/ biopsy 37634 (1)      • INJECTION OF MEDICATION  2016    Injection for nerve block (1)      • SUBTOTAL HYSTERECTOMY       Social History     Socioeconomic History   • Marital status:      Spouse name: Not on file   • Number of children: Not on file   • Years of education: Not on file   • Highest education level: Not on file   Tobacco Use   • Smoking status: Never Smoker   • Smokeless tobacco: Never Used   Substance and Sexual Activity   • Alcohol use: No     Frequency: Never     Binge frequency: Never   • Drug use: No   • Sexual activity: Never     Birth control/protection: Surgical     Family History   Problem Relation Age of Onset   • Heart disease Mother    • Asthma Father    • Diabetes Father    • Heart disease Father    • Hypertension Father    • No Known Problems Brother        Family history, surgical history, past medical history, Allergies and med's reviewed with patient today and updated in [facility].     ROS:  Review of Systems   Constitutional: Negative.  Negative for fatigue, fever and unexpected weight change.   HENT: Negative.  Negative for facial swelling, sore  "throat and trouble swallowing.    Eyes: Negative.  Negative for photophobia, discharge and visual disturbance.   Respiratory: Negative.  Negative for cough, chest tightness and shortness of breath.    Cardiovascular: Positive for palpitations. Negative for chest pain.   Gastrointestinal: Positive for nausea and vomiting. Negative for abdominal pain and diarrhea.        Epigastric pain.   Endocrine: Negative.  Negative for polydipsia, polyphagia and polyuria.   Genitourinary: Negative.  Negative for dysuria, flank pain and frequency.   Musculoskeletal: Negative.  Negative for back pain, gait problem and neck pain.   Skin: Negative.  Negative for rash.   Allergic/Immunologic: Negative.    Neurological: Positive for numbness. Negative for dizziness, light-headedness and headaches.        Foot numbness.   Hematological: Negative.    Psychiatric/Behavioral: Negative.  Negative for self-injury and suicidal ideas.       OBJECTIVE:  Vitals:    10/29/20 1033   BP: 126/93   BP Location: Left arm   Patient Position: Sitting   Cuff Size: Adult   Pulse: (!) 130   Temp: 97.5 °F (36.4 °C)   TempSrc: Infrared   SpO2: 98%   Weight: 96.7 kg (213 lb 3.2 oz)   Height: 162.6 cm (64\")     Physical Exam  Vitals signs and nursing note reviewed.   Constitutional:       General: She is not in acute distress.     Appearance: Normal appearance. She is well-developed. She is obese. She is not diaphoretic.   HENT:      Head: Normocephalic and atraumatic.   Eyes:      Conjunctiva/sclera: Conjunctivae normal.      Pupils: Pupils are equal, round, and reactive to light.   Neck:      Musculoskeletal: Normal range of motion and neck supple.   Cardiovascular:      Rate and Rhythm: Normal rate and regular rhythm.      Pulses:           Dorsalis pedis pulses are 2+ on the right side and 2+ on the left side.        Posterior tibial pulses are 2+ on the right side and 2+ on the left side.      Heart sounds: Normal heart sounds. No murmur.   Pulmonary:     "  Effort: Pulmonary effort is normal. No respiratory distress.      Breath sounds: Normal breath sounds.   Abdominal:      General: Bowel sounds are normal. There is no distension.      Palpations: Abdomen is soft.      Tenderness: There is no abdominal tenderness.   Musculoskeletal: Normal range of motion.        Feet:    Feet:      Right foot:      Protective Sensation: 5 sites tested. 5 sites sensed.      Skin integrity: Skin integrity normal.      Left foot:      Protective Sensation: 5 sites tested. 4 sites sensed.      Skin integrity: Skin integrity normal.   Skin:     General: Skin is warm and dry.      Capillary Refill: Capillary refill takes less than 2 seconds.      Findings: No erythema.   Neurological:      Mental Status: She is alert and oriented to person, place, and time.   Psychiatric:         Behavior: Behavior normal.         Thought Content: Thought content normal.         Judgment: Judgment normal.         ASSESSMENT/ PLAN:    Diagnoses and all orders for this visit:    1. Epigastric pain (Primary)  -     famotidine (Pepcid) 20 MG tablet; Take 1 tablet by mouth 2 (Two) Times a Day.  Dispense: 60 tablet; Refill: 5  -     sucralfate (Carafate) 1 g tablet; Take 1 tablet by mouth 4 (Four) Times a Day Before Meals & at Bedtime.  Dispense: 120 tablet; Refill: 0    2. Need for immunization against influenza  -     FluLaval Quad >6 Months ([id number])    3. Type 2 diabetes mellitus without complication, without long-term current use of insulin (CMS/[facility])  -     POC Glycosylated Hemoglobin (Hb A1C)    4. Gastroesophageal reflux disease, unspecified whether esophagitis present  -     famotidine (Pepcid) 20 MG tablet; Take 1 tablet by mouth 2 (Two) Times a Day.  Dispense: 60 tablet; Refill: 5  -     sucralfate (Carafate) 1 g tablet; Take 1 tablet by mouth 4 (Four) Times a Day Before Meals & at Bedtime.  Dispense: 120 tablet; Refill: 0    5. Nausea and vomiting, intractability of vomiting not specified,  unspecified vomiting type  -     promethazine (PHENERGAN) 25 MG tablet; Take 1 tablet by mouth Every 6 (Six) Hours As Needed for Nausea or Vomiting.  Dispense: 30 tablet; Refill: 0        Orders Placed Today:     New Medications Ordered This Visit   Medications   • famotidine (Pepcid) 20 MG tablet     Sig: Take 1 tablet by mouth 2 (Two) Times a Day.     Dispense:  60 tablet     Refill:  5   • sucralfate (Carafate) 1 g tablet     Sig: Take 1 tablet by mouth 4 (Four) Times a Day Before Meals & at Bedtime.     Dispense:  120 tablet     Refill:  0   • promethazine (PHENERGAN) 25 MG tablet     Sig: Take 1 tablet by mouth Every 6 (Six) Hours As Needed for Nausea or Vomiting.     Dispense:  30 tablet     Refill:  0        Management Plan:     An After Visit Summary was printed and given to the patient at discharge.    Follow-up: Return in about 3 months (around 1/29/2021) for Next scheduled follow up with labs prior.    ARIELLE Castro 10/29/2020 11:06 CDT  This note was electronically signed.

## 2020-11-18 NOTE — TELEPHONE ENCOUNTER
Patient aware and voiced understanding. She is asking for an update on referral to [facility].     I advised Patient Leesa is out of the office today but I would send a message asking Leesa to check on this Monday when she returns. Pt voiced understanding.    Kidney stones

## 2020-12-14 DIAGNOSIS — M79.18 MYOFASCIAL MUSCLE PAIN: ICD-10-CM

## 2020-12-14 RX ORDER — TIZANIDINE 4 MG/1
TABLET ORAL
Qty: 30 TABLET | Refills: 2 | Status: SHIPPED | OUTPATIENT
Start: 2020-12-14 | End: 2021-01-04 | Stop reason: SDUPTHER

## 2021-01-04 DIAGNOSIS — E11.9 TYPE 2 DIABETES MELLITUS WITHOUT COMPLICATION, WITHOUT LONG-TERM CURRENT USE OF INSULIN (HCC): ICD-10-CM

## 2021-01-04 DIAGNOSIS — G25.81 RLS (RESTLESS LEGS SYNDROME): ICD-10-CM

## 2021-01-04 DIAGNOSIS — M79.18 MYOFASCIAL MUSCLE PAIN: ICD-10-CM

## 2021-01-04 RX ORDER — LIRAGLUTIDE 6 MG/ML
INJECTION SUBCUTANEOUS
Qty: 9 PEN | Refills: 1 | Status: SHIPPED | OUTPATIENT
Start: 2021-01-04 | End: 2021-03-05

## 2021-01-05 RX ORDER — PRAMIPEXOLE DIHYDROCHLORIDE 0.25 MG/1
0.25 TABLET ORAL EVERY EVENING
Qty: 60 TABLET | Refills: 2 | Status: SHIPPED | OUTPATIENT
Start: 2021-01-05 | End: 2021-01-14

## 2021-01-05 RX ORDER — LANCETS 30 GAUGE
1 EACH MISCELLANEOUS DAILY
Qty: 100 EACH | Refills: 12 | Status: SHIPPED | OUTPATIENT
Start: 2021-01-05 | End: 2021-01-29

## 2021-01-05 RX ORDER — TIZANIDINE 4 MG/1
2 TABLET ORAL 2 TIMES DAILY PRN
Qty: 30 TABLET | Refills: 2 | Status: SHIPPED | OUTPATIENT
Start: 2021-01-05 | End: 2021-04-15

## 2021-01-12 DIAGNOSIS — I10 ESSENTIAL HYPERTENSION: ICD-10-CM

## 2021-01-12 RX ORDER — SPIRONOLACTONE 25 MG/1
TABLET ORAL
Qty: 30 TABLET | Refills: 2 | Status: SHIPPED | OUTPATIENT
Start: 2021-01-12 | End: 2021-05-05

## 2021-01-14 DIAGNOSIS — G90.A POTS (POSTURAL ORTHOSTATIC TACHYCARDIA SYNDROME): ICD-10-CM

## 2021-01-14 DIAGNOSIS — G25.81 RLS (RESTLESS LEGS SYNDROME): ICD-10-CM

## 2021-01-14 DIAGNOSIS — I10 ESSENTIAL HYPERTENSION: ICD-10-CM

## 2021-01-14 RX ORDER — ISOSORBIDE MONONITRATE 30 MG/1
30 TABLET, EXTENDED RELEASE ORAL DAILY
Qty: 90 TABLET | Refills: 1 | Status: SHIPPED | OUTPATIENT
Start: 2021-01-14 | End: 2021-10-06

## 2021-01-14 RX ORDER — PRAMIPEXOLE DIHYDROCHLORIDE 0.25 MG/1
0.25 TABLET ORAL EVERY EVENING
Qty: 90 TABLET | Refills: 1 | Status: SHIPPED | OUTPATIENT
Start: 2021-01-14 | End: 2021-08-02

## 2021-01-18 DIAGNOSIS — I10 ESSENTIAL HYPERTENSION: ICD-10-CM

## 2021-01-18 RX ORDER — AMLODIPINE BESYLATE 10 MG/1
TABLET ORAL
Qty: 90 TABLET | Refills: 1 | Status: SHIPPED | OUTPATIENT
Start: 2021-01-18 | End: 2021-02-03

## 2021-01-22 DIAGNOSIS — E11.9 TYPE 2 DIABETES MELLITUS WITHOUT COMPLICATION, WITHOUT LONG-TERM CURRENT USE OF INSULIN (HCC): ICD-10-CM

## 2021-01-22 DIAGNOSIS — I10 ESSENTIAL HYPERTENSION: Primary | ICD-10-CM

## 2021-01-25 ENCOUNTER — CLINICAL SUPPORT (OUTPATIENT)
Dept: FAMILY MEDICINE CLINIC | Facility: CLINIC | Age: 36
End: 2021-01-25

## 2021-01-26 LAB
ALBUMIN SERPL-MCNC: 4 G/DL (ref 3.8–4.8)
ALBUMIN/GLOB SERPL: 1.3 {RATIO} (ref 1.2–2.2)
ALP SERPL-CCNC: 84 IU/L (ref 39–117)
ALT SERPL-CCNC: 28 IU/L (ref 0–32)
AST SERPL-CCNC: 22 IU/L (ref 0–40)
BASOPHILS # BLD AUTO: 0.1 X10E3/UL (ref 0–0.2)
BASOPHILS NFR BLD AUTO: 1 %
BILIRUB SERPL-MCNC: <0.2 MG/DL (ref 0–1.2)
BUN SERPL-MCNC: 8 MG/DL (ref 6–20)
BUN/CREAT SERPL: 9 (ref 9–23)
CALCIUM SERPL-MCNC: 9.3 MG/DL (ref 8.7–10.2)
CHLORIDE SERPL-SCNC: 103 MMOL/L (ref 96–106)
CO2 SERPL-SCNC: 20 MMOL/L (ref 20–29)
CREAT SERPL-MCNC: 0.85 MG/DL (ref 0.57–1)
EOSINOPHIL # BLD AUTO: 0.1 X10E3/UL (ref 0–0.4)
EOSINOPHIL NFR BLD AUTO: 1 %
ERYTHROCYTE [DISTWIDTH] IN BLOOD BY AUTOMATED COUNT: 13.6 % (ref 11.7–15.4)
GLOBULIN SER CALC-MCNC: 3.2 G/DL (ref 1.5–4.5)
GLUCOSE SERPL-MCNC: 91 MG/DL (ref 65–99)
HBA1C MFR BLD: 5.3 % (ref 4.8–5.6)
HCT VFR BLD AUTO: 41.4 % (ref 34–46.6)
HGB BLD-MCNC: 14.1 G/DL (ref 11.1–15.9)
IMM GRANULOCYTES # BLD AUTO: 0 X10E3/UL (ref 0–0.1)
IMM GRANULOCYTES NFR BLD AUTO: 0 %
LYMPHOCYTES # BLD AUTO: 3.3 X10E3/UL (ref 0.7–3.1)
LYMPHOCYTES NFR BLD AUTO: 29 %
MCH RBC QN AUTO: 28.9 PG (ref 26.6–33)
MCHC RBC AUTO-ENTMCNC: 34.1 G/DL (ref 31.5–35.7)
MCV RBC AUTO: 85 FL (ref 79–97)
MONOCYTES # BLD AUTO: 0.6 X10E3/UL (ref 0.1–0.9)
MONOCYTES NFR BLD AUTO: 6 %
NEUTROPHILS # BLD AUTO: 7.2 X10E3/UL (ref 1.4–7)
NEUTROPHILS NFR BLD AUTO: 63 %
PLATELET # BLD AUTO: 414 X10E3/UL (ref 150–450)
POTASSIUM SERPL-SCNC: 4.2 MMOL/L (ref 3.5–5.2)
PROT SERPL-MCNC: 7.2 G/DL (ref 6–8.5)
RBC # BLD AUTO: 4.88 X10E6/UL (ref 3.77–5.28)
SODIUM SERPL-SCNC: 139 MMOL/L (ref 134–144)
WBC # BLD AUTO: 11.3 X10E3/UL (ref 3.4–10.8)

## 2021-01-29 ENCOUNTER — TELEPHONE (OUTPATIENT)
Dept: FAMILY MEDICINE CLINIC | Facility: CLINIC | Age: 36
End: 2021-01-29

## 2021-01-29 ENCOUNTER — OFFICE VISIT (OUTPATIENT)
Dept: FAMILY MEDICINE CLINIC | Facility: CLINIC | Age: 36
End: 2021-01-29

## 2021-01-29 VITALS
HEART RATE: 137 BPM | WEIGHT: 199.8 LBS | TEMPERATURE: 97.7 F | HEIGHT: 64 IN | BODY MASS INDEX: 34.11 KG/M2 | SYSTOLIC BLOOD PRESSURE: 140 MMHG | OXYGEN SATURATION: 94 % | DIASTOLIC BLOOD PRESSURE: 99 MMHG

## 2021-01-29 DIAGNOSIS — R53.1 GENERAL WEAKNESS: ICD-10-CM

## 2021-01-29 DIAGNOSIS — M25.511 ACUTE PAIN OF RIGHT SHOULDER: Primary | ICD-10-CM

## 2021-01-29 DIAGNOSIS — M79.18 MYOFASCIAL MUSCLE PAIN: ICD-10-CM

## 2021-01-29 DIAGNOSIS — R40.0 DAYTIME SOMNOLENCE: ICD-10-CM

## 2021-01-29 DIAGNOSIS — R53.83 FATIGUE, UNSPECIFIED TYPE: ICD-10-CM

## 2021-01-29 DIAGNOSIS — G90.A POTS (POSTURAL ORTHOSTATIC TACHYCARDIA SYNDROME): ICD-10-CM

## 2021-01-29 DIAGNOSIS — Z86.69 HISTORY OF OBSTRUCTIVE SLEEP APNEA: ICD-10-CM

## 2021-01-29 PROCEDURE — 99214 OFFICE O/P EST MOD 30 MIN: CPT | Performed by: NURSE PRACTITIONER

## 2021-01-29 RX ORDER — CLONAZEPAM 1 MG/1
1 TABLET ORAL 2 TIMES DAILY PRN
COMMUNITY
Start: 2021-01-18 | End: 2022-10-14 | Stop reason: SDUPTHER

## 2021-01-29 RX ORDER — PEN NEEDLE, DIABETIC 32GX 5/32"
NEEDLE, DISPOSABLE MISCELLANEOUS
COMMUNITY
Start: 2021-01-04 | End: 2021-02-03

## 2021-01-29 RX ORDER — HYDROCODONE BITARTRATE AND ACETAMINOPHEN 5; 325 MG/1; MG/1
1 TABLET ORAL 4 TIMES DAILY
COMMUNITY
Start: 2021-01-07 | End: 2022-09-22 | Stop reason: HOSPADM

## 2021-01-29 RX ORDER — TRAZODONE HYDROCHLORIDE 150 MG/1
150 TABLET ORAL
COMMUNITY
Start: 2021-01-18 | End: 2021-05-28

## 2021-01-29 RX ORDER — QUETIAPINE FUMARATE 400 MG/1
800 TABLET, FILM COATED ORAL
COMMUNITY
Start: 2020-11-28 | End: 2022-08-16 | Stop reason: SDUPTHER

## 2021-01-29 NOTE — PROGRESS NOTES
"Chief Complaint  Diabetes (3 month follow up) and referrals (pt needs multiple referrals)    Subjective          Chelita Rosenbaum presents to [facility] for   History of Present Illness  WALKER:  Patient states that ANTELMO Gannon OT has recommended that she have a rollator walker with a seat for ambulation so that she can safely ambulate independently.  She is currently receiving occupational therapy.  She also has right shoulder pain that limits her activities.  It has not been evaluated with xray.  SLEEP APNEA:  She previously has used a CPAP at night but not in the recent past.  She is questioning if she can proceed with a new sleep study.  She has the history as well as daytime somnolence, witnessed snoring and fatigue.    Objective   Vital Signs:   /99 (BP Location: Left arm, Patient Position: Sitting, Cuff Size: Large Adult)   Pulse (!) 137   Temp 97.7 °F (36.5 °C) (Infrared)   Ht 162.6 cm (64.02\")   Wt 90.6 kg (199 lb 12.8 oz)   SpO2 94%   BMI 34.28 kg/m²     Physical Exam  Vitals signs and nursing note reviewed.   Constitutional:       General: She is not in acute distress.     Appearance: Normal appearance. She is well-developed. She is obese. She is not diaphoretic.   HENT:      Head: Normocephalic and atraumatic.   Eyes:      Conjunctiva/sclera: Conjunctivae normal.      Pupils: Pupils are equal, round, and reactive to light.   Neck:      Musculoskeletal: Normal range of motion and neck supple.   Cardiovascular:      Rate and Rhythm: Regular rhythm. Tachycardia present.      Heart sounds: Normal heart sounds. No murmur.   Pulmonary:      Effort: Pulmonary effort is normal. No respiratory distress.      Breath sounds: Normal breath sounds.   Abdominal:      General: Bowel sounds are normal. There is no distension.      Palpations: Abdomen is soft.      Tenderness: There is no abdominal tenderness.   Musculoskeletal:         General: Tenderness present.      " Comments: Tenderness and decreased ROM of the right shoulder.   Skin:     General: Skin is warm and dry.      Capillary Refill: Capillary refill takes less than 2 seconds.      Findings: No erythema.   Neurological:      Mental Status: She is alert and oriented to person, place, and time.   Psychiatric:         Behavior: Behavior normal.         Thought Content: Thought content normal.         Judgment: Judgment normal.        Result Review :                 Assessment and Plan    Problem List Items Addressed This Visit        Cardiac and Vasculature    POTS (postural orthostatic tachycardia syndrome)    Overview     With some elements of POTS           Relevant Orders    Walker    Overnight Sleep Oximetry Study      Other Visit Diagnoses     Acute pain of right shoulder    -  Primary    Relevant Orders    XR Shoulder 2+ View Right    Walker    History of obstructive sleep apnea        Relevant Orders    Overnight Sleep Oximetry Study    General weakness        Relevant Orders    Walker    Myofascial muscle pain        Relevant Orders    Walker    Daytime somnolence        Relevant Orders    Overnight Sleep Oximetry Study    Fatigue, unspecified type        Relevant Orders    Overnight Sleep Oximetry Study          Follow Up   Return in about 4 weeks (around 2/26/2021) for Recheck with [facility].  Patient was given instructions and counseling regarding her condition or for health maintenance advice. Please see specific information pulled into the AVS if appropriate.

## 2021-02-02 DIAGNOSIS — I10 ESSENTIAL HYPERTENSION: ICD-10-CM

## 2021-02-03 RX ORDER — AMLODIPINE BESYLATE 10 MG/1
TABLET ORAL
Qty: 90 TABLET | Refills: 1 | Status: SHIPPED | OUTPATIENT
Start: 2021-02-03 | End: 2022-02-08

## 2021-02-03 RX ORDER — PEN NEEDLE, DIABETIC 32GX 5/32"
NEEDLE, DISPOSABLE MISCELLANEOUS
Qty: 30 EACH | Refills: 5 | Status: SHIPPED | OUTPATIENT
Start: 2021-02-03 | End: 2021-03-05

## 2021-02-12 DIAGNOSIS — M25.511 ACUTE PAIN OF RIGHT SHOULDER: ICD-10-CM

## 2021-02-22 ENCOUNTER — OFFICE VISIT (OUTPATIENT)
Dept: FAMILY MEDICINE CLINIC | Facility: CLINIC | Age: 36
End: 2021-02-22

## 2021-02-22 VITALS
HEART RATE: 139 BPM | SYSTOLIC BLOOD PRESSURE: 119 MMHG | DIASTOLIC BLOOD PRESSURE: 91 MMHG | OXYGEN SATURATION: 98 % | TEMPERATURE: 97.7 F | WEIGHT: 196.2 LBS | HEIGHT: 64 IN | BODY MASS INDEX: 33.49 KG/M2

## 2021-02-22 DIAGNOSIS — M25.511 CHRONIC RIGHT SHOULDER PAIN: ICD-10-CM

## 2021-02-22 DIAGNOSIS — G43.719 INTRACTABLE CHRONIC MIGRAINE WITHOUT AURA AND WITHOUT STATUS MIGRAINOSUS: ICD-10-CM

## 2021-02-22 DIAGNOSIS — S89.92XA LEFT KNEE INJURY, INITIAL ENCOUNTER: Primary | ICD-10-CM

## 2021-02-22 DIAGNOSIS — G89.29 CHRONIC RIGHT SHOULDER PAIN: ICD-10-CM

## 2021-02-22 DIAGNOSIS — R11.2 NAUSEA AND VOMITING, INTRACTABILITY OF VOMITING NOT SPECIFIED, UNSPECIFIED VOMITING TYPE: ICD-10-CM

## 2021-02-22 DIAGNOSIS — J45.40 MODERATE PERSISTENT ASTHMA WITHOUT COMPLICATION: ICD-10-CM

## 2021-02-22 DIAGNOSIS — S83.412A SPRAIN OF MEDIAL COLLATERAL LIGAMENT OF LEFT KNEE, INITIAL ENCOUNTER: ICD-10-CM

## 2021-02-22 PROCEDURE — 99214 OFFICE O/P EST MOD 30 MIN: CPT | Performed by: FAMILY MEDICINE

## 2021-02-22 RX ORDER — ALBUTEROL SULFATE 90 UG/1
2 AEROSOL, METERED RESPIRATORY (INHALATION) EVERY 4 HOURS PRN
Qty: 18 G | Refills: 5 | Status: SHIPPED | OUTPATIENT
Start: 2021-02-22

## 2021-02-22 RX ORDER — PROMETHAZINE HYDROCHLORIDE 25 MG/1
25 TABLET ORAL EVERY 6 HOURS PRN
Qty: 30 TABLET | Refills: 0 | Status: SHIPPED | OUTPATIENT
Start: 2021-02-22 | End: 2021-11-12 | Stop reason: SDUPTHER

## 2021-03-05 DIAGNOSIS — E11.9 TYPE 2 DIABETES MELLITUS WITHOUT COMPLICATION, WITHOUT LONG-TERM CURRENT USE OF INSULIN (HCC): ICD-10-CM

## 2021-03-05 RX ORDER — PEN NEEDLE, DIABETIC 32GX 5/32"
NEEDLE, DISPOSABLE MISCELLANEOUS
Qty: 30 EACH | Refills: 0 | Status: SHIPPED | OUTPATIENT
Start: 2021-03-05 | End: 2021-04-01

## 2021-03-05 RX ORDER — LIRAGLUTIDE 6 MG/ML
INJECTION SUBCUTANEOUS
Qty: 3 ML | Refills: 2 | Status: SHIPPED | OUTPATIENT
Start: 2021-03-05 | End: 2021-06-03

## 2021-03-22 ENCOUNTER — TELEPHONE (OUTPATIENT)
Dept: FAMILY MEDICINE CLINIC | Facility: CLINIC | Age: 36
End: 2021-03-22

## 2021-03-22 NOTE — TELEPHONE ENCOUNTER
Caller: REMA    Relationship:     Best call back number: [phone]    [facility] [employer] WILL REFAX DOCUMENT FOR JE LANGE TO BE COMPLETED    Who is requesting this form or medical record from you: PATIENT        Timeframe paperwork needed: ASAP

## 2021-04-01 RX ORDER — PEN NEEDLE, DIABETIC 32GX 5/32"
NEEDLE, DISPOSABLE MISCELLANEOUS
Qty: 30 EACH | Refills: 0 | Status: SHIPPED | OUTPATIENT
Start: 2021-04-01 | End: 2021-05-03

## 2021-04-12 DIAGNOSIS — E78.2 MIXED HYPERLIPIDEMIA: ICD-10-CM

## 2021-04-12 DIAGNOSIS — E11.9 TYPE 2 DIABETES MELLITUS WITHOUT COMPLICATION, WITHOUT LONG-TERM CURRENT USE OF INSULIN (HCC): ICD-10-CM

## 2021-04-12 RX ORDER — ATORVASTATIN CALCIUM 40 MG/1
TABLET, FILM COATED ORAL
Qty: 90 TABLET | Refills: 1 | Status: SHIPPED | OUTPATIENT
Start: 2021-04-12 | End: 2021-10-06

## 2021-04-12 RX ORDER — EZETIMIBE 10 MG/1
TABLET ORAL
Qty: 90 TABLET | Refills: 1 | Status: SHIPPED | OUTPATIENT
Start: 2021-04-12 | End: 2021-10-06

## 2021-04-15 DIAGNOSIS — M79.18 MYOFASCIAL MUSCLE PAIN: ICD-10-CM

## 2021-04-15 RX ORDER — TIZANIDINE 4 MG/1
TABLET ORAL
Qty: 30 TABLET | Refills: 2 | Status: SHIPPED | OUTPATIENT
Start: 2021-04-15 | End: 2021-07-19 | Stop reason: ALTCHOICE

## 2021-05-03 RX ORDER — PEN NEEDLE, DIABETIC 32GX 5/32"
NEEDLE, DISPOSABLE MISCELLANEOUS
Qty: 30 EACH | Refills: 5 | Status: SHIPPED | OUTPATIENT
Start: 2021-05-03 | End: 2021-11-05

## 2021-05-05 DIAGNOSIS — I10 ESSENTIAL HYPERTENSION: ICD-10-CM

## 2021-05-05 RX ORDER — SPIRONOLACTONE 25 MG/1
TABLET ORAL
Qty: 30 TABLET | Refills: 2 | Status: SHIPPED | OUTPATIENT
Start: 2021-05-05 | End: 2021-08-02

## 2021-05-28 ENCOUNTER — OFFICE VISIT (OUTPATIENT)
Dept: FAMILY MEDICINE CLINIC | Facility: CLINIC | Age: 36
End: 2021-05-28

## 2021-05-28 VITALS
BODY MASS INDEX: 31.76 KG/M2 | DIASTOLIC BLOOD PRESSURE: 92 MMHG | TEMPERATURE: 98.1 F | HEART RATE: 99 BPM | WEIGHT: 186 LBS | SYSTOLIC BLOOD PRESSURE: 126 MMHG | OXYGEN SATURATION: 96 % | HEIGHT: 64 IN

## 2021-05-28 DIAGNOSIS — E11.9 TYPE 2 DIABETES MELLITUS WITHOUT COMPLICATION, WITHOUT LONG-TERM CURRENT USE OF INSULIN (HCC): ICD-10-CM

## 2021-05-28 DIAGNOSIS — E78.2 MIXED HYPERLIPIDEMIA: ICD-10-CM

## 2021-05-28 DIAGNOSIS — G47.33 OBSTRUCTIVE SLEEP APNEA SYNDROME: Primary | ICD-10-CM

## 2021-05-28 DIAGNOSIS — I10 ESSENTIAL HYPERTENSION: ICD-10-CM

## 2021-05-28 PROCEDURE — 99214 OFFICE O/P EST MOD 30 MIN: CPT | Performed by: FAMILY MEDICINE

## 2021-05-28 RX ORDER — GUANFACINE 1 MG/1
2 TABLET ORAL NIGHTLY
COMMUNITY
End: 2021-09-02 | Stop reason: DRUGHIGH

## 2021-06-02 DIAGNOSIS — E11.9 TYPE 2 DIABETES MELLITUS WITHOUT COMPLICATION, WITHOUT LONG-TERM CURRENT USE OF INSULIN (HCC): ICD-10-CM

## 2021-06-03 RX ORDER — LIRAGLUTIDE 6 MG/ML
INJECTION SUBCUTANEOUS
Qty: 9 ML | Refills: 2 | Status: SHIPPED | OUTPATIENT
Start: 2021-06-03 | End: 2021-09-07

## 2021-07-01 DIAGNOSIS — G47.33 OBSTRUCTIVE SLEEP APNEA SYNDROME: Primary | ICD-10-CM

## 2021-07-06 DIAGNOSIS — R10.13 EPIGASTRIC PAIN: ICD-10-CM

## 2021-07-06 DIAGNOSIS — K21.9 GASTROESOPHAGEAL REFLUX DISEASE, UNSPECIFIED WHETHER ESOPHAGITIS PRESENT: ICD-10-CM

## 2021-07-06 DIAGNOSIS — E11.9 TYPE 2 DIABETES MELLITUS WITHOUT COMPLICATION, WITHOUT LONG-TERM CURRENT USE OF INSULIN (HCC): ICD-10-CM

## 2021-07-06 RX ORDER — FAMOTIDINE 20 MG/1
TABLET, FILM COATED ORAL
Qty: 60 TABLET | Refills: 5 | Status: SHIPPED | OUTPATIENT
Start: 2021-07-06 | End: 2022-01-11

## 2021-07-06 NOTE — TELEPHONE ENCOUNTER
Patient needs a 3 month follow up appt scheduled for sometime in August. Patient is a diabetic. Please contact patient to schedule appt

## 2021-07-13 DIAGNOSIS — G47.33 OBSTRUCTIVE SLEEP APNEA SYNDROME: Primary | ICD-10-CM

## 2021-07-15 ENCOUNTER — TELEPHONE (OUTPATIENT)
Dept: FAMILY MEDICINE CLINIC | Facility: CLINIC | Age: 36
End: 2021-07-15

## 2021-07-15 NOTE — TELEPHONE ENCOUNTER
Caller: MARTHA     Relationship:      Best call back number: [phone]    What is the best time to reach you: ANYTIME FROM 8 AM TO 5 PM     Who are you requesting to speak with (clinical staff, provider,  specific staff member): CLINICAL     Do you know the name of the person who called: CLINICAL   What was the call regarding: STATES SHE WILL NEED CLINICAL NOTES FOR THE PRE AUTH FOR THE SLEEP STUDY     Do you require a callback: YES

## 2021-07-19 ENCOUNTER — OFFICE VISIT (OUTPATIENT)
Dept: NEUROLOGY | Facility: CLINIC | Age: 36
End: 2021-07-19

## 2021-07-19 VITALS
RESPIRATION RATE: 18 BRPM | DIASTOLIC BLOOD PRESSURE: 80 MMHG | SYSTOLIC BLOOD PRESSURE: 130 MMHG | BODY MASS INDEX: 32.78 KG/M2 | WEIGHT: 192 LBS | HEIGHT: 64 IN | HEART RATE: 98 BPM

## 2021-07-19 DIAGNOSIS — R26.89 IMBALANCE: ICD-10-CM

## 2021-07-19 DIAGNOSIS — G43.119 INTRACTABLE MIGRAINE WITH AURA WITHOUT STATUS MIGRAINOSUS: ICD-10-CM

## 2021-07-19 DIAGNOSIS — R51.9 CHRONIC DAILY HEADACHE: Primary | ICD-10-CM

## 2021-07-19 PROCEDURE — 99204 OFFICE O/P NEW MOD 45 MIN: CPT | Performed by: PSYCHIATRY & NEUROLOGY

## 2021-07-19 RX ORDER — DOXEPIN HYDROCHLORIDE 6 MG/1
TABLET ORAL NIGHTLY
COMMUNITY
End: 2022-05-02

## 2021-07-19 RX ORDER — VERAPAMIL HYDROCHLORIDE 120 MG/1
120 CAPSULE, EXTENDED RELEASE ORAL EVERY MORNING
COMMUNITY
End: 2022-05-02

## 2021-07-19 RX ORDER — PREGABALIN 75 MG/1
CAPSULE ORAL
COMMUNITY
End: 2022-04-18
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
## 2021-07-19 NOTE — PATIENT INSTRUCTIONS
Patient to take out all piercings prior to MRI.  Patient not be climbing or using sharp cutting pills and have caution work around hot fire/stove/grill/water.  Patient to get with PCP about weight and diet control

## 2021-07-27 ENCOUNTER — CLINICAL SUPPORT (OUTPATIENT)
Dept: FAMILY MEDICINE CLINIC | Facility: CLINIC | Age: 36
End: 2021-07-27

## 2021-07-29 ENCOUNTER — DOCUMENTATION (OUTPATIENT)
Dept: NEUROLOGY | Facility: CLINIC | Age: 36
End: 2021-07-29

## 2021-07-29 NOTE — PROGRESS NOTES
I spoke to patient by phone in regards to her lab results.  I did let her know that our NP, Lindy Garza did review the labs and she recommends she follow up with her family doctor as her lipids are still elevated.  Patient voices understanding and will contact her family doctor.  I will mail her a copy of the lab work.

## 2021-08-02 DIAGNOSIS — M79.18 MYOFASCIAL MUSCLE PAIN: ICD-10-CM

## 2021-08-02 DIAGNOSIS — G25.81 RLS (RESTLESS LEGS SYNDROME): ICD-10-CM

## 2021-08-02 DIAGNOSIS — I10 ESSENTIAL HYPERTENSION: ICD-10-CM

## 2021-08-02 RX ORDER — SPIRONOLACTONE 25 MG/1
TABLET ORAL
Qty: 30 TABLET | Refills: 2 | Status: SHIPPED | OUTPATIENT
Start: 2021-08-02 | End: 2021-11-08

## 2021-08-02 RX ORDER — PRAMIPEXOLE DIHYDROCHLORIDE 0.25 MG/1
0.25 TABLET ORAL EVERY EVENING
Qty: 90 TABLET | Refills: 1 | Status: SHIPPED | OUTPATIENT
Start: 2021-08-02 | End: 2022-02-08

## 2021-08-02 RX ORDER — TIZANIDINE 4 MG/1
TABLET ORAL
Qty: 30 TABLET | Refills: 2 | OUTPATIENT
Start: 2021-08-02

## 2021-08-02 NOTE — TELEPHONE ENCOUNTER
Zanaflex original prescription was discontinued on 7/19/2021 by Loni Ledesma LPN      Last OV: 5.28.21  No follow up scheduled

## 2021-08-06 DIAGNOSIS — Z01.812 ENCOUNTER FOR PREPROCEDURE SCREENING LABORATORY TESTING FOR COVID-19: Primary | ICD-10-CM

## 2021-08-06 DIAGNOSIS — Z20.822 ENCOUNTER FOR PREPROCEDURE SCREENING LABORATORY TESTING FOR COVID-19: Primary | ICD-10-CM

## 2021-08-17 ENCOUNTER — HOSPITAL ENCOUNTER (OUTPATIENT)
Dept: ULTRASOUND IMAGING | Facility: HOSPITAL | Age: 36
Discharge: HOME | End: 2021-08-17

## 2021-08-17 ENCOUNTER — HOSPITAL ENCOUNTER (OUTPATIENT)
Dept: MRI IMAGING | Facility: HOSPITAL | Age: 36
Discharge: HOME | End: 2021-08-17

## 2021-08-17 DIAGNOSIS — G47.33 OBSTRUCTIVE SLEEP APNEA SYNDROME: ICD-10-CM

## 2021-08-17 DIAGNOSIS — G43.119 INTRACTABLE MIGRAINE WITH AURA WITHOUT STATUS MIGRAINOSUS: ICD-10-CM

## 2021-08-17 DIAGNOSIS — R26.89 IMBALANCE: ICD-10-CM

## 2021-08-17 DIAGNOSIS — R51.9 CHRONIC DAILY HEADACHE: ICD-10-CM

## 2021-08-17 PROCEDURE — 93880 EXTRACRANIAL BILAT STUDY: CPT

## 2021-08-17 PROCEDURE — 70551 MRI BRAIN STEM W/O DYE: CPT

## 2021-08-17 PROCEDURE — 93880 EXTRACRANIAL BILAT STUDY: CPT | Performed by: SURGERY

## 2021-08-18 ENCOUNTER — TELEPHONE (OUTPATIENT)
Dept: NEUROLOGY | Facility: CLINIC | Age: 36
End: 2021-08-18

## 2021-08-18 DIAGNOSIS — M79.18 MYOFASCIAL MUSCLE PAIN: ICD-10-CM

## 2021-08-18 RX ORDER — TIZANIDINE 4 MG/1
TABLET ORAL
Qty: 30 TABLET | Refills: 2 | OUTPATIENT
Start: 2021-08-18

## 2021-08-18 NOTE — TELEPHONE ENCOUNTER
States Tizanidine was DC'd on 7/19/21 by Loni Ledesma LPN. Did not see this medication listed in patient's chart at this time.

## 2021-08-18 NOTE — TELEPHONE ENCOUNTER
The original prescription was discontinued on 7/19/2021 by Loni Ledesma LPN for the following reason: Discontinued by another clinician.

## 2021-08-18 NOTE — TELEPHONE ENCOUNTER
Caller: JE    Relationship: SELF    Best call back number: [phone]    Caller requesting test results: PT    What test was performed: MRI BRAIN W/O AND US CAROTID BILAT    When was the test performed: 8-17-21    Where was the test performed:  [facility]

## 2021-08-19 DIAGNOSIS — G47.33 OBSTRUCTIVE SLEEP APNEA SYNDROME: Primary | ICD-10-CM

## 2021-09-02 ENCOUNTER — OFFICE VISIT (OUTPATIENT)
Dept: NEUROLOGY | Facility: CLINIC | Age: 36
End: 2021-09-02

## 2021-09-02 VITALS
HEART RATE: 87 BPM | OXYGEN SATURATION: 97 % | SYSTOLIC BLOOD PRESSURE: 128 MMHG | DIASTOLIC BLOOD PRESSURE: 84 MMHG | BODY MASS INDEX: 31.76 KG/M2 | HEIGHT: 64 IN | WEIGHT: 186 LBS

## 2021-09-02 DIAGNOSIS — M54.2 NECK PAIN: ICD-10-CM

## 2021-09-02 DIAGNOSIS — G43.119 INTRACTABLE MIGRAINE WITH AURA WITHOUT STATUS MIGRAINOSUS: Primary | ICD-10-CM

## 2021-09-02 DIAGNOSIS — G44.86 CERVICOGENIC HEADACHE: ICD-10-CM

## 2021-09-02 PROCEDURE — 99214 OFFICE O/P EST MOD 30 MIN: CPT | Performed by: NURSE PRACTITIONER

## 2021-09-02 RX ORDER — GUANFACINE 3 MG/1
3 TABLET, EXTENDED RELEASE ORAL DAILY
COMMUNITY
Start: 2021-07-29 | End: 2021-11-12

## 2021-09-02 NOTE — PROGRESS NOTES
Neurology Progress Note      Chief Complaint:    Migraine    Subjective     Subjective:  Chelita Rosenbaum is a 35 y.o. female who presents today for migraine follow-up.  She was last seen by Dr. Silva.  She continues to complain of migraine with associated nausea, vomiting, photophobia, and phonophobia.  This has not changed in character or quality.  She describes them as occurring at least 3-4 times per week or around 12 times per month.  She does have other intermittent non-migrainous headache. She mentions a sharp throbbing pain in all over with no specific area of pain.  She mentions her daily headaches as being located at either the base of her skull or in the frontal area.  She does mention a history of cervical disc disease which may be contributory to her headaches.  She has tried and failed many medications such as Imitrex, Topamax, beta blockers, verapamil, and amitriptyline.  She is contraindicated for triptan medications related to her underlying heart disease at this time.     Past Medical History:   Diagnosis Date   • Arrhythmia    • Arthropathy of lumbar facet joint    • Asthma    • Bipolar disorder (CMS/HCC)    • Cervico-occipital neuralgia    • Constipation    • Diabetes mellitus (CMS/HCC)    • Disorder of small intestine     thicking jejunum, delayed transit      • Diverticular disease of colon    • Drug therapy     Other long term (current) drug therapy      • Dysphagia    • Encounter for contraceptive management     other   • Encounter for surveillance of contraceptives     other   • Epigastric pain    • Generalized anxiety disorder    • H/O colonoscopy with polypectomy    • Hirsutism    • Hyperlipidemia    • Hyperprolactinemia (CMS/HCC)    • Hypertension    • Low back pain    • Menstruation disorder     Other disorders of menstruation and other abnormal bleeding from female genital tract      • Migraine aura, persistent     Migraine - w/ aura of spots      • Myofascial pain    • Nausea    •  Nausea and vomiting    • Pap smear for cervical cancer screening    • PCOS (polycystic ovarian syndrome)    • Periumbilical pain    • POTS (postural orthostatic tachycardia syndrome)    • Sleep apnea    • Surgical follow-up care    • Tachycardia      Past Surgical History:   Procedure Laterality Date   • ABDOMINAL SURGERY  2014    Anesth, surgery of abdomen (1)      • CAPSULE ENDOSCOPY  2015    [facility], capsule endoscopy 89667 (1)      •  SECTION  2010     Section (3)      • CHOLECYSTECTOMY  05/15/2003    Cholecystectomy, laparoscopic (1)      • COLON RESECTION     • COLONOSCOPY     • DILATATION AND CURETTAGE  10/21/2013    D&C (1)      • ENDOSCOPY  2015    EGD w/ biopsy 04739 (1)      • INJECTION OF MEDICATION  2016    Injection for nerve block (1)      • SUBTOTAL HYSTERECTOMY       Family History   Problem Relation Age of Onset   • Heart disease Mother    • Asthma Father    • Diabetes Father    • Heart disease Father    • Hypertension Father    • No Known Problems Brother      Social History     Tobacco Use   • Smoking status: Never Smoker   • Smokeless tobacco: Never Used   Substance Use Topics   • Alcohol use: No   • Drug use: No     Medications:  Current Outpatient Medications   Medication Sig Dispense Refill   • albuterol sulfate  (90 Base) MCG/ACT inhaler Inhale 2 puffs Every 4 (Four) Hours As Needed for Wheezing. 18 g 5   • amLODIPine (NORVASC) 10 MG tablet TAKE ONE TABLET BY MOUTH DAILY 90 tablet 1   • atorvastatin (LIPITOR) 40 MG tablet TAKE ONE TABLET BY MOUTH DAILY 90 tablet 1   • BD Pen Needle Leena U/F 32G X 4 MM misc USE ONCE DAILY WITH VICTOZA 30 each 5   • Blood Glucose Monitoring Suppl (ONE TOUCH BASIC SYSTEM) w/Device kit 1 Device Daily. 1 each 0   • clonazePAM (KlonoPIN) 1 MG tablet Take 1 mg by mouth 2 (Two) Times a Day As Needed.     • Doxepin HCl 6 MG tablet Take  by mouth Every Night.     • ezetimibe (ZETIA) 10 MG tablet TAKE 1 TABLET BY  MOUTH EVERY DAY 90 tablet 1   • famotidine (PEPCID) 20 MG tablet TAKE 1 TABLET BY MOUTH TWICE A DAY 60 tablet 5   • glucose blood (ONE TOUCH ULTRA TEST) test strip Use as instructed 50 each 12   • guanFACINE HCl ER 3 MG tablet sustained-release 24 hour Take 3 mg by mouth Daily.     • HYDROcodone-acetaminophen (NORCO) 5-325 MG per tablet Take 5-325 tablets by mouth 2 (two) times a day.     • isosorbide mononitrate (IMDUR) 30 MG 24 hr tablet TAKE 1 TABLET BY MOUTH DAILY 90 tablet 1   • Lancets (ACCU-CHEK MULTICLIX) lancets BID blood sugar check 102 each 11   • meclizine (ANTIVERT) 25 MG tablet Take 1 tablet by mouth 3 (Three) Times a Day As Needed for dizziness. 90 tablet 1   • metFORMIN (GLUCOPHAGE) 1000 MG tablet TAKE ONE TABLET BY MOUTH TWO TIMES A DAY 60 tablet 1   • pramipexole (MIRAPEX) 0.25 MG tablet TAKE 1 TABLET BY MOUTH EVERY EVENING 90 tablet 1   • pregabalin (LYRICA) 75 MG capsule Take 2 caps po tid     • promethazine (PHENERGAN) 25 MG tablet Take 1 tablet by mouth Every 6 (Six) Hours As Needed for Nausea or Vomiting. 30 tablet 0   • QUEtiapine (SEROquel) 400 MG tablet Take 800 mg by mouth every night at bedtime.     • spironolactone (ALDACTONE) 25 MG tablet TAKE ONE TABLET BY MOUTH EVERY DAY 30 tablet 2   • verapamil ER (VERELAN) 120 MG 24 hr capsule Take 120 mg by mouth Every Morning.     • Victoza 18 MG/3ML solution pen-injector injection INJECT 1.8 MG UNDER THE SKIN ONCE DAILY 9 mL 2   • galcanezumab-gnlm (EMGALITY) 120 MG/ML auto-injector pen Inject 1 mL under the skin into the appropriate area as directed Every 30 (Thirty) Days. 1 pen 2   • ubrogepant (ubrogepant) 100 MG tablet Take 1 tablet by mouth As Needed (Migraine). 10 tablet 2     No current facility-administered medications for this visit.     Current outpatient and discharge medications have been reconciled for the patient.  Reviewed by: ARIELLE Garcia      Allergies:    Apriso [mesalamine er], Lamotrigine, Lithium, Tramadol, and  Ultram [tramadol hcl]    Review of Systems:   Review of Systems   Eyes: Positive for photophobia (With migraine).   Cardiovascular: Positive for palpitations.   Gastrointestinal: Positive for nausea (With Migraine) and vomiting (With Migraine).   Neurological: Positive for syncope, weakness (Baseline neck and back problems.), numbness (Baseline) and headache. Negative for dizziness, facial asymmetry, speech difficulty and confusion.   All other systems reviewed and are negative.    Objective      Vital Signs  Heart Rate:  [87] 87  BP: (128)/(84) 128/84    Physical Exam:  Physical Exam  Vitals reviewed.   Constitutional:       Appearance: Normal appearance.   HENT:      Head: Normocephalic.   Eyes:      Extraocular Movements: Extraocular movements intact.      Pupils: Pupils are equal, round, and reactive to light.   Cardiovascular:      Rate and Rhythm: Normal rate and regular rhythm.      Pulses: Normal pulses.   Pulmonary:      Effort: Pulmonary effort is normal.   Musculoskeletal:         General: Normal range of motion.      Cervical back: Normal range of motion and neck supple.   Skin:     General: Skin is warm and dry.      Capillary Refill: Capillary refill takes less than 2 seconds.   Neurological:      Gait: Gait is intact.   Psychiatric:         Mood and Affect: Mood normal.     Neurologic Exam:  Mental Status:    -Awake. Alert. Oriented to person, place & time.  -No word finding difficulties.  -No aphasia.  -No dysarthria.  -Follows simple commands.     CN II:  Full visual fields with confrontation.  Pupils equally reactive to light.  CN III, IV, VI:  Extraocular muscles function intact with no nystagmus.  CN V:  Facial sensory is symmetric.  CN VII:  Facial motor symmetric.  CN VIII:  Gross hearing intact bilaterally.  CN IX/X:  Palate elevates symmetrically.  CN XI:  Shoulder shrug symmetric.  CN XII:  Tongue is midline on protrusion.     Motor: (strength out of 5:  1= minimal movement, 2 = movement in  plane of gravity, 3 = movement against gravity, 4 = movement against some resistance, 5 = full strength)     -4+/5 in bilateral biceps, triceps, brachioradialis, wrist extensors and intrinsic muscles of the hand.    -4+/5 in bilateral hip flexors, quadriceps, hamstrings, gastrocsoleus complex, anterior tibialis and extensor hallucis longus.       Deep Tendon Reflexes:  -Right              Biceps: 2+         Triceps: 2+      Brachioradialis: 2+              Patella: 1+       Ankle: 1+         Babinski:  negative  -Left              Biceps: 2+         Triceps: 2+      Brachioradialis: 2+              Patella: 1+       Ankle: 1+         Babinski:  negative    Tone (Modified Karen Scale):  No appreciable increase in tone or rigidity noted.     Sensory:  -Intact to light touch, pinprick BUE (C5-T1) and BLE (L2-S1).  -Some decrease sensation in no distribution.  This is chronic and as a result of comorbid disease.     Coordination:  -Finger to nose intact BUEs  -Heel to shin intact BLEs  -No ataxia     Gait  -No signs of ataxia  -ambulates unassisted     Results Review:    Lab Results   Component Value Date    GLUCOSE 121 (H) 07/29/2020    BUN 8 01/25/2021    CREATININE 0.85 01/25/2021    EGFRIFNONA 88 01/25/2021    EGFRIFAFRI 102 01/25/2021    BCR 9 01/25/2021    K 4.2 01/25/2021    CO2 20 01/25/2021    CALCIUM 9.3 01/25/2021    PROTENTOTREF 7.2 01/25/2021    ALBUMIN 4.0 01/25/2021    LABIL2 1.3 01/25/2021    AST 22 01/25/2021    ALT 28 01/25/2021     Lab Results   Component Value Date    WBC 11.3 (H) 01/25/2021    HGB 14.1 01/25/2021    HCT 41.4 01/25/2021    MCV 85 01/25/2021     01/25/2021     MRI Brain Without Contrast (08/17/2021 12:02)      US Carotid Bilateral (08/17/2021 12:58)       Assessment/Plan     Impression:  • Migraine with aura- intractable  • Daily headaches  • Degenerative neck disease    Plan:  • Start Emgality 120mg subcutaneously every 30 days.  Discussed side effect profile.     • Start  Ubrelvy 100mg PRN for migraine onset.  Can repeat dose in 2 hours if needed.      • PT for neck.  She has degenerative changes in her cervical spine and it may be contributory to her daily headaches and migraine.  I do believe PT will be a long-term benefit to her.    • Continue with Botox referral.     • Recommend lifestyle changes such as weight loss, proper nutrition and diet, and  regular physical activity as can be tolerated.      • Recommend avoidance of known triggers to migraine.    She presents with a significant history of migraine and has a relatively high burden of migraine with at least 15 per month and intermittent daily headache.  I do believe that CGRP medications are the best for her at this point as she has significant underlying comorbidity and cardiac disease.  From report and examination, she has significant neck pain and has fallen many times due to POTS diagnosis.  This leads me to believe some of her headaches may be cervicogenic in nature. We did discuss the findings from her previous studies in our visit today.  She expressed understanding of the current plan and will let me know of any concerns or changes in the interm.    The patient and I have discussed the plan of care and she is in full agreement at this time.     Follow-Up:  Return in about 2 months (around 11/2/2021) for Migraine follow-up.      Thien Jean Baptiste, ARIELLE  09/02/21  16:01 CDT

## 2021-09-07 DIAGNOSIS — E11.9 TYPE 2 DIABETES MELLITUS WITHOUT COMPLICATION, WITHOUT LONG-TERM CURRENT USE OF INSULIN (HCC): ICD-10-CM

## 2021-09-07 RX ORDER — LIRAGLUTIDE 6 MG/ML
INJECTION SUBCUTANEOUS
Qty: 9 ML | Refills: 2 | Status: SHIPPED | OUTPATIENT
Start: 2021-09-07 | End: 2021-12-07

## 2021-09-07 NOTE — TELEPHONE ENCOUNTER
Patient over due for annual wellness and labs. Please contact patient to schedule appt. And labs.

## 2021-09-22 ENCOUNTER — TELEPHONE (OUTPATIENT)
Dept: NEUROLOGY | Facility: CLINIC | Age: 36
End: 2021-09-22

## 2021-09-23 ENCOUNTER — TELEPHONE (OUTPATIENT)
Dept: NEUROLOGY | Facility: CLINIC | Age: 36
End: 2021-09-23

## 2021-09-23 NOTE — TELEPHONE ENCOUNTER
Provider: REGINE LUCIO    Caller: JE    Relationship to Patient: SELF    Phone Number: [phone]    Reason for Call:     CALLING TO SEE IF BOTOX HAS BEEN APPROVED? PLEASE ADVISE    When was the patient last seen:  9-2

## 2021-09-24 ENCOUNTER — TELEPHONE (OUTPATIENT)
Dept: FAMILY MEDICINE CLINIC | Facility: CLINIC | Age: 36
End: 2021-09-24

## 2021-09-24 NOTE — TELEPHONE ENCOUNTER
Caller: [employer]    Relationship: [employer]    Best call back number: [phone]    What form or medical record are you requesting: PATIENT'S SLEEP STUDY AND MOST RECENT OFFICE VISIT NOTES    Who is requesting this form or medical record from you: [employer]        Timeframe paperwork needed: ASAP    Additional notes: PATIENT'S SLEEP STUDY AND MOST RECENT OFFICE VISIT NOTES    FAX# [phone]

## 2021-09-29 NOTE — TELEPHONE ENCOUNTER
PATIENT CALLED BACK TO CHECK ON THE STATUS OF SCHEDULING HER BOTOX APPOINTMENT SINCE SHE WAS ADVISED IT WAS APPROVED. PLEASE REVIEW AND ADVISE.    [phone]  JE

## 2021-09-29 NOTE — TELEPHONE ENCOUNTER
Ariela who is patients  with [employer] states patient has called office and keeps getting told botox isn't approved but note here states it was approved. Please advise.

## 2021-10-06 DIAGNOSIS — G90.A POTS (POSTURAL ORTHOSTATIC TACHYCARDIA SYNDROME): ICD-10-CM

## 2021-10-06 DIAGNOSIS — I10 ESSENTIAL HYPERTENSION: ICD-10-CM

## 2021-10-06 DIAGNOSIS — E78.2 MIXED HYPERLIPIDEMIA: ICD-10-CM

## 2021-10-06 DIAGNOSIS — E11.9 TYPE 2 DIABETES MELLITUS WITHOUT COMPLICATION, WITHOUT LONG-TERM CURRENT USE OF INSULIN (HCC): ICD-10-CM

## 2021-10-06 RX ORDER — ISOSORBIDE MONONITRATE 30 MG/1
TABLET, EXTENDED RELEASE ORAL
Qty: 90 TABLET | Refills: 0 | Status: SHIPPED | OUTPATIENT
Start: 2021-10-06 | End: 2022-01-11

## 2021-10-06 RX ORDER — EZETIMIBE 10 MG/1
TABLET ORAL
Qty: 90 TABLET | Refills: 0 | Status: SHIPPED | OUTPATIENT
Start: 2021-10-06 | End: 2022-01-11

## 2021-10-06 RX ORDER — ATORVASTATIN CALCIUM 40 MG/1
TABLET, FILM COATED ORAL
Qty: 90 TABLET | Refills: 0 | Status: SHIPPED | OUTPATIENT
Start: 2021-10-06 | End: 2022-01-11

## 2021-10-25 ENCOUNTER — TELEPHONE (OUTPATIENT)
Dept: NEUROLOGY | Facility: CLINIC | Age: 36
End: 2021-10-25

## 2021-10-25 NOTE — TELEPHONE ENCOUNTER
emgality and the Ubrelvy both working but not completely eliminating headaches. She will continue with tx as directed and has Botox tx coming up. Pt verbally understood and will call if she has any other concerns.   LS

## 2021-11-03 ENCOUNTER — OFFICE VISIT (OUTPATIENT)
Dept: NEUROLOGY | Facility: CLINIC | Age: 36
End: 2021-11-03

## 2021-11-03 VITALS
SYSTOLIC BLOOD PRESSURE: 128 MMHG | BODY MASS INDEX: 48.65 KG/M2 | DIASTOLIC BLOOD PRESSURE: 60 MMHG | HEART RATE: 86 BPM | OXYGEN SATURATION: 99 % | RESPIRATION RATE: 16 BRPM | WEIGHT: 285 LBS | HEIGHT: 64 IN

## 2021-11-03 DIAGNOSIS — G47.33 OSA (OBSTRUCTIVE SLEEP APNEA): ICD-10-CM

## 2021-11-03 DIAGNOSIS — M54.2 NECK PAIN: ICD-10-CM

## 2021-11-03 DIAGNOSIS — G43.119 INTRACTABLE MIGRAINE WITH AURA WITHOUT STATUS MIGRAINOSUS: Primary | ICD-10-CM

## 2021-11-03 PROCEDURE — 99214 OFFICE O/P EST MOD 30 MIN: CPT | Performed by: NURSE PRACTITIONER

## 2021-11-03 RX ORDER — TRAZODONE HYDROCHLORIDE 150 MG/1
150 TABLET ORAL
COMMUNITY
Start: 2021-10-15 | End: 2021-11-03 | Stop reason: SDUPTHER

## 2021-11-03 NOTE — PROGRESS NOTES
Neurology Progress Note      Chief Complaint:    Migraine    Subjective     Subjective:  Chelita Rosenbaum is a 35 y.o. female who presents today for follow-up of migraine.  She is followed by Dr. Best Gannon MD for primary care.  At our last visit we started her on Emgality 120mg, Ubrelvy 100mg, and set her up for Botox.  She has yet to get her first botox appointment.  She states that she has seen a slight decrease in migraine burden and some decrease in intensity.  However, she still does have around 2 migraines per week at this point which are intermittently resolved with ubrelvy.  She will notice an increase in intensity and frequency of the migraines around 1 week prior to her next Emgality injection.  She notes no change in character or quality of her migraines.  At our last visit I did feel that her falls from POTS were potentially contributory to her neck pain and subsequently her pain at the base of her skull.  She has completed therapy and notes no change.  She does have other underlying co morbidities such as EVELYN, heart disease, and autonomic dysfunction.  She reports that when she has a spike in BP she will have onset of headaches.  She is currently having untreated EVELYN as she is awaiting CPAP to be given to her.  I do feel that this could be contributory to her headache burden.  She overall states she feels some better but does feel as if she could see better improvement.    Past Medical History:   Diagnosis Date   • Arrhythmia    • Arthropathy of lumbar facet joint    • Asthma    • Bipolar disorder (HCC)    • Cervico-occipital neuralgia    • Constipation    • Diabetes mellitus (HCC)    • Disorder of small intestine     thicking jejunum, delayed transit      • Diverticular disease of colon    • Drug therapy     Other long term (current) drug therapy      • Dysphagia    • Encounter for contraceptive management     other   • Encounter for surveillance of contraceptives     other   • Epigastric pain    •  Generalized anxiety disorder    • H/O colonoscopy with polypectomy    • Hirsutism    • Hyperlipidemia    • Hyperprolactinemia (HCC)    • Hypertension    • Low back pain    • Menstruation disorder     Other disorders of menstruation and other abnormal bleeding from female genital tract      • Migraine aura, persistent     Migraine - w/ aura of spots      • Myofascial pain    • Nausea    • Nausea and vomiting    • Pap smear for cervical cancer screening    • PCOS (polycystic ovarian syndrome)    • Periumbilical pain    • POTS (postural orthostatic tachycardia syndrome)    • Sleep apnea    • Surgical follow-up care    • Tachycardia      Past Surgical History:   Procedure Laterality Date   • ABDOMINAL SURGERY  2014    Anesth, surgery of abdomen (1)      • CAPSULE ENDOSCOPY  2015    GI Imaging, capsule endoscopy 97475 (1)      •  SECTION  2010     Section (3)      • CHOLECYSTECTOMY  05/15/2003    Cholecystectomy, laparoscopic (1)      • COLON RESECTION     • COLONOSCOPY     • DILATATION AND CURETTAGE  10/21/2013    D&C (1)      • ENDOSCOPY  2015    EGD w/ biopsy 56207 (1)      • INJECTION OF MEDICATION  2016    Injection for nerve block (1)      • SUBTOTAL HYSTERECTOMY       Family History   Problem Relation Age of Onset   • Heart disease Mother    • Asthma Father    • Diabetes Father    • Heart disease Father    • Hypertension Father    • No Known Problems Brother      Social History     Tobacco Use   • Smoking status: Never Smoker   • Smokeless tobacco: Never Used   Substance Use Topics   • Alcohol use: No   • Drug use: No     Medications:  Current Outpatient Medications   Medication Sig Dispense Refill   • albuterol sulfate  (90 Base) MCG/ACT inhaler Inhale 2 puffs Every 4 (Four) Hours As Needed for Wheezing. 18 g 5   • amLODIPine (NORVASC) 10 MG tablet TAKE ONE TABLET BY MOUTH DAILY 90 tablet 1   • atorvastatin (LIPITOR) 40 MG tablet TAKE ONE TABLET BY MOUTH DAILY 90  tablet 0   • BD Pen Needle Leena U/F 32G X 4 MM misc USE ONCE DAILY WITH VICTOZA 30 each 5   • Blood Glucose Monitoring Suppl (ONE TOUCH BASIC SYSTEM) w/Device kit 1 Device Daily. 1 each 0   • clonazePAM (KlonoPIN) 1 MG tablet Take 1 mg by mouth 2 (Two) Times a Day As Needed.     • Doxepin HCl 6 MG tablet Take  by mouth Every Night.     • ezetimibe (ZETIA) 10 MG tablet TAKE ONE TABLET BY MOUTH DAILY 90 tablet 0   • famotidine (PEPCID) 20 MG tablet TAKE 1 TABLET BY MOUTH TWICE A DAY 60 tablet 5   • galcanezumab-gnlm (EMGALITY) 120 MG/ML auto-injector pen Inject 1 mL under the skin into the appropriate area as directed Every 30 (Thirty) Days. 1 pen 2   • glucose blood (ONE TOUCH ULTRA TEST) test strip Use as instructed 50 each 12   • guanFACINE HCl ER 3 MG tablet sustained-release 24 hour Take 3 mg by mouth Daily.     • HYDROcodone-acetaminophen (NORCO) 5-325 MG per tablet Take 5-325 tablets by mouth 2 (two) times a day.     • isosorbide mononitrate (IMDUR) 30 MG 24 hr tablet TAKE 1 TABLET BY MOUTH EVERY DAY 90 tablet 0   • Lancets (ACCU-CHEK MULTICLIX) lancets BID blood sugar check 102 each 11   • meclizine (ANTIVERT) 25 MG tablet Take 1 tablet by mouth 3 (Three) Times a Day As Needed for dizziness. 90 tablet 1   • metFORMIN (GLUCOPHAGE) 1000 MG tablet TAKE ONE TABLET BY MOUTH TWO TIMES A  tablet 0   • pramipexole (MIRAPEX) 0.25 MG tablet TAKE 1 TABLET BY MOUTH EVERY EVENING 90 tablet 1   • pregabalin (LYRICA) 75 MG capsule Take 2 caps po tid     • promethazine (PHENERGAN) 25 MG tablet Take 1 tablet by mouth Every 6 (Six) Hours As Needed for Nausea or Vomiting. 30 tablet 0   • QUEtiapine (SEROquel) 400 MG tablet Take 800 mg by mouth every night at bedtime.     • spironolactone (ALDACTONE) 25 MG tablet TAKE ONE TABLET BY MOUTH EVERY DAY 30 tablet 2   • ubrogepant (ubrogepant) 100 MG tablet Take 1 tablet by mouth As Needed (Migraine). 10 tablet 2   • verapamil ER (VERELAN) 120 MG 24 hr capsule Take 120 mg by  mouth Every Morning.     • Victoza 18 MG/3ML solution pen-injector injection INJECT 1.8 MG UNDER THE SKIN ONCE DAILY 9 mL 2     No current facility-administered medications for this visit.     Current outpatient and discharge medications have been reconciled for the patient.  Reviewed by: ARIELLE Garcia      Allergies:    Apriso [mesalamine er], Lamotrigine, Lithium, Tramadol, and Ultram [tramadol hcl]    Review of Systems:   Review of Systems   Eyes: Positive for blurred vision and photophobia.   Cardiovascular: Positive for palpitations.   Gastrointestinal: Positive for nausea.   Musculoskeletal: Positive for arthralgias, back pain, neck pain and neck stiffness.   Neurological: Positive for dizziness, syncope, weakness and headache.   Psychiatric/Behavioral: Positive for sleep disturbance.   All other systems reviewed and are negative.        Objective      Vital Signs  Heart Rate:  [86] 86  Resp:  [16] 16  BP: (128)/(60) 128/60    Physical Exam:  Physical Exam  Vitals reviewed.   Constitutional:       Appearance: Normal appearance.   HENT:      Head: Normocephalic.      Mouth/Throat:      Dentition: Abnormal dentition.   Eyes:      Extraocular Movements: Extraocular movements intact.      Pupils: Pupils are equal, round, and reactive to light.   Cardiovascular:      Rate and Rhythm: Normal rate and regular rhythm.      Pulses: Normal pulses.   Pulmonary:      Effort: Pulmonary effort is normal.   Musculoskeletal:      Cervical back: Rigidity present. Pain with movement present. Decreased range of motion.   Skin:     General: Skin is warm and dry.      Capillary Refill: Capillary refill takes less than 2 seconds.   Neurological:      Gait: Gait is intact.   Psychiatric:         Mood and Affect: Mood normal.       Neurologic Exam:  Mental Status:    -Awake. Alert. Oriented to person, place & time.  -No word finding difficulties.  -No aphasia.  -No dysarthria.  -Follows simple commands.     CN II:  Full  visual fields with confrontation.  Pupils equally reactive to light.  CN III, IV, VI:  Extraocular muscles function intact with no nystagmus.  CN V:  Facial sensory is symmetric.  CN VII:  Facial motor symmetric.  CN VIII:  Gross hearing intact bilaterally.  CN IX/X:  Palate elevates symmetrically.  CN XI:  Shoulder shrug symmetric.  CN XII:  Tongue is midline on protrusion.     Motor: (strength out of 5:  1= minimal movement, 2 = movement in plane of gravity, 3 = movement against gravity, 4 = movement against some resistance, 5 = full strength)     -5/5 in bilateral biceps, triceps, brachioradialis, wrist extensors and intrinsic muscles of the hand.    -5/5 in bilateral hip flexors, quadriceps, hamstrings, gastrocsoleus complex, anterior tibialis and extensor hallucis longus.       Deep Tendon Reflexes:  -Right              Biceps: 2+         Triceps: 2+      Brachioradialis: 2+              Patella: 2+       Ankle: 2+         Babinski:  negative  -Left              Biceps: 2+         Triceps: 2+      Brachioradialis: 2+              Patella: 2+       Ankle: 2+         Babinski:  negative    Tone (Modified Karen Scale):  No appreciable increase in tone or rigidity noted.     Sensory:  -Intact to light touch, pinprick BUE (C5-T1) and BLE (L2-S1).     Coordination:  -Finger to nose intact BUEs  -Heel to shin intact BLEs  -No ataxia     Gait  -No signs of ataxia  -ambulates unassisted    Assessment/Plan     Impression:  • Migraine with aura  • Neck pain  • Hypertension  • EVELYN    Plan:  • Continue Emgality 120mg every 30 days.  I would like to wait another 3 months to determine efficacy as that will be around 5-6 months post initiating therapy.     • Continue with botox appointment on 12/8/2021.  I do feel that addition of this will help with her current migraine burden     • I have given samples of Nurtec for her to try.  If they are successful at aborting her migraines, we will switch her to Nurtec 75mg as needed  instead of the Ubrelvy 100mg as needed.  She will call in a few days to let me know of progress.     • OK to not continue with PT as this did not help her headaches.      · Recommend lifestyle changes such as weight loss, proper nutrition and diet, and  regular physical activity as can be tolerated.     · Recommend avoidance of known triggers to migraine    I would like to see how another 3 months of Emgality will help her in addition to botox.  If at our next follow-up she is still experiencing significant burden, we will try and switch her to Aimovig for additional preventative therapy.  I did explain this to her and she agrees and states understanding.      The patient and I have discussed the plan of care and she is in full agreement at this time.     Follow-Up:  Return in about 3 months (around 2/3/2022) for Migraine follow-up.      Thien Jean Baptiste, ARIELLE  11/03/21  14:16 CDT

## 2021-11-05 DIAGNOSIS — E11.9 TYPE 2 DIABETES MELLITUS WITHOUT COMPLICATION, WITHOUT LONG-TERM CURRENT USE OF INSULIN (HCC): Primary | ICD-10-CM

## 2021-11-08 DIAGNOSIS — I10 ESSENTIAL HYPERTENSION: ICD-10-CM

## 2021-11-08 RX ORDER — SPIRONOLACTONE 25 MG/1
TABLET ORAL
Qty: 30 TABLET | Refills: 2 | Status: SHIPPED | OUTPATIENT
Start: 2021-11-08 | End: 2022-02-08

## 2021-11-08 NOTE — TELEPHONE ENCOUNTER
Called Pt, scheduled wellness for 11/12/21, Pt will be in office 11/11/21 for annual labs, please enter for Pt to complete.

## 2021-11-09 ENCOUNTER — TELEPHONE (OUTPATIENT)
Dept: NEUROLOGY | Facility: CLINIC | Age: 36
End: 2021-11-09

## 2021-11-09 DIAGNOSIS — Z11.59 ENCOUNTER FOR HEPATITIS C SCREENING TEST FOR LOW RISK PATIENT: ICD-10-CM

## 2021-11-09 DIAGNOSIS — G43.119 INTRACTABLE MIGRAINE WITH AURA WITHOUT STATUS MIGRAINOSUS: Primary | ICD-10-CM

## 2021-11-09 DIAGNOSIS — E11.9 TYPE 2 DIABETES MELLITUS WITHOUT COMPLICATION, WITHOUT LONG-TERM CURRENT USE OF INSULIN (HCC): ICD-10-CM

## 2021-11-09 DIAGNOSIS — E78.2 MIXED HYPERLIPIDEMIA: ICD-10-CM

## 2021-11-09 DIAGNOSIS — I10 ESSENTIAL HYPERTENSION: Primary | ICD-10-CM

## 2021-11-09 DIAGNOSIS — Z00.00 ANNUAL PHYSICAL EXAM: ICD-10-CM

## 2021-11-09 RX ORDER — RIMEGEPANT SULFATE 75 MG/75MG
75 TABLET, ORALLY DISINTEGRATING ORAL DAILY PRN
Qty: 8 TABLET | Refills: 2 | Status: SHIPPED | OUTPATIENT
Start: 2021-11-09 | End: 2022-03-15 | Stop reason: SDUPTHER

## 2021-11-09 RX ORDER — PEN NEEDLE, DIABETIC 32GX 5/32"
NEEDLE, DISPOSABLE MISCELLANEOUS
Qty: 30 EACH | Refills: 5 | Status: SHIPPED | OUTPATIENT
Start: 2021-11-09 | End: 2022-07-28

## 2021-11-09 NOTE — TELEPHONE ENCOUNTER
Megan called from pharmacy- adv patient ended ubrelvy today, they got a rx for nurtec. She said the nurtec requires p/a so she will fax to clinic fax#.

## 2021-11-11 ENCOUNTER — CLINICAL SUPPORT (OUTPATIENT)
Dept: FAMILY MEDICINE CLINIC | Facility: CLINIC | Age: 36
End: 2021-11-11

## 2021-11-12 ENCOUNTER — OFFICE VISIT (OUTPATIENT)
Dept: FAMILY MEDICINE CLINIC | Facility: CLINIC | Age: 36
End: 2021-11-12

## 2021-11-12 VITALS — OXYGEN SATURATION: 97 % | WEIGHT: 188.2 LBS | HEIGHT: 64 IN | TEMPERATURE: 98.2 F | BODY MASS INDEX: 32.13 KG/M2

## 2021-11-12 DIAGNOSIS — Z00.00 ANNUAL PHYSICAL EXAM: Primary | ICD-10-CM

## 2021-11-12 DIAGNOSIS — I10 ESSENTIAL HYPERTENSION: ICD-10-CM

## 2021-11-12 DIAGNOSIS — K92.0 HEMATEMESIS WITH NAUSEA: ICD-10-CM

## 2021-11-12 DIAGNOSIS — G90.A POTS (POSTURAL ORTHOSTATIC TACHYCARDIA SYNDROME): ICD-10-CM

## 2021-11-12 DIAGNOSIS — R11.2 NAUSEA AND VOMITING, INTRACTABILITY OF VOMITING NOT SPECIFIED, UNSPECIFIED VOMITING TYPE: ICD-10-CM

## 2021-11-12 DIAGNOSIS — E11.9 TYPE 2 DIABETES MELLITUS WITHOUT COMPLICATION, WITHOUT LONG-TERM CURRENT USE OF INSULIN (HCC): ICD-10-CM

## 2021-11-12 DIAGNOSIS — Z23 NEED FOR IMMUNIZATION AGAINST INFLUENZA: ICD-10-CM

## 2021-11-12 DIAGNOSIS — E78.2 MIXED HYPERLIPIDEMIA: ICD-10-CM

## 2021-11-12 LAB
ALBUMIN SERPL-MCNC: 3.5 G/DL (ref 3.8–4.8)
ALBUMIN/GLOB SERPL: 1.1 {RATIO} (ref 1.2–2.2)
ALP SERPL-CCNC: 83 IU/L (ref 44–121)
ALT SERPL-CCNC: 39 IU/L (ref 0–32)
AST SERPL-CCNC: 28 IU/L (ref 0–40)
BASOPHILS # BLD AUTO: 0.1 X10E3/UL (ref 0–0.2)
BASOPHILS NFR BLD AUTO: 1 %
BILIRUB SERPL-MCNC: <0.2 MG/DL (ref 0–1.2)
BUN SERPL-MCNC: 9 MG/DL (ref 6–20)
BUN/CREAT SERPL: 12 (ref 9–23)
CALCIUM SERPL-MCNC: 9.2 MG/DL (ref 8.7–10.2)
CHLORIDE SERPL-SCNC: 104 MMOL/L (ref 96–106)
CHOLEST SERPL-MCNC: 161 MG/DL (ref 100–199)
CO2 SERPL-SCNC: 22 MMOL/L (ref 20–29)
CREAT SERPL-MCNC: 0.75 MG/DL (ref 0.57–1)
EOSINOPHIL # BLD AUTO: 0.2 X10E3/UL (ref 0–0.4)
EOSINOPHIL NFR BLD AUTO: 2 %
ERYTHROCYTE [DISTWIDTH] IN BLOOD BY AUTOMATED COUNT: 13.2 % (ref 11.7–15.4)
GLOBULIN SER CALC-MCNC: 3.2 G/DL (ref 1.5–4.5)
GLUCOSE SERPL-MCNC: 107 MG/DL (ref 65–99)
HBA1C MFR BLD: 5.3 % (ref 4.8–5.6)
HCT VFR BLD AUTO: 39 % (ref 34–46.6)
HCV AB S/CO SERPL IA: <0.1 S/CO RATIO (ref 0–0.9)
HDLC SERPL-MCNC: 38 MG/DL
HGB BLD-MCNC: 13.1 G/DL (ref 11.1–15.9)
IMM GRANULOCYTES # BLD AUTO: 0 X10E3/UL (ref 0–0.1)
IMM GRANULOCYTES NFR BLD AUTO: 0 %
LDLC SERPL CALC-MCNC: 97 MG/DL (ref 0–99)
LDLC/HDLC SERPL: 2.6 RATIO (ref 0–3.2)
LYMPHOCYTES # BLD AUTO: 2.4 X10E3/UL (ref 0.7–3.1)
LYMPHOCYTES NFR BLD AUTO: 31 %
MCH RBC QN AUTO: 29.4 PG (ref 26.6–33)
MCHC RBC AUTO-ENTMCNC: 33.6 G/DL (ref 31.5–35.7)
MCV RBC AUTO: 87 FL (ref 79–97)
MONOCYTES # BLD AUTO: 0.4 X10E3/UL (ref 0.1–0.9)
MONOCYTES NFR BLD AUTO: 5 %
NEUTROPHILS # BLD AUTO: 4.7 X10E3/UL (ref 1.4–7)
NEUTROPHILS NFR BLD AUTO: 61 %
PLATELET # BLD AUTO: 342 X10E3/UL (ref 150–450)
POTASSIUM SERPL-SCNC: 4.2 MMOL/L (ref 3.5–5.2)
PROT SERPL-MCNC: 6.7 G/DL (ref 6–8.5)
RBC # BLD AUTO: 4.46 X10E6/UL (ref 3.77–5.28)
SODIUM SERPL-SCNC: 140 MMOL/L (ref 134–144)
T4 SERPL-MCNC: 6.1 UG/DL (ref 4.5–12)
TRIGL SERPL-MCNC: 148 MG/DL (ref 0–149)
TSH SERPL DL<=0.005 MIU/L-ACNC: 2.12 UIU/ML (ref 0.45–4.5)
VLDLC SERPL CALC-MCNC: 26 MG/DL (ref 5–40)
WBC # BLD AUTO: 7.7 X10E3/UL (ref 3.4–10.8)

## 2021-11-12 PROCEDURE — 99395 PREV VISIT EST AGE 18-39: CPT | Performed by: NURSE PRACTITIONER

## 2021-11-12 PROCEDURE — 90686 IIV4 VACC NO PRSV 0.5 ML IM: CPT | Performed by: NURSE PRACTITIONER

## 2021-11-12 PROCEDURE — 90471 IMMUNIZATION ADMIN: CPT | Performed by: NURSE PRACTITIONER

## 2021-11-12 PROCEDURE — 99213 OFFICE O/P EST LOW 20 MIN: CPT | Performed by: NURSE PRACTITIONER

## 2021-11-12 RX ORDER — PANTOPRAZOLE SODIUM 40 MG/1
40 TABLET, DELAYED RELEASE ORAL DAILY
Qty: 30 TABLET | Refills: 0 | Status: SHIPPED | OUTPATIENT
Start: 2021-11-12 | End: 2022-04-18

## 2021-11-12 RX ORDER — DICLOFENAC SODIUM 75 MG/1
1 TABLET, DELAYED RELEASE ORAL 2 TIMES DAILY
COMMUNITY
Start: 2021-11-10 | End: 2021-11-12

## 2021-11-12 RX ORDER — PROMETHAZINE HYDROCHLORIDE 25 MG/1
25 TABLET ORAL EVERY 6 HOURS PRN
Qty: 30 TABLET | Refills: 0 | Status: SHIPPED | OUTPATIENT
Start: 2021-11-12 | End: 2022-04-18

## 2021-11-12 NOTE — PROGRESS NOTES
"Chief Complaint  Annual Exam    Subjective          Chelita Rosenbaum presents to [facility]  History of Present Illness  Patient has had recent visit to ER for chest pain.  This following an episode of bloody emesis with clots.  Unfortunately, the ER simply focused on the chest pain from a cardiac standpoint and ignored the GI complaints.  She has had no further GI bleeding; however, she continues to note epigastric discomfort and nausea.  She continues to be followed by Pain Management and the POTS clinic.  Objective   Vital Signs:   Temp 98.2 °F (36.8 °C)   Ht 162.6 cm (64\") Comment: pt reported  Wt 85.4 kg (188 lb 3.2 oz)   SpO2 97%   BMI 32.30 kg/m²     Physical Exam  Vitals and nursing note reviewed.   Constitutional:       General: She is not in acute distress.     Appearance: She is well-developed. She is not diaphoretic.   HENT:      Head: Normocephalic and atraumatic.   Eyes:      Conjunctiva/sclera: Conjunctivae normal.      Pupils: Pupils are equal, round, and reactive to light.   Cardiovascular:      Rate and Rhythm: Normal rate and regular rhythm.      Pulses:           Dorsalis pedis pulses are 2+ on the right side and 2+ on the left side.        Posterior tibial pulses are 2+ on the right side and 2+ on the left side.      Heart sounds: Normal heart sounds. No murmur heard.      Pulmonary:      Effort: Pulmonary effort is normal. No respiratory distress.      Breath sounds: Normal breath sounds.   Abdominal:      General: Bowel sounds are normal. There is no distension.      Palpations: Abdomen is soft.      Tenderness: There is no abdominal tenderness.   Genitourinary:     Comments: Declined.  Musculoskeletal:         General: Normal range of motion.      Cervical back: Normal range of motion and neck supple.      Right foot: No prominent metatarsal heads.      Left foot: No prominent metatarsal heads.   Feet:      Right foot:      Protective Sensation: 5 sites " tested. 5 sites sensed.      Skin integrity: Skin integrity normal.      Left foot:      Protective Sensation: 5 sites tested. 5 sites sensed.      Skin integrity: Skin integrity normal.   Skin:     General: Skin is warm and dry.      Capillary Refill: Capillary refill takes less than 2 seconds.      Findings: No erythema.   Neurological:      Mental Status: She is alert and oriented to person, place, and time.   Psychiatric:         Behavior: Behavior normal.         Thought Content: Thought content normal.         Judgment: Judgment normal.        Result Review :                 Assessment and Plan    Diagnoses and all orders for this visit:    1. Annual physical exam (Primary)    2. POTS (postural orthostatic tachycardia syndrome)    3. Need for immunization against influenza  -     FluLaval/Fluarix/Fluzone >6 Months ([id number])    4. Hematemesis with nausea  -     Ambulatory Referral to Gastroenterology  -     pantoprazole (PROTONIX) 40 MG EC tablet; Take 1 tablet by mouth Daily.  Dispense: 30 tablet; Refill: 0    5. Nausea and vomiting, intractability of vomiting not specified, unspecified vomiting type  -     promethazine (PHENERGAN) 25 MG tablet; Take 1 tablet by mouth Every 6 (Six) Hours As Needed for Nausea or Vomiting.  Dispense: 30 tablet; Refill: 0    6. Essential hypertension    7. Mixed hyperlipidemia    8. Type 2 diabetes mellitus without complication, without long-term current use of insulin (HCC)    Continue current treatment plan.   Urgent referral to GI group for endoscopy.  Patient encouraged to partake of healthy diet rich in fresh fruits and vegetables as well as lean proteins.  Patient encouraged to participate in daily low impact exercise with goal of 30 min sustained activity.    Follow Up   Return in about 4 weeks (around 12/10/2021) for Next scheduled follow up with [facility].  Patient was given instructions and counseling regarding her condition or for health maintenance advice. Please see  specific information pulled into the AVS if appropriate.

## 2021-12-02 ENCOUNTER — TELEPHONE (OUTPATIENT)
Dept: NEUROLOGY | Facility: CLINIC | Age: 36
End: 2021-12-02

## 2021-12-02 NOTE — TELEPHONE ENCOUNTER
JE CALLED IN STATING THAT SHE IS NEEDING A PA FOR THE EMGALITY. PT IS DUE TO TAKE IT TODAY. PLEASE ADVISE.       [facility] - [place], [place] - [address] - [phone] [facility] [phone]   [phone]      PT CALL BACK   [phone]

## 2021-12-02 NOTE — TELEPHONE ENCOUNTER
Chelita notified the PA  yesterday.  I will send Zaida a message.  She said she is unable to come get a sample, she doesn't have transportation.

## 2021-12-06 DIAGNOSIS — E11.9 TYPE 2 DIABETES MELLITUS WITHOUT COMPLICATION, WITHOUT LONG-TERM CURRENT USE OF INSULIN (HCC): ICD-10-CM

## 2021-12-07 RX ORDER — LIRAGLUTIDE 6 MG/ML
INJECTION SUBCUTANEOUS
Qty: 9 ML | Refills: 2 | Status: SHIPPED | OUTPATIENT
Start: 2021-12-07 | End: 2022-03-09

## 2021-12-14 PROBLEM — G43.719 INTRACTABLE CHRONIC MIGRAINE WITHOUT AURA AND WITHOUT STATUS MIGRAINOSUS: Status: ACTIVE | Noted: 2021-12-14

## 2021-12-15 ENCOUNTER — PROCEDURE VISIT (OUTPATIENT)
Dept: NEUROLOGY | Facility: CLINIC | Age: 36
End: 2021-12-15

## 2021-12-15 DIAGNOSIS — G43.719 INTRACTABLE CHRONIC MIGRAINE WITHOUT AURA AND WITHOUT STATUS MIGRAINOSUS: Primary | ICD-10-CM

## 2021-12-15 PROCEDURE — 64615 CHEMODENERV MUSC MIGRAINE: CPT | Performed by: PSYCHIATRY & NEUROLOGY

## 2021-12-24 ENCOUNTER — PATIENT MESSAGE (OUTPATIENT)
Dept: NEUROLOGY | Facility: CLINIC | Age: 36
End: 2021-12-24

## 2021-12-27 NOTE — TELEPHONE ENCOUNTER
Chelita notified occasionally an eyelid will droop.  Explained that she needs to tell Geo and Dr. Rodriguez the next time she has Botox.

## 2022-01-05 DIAGNOSIS — G43.119 INTRACTABLE MIGRAINE WITH AURA WITHOUT STATUS MIGRAINOSUS: ICD-10-CM

## 2022-01-09 DIAGNOSIS — I10 ESSENTIAL HYPERTENSION: ICD-10-CM

## 2022-01-09 DIAGNOSIS — E11.9 TYPE 2 DIABETES MELLITUS WITHOUT COMPLICATION, WITHOUT LONG-TERM CURRENT USE OF INSULIN: ICD-10-CM

## 2022-01-09 DIAGNOSIS — E78.2 MIXED HYPERLIPIDEMIA: ICD-10-CM

## 2022-01-09 DIAGNOSIS — R10.13 EPIGASTRIC PAIN: ICD-10-CM

## 2022-01-09 DIAGNOSIS — K21.9 GASTROESOPHAGEAL REFLUX DISEASE, UNSPECIFIED WHETHER ESOPHAGITIS PRESENT: ICD-10-CM

## 2022-01-09 DIAGNOSIS — G90.A POTS (POSTURAL ORTHOSTATIC TACHYCARDIA SYNDROME): ICD-10-CM

## 2022-01-10 NOTE — TELEPHONE ENCOUNTER
Rx Refill Note  Requested Prescriptions     Pending Prescriptions Disp Refills   • famotidine (PEPCID) 20 MG tablet [Pharmacy Med Name: FAMOTIDINE 20 MG ORAL TABLET] 60 tablet 5     Sig: TAKE 1 TABLET BY MOUTH TWICE DAILY   • isosorbide mononitrate (IMDUR) 30 MG 24 hr tablet [Pharmacy Med Name: ISOSORBIDE MONONITRATE ER 30 MG ORAL TABLET EXTENDED RELEASE 24 HOUR] 90 tablet 0     Sig: TAKE 1 TABLET BY MOUTH ONCE DAILY   • atorvastatin (LIPITOR) 40 MG tablet [Pharmacy Med Name: ATORVASTATIN CALCIUM 40 MG ORAL TABLET] 90 tablet 0     Sig: TAKE 1 TABLET BY MOUTH ONCE DAILY AT BEDTIME   • ezetimibe (ZETIA) 10 MG tablet [Pharmacy Med Name: EZETIMIBE 10 MG ORAL TABLET] 90 tablet 0     Sig: TAKE 1 TABLET BY MOUTH ONCE DAILY   • metFORMIN (GLUCOPHAGE) 1000 MG tablet [Pharmacy Med Name: METFORMIN HCL 1000 MG ORAL TABLET] 180 tablet 0     Sig: TAKE 1 TABLET BY MOUTH TWICE DAILY WITH FOOD      Last office visit with prescribing clinician: 5/28/2021      Next office visit with prescribing clinician: Visit date not found     Leesa Sarmiento MA  01/10/22, 07:53 CST

## 2022-01-11 RX ORDER — EZETIMIBE 10 MG/1
TABLET ORAL
Qty: 90 TABLET | Refills: 0 | Status: SHIPPED | OUTPATIENT
Start: 2022-01-11 | End: 2022-06-28 | Stop reason: SDUPTHER

## 2022-01-11 RX ORDER — ATORVASTATIN CALCIUM 40 MG/1
TABLET, FILM COATED ORAL
Qty: 90 TABLET | Refills: 0 | Status: SHIPPED | OUTPATIENT
Start: 2022-01-11 | End: 2022-06-28 | Stop reason: SDUPTHER

## 2022-01-11 RX ORDER — FAMOTIDINE 20 MG/1
TABLET, FILM COATED ORAL
Qty: 60 TABLET | Refills: 5 | Status: SHIPPED | OUTPATIENT
Start: 2022-01-11 | End: 2022-09-13

## 2022-01-11 RX ORDER — ISOSORBIDE MONONITRATE 30 MG/1
TABLET, EXTENDED RELEASE ORAL
Qty: 90 TABLET | Refills: 0 | Status: SHIPPED | OUTPATIENT
Start: 2022-01-11 | End: 2022-06-28 | Stop reason: SDUPTHER

## 2022-01-21 ENCOUNTER — CLINICAL SUPPORT (OUTPATIENT)
Dept: FAMILY MEDICINE CLINIC | Facility: CLINIC | Age: 37
End: 2022-01-21

## 2022-01-21 ENCOUNTER — TELEMEDICINE (OUTPATIENT)
Dept: FAMILY MEDICINE CLINIC | Facility: CLINIC | Age: 37
End: 2022-01-21

## 2022-01-21 DIAGNOSIS — J06.9 UPPER RESPIRATORY TRACT INFECTION, UNSPECIFIED TYPE: Primary | ICD-10-CM

## 2022-01-21 PROCEDURE — 99213 OFFICE O/P EST LOW 20 MIN: CPT | Performed by: NURSE PRACTITIONER

## 2022-01-21 NOTE — PROGRESS NOTES
CC: URI     History:  Chelita Rosenbaum is a 36 y.o. female who presents today for evaluation of the above problems.      Congestion and runny nose, fatigue and diarrhea.  Symptoms started on Tuesday.  She is diabetic and has POTS, so she is concerned for COVID. No fever that she is aware of. She does have chronic shortness of breath, but does feel it was a little worse than normal yesterday.       HPI  ROS:  Review of Systems   Constitutional: Positive for fatigue. Negative for fever.   HENT: Positive for congestion and rhinorrhea.    Respiratory: Positive for cough and shortness of breath.    Gastrointestinal: Positive for diarrhea.   Neurological: Negative for headaches.       Allergies   Allergen Reactions   • Apriso [Mesalamine Er] Nausea And Vomiting     Vomiting    • Lamotrigine Rash   • Lithium Nausea And Vomiting     Difficulty breathing   • Tramadol Other (See Comments)     Hard to breath   • Ultram [Tramadol Hcl]      Past Medical History:   Diagnosis Date   • Arrhythmia    • Arthropathy of lumbar facet joint    • Asthma    • Bipolar disorder (HCC)    • Cervico-occipital neuralgia    • Constipation    • Diabetes mellitus (HCC)    • Disorder of small intestine     thicking jejunum, delayed transit      • Diverticular disease of colon    • Drug therapy     Other long term (current) drug therapy      • Dysphagia    • Encounter for contraceptive management     other   • Encounter for surveillance of contraceptives     other   • Epigastric pain    • Generalized anxiety disorder    • H/O colonoscopy with polypectomy    • Hirsutism    • Hyperlipidemia    • Hyperprolactinemia (HCC)    • Hypertension    • Low back pain    • Menstruation disorder     Other disorders of menstruation and other abnormal bleeding from female genital tract      • Migraine aura, persistent     Migraine - w/ aura of spots      • Myofascial pain    • Nausea    • Nausea and vomiting    • Pap smear for cervical cancer screening    • PCOS  (polycystic ovarian syndrome)    • Periumbilical pain    • POTS (postural orthostatic tachycardia syndrome)    • Sleep apnea    • Surgical follow-up care    • Tachycardia      Past Surgical History:   Procedure Laterality Date   • ABDOMINAL SURGERY  2014    Anesth, surgery of abdomen (1)      • CAPSULE ENDOSCOPY  2015    [facility] Imaging, capsule endoscopy 23491 (1)      •  SECTION  2010     Section (3)      • CHOLECYSTECTOMY  05/15/2003    Cholecystectomy, laparoscopic (1)      • COLON RESECTION     • COLONOSCOPY     • DILATATION AND CURETTAGE  10/21/2013    D&C (1)      • ENDOSCOPY  2015    EGD w/ biopsy 74206 (1)      • INJECTION OF MEDICATION  2016    Injection for nerve block (1)      • SUBTOTAL HYSTERECTOMY       Family History   Problem Relation Age of Onset   • Heart disease Mother    • Asthma Father    • Diabetes Father    • Heart disease Father    • Hypertension Father    • No Known Problems Brother       reports that she has never smoked. She has never used smokeless tobacco. She reports that she does not drink alcohol and does not use drugs.      Current Outpatient Medications:   •  albuterol sulfate  (90 Base) MCG/ACT inhaler, Inhale 2 puffs Every 4 (Four) Hours As Needed for Wheezing., Disp: 18 g, Rfl: 5  •  amLODIPine (NORVASC) 10 MG tablet, TAKE ONE TABLET BY MOUTH DAILY, Disp: 90 tablet, Rfl: 1  •  atorvastatin (LIPITOR) 40 MG tablet, TAKE 1 TABLET BY MOUTH ONCE DAILY AT BEDTIME, Disp: 90 tablet, Rfl: 0  •  BD Pen Needle Leena U/F 32G X 4 MM misc, USE ONCE DAILY WITH VICTOZA, Disp: 30 each, Rfl: 5  •  Blood Glucose Monitoring Suppl (ONE TOUCH BASIC SYSTEM) w/Device kit, 1 Device Daily., Disp: 1 each, Rfl: 0  •  clonazePAM (KlonoPIN) 1 MG tablet, Take 1 mg by mouth 2 (Two) Times a Day As Needed., Disp: , Rfl:   •  Doxepin HCl 6 MG tablet, Take  by mouth Every Night., Disp: , Rfl:   •  ezetimibe (ZETIA) 10 MG tablet, TAKE 1 TABLET BY MOUTH ONCE DAILY, Disp:  90 tablet, Rfl: 0  •  famotidine (PEPCID) 20 MG tablet, TAKE 1 TABLET BY MOUTH TWICE DAILY, Disp: 60 tablet, Rfl: 5  •  galcanezumab-gnlm (EMGALITY) 120 MG/ML auto-injector pen, Inject 1 mL under the skin into the appropriate area as directed Every 30 (Thirty) Days., Disp: 1 pen, Rfl: 0  •  glucose blood (ONE TOUCH ULTRA TEST) test strip, Use as instructed, Disp: 50 each, Rfl: 12  •  HYDROcodone-acetaminophen (NORCO) 5-325 MG per tablet, Take 5-325 tablets by mouth 4 (Four) Times a Day., Disp: , Rfl:   •  isosorbide mononitrate (IMDUR) 30 MG 24 hr tablet, TAKE 1 TABLET BY MOUTH ONCE DAILY, Disp: 90 tablet, Rfl: 0  •  Lancets (ACCU-CHEK MULTICLIX) lancets, BID blood sugar check, Disp: 102 each, Rfl: 11  •  meclizine (ANTIVERT) 25 MG tablet, Take 1 tablet by mouth 3 (Three) Times a Day As Needed for dizziness., Disp: 90 tablet, Rfl: 1  •  metFORMIN (GLUCOPHAGE) 1000 MG tablet, TAKE 1 TABLET BY MOUTH TWICE DAILY WITH FOOD, Disp: 180 tablet, Rfl: 0  •  pantoprazole (PROTONIX) 40 MG EC tablet, Take 1 tablet by mouth Daily., Disp: 30 tablet, Rfl: 0  •  pramipexole (MIRAPEX) 0.25 MG tablet, TAKE 1 TABLET BY MOUTH EVERY EVENING, Disp: 90 tablet, Rfl: 1  •  pregabalin (LYRICA) 75 MG capsule, Take 2 caps po tid, Disp: , Rfl:   •  promethazine (PHENERGAN) 25 MG tablet, Take 1 tablet by mouth Every 6 (Six) Hours As Needed for Nausea or Vomiting., Disp: 30 tablet, Rfl: 0  •  QUEtiapine (SEROquel) 400 MG tablet, Take 800 mg by mouth every night at bedtime., Disp: , Rfl:   •  Rimegepant Sulfate (Nurtec) 75 MG tablet dispersible tablet, Take 1 tablet by mouth Daily As Needed (migraine)., Disp: 8 tablet, Rfl: 2  •  spironolactone (ALDACTONE) 25 MG tablet, TAKE 1 TABLET BY MOUTH ONCE DAILY, Disp: 30 tablet, Rfl: 2  •  verapamil ER (VERELAN) 120 MG 24 hr capsule, Take 120 mg by mouth Every Morning., Disp: , Rfl:   •  Victoza 18 MG/3ML solution pen-injector injection, INJECT 1.8 MG UNDER THE SKIN ONCE DAILY, Disp: 9 mL, Rfl:  2    Current Facility-Administered Medications:   •  OnabotulinumtoxinA 155 Units, 155 Units, Intramuscular, Once, Poncho Rodriguez MD    OBJECTIVE:  LMP  (LMP Unknown)    Physical Exam  Constitutional:       Appearance: She is well-developed.   Pulmonary:      Effort: Pulmonary effort is normal.   Neurological:      Mental Status: She is alert and oriented to person, place, and time.   Psychiatric:         Behavior: Behavior normal.         Assessment/Plan    Diagnoses and all orders for this visit:    1. Upper respiratory tract infection, unspecified type (Primary)  -     COVID-19,[facility] ROUTINE, NP/OP SWAB IN TRANSPORT MEDIA OR ESWAB 72 HR TAT - Swab, Anterior nasal    Patient advised to start on Mucinex pending swab.  Patient is diabetic and has POTS, so would like to try for antibody infusion if covid positive.     This visit was completed via secure Zoom connection.     An After Visit Summary was printed and given to the patient at discharge.  Return if symptoms worsen or fail to improve, for Next scheduled follow up.       ARIELLE Marques 1/21/22    Electronically signed.

## 2022-01-21 NOTE — PROGRESS NOTES
Patient presented to office to have Covid testing. Patient swabbed without incident. Patient's temp was 97.8

## 2022-01-22 LAB
LABCORP SARS-COV-2, NAA 2 DAY TAT: NORMAL
SARS-COV-2 RNA RESP QL NAA+PROBE: NOT DETECTED

## 2022-01-25 ENCOUNTER — PATIENT MESSAGE (OUTPATIENT)
Dept: NEUROLOGY | Facility: CLINIC | Age: 37
End: 2022-01-25

## 2022-01-26 DIAGNOSIS — G43.119 INTRACTABLE MIGRAINE WITH AURA WITHOUT STATUS MIGRAINOSUS: Primary | ICD-10-CM

## 2022-02-01 ENCOUNTER — OFFICE VISIT (OUTPATIENT)
Dept: FAMILY MEDICINE CLINIC | Facility: CLINIC | Age: 37
End: 2022-02-01

## 2022-02-01 VITALS
DIASTOLIC BLOOD PRESSURE: 92 MMHG | HEART RATE: 101 BPM | OXYGEN SATURATION: 99 % | WEIGHT: 186.4 LBS | HEIGHT: 64 IN | TEMPERATURE: 97.7 F | BODY MASS INDEX: 31.82 KG/M2 | SYSTOLIC BLOOD PRESSURE: 132 MMHG

## 2022-02-01 DIAGNOSIS — K12.2 ORAL ABSCESS: Primary | ICD-10-CM

## 2022-02-01 PROCEDURE — 99213 OFFICE O/P EST LOW 20 MIN: CPT | Performed by: NURSE PRACTITIONER

## 2022-02-01 RX ORDER — TERAZOSIN 1 MG/1
1 CAPSULE ORAL
COMMUNITY
Start: 2022-01-27 | End: 2022-07-12

## 2022-02-01 RX ORDER — AMOXICILLIN 500 MG/1
500 CAPSULE ORAL 3 TIMES DAILY
Qty: 42 CAPSULE | Refills: 0 | Status: SHIPPED | OUTPATIENT
Start: 2022-02-01 | End: 2022-02-15

## 2022-02-01 NOTE — PROGRESS NOTES
"Chief Complaint  \"Swollen knot in mouth\"    Subjective          Chelita Rosenbaum presents to [facility]  History of Present Illness  Patient presents to the clinic today with a swollen \"knot\" in her mouth that is causing discomfort. When she was brushing her teeth, she noticed a small amount of \"sticky, white\" substance in that area. She first noticed this knot about a week ago. It has progressively gotten larger over the week. She was told some of her medications can cause mouth sores, she is unsure if that is the cause. She has tried Orajel and Tylenol with no relief. On a scale from 1-10 patient rates her pain as a 4/10 and it hurts especially when she is eating. Stated her face was swollen two days ago, but after applying a warm compress the swelling went down. Her last visit to the dentist was last year when she got her front teeth pulled, and she is currently looking for a dentist that will accept her insurance. She denies any fever or chills and she does not feel ill.  Patient has no further complaints at this time.   Objective   Vital Signs:   /92 (BP Location: Right arm, Patient Position: Sitting, Cuff Size: Large Adult)   Pulse 101   Temp 97.7 °F (36.5 °C)   Ht 162.6 cm (64\") Comment: pt reported  Wt 84.6 kg (186 lb 6.4 oz)   SpO2 99%   BMI 32.00 kg/m²     Physical Exam  Constitutional:       Appearance: Normal appearance.   HENT:      Head: Normocephalic and atraumatic.      Mouth/Throat:      Lips: Pink.      Mouth: Mucous membranes are moist.      Dentition: Dental tenderness, dental caries and dental abscesses present.      Pharynx: Oropharynx is clear.     Cardiovascular:      Rate and Rhythm: Regular rhythm. Tachycardia present.   Pulmonary:      Effort: Pulmonary effort is normal.      Breath sounds: Normal breath sounds.   Musculoskeletal:      Cervical back: Normal range of motion.   Lymphadenopathy:      Head:      Right side of head: No submental, " submandibular, tonsillar, preauricular, posterior auricular or occipital adenopathy.      Left side of head: No submental, submandibular, tonsillar, preauricular, posterior auricular or occipital adenopathy.      Cervical: No cervical adenopathy.   Skin:     General: Skin is warm and dry.   Neurological:      Mental Status: She is alert and oriented to person, place, and time.   Psychiatric:         Thought Content: Thought content normal.        Result Review :                 Assessment and Plan    Diagnoses and all orders for this visit:    1. Oral abscess (Primary)  -     amoxicillin (AMOXIL) 500 MG capsule; Take 1 capsule by mouth 3 (Three) Times a Day for 14 days.  Dispense: 42 capsule; Refill: 0        Follow Up   Return if symptoms worsen or fail to improve.  Patient was given instructions and counseling regarding her condition or for health maintenance advice. Please see specific information pulled into the AVS if appropriate.

## 2022-02-08 DIAGNOSIS — I10 ESSENTIAL HYPERTENSION: ICD-10-CM

## 2022-02-08 DIAGNOSIS — G25.81 RLS (RESTLESS LEGS SYNDROME): ICD-10-CM

## 2022-02-08 RX ORDER — AMLODIPINE BESYLATE 10 MG/1
TABLET ORAL
Qty: 90 TABLET | Refills: 1 | Status: SHIPPED | OUTPATIENT
Start: 2022-02-08 | End: 2022-09-20

## 2022-02-08 RX ORDER — SPIRONOLACTONE 25 MG/1
TABLET ORAL
Qty: 90 TABLET | Refills: 1 | Status: SHIPPED | OUTPATIENT
Start: 2022-02-08 | End: 2022-07-12

## 2022-02-08 RX ORDER — PRAMIPEXOLE DIHYDROCHLORIDE 0.25 MG/1
TABLET ORAL
Qty: 90 TABLET | Refills: 1 | Status: SHIPPED | OUTPATIENT
Start: 2022-02-08 | End: 2022-07-26 | Stop reason: SDUPTHER

## 2022-02-08 NOTE — TELEPHONE ENCOUNTER
Rx Refill Note  Requested Prescriptions     Pending Prescriptions Disp Refills   • pramipexole (MIRAPEX) 0.25 MG tablet [Pharmacy Med Name: PRAMIPEXOLE DIHYDROCHLORIDE 0.25 MG ORAL TABLET] 90 tablet 1     Sig: TAKE 1 TABLET BY MOUTH ONCE DAILY IN THE EVENING   • spironolactone (ALDACTONE) 25 MG tablet [Pharmacy Med Name: SPIRONOLACTONE 25 MG ORAL TABLET] 30 tablet 2     Sig: TAKE 1 TABLET BY MOUTH ONCE DAILY      Last office visit with prescribing clinician: 2/1/2022      Next office visit with prescribing clinician: Visit date not found     Dori Tyler MA  02/08/22, 14:40 CST

## 2022-02-08 NOTE — TELEPHONE ENCOUNTER
Rx Refill Note  Requested Prescriptions     Pending Prescriptions Disp Refills   • amLODIPine (NORVASC) 10 MG tablet [Pharmacy Med Name: AMLODIPINE BESYLATE 10 MG ORAL TABLET] 90 tablet 1     Sig: TAKE 1 TABLET BY MOUTH ONCE DAILY      Last office visit with prescribing clinician: 5/28/2021      Next office visit with prescribing clinician: Visit date not found   Last fill on 2/3/21 for #90 with 1 refill  Leesa Sarmiento MA  02/08/22, 15:00 CST

## 2022-03-09 DIAGNOSIS — E11.9 TYPE 2 DIABETES MELLITUS WITHOUT COMPLICATION, WITHOUT LONG-TERM CURRENT USE OF INSULIN: ICD-10-CM

## 2022-03-09 RX ORDER — LIRAGLUTIDE 6 MG/ML
INJECTION SUBCUTANEOUS
Qty: 9 ML | Refills: 2 | Status: SHIPPED | OUTPATIENT
Start: 2022-03-09 | End: 2022-04-18

## 2022-03-09 NOTE — TELEPHONE ENCOUNTER
Rx Refill Note  Requested Prescriptions     Pending Prescriptions Disp Refills   • Liraglutide (Victoza) 18 MG/3ML solution pen-injector injection [Pharmacy Med Name: VICTOZA 18MG/3ML SOPN] 9 mL 2     Sig: INJECT 1.8MG UNDER THE SKIN ONCE DAILY      Last office visit with prescribing clinician: 5/28/2021      Next office visit with prescribing clinician: 07/07/2022    Dori Tyler MA  03/09/22, 12:10 CST

## 2022-03-15 DIAGNOSIS — G43.119 INTRACTABLE MIGRAINE WITH AURA WITHOUT STATUS MIGRAINOSUS: ICD-10-CM

## 2022-03-15 NOTE — TELEPHONE ENCOUNTER
Caller: JE LANGE    Relationship: SELF    Best call back number: [phone]     Requested Prescriptions: NURTEC  Requested Prescriptions      No prescriptions requested or ordered in this encounter        Pharmacy where request should be sent:  [facility]    Additional details provided by patient: PT HAS BEEN OUT OF NRTEC FOR A COUPLE DAYS AND THE PHARMACY SAID THAT THEY NEED A PA FOR IT.    Does the patient have less than a 3 day supply:  [x] Yes  [] No    Iban Gonzalez Rep   03/15/22 13:59 CDT

## 2022-03-16 RX ORDER — RIMEGEPANT SULFATE 75 MG/75MG
75 TABLET, ORALLY DISINTEGRATING ORAL DAILY PRN
Qty: 8 TABLET | Refills: 1 | Status: SHIPPED | OUTPATIENT
Start: 2022-03-16 | End: 2022-03-17 | Stop reason: SDUPTHER

## 2022-03-16 NOTE — TELEPHONE ENCOUNTER
[facility] CALLED IN STATING THAT WE ARE NEEDING TO CALL [facility] ABOUT PTS NEURTEC MEDICATION PA     PHONE NUMBER [facility] [phone]    [facility]   [phone] REF [id number]

## 2022-03-17 ENCOUNTER — PROCEDURE VISIT (OUTPATIENT)
Dept: NEUROLOGY | Facility: CLINIC | Age: 37
End: 2022-03-17

## 2022-03-17 DIAGNOSIS — G43.719 INTRACTABLE CHRONIC MIGRAINE WITHOUT AURA AND WITHOUT STATUS MIGRAINOSUS: Primary | ICD-10-CM

## 2022-03-17 DIAGNOSIS — G43.119 INTRACTABLE MIGRAINE WITH AURA WITHOUT STATUS MIGRAINOSUS: ICD-10-CM

## 2022-03-17 PROCEDURE — 64615 CHEMODENERV MUSC MIGRAINE: CPT | Performed by: PSYCHIATRY & NEUROLOGY

## 2022-03-17 RX ORDER — RIMEGEPANT SULFATE 75 MG/75MG
75 TABLET, ORALLY DISINTEGRATING ORAL DAILY PRN
Qty: 8 TABLET | Refills: 1 | Status: SHIPPED | OUTPATIENT
Start: 2022-03-17 | End: 2022-05-18 | Stop reason: SDUPTHER

## 2022-04-18 ENCOUNTER — OFFICE VISIT (OUTPATIENT)
Dept: FAMILY MEDICINE CLINIC | Facility: CLINIC | Age: 37
End: 2022-04-18

## 2022-04-18 VITALS — TEMPERATURE: 97.3 F | WEIGHT: 192.8 LBS | HEIGHT: 64 IN | BODY MASS INDEX: 32.91 KG/M2 | OXYGEN SATURATION: 99 %

## 2022-04-18 DIAGNOSIS — Z79.4 TYPE 2 DIABETES MELLITUS WITH DIABETIC AUTONOMIC NEUROPATHY, WITH LONG-TERM CURRENT USE OF INSULIN: Primary | ICD-10-CM

## 2022-04-18 DIAGNOSIS — R11.2 NAUSEA AND VOMITING, UNSPECIFIED VOMITING TYPE: ICD-10-CM

## 2022-04-18 DIAGNOSIS — E11.43 TYPE 2 DIABETES MELLITUS WITH DIABETIC AUTONOMIC NEUROPATHY, WITH LONG-TERM CURRENT USE OF INSULIN: Primary | ICD-10-CM

## 2022-04-18 DIAGNOSIS — E11.9 TYPE 2 DIABETES MELLITUS WITHOUT COMPLICATION, WITHOUT LONG-TERM CURRENT USE OF INSULIN: ICD-10-CM

## 2022-04-18 LAB
EXPIRATION DATE: NORMAL
HBA1C MFR BLD: 5.2 %
Lab: NORMAL

## 2022-04-18 PROCEDURE — 99213 OFFICE O/P EST LOW 20 MIN: CPT | Performed by: NURSE PRACTITIONER

## 2022-04-18 PROCEDURE — 3044F HG A1C LEVEL LT 7.0%: CPT | Performed by: NURSE PRACTITIONER

## 2022-04-18 PROCEDURE — 83036 HEMOGLOBIN GLYCOSYLATED A1C: CPT | Performed by: NURSE PRACTITIONER

## 2022-04-18 RX ORDER — LIRAGLUTIDE 6 MG/ML
INJECTION SUBCUTANEOUS
Qty: 9 ML | Refills: 2 | Status: SHIPPED | OUTPATIENT
Start: 2022-04-18 | End: 2022-07-12

## 2022-04-18 RX ORDER — PROMETHAZINE HYDROCHLORIDE 25 MG/1
25 TABLET ORAL EVERY 6 HOURS PRN
Qty: 30 TABLET | Refills: 0 | Status: SHIPPED | OUTPATIENT
Start: 2022-04-18 | End: 2022-07-12 | Stop reason: SDUPTHER

## 2022-04-18 RX ORDER — OXCARBAZEPINE 600 MG/1
600 TABLET, FILM COATED ORAL DAILY
COMMUNITY
Start: 2022-03-28 | End: 2022-05-02 | Stop reason: SINTOL

## 2022-04-18 RX ORDER — LIRAGLUTIDE 6 MG/ML
INJECTION SUBCUTANEOUS
Qty: 9 ML | Refills: 2 | Status: SHIPPED | OUTPATIENT
Start: 2022-04-18 | End: 2022-04-18

## 2022-04-18 NOTE — PROGRESS NOTES
"Chief Complaint  Rash (Stomach area/ Pt states rash does not itch) and Orthostatic BP (Per Heart Dr )    Subjective          Chelita Rosenbaum presents to [facility]  History of Present Illness  RASH: Patient noticed it two months ago. Rash is diffuse across abdomen and has moved to arms. No pruritis, no burning, no pain.   ORTHOSTATIC: [facility] [place] wanted orthostatic readings. Her last appointment was in January. Patient is experiencing dizziness. She started Clonidine in January.   Objective   Vital Signs:   Temp 97.3 °F (36.3 °C) (Infrared)   Ht 162.6 cm (64.02\")   Wt 87.5 kg (192 lb 12.8 oz)   SpO2 99%   BMI 33.08 kg/m²     BMI is above normal parameters. Recommendations: exercise counseling/recommendations and nutrition counseling/recommendations       Physical Exam  Constitutional:       General: She is not in acute distress.     Appearance: Normal appearance. She is not ill-appearing.   HENT:      Head: Normocephalic and atraumatic.   Cardiovascular:      Rate and Rhythm: Regular rhythm. Tachycardia present.      Pulses: Normal pulses.      Heart sounds: Normal heart sounds.   Pulmonary:      Effort: Pulmonary effort is normal.      Breath sounds: Normal breath sounds.   Skin:     Capillary Refill: Capillary refill takes less than 2 seconds.      Findings: Rash present.      Comments: Circular, flat patchy rash on torso and upper extremities   Neurological:      Mental Status: She is alert and oriented to person, place, and time.   Psychiatric:         Thought Content: Thought content normal.        Result Review :                 Assessment and Plan    Diagnoses and all orders for this visit:    1. Type 2 diabetes mellitus with diabetic autonomic neuropathy, with long-term current use of insulin ([facility]) (Primary)  -     POC Glycosylated Hemoglobin (Hb A1C)    2. Type 2 diabetes mellitus without complication, without long-term current use of insulin ([facility])  -     " Discontinue: Liraglutide (Victoza) 18 MG/3ML solution pen-injector injection; INJECT 2.4 mg SQ daily  Dispense: 9 mL; Refill: 2  -     Liraglutide (Victoza) 18 MG/3ML solution pen-injector injection; Inject 1.2 mg SQ BID  Dispense: 9 mL; Refill: 2    3. Nausea and vomiting, unspecified vomiting type  -     promethazine (PHENERGAN) 25 MG tablet; Take 1 tablet by mouth Every 6 (Six) Hours As Needed for Nausea or Vomiting.  Dispense: 30 tablet; Refill: 0        Follow Up   Return in about 3 months (around 7/18/2022) for Next scheduled follow up.  Patient was given instructions and counseling regarding her condition or for health maintenance advice. Please see specific information pulled into the AVS if appropriate.

## 2022-04-19 ENCOUNTER — TELEPHONE (OUTPATIENT)
Dept: FAMILY MEDICINE CLINIC | Facility: CLINIC | Age: 37
End: 2022-04-19

## 2022-05-02 ENCOUNTER — OFFICE VISIT (OUTPATIENT)
Dept: NEUROLOGY | Facility: CLINIC | Age: 37
End: 2022-05-02

## 2022-05-02 VITALS
HEART RATE: 97 BPM | BODY MASS INDEX: 32.78 KG/M2 | HEIGHT: 64 IN | SYSTOLIC BLOOD PRESSURE: 118 MMHG | WEIGHT: 192 LBS | OXYGEN SATURATION: 98 % | DIASTOLIC BLOOD PRESSURE: 74 MMHG | RESPIRATION RATE: 17 BRPM

## 2022-05-02 DIAGNOSIS — G43.719 INTRACTABLE CHRONIC MIGRAINE WITHOUT AURA AND WITHOUT STATUS MIGRAINOSUS: Primary | ICD-10-CM

## 2022-05-02 PROCEDURE — 99214 OFFICE O/P EST MOD 30 MIN: CPT | Performed by: NURSE PRACTITIONER

## 2022-05-02 RX ORDER — BACLOFEN 10 MG/1
TABLET ORAL 3 TIMES DAILY
COMMUNITY
Start: 2022-04-21 | End: 2022-09-13

## 2022-05-02 RX ORDER — DULOXETIN HYDROCHLORIDE 30 MG/1
30 CAPSULE, DELAYED RELEASE ORAL DAILY
COMMUNITY
Start: 2022-04-23

## 2022-05-02 RX ORDER — ATOGEPANT 60 MG/1
60 TABLET ORAL DAILY
Qty: 60 TABLET | Refills: 3 | Status: SHIPPED | OUTPATIENT
Start: 2022-05-02 | End: 2023-03-08 | Stop reason: SDUPTHER

## 2022-05-02 NOTE — PROGRESS NOTES
Neurology Progress Note      Chief Complaint:    Migraine    Subjective     Subjective:  Chelita Rosenbaum is a 36 y.o. female who presents today for follow-up of migraine.  She is followed by Dr. Best Gannon MD for primary care.  Since our last visit she was switched to Aimovig as Emgality was wearing off toward the end of the month.  She was also switched to Nurtec as Ubrelvy was unsuccessful at treating her headaches.    Today, she states that she continues to have very frequent headaches. She is reporting continued daily headaches which are frontotemporal in nature with some minor throbbing pains.  She also continues to report around 1-2 migraines per week or around 6-8 per month. These continue to be located holocephalic with intense throbbing pains.  They continue to be associated with photophobia, phonophobia, nausea, and vomiting. She reports that Aimovig is only lasting around 2 weeks out of the month. She is stating that she will get a severe migraine the day she takes the Aimovig injection and it will then dissipate around a day later. She notes that she feels this is not overly great at helping decrease her overall migraine burden. She has had 2 Botox sesstions and mentions that she has not yet seen any difference since starting this.  She does have another session in June which I have encouraged her to attend.      She reports continued issues with her heart and a POTS diagnosis. She will frequently become dizzy and lightheaded as a result.  She does have john blurred vision in association to this.  She is working with her cardiologist for this.     Past Medical History:   Diagnosis Date   • Arrhythmia    • Arthropathy of lumbar facet joint    • Asthma    • Bipolar disorder (HCC)    • Cervico-occipital neuralgia    • Constipation    • Diabetes mellitus (HCC)    • Disorder of small intestine     thicking jejunum, delayed transit      • Diverticular disease of colon    • Drug therapy     Other long term  (current) drug therapy      • Dysphagia    • Encounter for contraceptive management     other   • Encounter for surveillance of contraceptives     other   • Epigastric pain    • Generalized anxiety disorder    • H/O colonoscopy with polypectomy    • Hirsutism    • Hyperlipidemia    • Hyperprolactinemia (HCC)    • Hypertension    • Low back pain    • Menstruation disorder     Other disorders of menstruation and other abnormal bleeding from female genital tract      • Migraine aura, persistent     Migraine - w/ aura of spots      • Myofascial pain    • Nausea    • Nausea and vomiting    • Pap smear for cervical cancer screening    • PCOS (polycystic ovarian syndrome)    • Periumbilical pain    • POTS (postural orthostatic tachycardia syndrome)    • Sleep apnea    • Surgical follow-up care    • Tachycardia      Past Surgical History:   Procedure Laterality Date   • ABDOMINAL SURGERY  2014    Anesth, surgery of abdomen (1)      • CAPSULE ENDOSCOPY  2015    [facility], capsule endoscopy 01273 (1)      •  SECTION  2010     Section (3)      • CHOLECYSTECTOMY  05/15/2003    Cholecystectomy, laparoscopic (1)      • COLON RESECTION     • COLONOSCOPY     • DILATATION AND CURETTAGE  10/21/2013    D&C (1)      • ENDOSCOPY  2015    EGD w/ biopsy 25297 (1)      • INJECTION OF MEDICATION  2016    Injection for nerve block (1)      • SUBTOTAL HYSTERECTOMY       Family History   Problem Relation Age of Onset   • Heart disease Mother    • Asthma Father    • Diabetes Father    • Heart disease Father    • Hypertension Father    • No Known Problems Brother      Social History     Tobacco Use   • Smoking status: Never Smoker   • Smokeless tobacco: Never Used   Substance Use Topics   • Alcohol use: No   • Drug use: No     Medications:  Current Outpatient Medications   Medication Sig Dispense Refill   • albuterol sulfate  (90 Base) MCG/ACT inhaler Inhale 2 puffs Every 4 (Four) Hours As  Needed for Wheezing. 18 g 5   • amLODIPine (NORVASC) 10 MG tablet TAKE 1 TABLET BY MOUTH ONCE DAILY 90 tablet 1   • atorvastatin (LIPITOR) 40 MG tablet TAKE 1 TABLET BY MOUTH ONCE DAILY AT BEDTIME 90 tablet 0   • baclofen (LIORESAL) 10 MG tablet Take  by mouth 3 (Three) Times a Day.     • BD Pen Needle Leena U/F 32G X 4 MM misc USE ONCE DAILY WITH VICTOZA 30 each 5   • Blood Glucose Monitoring Suppl (ONE TOUCH BASIC SYSTEM) w/Device kit 1 Device Daily. 1 each 0   • clonazePAM (KlonoPIN) 1 MG tablet Take 1 mg by mouth 2 (Two) Times a Day As Needed.     • Erenumab-aooe (AIMOVIG) 140 MG/ML prefilled syringe Inject 1 mL under the skin into the appropriate area as directed Every 30 (Thirty) Days. 1 pen 2   • ezetimibe (ZETIA) 10 MG tablet TAKE 1 TABLET BY MOUTH ONCE DAILY 90 tablet 0   • famotidine (PEPCID) 20 MG tablet TAKE 1 TABLET BY MOUTH TWICE DAILY 60 tablet 5   • glucose blood (ONE TOUCH ULTRA TEST) test strip Use as instructed 50 each 12   • HYDROcodone-acetaminophen (NORCO) 5-325 MG per tablet Take 5-325 tablets by mouth 4 (Four) Times a Day.     • isosorbide mononitrate (IMDUR) 30 MG 24 hr tablet TAKE 1 TABLET BY MOUTH ONCE DAILY 90 tablet 0   • Lancets (ACCU-CHEK MULTICLIX) lancets BID blood sugar check 102 each 11   • Liraglutide (Victoza) 18 MG/3ML solution pen-injector injection Inject 1.2 mg SQ BID 9 mL 2   • meclizine (ANTIVERT) 25 MG tablet Take 1 tablet by mouth 3 (Three) Times a Day As Needed for dizziness. 90 tablet 1   • metFORMIN (GLUCOPHAGE) 1000 MG tablet TAKE 1 TABLET BY MOUTH TWICE DAILY WITH FOOD 180 tablet 0   • pramipexole (MIRAPEX) 0.25 MG tablet TAKE 1 TABLET BY MOUTH ONCE DAILY IN THE EVENING 90 tablet 1   • promethazine (PHENERGAN) 25 MG tablet Take 1 tablet by mouth Every 6 (Six) Hours As Needed for Nausea or Vomiting. 30 tablet 0   • QUEtiapine (SEROquel) 400 MG tablet Take 800 mg by mouth every night at bedtime.     • Rimegepant Sulfate (Nurtec) 75 MG tablet dispersible tablet Take 1  tablet by mouth Daily As Needed (migraine). 8 tablet 1   • spironolactone (ALDACTONE) 25 MG tablet TAKE 1 TABLET BY MOUTH ONCE DAILY 90 tablet 1   • terazosin (HYTRIN) 1 MG capsule Take 1 capsule by mouth every night at bedtime.     • cloNIDine HCl ER 0.1 MG tablet sustained-release 12 hour tablet Take 0.2 mg by mouth Every 12 (Twelve) Hours.     • DULoxetine (CYMBALTA) 30 MG capsule        Current Facility-Administered Medications   Medication Dose Route Frequency Provider Last Rate Last Admin   • OnabotulinumtoxinA 155 Units  155 Units Intramuscular Once Poncho Rodriguez MD         Current outpatient and discharge medications have been reconciled for the patient.  Reviewed by: ARIELLE Garcia      Allergies:    Apriso [mesalamine er], Lamotrigine, Lithium, Tramadol, and Ultram [tramadol hcl]    Review of Systems:   Review of Systems   Eyes: Positive for blurred vision and photophobia.   Cardiovascular: Positive for palpitations.   Gastrointestinal: Positive for nausea.   Musculoskeletal: Positive for arthralgias, back pain, neck pain and neck stiffness.   Neurological: Positive for dizziness, syncope, weakness and headache.   Psychiatric/Behavioral: Positive for sleep disturbance.   All other systems reviewed and are negative.        Objective      Vital Signs  Heart Rate:  [97] 97  Resp:  [17] 17  BP: (118)/(74) 118/74    Physical Exam:  Physical Exam  Vitals reviewed.   Constitutional:       Appearance: Normal appearance.   HENT:      Head: Normocephalic.      Mouth/Throat:      Dentition: Abnormal dentition.   Eyes:      Extraocular Movements: Extraocular movements intact.      Pupils: Pupils are equal, round, and reactive to light.   Cardiovascular:      Rate and Rhythm: Normal rate and regular rhythm.      Pulses: Normal pulses.   Pulmonary:      Effort: Pulmonary effort is normal.   Musculoskeletal:      Cervical back: Rigidity present. Pain with movement present. Decreased range of motion.   Skin:      General: Skin is warm and dry.      Capillary Refill: Capillary refill takes less than 2 seconds.   Neurological:      Gait: Gait is intact.   Psychiatric:         Mood and Affect: Mood normal.       Neurologic Exam:  Mental Status:    -Awake. Alert. Oriented to person, place & time.  -No word finding difficulties.  -No aphasia.  -No dysarthria.  -Follows simple commands.     CN II:  Full visual fields with confrontation.  Pupils equally reactive to light.  CN III, IV, VI:  Extraocular muscles function intact with no nystagmus.  CN V:  Facial sensory is symmetric.  CN VII:  Facial motor symmetric.  CN VIII:  Gross hearing intact bilaterally.  CN IX/X:  Palate elevates symmetrically.  CN XI:  Shoulder shrug symmetric.  CN XII:  Tongue is midline on protrusion.     Motor: (strength out of 5:  1= minimal movement, 2 = movement in plane of gravity, 3 = movement against gravity, 4 = movement against some resistance, 5 = full strength)     -5/5 in bilateral biceps, triceps, brachioradialis, wrist extensors and intrinsic muscles of the hand.    -5/5 in bilateral hip flexors, quadriceps, hamstrings, gastrocsoleus complex, anterior tibialis and extensor hallucis longus.       Deep Tendon Reflexes:  -Right              Biceps: 2+         Triceps: 2+      Brachioradialis: 2+              Patella: 2+       Ankle: 2+         Babinski:  negative  -Left              Biceps: 2+         Triceps: 2+      Brachioradialis: 2+              Patella: 2+       Ankle: 2+         Babinski:  negative    Tone (Modified Karen Scale):  No appreciable increase in tone or rigidity noted.     Sensory:  -Intact to light touch, pinprick BUE (C5-T1) and BLE (L2-S1).     Coordination:  -Finger to nose intact BUEs  -Heel to shin intact BLEs  -No ataxia     Gait  -No signs of ataxia  -ambulates unassisted    Assessment/Plan     Impression:  • Migraine with aura  • Hypertension    Plan:  • Stop Aimovig and start Quilepta 60mg daily. Discussed  mechanism of action and side effects with her today in the office.       • Continue with botox.     • Continue Nurtec 75mg as needed for migraine onset.  Can consider Reyvow if this does become ineffective.      • Last MRI was 8/17/2021 with some minor T2/Flair changes but no acute findings.  I see no need for repeat today.  However, may consider if we continue to have persistent symptoms.      · Recommend lifestyle changes such as weight loss, proper nutrition and diet, and  regular physical activity as can be tolerated.     · Recommend avoidance of known triggers to migraine       · Continue to follow closely with cardiology.    The patient and I have discussed the plan of care and she is in full agreement at this time.     Follow-Up:  Return in about 3 months (around 8/2/2022) for Migraine follow-up.      ARIELLE Garcia  05/02/22  13:05 CDT

## 2022-05-16 ENCOUNTER — TELEPHONE (OUTPATIENT)
Dept: FAMILY MEDICINE CLINIC | Facility: CLINIC | Age: 37
End: 2022-05-16

## 2022-05-18 ENCOUNTER — OFFICE VISIT (OUTPATIENT)
Dept: FAMILY MEDICINE CLINIC | Facility: CLINIC | Age: 37
End: 2022-05-18

## 2022-05-18 VITALS
HEART RATE: 99 BPM | BODY MASS INDEX: 32.71 KG/M2 | TEMPERATURE: 98.1 F | HEIGHT: 64 IN | OXYGEN SATURATION: 98 % | SYSTOLIC BLOOD PRESSURE: 164 MMHG | WEIGHT: 191.6 LBS | DIASTOLIC BLOOD PRESSURE: 115 MMHG

## 2022-05-18 DIAGNOSIS — G43.119 INTRACTABLE MIGRAINE WITH AURA WITHOUT STATUS MIGRAINOSUS: ICD-10-CM

## 2022-05-18 DIAGNOSIS — R22.0 FACIAL SWELLING: ICD-10-CM

## 2022-05-18 DIAGNOSIS — R42 DIZZINESS: ICD-10-CM

## 2022-05-18 DIAGNOSIS — K12.2 ORAL ABSCESS: Primary | ICD-10-CM

## 2022-05-18 DIAGNOSIS — R68.84 PAIN IN LOWER JAW: ICD-10-CM

## 2022-05-18 PROCEDURE — 99214 OFFICE O/P EST MOD 30 MIN: CPT | Performed by: NURSE PRACTITIONER

## 2022-05-18 RX ORDER — LANCETS
1 EACH MISCELLANEOUS DAILY
COMMUNITY

## 2022-05-18 RX ORDER — MECLIZINE HYDROCHLORIDE 25 MG/1
25 TABLET ORAL 3 TIMES DAILY PRN
Qty: 90 TABLET | Refills: 1 | Status: SHIPPED | OUTPATIENT
Start: 2022-05-18 | End: 2022-10-14 | Stop reason: SDUPTHER

## 2022-05-18 RX ORDER — AMOXICILLIN 500 MG/1
1000 CAPSULE ORAL 2 TIMES DAILY
Qty: 40 CAPSULE | Refills: 0 | Status: SHIPPED | OUTPATIENT
Start: 2022-05-18 | End: 2022-05-28

## 2022-05-18 RX ORDER — RIMEGEPANT SULFATE 75 MG/75MG
75 TABLET, ORALLY DISINTEGRATING ORAL DAILY PRN
Qty: 8 TABLET | Refills: 1 | Status: SHIPPED | OUTPATIENT
Start: 2022-05-18

## 2022-05-18 NOTE — PROGRESS NOTES
"Chief Complaint  Facial Swelling    Subjective          Chelita Italia Rosenbaum presents to [facility]  History of Present Illness  Patient presents with left jaw swelling and pain.  She states she has a large abscess inside her mouth.  She has problems finding a dentist who will take her insurance.  She also has issues with her b/p and recently has talked with the POTS clinic about her elevations.  Unsure if this is related to her infection.  Objective   Vital Signs:  BP (!) 164/115 (BP Location: Left arm, Patient Position: Sitting, Cuff Size: Adult)   Pulse 99   Temp 98.1 °F (36.7 °C)   Ht 162.6 cm (64\") Comment: pt reported  Wt 86.9 kg (191 lb 9.6 oz)   SpO2 98%   BMI 32.89 kg/m²         Physical Exam  Vitals and nursing note reviewed.   Constitutional:       General: She is not in acute distress.     Appearance: Normal appearance. She is not ill-appearing.   HENT:      Head: Normocephalic and atraumatic.      Mouth/Throat:      Comments: Left jaw has marked swelling with evidence of large abscess in left lower mouth.  Non-draining.  Cardiovascular:      Rate and Rhythm: Regular rhythm. Tachycardia present.      Heart sounds: Normal heart sounds.   Pulmonary:      Effort: Pulmonary effort is normal.      Breath sounds: Normal breath sounds.   Skin:     General: Skin is warm and dry.   Neurological:      Mental Status: She is alert and oriented to person, place, and time.   Psychiatric:         Thought Content: Thought content normal.        Result Review :                 Assessment and Plan    Diagnoses and all orders for this visit:    1. Oral abscess (Primary)  -     amoxicillin (AMOXIL) 500 MG capsule; Take 2 capsules by mouth 2 (Two) Times a Day for 10 days.  Dispense: 40 capsule; Refill: 0    2. Pain in lower jaw  -     amoxicillin (AMOXIL) 500 MG capsule; Take 2 capsules by mouth 2 (Two) Times a Day for 10 days.  Dispense: 40 capsule; Refill: 0    3. Facial swelling  -     " amoxicillin (AMOXIL) 500 MG capsule; Take 2 capsules by mouth 2 (Two) Times a Day for 10 days.  Dispense: 40 capsule; Refill: 0    4. Dizziness  -     meclizine (ANTIVERT) 25 MG tablet; Take 1 tablet by mouth 3 (Three) Times a Day As Needed for Dizziness.  Dispense: 90 tablet; Refill: 1             Follow Up   Return in about 3 months (around 8/18/2022) for Next scheduled follow up.  Patient was given instructions and counseling regarding her condition or for health maintenance advice. Please see specific information pulled into the AVS if appropriate.

## 2022-05-25 DIAGNOSIS — E11.9 TYPE 2 DIABETES MELLITUS WITHOUT COMPLICATION, WITHOUT LONG-TERM CURRENT USE OF INSULIN: ICD-10-CM

## 2022-05-25 NOTE — TELEPHONE ENCOUNTER
Rx Refill Note  Requested Prescriptions     Pending Prescriptions Disp Refills   • metFORMIN (GLUCOPHAGE) 1000 MG tablet 180 tablet 0     Sig: Take 1 tablet by mouth 2 (Two) Times a Day With Meals.      Last office visit with prescribing clinician: 5/18/2022      Next office visit with prescribing clinician: Visit date not found     Dori Tyler MA  05/25/22, 13:04 CDT

## 2022-06-01 ENCOUNTER — OFFICE VISIT (OUTPATIENT)
Dept: FAMILY MEDICINE CLINIC | Facility: CLINIC | Age: 37
End: 2022-06-01

## 2022-06-01 VITALS
WEIGHT: 198.2 LBS | HEIGHT: 64 IN | DIASTOLIC BLOOD PRESSURE: 109 MMHG | HEART RATE: 103 BPM | BODY MASS INDEX: 33.84 KG/M2 | OXYGEN SATURATION: 100 % | SYSTOLIC BLOOD PRESSURE: 156 MMHG | TEMPERATURE: 98.3 F

## 2022-06-01 DIAGNOSIS — R59.9 LYMPH NODE ENLARGEMENT: Primary | ICD-10-CM

## 2022-06-01 PROCEDURE — 99213 OFFICE O/P EST LOW 20 MIN: CPT | Performed by: NURSE PRACTITIONER

## 2022-06-01 RX ORDER — CIPROFLOXACIN 750 MG/1
750 TABLET, FILM COATED ORAL 2 TIMES DAILY
Qty: 20 TABLET | Refills: 0 | Status: SHIPPED | OUTPATIENT
Start: 2022-06-01 | End: 2022-06-27

## 2022-06-01 NOTE — PROGRESS NOTES
"Chief Complaint   Patient presents with   • Cyst     Patient advised knot on neck was noticed approx 1 week ago. Patient states it is sore and when she lays down it is bothersome.         Subjective   Chelita Rosenbaum is a 36 y.o. female who presents today for knot on neck.    HPI   It has been there for over a week. Tender to touch. Left side of neck. Had been on antibiotics prior for her jaw. No other complaints.     Allergies   Allergen Reactions   • Apriso [Mesalamine Er] Nausea And Vomiting     Vomiting    • Lamotrigine Rash   • Lithium Nausea And Vomiting     Difficulty breathing   • Tramadol Other (See Comments)     Hard to breath   • Ultram [Tramadol Hcl]          OBJECTIVE:  Vitals:    06/01/22 1325   BP: (!) 156/109   BP Location: Right leg   Patient Position: Sitting   Cuff Size: Large Adult   Pulse: 103   Temp: 98.3 °F (36.8 °C)   TempSrc: Infrared   SpO2: 100%   Weight: 89.9 kg (198 lb 3.2 oz)   Height: 162.6 cm (64.02\")     Physical Exam  Vitals and nursing note reviewed.   Constitutional:       Appearance: Normal appearance.   HENT:      Mouth/Throat:      Mouth: Mucous membranes are moist.   Neck:        Comments: There is a well defined lymph node palpable left side of neck. Moveable. Tender to touch.   Cardiovascular:      Rate and Rhythm: Normal rate and regular rhythm.      Pulses: Normal pulses.      Heart sounds: Normal heart sounds.   Pulmonary:      Effort: Pulmonary effort is normal.      Breath sounds: Normal breath sounds.   Lymphadenopathy:      Cervical: Cervical adenopathy present.   Skin:     General: Skin is warm and dry.   Neurological:      General: No focal deficit present.      Mental Status: She is alert and oriented to person, place, and time.   Psychiatric:         Mood and Affect: Mood normal.         Behavior: Behavior normal.         BMI is >= 30 and <= 34.9 (Class 1 obesity). The following options were offered after discussion: weight loss educational material (shared in " after visit summary), exercise counseling/recommendations and nutrition counseling/recommendations        ASSESSMENT/ PLAN:    Diagnoses and all orders for this visit:    1. Lymph node enlargement (Primary)  -     ciprofloxacin (CIPRO) 750 MG tablet; Take 1 tablet by mouth 2 (Two) Times a Day.  Dispense: 20 tablet; Refill: 0          Management Plan:     An After Visit Summary was printed and given to the patient at discharge.    Follow-up: Return in about 2 weeks (around 6/15/2022) for fu lymph node on neck.    I spent 20 minutes caring for Chelita on this date of service. This time includes time spent by me in the following activities: preparing for the visit, obtaining and/or reviewing a separately obtained history, performing a medically appropriate examination and/or evaluation, ordering medications, tests, or procedures and documenting information in the medical record     ARIELLE Campuzano 6/1/2022 13:38 CDT  This note was electronically signed.

## 2022-06-15 ENCOUNTER — TELEMEDICINE (OUTPATIENT)
Dept: FAMILY MEDICINE CLINIC | Facility: CLINIC | Age: 37
End: 2022-06-15

## 2022-06-15 DIAGNOSIS — R59.9 LYMPH NODE ENLARGEMENT: Primary | ICD-10-CM

## 2022-06-15 PROCEDURE — 99213 OFFICE O/P EST LOW 20 MIN: CPT | Performed by: NURSE PRACTITIONER

## 2022-06-15 NOTE — PROGRESS NOTES
No chief complaint on file.       Subjective   Chelita Rosenbaum is a 36 y.o. female who presents today for enlarged lymph node posterior to left ear.    HPI   Patient has been on an antibiotic for ten days which has not resolved the enlarged lymph node. She states it is getting larger and more tender to touch. States the pain level is a 4 or 5 out of 10 during the day. It is a 6 or 7 lying down. Patient has not been ill.     Prior to video visit consent was signed and provider reviewed the consent.     Allergies   Allergen Reactions   • Apriso [Mesalamine Er] Nausea And Vomiting     Vomiting    • Lamotrigine Rash   • Lithium Nausea And Vomiting     Difficulty breathing   • Tramadol Other (See Comments)     Hard to breath   • Ultram [Tramadol Hcl]          OBJECTIVE:  There were no vitals filed for this visit.  Physical Exam  No physical exam done via video visit.    BMI is >= 30 and <35. (Class 1 Obesity). The following options were offered after discussion;: weight loss educational material (shared in after visit summary), exercise counseling/recommendations and nutrition counseling/recommendations        ASSESSMENT/ PLAN:    Diagnoses and all orders for this visit:    1. Lymph node enlargement (Primary)  -     Ambulatory Referral to General Surgery          Management Plan:     An After Visit Summary was printed and given to the patient at discharge.    Follow-up: No follow-ups on file.     I spent 20 minutes caring for Chelita on this date of service.     Kendy Echeverria, ARIELLE 6/15/2022 13:48 CDT  This note was electronically signed.

## 2022-06-17 ENCOUNTER — TELEPHONE (OUTPATIENT)
Dept: FAMILY MEDICINE CLINIC | Facility: CLINIC | Age: 37
End: 2022-06-17

## 2022-06-17 NOTE — TELEPHONE ENCOUNTER
Caller: [employer]    Relationship to patient:     Best call back number: [phone]    They are needing to know if the transportation referral renewal for ortho has been received by this office. If not returned today, they will need to cancel the appt.

## 2022-06-21 ENCOUNTER — TELEPHONE (OUTPATIENT)
Dept: FAMILY MEDICINE CLINIC | Facility: CLINIC | Age: 37
End: 2022-06-21

## 2022-06-21 NOTE — TELEPHONE ENCOUNTER
Caller: Chelita Rosenbaum    Relationship to patient: Self    Best call back number: [phone]    [place] will be calling about a new referral for rides to appts.

## 2022-06-27 ENCOUNTER — OFFICE VISIT (OUTPATIENT)
Dept: SURGERY | Facility: CLINIC | Age: 37
End: 2022-06-27

## 2022-06-27 VITALS
HEIGHT: 64 IN | SYSTOLIC BLOOD PRESSURE: 122 MMHG | WEIGHT: 198 LBS | BODY MASS INDEX: 33.8 KG/M2 | DIASTOLIC BLOOD PRESSURE: 80 MMHG

## 2022-06-27 DIAGNOSIS — R59.1 LYMPHADENOPATHY: Primary | ICD-10-CM

## 2022-06-27 DIAGNOSIS — E66.09 CLASS 1 OBESITY DUE TO EXCESS CALORIES WITH SERIOUS COMORBIDITY AND BODY MASS INDEX (BMI) OF 34.0 TO 34.9 IN ADULT: ICD-10-CM

## 2022-06-27 PROCEDURE — 99204 OFFICE O/P NEW MOD 45 MIN: CPT | Performed by: STUDENT IN AN ORGANIZED HEALTH CARE EDUCATION/TRAINING PROGRAM

## 2022-06-27 NOTE — PROGRESS NOTES
Office New Patient History and Physical:     Referring Provider: Kendy Echeverria APRN    Chief Complaint   Patient presents with   • enlarged lymph nodes       Subjective .     History of present illness:  Chelita Rosenbaum is a 36 y.o. female who presented to her PCP on 6/1/22 with a mass posterior to the left ear. It was determined to be an enlarged lymph node. She was started on ciprofloxacin. She reported worsening of the pain associated with the node and enlargement of the node at follow up on 6/15/22 for which she was referred to general surgery. She reports that it is half the size that it was initially.  She denies B symptoms such as excessive fatigue, night sweats and fevers. She denies family history of lymphoma. She has no personal history of cancer. She did receive her COVID booster in her L arm on 5/20/22.     She is not on blood thinners. She is a non-Smoker. She does vape CBD with NO nicotine.      History  Past Medical History:   Diagnosis Date   • Arrhythmia    • Arthropathy of lumbar facet joint    • Asthma    • Bipolar disorder (HCC)    • Cervico-occipital neuralgia    • Constipation    • Diabetes mellitus (HCC)    • Disorder of small intestine     thicking jejunum, delayed transit      • Diverticular disease of colon    • Drug therapy     Other long term (current) drug therapy      • Dysphagia    • Encounter for contraceptive management     other   • Encounter for surveillance of contraceptives     other   • Epigastric pain    • Generalized anxiety disorder    • H/O colonoscopy with polypectomy    • Hirsutism    • Hyperlipidemia    • Hyperprolactinemia (HCC)    • Hypertension    • Low back pain    • Menstruation disorder     Other disorders of menstruation and other abnormal bleeding from female genital tract      • Migraine aura, persistent     Migraine - w/ aura of spots      • Myofascial pain    • Nausea    • Nausea and vomiting    • Pap smear for cervical cancer screening    • PCOS (polycystic  ovarian syndrome)    • Periumbilical pain    • POTS (postural orthostatic tachycardia syndrome)    • Sleep apnea    • Surgical follow-up care    • Tachycardia    ,   Past Surgical History:   Procedure Laterality Date   • ABDOMINAL SURGERY  2014    Anesth, surgery of abdomen (1)      • CAPSULE ENDOSCOPY  2015    GI Imaging, capsule endoscopy 89325 (1)      •  SECTION  2010     Section (3)      • CHOLECYSTECTOMY  05/15/2003    Cholecystectomy, laparoscopic (1)      • COLON RESECTION     • COLONOSCOPY     • DILATATION AND CURETTAGE  10/21/2013    D&C (1)      • ENDOSCOPY  2015    EGD w/ biopsy 81630 (1)      • INJECTION OF MEDICATION  2016    Injection for nerve block (1)      • SUBTOTAL HYSTERECTOMY     ,   Family History   Problem Relation Age of Onset   • Heart disease Mother    • Asthma Father    • Diabetes Father    • Heart disease Father    • Hypertension Father    • No Known Problems Brother    ,   Social History     Tobacco Use   • Smoking status: Never Smoker   • Smokeless tobacco: Never Used   Vaping Use   • Vaping Use: Some days   • Substances: CBD   • Devices: [employer]   Substance Use Topics   • Alcohol use: No   • Drug use: No   , (Not in a hospital admission)   and Allergies:  Apriso [mesalamine er], Lamotrigine, Lithium, Tramadol, and Ultram [tramadol hcl]    Current Outpatient Medications:   •  albuterol sulfate  (90 Base) MCG/ACT inhaler, Inhale 2 puffs Every 4 (Four) Hours As Needed for Wheezing., Disp: 18 g, Rfl: 5  •  amLODIPine (NORVASC) 10 MG tablet, TAKE 1 TABLET BY MOUTH ONCE DAILY, Disp: 90 tablet, Rfl: 1  •  Atogepant (Qulipta) 60 MG tablet, Take 1 tablet by mouth Daily., Disp: 60 tablet, Rfl: 3  •  atorvastatin (LIPITOR) 40 MG tablet, TAKE 1 TABLET BY MOUTH ONCE DAILY AT BEDTIME, Disp: 90 tablet, Rfl: 0  •  baclofen (LIORESAL) 10 MG tablet, Take  by mouth 3 (Three) Times a Day., Disp: , Rfl:   •  BD Pen Needle Leena U/F 32G X 4 MM misc,  USE ONCE DAILY WITH VICTOZA, Disp: 30 each, Rfl: 5  •  Blood Glucose Monitoring Suppl (ONE TOUCH BASIC SYSTEM) w/Device kit, 1 Device Daily., Disp: 1 each, Rfl: 0  •  clonazePAM (KlonoPIN) 1 MG tablet, Take 1 mg by mouth 2 (Two) Times a Day As Needed., Disp: , Rfl:   •  DULoxetine (CYMBALTA) 30 MG capsule, Take 30 mg by mouth Daily., Disp: , Rfl:   •  ezetimibe (ZETIA) 10 MG tablet, TAKE 1 TABLET BY MOUTH ONCE DAILY, Disp: 90 tablet, Rfl: 0  •  famotidine (PEPCID) 20 MG tablet, TAKE 1 TABLET BY MOUTH TWICE DAILY, Disp: 60 tablet, Rfl: 5  •  glucose blood (ONE TOUCH ULTRA TEST) test strip, Use as instructed, Disp: 50 each, Rfl: 12  •  HYDROcodone-acetaminophen (NORCO) 5-325 MG per tablet, Take 5-325 tablets by mouth 4 (Four) Times a Day., Disp: , Rfl:   •  isosorbide mononitrate (IMDUR) 30 MG 24 hr tablet, TAKE 1 TABLET BY MOUTH ONCE DAILY, Disp: 90 tablet, Rfl: 0  •  Lancets (onetouch ultrasoft) lancets, 1 each by Other route Daily. Use as instructed, Disp: , Rfl:   •  Liraglutide (Victoza) 18 MG/3ML solution pen-injector injection, Inject 1.2 mg SQ BID, Disp: 9 mL, Rfl: 2  •  meclizine (ANTIVERT) 25 MG tablet, Take 1 tablet by mouth 3 (Three) Times a Day As Needed for Dizziness., Disp: 90 tablet, Rfl: 1  •  metFORMIN (GLUCOPHAGE) 1000 MG tablet, Take 1 tablet by mouth 2 (Two) Times a Day With Meals., Disp: 180 tablet, Rfl: 0  •  pramipexole (MIRAPEX) 0.25 MG tablet, TAKE 1 TABLET BY MOUTH ONCE DAILY IN THE EVENING, Disp: 90 tablet, Rfl: 1  •  promethazine (PHENERGAN) 25 MG tablet, Take 1 tablet by mouth Every 6 (Six) Hours As Needed for Nausea or Vomiting., Disp: 30 tablet, Rfl: 0  •  QUEtiapine (SEROquel) 400 MG tablet, Take 800 mg by mouth every night at bedtime., Disp: , Rfl:   •  spironolactone (ALDACTONE) 25 MG tablet, TAKE 1 TABLET BY MOUTH ONCE DAILY, Disp: 90 tablet, Rfl: 1  •  terazosin (HYTRIN) 1 MG capsule, Take 1 capsule by mouth every night at bedtime., Disp: , Rfl:   •  cloNIDine HCl ER 0.1 MG tablet  "sustained-release 12 hour tablet, Take 0.2 mg by mouth Every 12 (Twelve) Hours., Disp: , Rfl:   •  Rimegepant Sulfate (Nurtec) 75 MG tablet dispersible tablet, Take 1 tablet by mouth Daily As Needed (migraine)., Disp: 8 tablet, Rfl: 1    Current Facility-Administered Medications:   •  OnabotulinumtoxinA 155 Units, 155 Units, Intramuscular, Once, Poncho Rodriguez MD    Objective     Vital Signs   /80   Ht 162.6 cm (64\")   Wt 89.8 kg (198 lb)   LMP  (LMP Unknown)   BMI 33.99 kg/m²      Physical Exam:  General appearance - alert, well appearing, and in no distress  Mental status - alert, oriented to person, place, and time  Eyes - sclera anicteric  Neck - supple, no significant adenopathy  Chest - no tachypnea, retractions or cyanosis  Heart - normal rate and regular rhythm  Neurological - alert, oriented, normal speech, no focal findings or movement disorder noted  Musculoskeletal - no joint tenderness, deformity or swelling  Neck - 1cm Left cervical lymph node, mobile.     Results Review:    The following data was reviewed by: Sue Youssef MD on 06/27/2022:  Progress Notes by Kendy Echeverria APRN (06/01/2022 13:45)  Progress Notes by Kendy Echeverria APRN (06/15/2022 13:45)      Assessment & Plan       Diagnoses and all orders for this visit:    1. Lymphadenopathy (Primary)  -     US Head Neck Soft Tissue; Future    2. Class 1 obesity due to excess calories with serious comorbidity and body mass index (BMI) of 34.0 to 34.9 in adult       The lymph node was first noticed to be enlarged after her covid vaccine booster. It is decreasing in size and I do not feel any other surrounding enlarged lymph nodes.  I have recommended a cervical US and follow up in 4 weeks. If it is not then resolved, will plan for excisional biopsy. Patient is in agreement with this plan.     BMI is >= 30 and <35. (Class 1 Obesity). The following options were offered after discussion;: weight loss educational material " (shared in after visit summary)      Sue Youssef MD  06/27/22  20:32 CDT

## 2022-06-27 NOTE — PATIENT INSTRUCTIONS

## 2022-06-28 DIAGNOSIS — E78.2 MIXED HYPERLIPIDEMIA: ICD-10-CM

## 2022-06-28 DIAGNOSIS — G90.A POTS (POSTURAL ORTHOSTATIC TACHYCARDIA SYNDROME): ICD-10-CM

## 2022-06-28 DIAGNOSIS — I10 ESSENTIAL HYPERTENSION: ICD-10-CM

## 2022-06-28 NOTE — TELEPHONE ENCOUNTER
Caller: Chelita Rosenbaum    Relationship: Self    Best call back number: [phone]    Requested Prescriptions:   Requested Prescriptions     Pending Prescriptions Disp Refills   • atorvastatin (LIPITOR) 40 MG tablet 90 tablet 0     Sig: Take 1 tablet by mouth every night at bedtime.   • ezetimibe (ZETIA) 10 MG tablet 90 tablet 0     Sig: Take 1 tablet by mouth Daily.   • isosorbide mononitrate (IMDUR) 30 MG 24 hr tablet 90 tablet 0     Sig: Take 1 tablet by mouth Daily.        Pharmacy where request should be sent: [address] - [phone]  - [phone] FX     Additional details provided by patient: COMPLETELY OUT-ALSO ASKING ABOUT LETTER FOR [facility]-NEEDS TO SAY PATIENT IS COMPLIANT AND STILL USING CPAP. PLEASE ADVISE.    Does the patient have less than a 3 day supply:  [x] Yes  [] No    Iban Vo Rep   06/28/22 12:11 CDT

## 2022-06-28 NOTE — TELEPHONE ENCOUNTER
Rx Refill Note  Requested Prescriptions     Pending Prescriptions Disp Refills   • atorvastatin (LIPITOR) 40 MG tablet 90 tablet 0     Sig: Take 1 tablet by mouth every night at bedtime.   • ezetimibe (ZETIA) 10 MG tablet 90 tablet 0     Sig: Take 1 tablet by mouth Daily.   • isosorbide mononitrate (IMDUR) 30 MG 24 hr tablet 90 tablet 0     Sig: Take 1 tablet by mouth Daily.      Last office visit with prescribing clinician: 5/18/2022      Next office visit with prescribing clinician: Visit date not found           Stephenie De Santiago MA  06/28/22, 14:14 CDT

## 2022-06-28 NOTE — TELEPHONE ENCOUNTER
Caller: Chelita Rosenbaum    Relationship: Self    Best call back number: [phone]    What is the best time to reach you: ANYTIME     Who are you requesting to speak with (clinical staff, provider,  specific staff member):  PROVIDER OR NURSE         What was the call regarding: PATIENT REQUESTING A CALL BACK TO DISCUSS [facility] NEEDING SOMETHING SENT OVER SAYING SHE IS COMPLAINT WITH HER C PAP SO CAN KEEP IT     PATIENT STATES WE CAN CALL HER OR CALL [facility] TO SEE WHAT THEY ARE NEEDING     Do you require a callback:  YES

## 2022-06-29 ENCOUNTER — PROCEDURE VISIT (OUTPATIENT)
Dept: NEUROLOGY | Facility: CLINIC | Age: 37
End: 2022-06-29

## 2022-06-29 DIAGNOSIS — G43.719 INTRACTABLE CHRONIC MIGRAINE WITHOUT AURA AND WITHOUT STATUS MIGRAINOSUS: Primary | ICD-10-CM

## 2022-06-29 PROCEDURE — 64615 CHEMODENERV MUSC MIGRAINE: CPT | Performed by: PSYCHIATRY & NEUROLOGY

## 2022-06-29 RX ORDER — EZETIMIBE 10 MG/1
10 TABLET ORAL DAILY
Qty: 90 TABLET | Refills: 0 | Status: SHIPPED | OUTPATIENT
Start: 2022-06-29 | End: 2022-11-18 | Stop reason: SDUPTHER

## 2022-06-29 RX ORDER — ATORVASTATIN CALCIUM 40 MG/1
40 TABLET, FILM COATED ORAL
Qty: 90 TABLET | Refills: 0 | Status: SHIPPED | OUTPATIENT
Start: 2022-06-29 | End: 2022-11-18

## 2022-06-29 RX ORDER — ISOSORBIDE MONONITRATE 30 MG/1
30 TABLET, EXTENDED RELEASE ORAL DAILY
Qty: 90 TABLET | Refills: 0 | Status: SHIPPED | OUTPATIENT
Start: 2022-06-29

## 2022-06-29 NOTE — TELEPHONE ENCOUNTER
Pt needs follow up apt for DM- Return in about 3 months (around 7/18/2022) please call pt to get schd

## 2022-07-05 NOTE — PROGRESS NOTES
Procedure Note:  Chelita Rosenbaum  06/29/2022    Procedure:  Botulinum Toxin Injection #3  Indication for Procedure: Chronic Migraines    The risks and benefits were explained to the patient including local infection, mild bleeding, paralysis of muscles, and possible allergic reaction.  The patient expressed understanding and informed consent was obtained.    Initial Headache Frequency: 30/30 Days  Current Headache Frequency: 15/30 Days    Initial Duration (hours): 24/24 Hours  Current Duration (hours): 24/24 Hours     10 Units divided in 2 sites: 5 Units Right, 5 Units Left  Procerus: 5 Units  Frontalis 20 units divided in 4 sites: 10 Units Right, 10 Units Left  Temporalis 40 Units divided in 8 sites: 20 Units Right, 20 Units Left  Occipitalis 30 Units divided in 6 sites: 15 Units Right, 15 Units Left  Cervical Paraspinal 20 Units divided in 4 sites: 10 Units Right, 10 Units Left  Trapezius 30 Units divided in 6 sites: 15 Units Right, 15 Units Left              200 units were mixed in total with 45 units being discarded.    The patient tolerated the procedure well with no complications and no blood loss.    Follow up for repeat injections in 12 weeks.    Poncho Rodriguez MD

## 2022-07-12 ENCOUNTER — OFFICE VISIT (OUTPATIENT)
Dept: FAMILY MEDICINE CLINIC | Facility: CLINIC | Age: 37
End: 2022-07-12

## 2022-07-12 ENCOUNTER — TELEPHONE (OUTPATIENT)
Dept: FAMILY MEDICINE CLINIC | Facility: CLINIC | Age: 37
End: 2022-07-12

## 2022-07-12 VITALS
HEART RATE: 110 BPM | WEIGHT: 198 LBS | DIASTOLIC BLOOD PRESSURE: 106 MMHG | SYSTOLIC BLOOD PRESSURE: 137 MMHG | OXYGEN SATURATION: 98 % | BODY MASS INDEX: 33.8 KG/M2 | TEMPERATURE: 98.2 F | HEIGHT: 64 IN

## 2022-07-12 DIAGNOSIS — G90.A POTS (POSTURAL ORTHOSTATIC TACHYCARDIA SYNDROME): ICD-10-CM

## 2022-07-12 DIAGNOSIS — E11.9 TYPE 2 DIABETES MELLITUS WITHOUT COMPLICATION, WITHOUT LONG-TERM CURRENT USE OF INSULIN: ICD-10-CM

## 2022-07-12 DIAGNOSIS — R60.1 GENERALIZED EDEMA: Primary | ICD-10-CM

## 2022-07-12 DIAGNOSIS — R11.2 NAUSEA AND VOMITING, UNSPECIFIED VOMITING TYPE: ICD-10-CM

## 2022-07-12 DIAGNOSIS — I10 ESSENTIAL HYPERTENSION: ICD-10-CM

## 2022-07-12 PROCEDURE — 99214 OFFICE O/P EST MOD 30 MIN: CPT | Performed by: NURSE PRACTITIONER

## 2022-07-12 RX ORDER — FUROSEMIDE 20 MG/1
20 TABLET ORAL DAILY
Qty: 30 TABLET | Refills: 5 | Status: SHIPPED | OUTPATIENT
Start: 2022-07-12 | End: 2023-03-07

## 2022-07-12 RX ORDER — DOXEPIN HYDROCHLORIDE 6 MG/1
1 TABLET ORAL DAILY
COMMUNITY
Start: 2022-05-10 | End: 2022-07-12 | Stop reason: ALTCHOICE

## 2022-07-12 RX ORDER — LIRAGLUTIDE 6 MG/ML
INJECTION SUBCUTANEOUS
Qty: 12 ML | Refills: 5 | Status: SHIPPED | OUTPATIENT
Start: 2022-07-12 | End: 2022-09-20

## 2022-07-12 RX ORDER — PROMETHAZINE HYDROCHLORIDE 25 MG/1
25 TABLET ORAL EVERY 6 HOURS PRN
Qty: 30 TABLET | Refills: 2 | Status: SHIPPED | OUTPATIENT
Start: 2022-07-12 | End: 2023-02-06

## 2022-07-12 RX ORDER — CARVEDILOL 3.12 MG/1
6.25 TABLET ORAL 2 TIMES DAILY WITH MEALS
Qty: 180 TABLET | Refills: 0
Start: 2022-07-12

## 2022-07-12 RX ORDER — CARVEDILOL 3.12 MG/1
1 TABLET ORAL DAILY
COMMUNITY
Start: 2022-06-27 | End: 2022-07-12

## 2022-07-12 NOTE — PROGRESS NOTES
"Chief Complaint  Diabetes    Subjective        Chelita Rosenbaum presents to [facility]  History of Present Illness  DM:  Well controlled on current plan.  Last few A1c readings < 6. No symptoms consistent with either hypoglycemia or hyperglycemia.  POTS:  Seen last week at [facility] by cardiology.  Noted edema.  Made some changes to med, started on carvedilol.  Recommended following her for edema control.  Although hypertensive, stable for patient.    Objective   Vital Signs:  BP (!) 137/106 (BP Location: Left arm, Patient Position: Sitting, Cuff Size: Adult)   Pulse 110   Temp 98.2 °F (36.8 °C)   Ht 162.6 cm (64\") Comment: patient reported  Wt 89.8 kg (198 lb)   SpO2 98%   BMI 33.99 kg/m²   Estimated body mass index is 33.99 kg/m² as calculated from the following:    Height as of this encounter: 162.6 cm (64\").    Weight as of this encounter: 89.8 kg (198 lb).          Physical Exam  Vitals and nursing note reviewed.   Constitutional:       General: She is not in acute distress.     Appearance: Normal appearance. She is obese. She is not ill-appearing.   HENT:      Head: Normocephalic and atraumatic.   Cardiovascular:      Rate and Rhythm: Regular rhythm. Tachycardia present.      Heart sounds: Normal heart sounds.   Pulmonary:      Effort: Pulmonary effort is normal.      Breath sounds: Normal breath sounds.   Musculoskeletal:      Right lower leg: Edema present.      Left lower leg: Edema present.      Comments: Trace bilateral lower extremity edema, non-pitting.   Skin:     General: Skin is warm and dry.      Capillary Refill: Capillary refill takes less than 2 seconds.   Neurological:      Mental Status: She is alert and oriented to person, place, and time.   Psychiatric:         Thought Content: Thought content normal.        Result Review :                Assessment and Plan   Diagnoses and all orders for this visit:    1. Generalized edema (Primary)  -     furosemide (Lasix) " 20 MG tablet; Take 1 tablet by mouth Daily.  Dispense: 30 tablet; Refill: 5    2. Nausea and vomiting, unspecified vomiting type  -     promethazine (PHENERGAN) 25 MG tablet; Take 1 tablet by mouth Every 6 (Six) Hours As Needed for Nausea or Vomiting.  Dispense: 30 tablet; Refill: 2    3. Type 2 diabetes mellitus without complication, without long-term current use of insulin (HCC)  -     Liraglutide (Victoza) 18 MG/3ML solution pen-injector injection; Inject 1.8 mg SQ BID  Dispense: 12 mL; Refill: 5    4. Essential hypertension  -     carvedilol (COREG) 3.125 MG tablet; Take 2 tablets by mouth 2 (Two) Times a Day With Meals.  Dispense: 180 tablet; Refill: 0    5. POTS (postural orthostatic tachycardia syndrome)  -     carvedilol (COREG) 3.125 MG tablet; Take 2 tablets by mouth 2 (Two) Times a Day With Meals.  Dispense: 180 tablet; Refill: 0    Continue current treatment plan.  Follow with POTS clinic and recommendations per Dominic.         Follow Up   Return in about 4 months (around 11/14/2022) for Annual physical with labs prior.  Patient was given instructions and counseling regarding her condition or for health maintenance advice. Please see specific information pulled into the AVS if appropriate.

## 2022-07-13 ENCOUNTER — HOSPITAL ENCOUNTER (OUTPATIENT)
Dept: ULTRASOUND IMAGING | Facility: HOSPITAL | Age: 37
Discharge: HOME | End: 2022-07-13
Admitting: STUDENT IN AN ORGANIZED HEALTH CARE EDUCATION/TRAINING PROGRAM

## 2022-07-13 DIAGNOSIS — R59.1 LYMPHADENOPATHY: ICD-10-CM

## 2022-07-13 PROCEDURE — 76536 US EXAM OF HEAD AND NECK: CPT

## 2022-07-19 ENCOUNTER — OFFICE VISIT (OUTPATIENT)
Dept: OBSTETRICS AND GYNECOLOGY | Facility: CLINIC | Age: 37
End: 2022-07-19

## 2022-07-19 VITALS
HEIGHT: 64 IN | DIASTOLIC BLOOD PRESSURE: 100 MMHG | BODY MASS INDEX: 34.21 KG/M2 | SYSTOLIC BLOOD PRESSURE: 160 MMHG | WEIGHT: 200.4 LBS

## 2022-07-19 DIAGNOSIS — G90.A POTS (POSTURAL ORTHOSTATIC TACHYCARDIA SYNDROME): ICD-10-CM

## 2022-07-19 DIAGNOSIS — Z90.710 HISTORY OF HYSTERECTOMY: ICD-10-CM

## 2022-07-19 DIAGNOSIS — Z90.49 HISTORY OF COLON RESECTION: ICD-10-CM

## 2022-07-19 DIAGNOSIS — R10.2 PELVIC PAIN: Primary | ICD-10-CM

## 2022-07-19 PROCEDURE — 99204 OFFICE O/P NEW MOD 45 MIN: CPT | Performed by: STUDENT IN AN ORGANIZED HEALTH CARE EDUCATION/TRAINING PROGRAM

## 2022-07-26 DIAGNOSIS — G25.81 RLS (RESTLESS LEGS SYNDROME): ICD-10-CM

## 2022-07-26 RX ORDER — PRAMIPEXOLE DIHYDROCHLORIDE 0.25 MG/1
0.25 TABLET ORAL EVERY EVENING
Qty: 90 TABLET | Refills: 1 | Status: SHIPPED | OUTPATIENT
Start: 2022-07-26 | End: 2023-01-30

## 2022-07-28 DIAGNOSIS — E11.9 TYPE 2 DIABETES MELLITUS WITHOUT COMPLICATION, WITHOUT LONG-TERM CURRENT USE OF INSULIN: ICD-10-CM

## 2022-07-28 RX ORDER — PEN NEEDLE, DIABETIC 32GX 5/32"
NEEDLE, DISPOSABLE MISCELLANEOUS
Qty: 30 EACH | Refills: 5 | Status: SHIPPED | OUTPATIENT
Start: 2022-07-28 | End: 2023-02-13

## 2022-07-28 NOTE — TELEPHONE ENCOUNTER
Rx Refill Note  Requested Prescriptions     Pending Prescriptions Disp Refills   • BD Pen Needle Ayde U/F 32G X 4 MM misc [Pharmacy Med Name: BD PEN NEEDLE AYDE  32G X 4 MM MISC] 30 each 5     Sig: USE ONCE DAILY WITH VICTOZA      Last office visit with prescribing clinician: 7/12/2022      Next office visit with prescribing clinician: 11/18/2022     Dori Tyler MA  07/28/22, 10:16 CDT

## 2022-08-01 ENCOUNTER — OFFICE VISIT (OUTPATIENT)
Dept: SURGERY | Facility: CLINIC | Age: 37
End: 2022-08-01

## 2022-08-01 VITALS
DIASTOLIC BLOOD PRESSURE: 86 MMHG | SYSTOLIC BLOOD PRESSURE: 142 MMHG | HEART RATE: 110 BPM | WEIGHT: 194 LBS | BODY MASS INDEX: 33.12 KG/M2 | OXYGEN SATURATION: 97 % | HEIGHT: 64 IN

## 2022-08-01 DIAGNOSIS — E66.09 CLASS 1 OBESITY DUE TO EXCESS CALORIES WITH SERIOUS COMORBIDITY AND BODY MASS INDEX (BMI) OF 34.0 TO 34.9 IN ADULT: ICD-10-CM

## 2022-08-01 DIAGNOSIS — R59.1 LYMPHADENOPATHY: Primary | ICD-10-CM

## 2022-08-01 PROCEDURE — 99214 OFFICE O/P EST MOD 30 MIN: CPT | Performed by: STUDENT IN AN ORGANIZED HEALTH CARE EDUCATION/TRAINING PROGRAM

## 2022-08-01 NOTE — PATIENT INSTRUCTIONS

## 2022-08-01 NOTE — PROGRESS NOTES
Office Established Patient Note:     Referring Provider: No ref. provider found    Chief Complaint   Patient presents with   • Follow-up     Patient is here for 4 week f/u and to get US results       Subjective .     History of present illness:  Chelita Rosenbaum is a 36 y.o. female who saw me on 6/27/22 for a swollen cervical lymph node without B symptoms after her COVID booster. She returns today to discuss her follow up neck US.     She deneis fatigue, night sweats, weight loss. She thinks the original lymph node is stable, however she now feels other lymph nodes. She endorses pain at the site. No drainage from the area. No skin changes    History  Past Medical History:   Diagnosis Date   • Arrhythmia    • Arthropathy of lumbar facet joint    • Asthma    • Bipolar disorder (HCC)    • Cervico-occipital neuralgia    • Constipation    • Diabetes mellitus (HCC)    • Disorder of small intestine     thicking jejunum, delayed transit      • Diverticular disease of colon    • Drug therapy     Other long term (current) drug therapy      • Dysphagia    • Encounter for contraceptive management     other   • Encounter for surveillance of contraceptives     other   • Epigastric pain    • Generalized anxiety disorder    • H/O colonoscopy with polypectomy    • Hirsutism    • Hyperlipidemia    • Hyperprolactinemia (HCC)    • Hypertension    • Low back pain    • Menstruation disorder     Other disorders of menstruation and other abnormal bleeding from female genital tract      • Migraine aura, persistent     Migraine - w/ aura of spots      • Myofascial pain    • Nausea    • Nausea and vomiting    • Pap smear for cervical cancer screening    • PCOS (polycystic ovarian syndrome)    • Periumbilical pain    • POTS (postural orthostatic tachycardia syndrome)    • Sleep apnea    • Surgical follow-up care    • Tachycardia    ,   Past Surgical History:   Procedure Laterality Date   • ABDOMINAL SURGERY  01/21/2014    Anesth, surgery of  abdomen (1)      • CAPSULE ENDOSCOPY  2015    GI Imaging, capsule endoscopy 32634 (1)      •  SECTION  2010     Section (3)      • CHOLECYSTECTOMY  05/15/2003    Cholecystectomy, laparoscopic (1)      • COLON RESECTION     • COLONOSCOPY     • DILATATION AND CURETTAGE  10/21/2013    D&C (1)      • ENDOSCOPY  2015    EGD w/ biopsy 04518 (1)      • INJECTION OF MEDICATION  2016    Injection for nerve block (1)      • SUBTOTAL HYSTERECTOMY     ,   Family History   Problem Relation Age of Onset   • Heart disease Mother    • Asthma Father    • Diabetes Father    • Heart disease Father    • Hypertension Father    • No Known Problems Brother    ,   Social History     Tobacco Use   • Smoking status: Never Smoker   • Smokeless tobacco: Never Used   Vaping Use   • Vaping Use: Some days   • Substances: CBD   • Devices: [employer]   Substance Use Topics   • Alcohol use: No   • Drug use: No   , (Not in a hospital admission)   and Allergies:  Apriso [mesalamine er], Lamotrigine, Lithium, Tramadol, and Ultram [tramadol hcl]    Current Outpatient Medications:   •  albuterol sulfate  (90 Base) MCG/ACT inhaler, Inhale 2 puffs Every 4 (Four) Hours As Needed for Wheezing., Disp: 18 g, Rfl: 5  •  amLODIPine (NORVASC) 10 MG tablet, TAKE 1 TABLET BY MOUTH ONCE DAILY, Disp: 90 tablet, Rfl: 1  •  Atogepant (Qulipta) 60 MG tablet, Take 1 tablet by mouth Daily., Disp: 60 tablet, Rfl: 3  •  atorvastatin (LIPITOR) 40 MG tablet, Take 1 tablet by mouth every night at bedtime., Disp: 90 tablet, Rfl: 0  •  baclofen (LIORESAL) 10 MG tablet, Take  by mouth 3 (Three) Times a Day., Disp: , Rfl:   •  BD Pen Needle Leena U/F 32G X 4 MM misc, USE ONCE DAILY WITH VICTOZA, Disp: 30 each, Rfl: 5  •  Blood Glucose Monitoring Suppl (ONE TOUCH [employer] SYSTEM) w/Device kit, 1 Device Daily., Disp: 1 each, Rfl: 0  •  carvedilol (COREG) 3.125 MG tablet, Take 2 tablets by mouth 2 (Two) Times a Day With Meals., Disp: 180  tablet, Rfl: 0  •  clonazePAM (KlonoPIN) 1 MG tablet, Take 1 mg by mouth 2 (Two) Times a Day As Needed., Disp: , Rfl:   •  DULoxetine (CYMBALTA) 30 MG capsule, Take 30 mg by mouth Daily., Disp: , Rfl:   •  ezetimibe (ZETIA) 10 MG tablet, Take 1 tablet by mouth Daily., Disp: 90 tablet, Rfl: 0  •  famotidine (PEPCID) 20 MG tablet, TAKE 1 TABLET BY MOUTH TWICE DAILY, Disp: 60 tablet, Rfl: 5  •  furosemide (Lasix) 20 MG tablet, Take 1 tablet by mouth Daily., Disp: 30 tablet, Rfl: 5  •  glucose blood (ONE TOUCH ULTRA TEST) test strip, Use as instructed, Disp: 50 each, Rfl: 12  •  HYDROcodone-acetaminophen (NORCO) 5-325 MG per tablet, Take 5-325 tablets by mouth 4 (Four) Times a Day., Disp: , Rfl:   •  isosorbide mononitrate (IMDUR) 30 MG 24 hr tablet, Take 1 tablet by mouth Daily., Disp: 90 tablet, Rfl: 0  •  Lancets (onetouch ultrasoft) lancets, 1 each by Other route Daily. Use as instructed, Disp: , Rfl:   •  Liraglutide (Victoza) 18 MG/3ML solution pen-injector injection, Inject 1.8 mg SQ BID, Disp: 12 mL, Rfl: 5  •  meclizine (ANTIVERT) 25 MG tablet, Take 1 tablet by mouth 3 (Three) Times a Day As Needed for Dizziness., Disp: 90 tablet, Rfl: 1  •  metFORMIN (GLUCOPHAGE) 1000 MG tablet, Take 1 tablet by mouth 2 (Two) Times a Day With Meals., Disp: 180 tablet, Rfl: 0  •  pramipexole (MIRAPEX) 0.25 MG tablet, Take 1 tablet by mouth Every Evening., Disp: 90 tablet, Rfl: 1  •  promethazine (PHENERGAN) 25 MG tablet, Take 1 tablet by mouth Every 6 (Six) Hours As Needed for Nausea or Vomiting., Disp: 30 tablet, Rfl: 2  •  QUEtiapine (SEROquel) 400 MG tablet, Take 800 mg by mouth every night at bedtime., Disp: , Rfl:   •  Rimegepant Sulfate (Nurtec) 75 MG tablet dispersible tablet, Take 1 tablet by mouth Daily As Needed (migraine)., Disp: 8 tablet, Rfl: 1  •  cloNIDine HCl ER 0.1 MG tablet sustained-release 12 hour tablet, Take 0.2 mg by mouth Every 12 (Twelve) Hours., Disp: , Rfl:     Current Facility-Administered  "Medications:   •  OnabotulinumtoxinA 155 Units, 155 Units, Intramuscular, Once, Poncho Rodriguez MD    Objective     Vital Signs   /86 (BP Location: Left arm, Patient Position: Sitting, Cuff Size: Adult)   Pulse 110   Ht 162.6 cm (64\")   Wt 88 kg (194 lb)   LMP  (LMP Unknown)   SpO2 97%   BMI 33.30 kg/m²      Physical Exam:  General appearance - alert, well appearing, and in no distress  Mental status - alert, oriented to person, place, and time  Eyes - sclera anicteric  Neck - Right sided small palpable node lateral to thyroid. Multiple left cervical palpable lymph nodes.   Chest - no tachypnea, retractions or cyanosis  Heart - normal rate and regular rhythm  Neurological - alert, oriented, normal speech, no focal findings or movement disorder noted  Musculoskeletal - no joint tenderness, deformity or swelling        Results Review:    The following data was reviewed by: Sue Youssef MD on 08/01/2022:  US Head Neck Soft Tissue (07/13/2022 14:55)  Targeted LEFT neck soft tissue ultrasound over an area of palpable concern. At the site of clinical concern, multiple prominent lymph nodes are present. The largest node is in the LEFT submandibular region measuring 1.9 cm with a cortex of 5 mm thickness. These lymph nodes are likely reactive to an infectious or inflammatory process. Recommend clinical follow-up to ensure resolution.    Assessment & Plan       Diagnoses and all orders for this visit:    1. Lymphadenopathy (Primary)  -     US Head Neck Soft Tissue; Future  -     Ambulatory Referral to ENT (Otolaryngology)    2. Class 1 obesity due to excess calories with serious comorbidity and body mass index (BMI) of 34.0 to 34.9 in adult    US shows multiple enlarged left sided lymph nodes, largest being 1.9 cm that appear consistent with an infectious or inflammatory process. As there are more enlarged lymph nodes now and they are deep to the muscle, I have recommended referral to ENT. The patient " is amenable to this plan. I ordered a follow up US. Follow up with me prn.       BMI is >= 30 and <35. (Class 1 Obesity). The following options were offered after discussion;: weight loss educational material (shared in after visit summary)      Sue Youssef MD  08/01/22  19:39 CDT

## 2022-08-02 NOTE — PROGRESS NOTES
Office Established Patient Note:     Referring Provider: No ref. provider found    Chief Complaint   Patient presents with   • Follow-up     Patient is here for 4 week f/u and to get US results       Subjective .     History of present illness:  Chelita Rosenbaum is a 36 y.o. female who saw me on 6/27/22 for a swollen cervical lymph node without B symptoms after her COVID booster. She returns today to discuss her follow up neck US.     She deneis fatigue, night sweats, weight loss. She thinks the original lymph node is stable, however she now feels other lymph nodes. She endorses pain at the site. No drainage from the area. No skin changes    History  Past Medical History:   Diagnosis Date   • Arrhythmia    • Arthropathy of lumbar facet joint    • Asthma    • Bipolar disorder (HCC)    • Cervico-occipital neuralgia    • Constipation    • Diabetes mellitus (HCC)    • Disorder of small intestine     thicking jejunum, delayed transit      • Diverticular disease of colon    • Drug therapy     Other long term (current) drug therapy      • Dysphagia    • Encounter for contraceptive management     other   • Encounter for surveillance of contraceptives     other   • Epigastric pain    • Generalized anxiety disorder    • H/O colonoscopy with polypectomy    • Hirsutism    • Hyperlipidemia    • Hyperprolactinemia (HCC)    • Hypertension    • Low back pain    • Menstruation disorder     Other disorders of menstruation and other abnormal bleeding from female genital tract      • Migraine aura, persistent     Migraine - w/ aura of spots      • Myofascial pain    • Nausea    • Nausea and vomiting    • Pap smear for cervical cancer screening    • PCOS (polycystic ovarian syndrome)    • Periumbilical pain    • POTS (postural orthostatic tachycardia syndrome)    • Sleep apnea    • Surgical follow-up care    • Tachycardia    ,   Past Surgical History:   Procedure Laterality Date   • ABDOMINAL SURGERY  01/21/2014    Anesth, surgery of  abdomen (1)      • CAPSULE ENDOSCOPY  2015    [facility], capsule endoscopy 73242 (1)      •  SECTION  2010     Section (3)      • CHOLECYSTECTOMY  05/15/2003    Cholecystectomy, laparoscopic (1)      • COLON RESECTION     • COLONOSCOPY     • DILATATION AND CURETTAGE  10/21/2013    D&C (1)      • ENDOSCOPY  2015    EGD w/ biopsy 30495 (1)      • INJECTION OF MEDICATION  2016    Injection for nerve block (1)      • SUBTOTAL HYSTERECTOMY     ,   Family History   Problem Relation Age of Onset   • Heart disease Mother    • Asthma Father    • Diabetes Father    • Heart disease Father    • Hypertension Father    • No Known Problems Brother    ,   Social History     Tobacco Use   • Smoking status: Never Smoker   • Smokeless tobacco: Never Used   Vaping Use   • Vaping Use: Some days   • Substances: CBD   • Devices: [employer]   Substance Use Topics   • Alcohol use: No   • Drug use: No   , (Not in a hospital admission)   and Allergies:  Apriso [mesalamine er], Lamotrigine, Lithium, Tramadol, and Ultram [tramadol hcl]    Current Outpatient Medications:   •  albuterol sulfate  (90 Base) MCG/ACT inhaler, Inhale 2 puffs Every 4 (Four) Hours As Needed for Wheezing., Disp: 18 g, Rfl: 5  •  amLODIPine (NORVASC) 10 MG tablet, TAKE 1 TABLET BY MOUTH ONCE DAILY, Disp: 90 tablet, Rfl: 1  •  Atogepant (Qulipta) 60 MG tablet, Take 1 tablet by mouth Daily., Disp: 60 tablet, Rfl: 3  •  atorvastatin (LIPITOR) 40 MG tablet, Take 1 tablet by mouth every night at bedtime., Disp: 90 tablet, Rfl: 0  •  baclofen (LIORESAL) 10 MG tablet, Take  by mouth 3 (Three) Times a Day., Disp: , Rfl:   •  BD Pen Needle Leena U/F 32G X 4 MM misc, USE ONCE DAILY WITH VICTOZA, Disp: 30 each, Rfl: 5  •  Blood Glucose Monitoring Suppl (ONE TOUCH [employer] SYSTEM) w/Device kit, 1 Device Daily., Disp: 1 each, Rfl: 0  •  carvedilol (COREG) 3.125 MG tablet, Take 2 tablets by mouth 2 (Two) Times a Day With Meals., Disp: 180  tablet, Rfl: 0  •  clonazePAM (KlonoPIN) 1 MG tablet, Take 1 mg by mouth 2 (Two) Times a Day As Needed., Disp: , Rfl:   •  DULoxetine (CYMBALTA) 30 MG capsule, Take 30 mg by mouth Daily., Disp: , Rfl:   •  ezetimibe (ZETIA) 10 MG tablet, Take 1 tablet by mouth Daily., Disp: 90 tablet, Rfl: 0  •  famotidine (PEPCID) 20 MG tablet, TAKE 1 TABLET BY MOUTH TWICE DAILY, Disp: 60 tablet, Rfl: 5  •  furosemide (Lasix) 20 MG tablet, Take 1 tablet by mouth Daily., Disp: 30 tablet, Rfl: 5  •  glucose blood (ONE TOUCH ULTRA TEST) test strip, Use as instructed, Disp: 50 each, Rfl: 12  •  HYDROcodone-acetaminophen (NORCO) 5-325 MG per tablet, Take 5-325 tablets by mouth 4 (Four) Times a Day., Disp: , Rfl:   •  isosorbide mononitrate (IMDUR) 30 MG 24 hr tablet, Take 1 tablet by mouth Daily., Disp: 90 tablet, Rfl: 0  •  Lancets (onetouch ultrasoft) lancets, 1 each by Other route Daily. Use as instructed, Disp: , Rfl:   •  Liraglutide (Victoza) 18 MG/3ML solution pen-injector injection, Inject 1.8 mg SQ BID, Disp: 12 mL, Rfl: 5  •  meclizine (ANTIVERT) 25 MG tablet, Take 1 tablet by mouth 3 (Three) Times a Day As Needed for Dizziness., Disp: 90 tablet, Rfl: 1  •  metFORMIN (GLUCOPHAGE) 1000 MG tablet, Take 1 tablet by mouth 2 (Two) Times a Day With Meals., Disp: 180 tablet, Rfl: 0  •  pramipexole (MIRAPEX) 0.25 MG tablet, Take 1 tablet by mouth Every Evening., Disp: 90 tablet, Rfl: 1  •  promethazine (PHENERGAN) 25 MG tablet, Take 1 tablet by mouth Every 6 (Six) Hours As Needed for Nausea or Vomiting., Disp: 30 tablet, Rfl: 2  •  QUEtiapine (SEROquel) 400 MG tablet, Take 800 mg by mouth every night at bedtime., Disp: , Rfl:   •  Rimegepant Sulfate (Nurtec) 75 MG tablet dispersible tablet, Take 1 tablet by mouth Daily As Needed (migraine)., Disp: 8 tablet, Rfl: 1  •  cloNIDine HCl ER 0.1 MG tablet sustained-release 12 hour tablet, Take 0.2 mg by mouth Every 12 (Twelve) Hours., Disp: , Rfl:     Current Facility-Administered  "Medications:   •  OnabotulinumtoxinA 155 Units, 155 Units, Intramuscular, Once, Poncho Rodriguez MD    Objective     Vital Signs   /86 (BP Location: Left arm, Patient Position: Sitting, Cuff Size: Adult)   Pulse 110   Ht 162.6 cm (64\")   Wt 88 kg (194 lb)   LMP  (LMP Unknown)   SpO2 97%   BMI 33.30 kg/m²      Physical Exam:  General appearance - alert, well appearing, and in no distress  Mental status - alert, oriented to person, place, and time  Eyes - sclera anicteric  Neck - Right sided small palpable node lateral to thyroid. Multiple left cervical palpable lymph nodes.   Chest - no tachypnea, retractions or cyanosis  Heart - normal rate and regular rhythm  Neurological - alert, oriented, normal speech, no focal findings or movement disorder noted  Musculoskeletal - no joint tenderness, deformity or swelling        Results Review:    The following data was reviewed by: Sue Youssef MD on 08/01/2022:  US Head Neck Soft Tissue (07/13/2022 14:55)  Targeted LEFT neck soft tissue ultrasound over an area of palpable concern. At the site of clinical concern, multiple prominent lymph nodes are present. The largest node is in the LEFT submandibular region measuring 1.9 cm with a cortex of 5 mm thickness. These lymph nodes are likely reactive to an infectious or inflammatory process. Recommend clinical follow-up to ensure resolution.    Assessment & Plan       Diagnoses and all orders for this visit:    1. Lymphadenopathy (Primary)  -     US Head Neck Soft Tissue; Future  -     Ambulatory Referral to ENT (Otolaryngology)    2. Class 1 obesity due to excess calories with serious comorbidity and body mass index (BMI) of 34.0 to 34.9 in adult    US shows multiple enlarged left sided lymph nodes, largest being 1.9 cm that appear consistent with an infectious or inflammatory process. As there are more enlarged lymph nodes now and they are deep to the muscle, I have recommended referral to ENT. The patient " is amenable to this plan. I ordered a follow up US. Follow up with me prn.       BMI is >= 30 and <35. (Class 1 Obesity). The following options were offered after discussion;: weight loss educational material (shared in after visit summary)      Sue Youssef MD  08/01/22  19:39 CDT

## 2022-08-10 ENCOUNTER — OFFICE VISIT (OUTPATIENT)
Dept: NEUROLOGY | Facility: CLINIC | Age: 37
End: 2022-08-10

## 2022-08-10 VITALS — SYSTOLIC BLOOD PRESSURE: 124 MMHG | DIASTOLIC BLOOD PRESSURE: 68 MMHG | HEART RATE: 96 BPM

## 2022-08-10 DIAGNOSIS — G43.719 INTRACTABLE CHRONIC MIGRAINE WITHOUT AURA AND WITHOUT STATUS MIGRAINOSUS: Primary | ICD-10-CM

## 2022-08-10 PROCEDURE — 99213 OFFICE O/P EST LOW 20 MIN: CPT | Performed by: NURSE PRACTITIONER

## 2022-08-10 NOTE — PROGRESS NOTES
Neurology Progress Note      Chief Complaint:    Migraine    Subjective    History of Present Illness:  Chelita Rosenbaum is a 36 y.o. female who presents today for migraine.  She is routinely followed by Idalmis Mcdermott APRN for primary care.     Migraine  She continues to report 2-3 migraines per week.  She continues to experience throbbing pains located in a holocephalic presentation.  She also has associated nausea, vomiting, dizziness, photophobia, and phonophobia.  She continues Qulipta 60mg daily, Botox injections, and Nurtec 75mg as needed for treatment.  She indicates that the addition of Qulipta didn't really seem to have much effect on her migraines.  She also feels this way with Botox.  She does mention that the Nurtec is helpful with reduction of severity in her migraine but doesn't have much success with completely aborting her migraine.  She has tried and failed multiple different treatments with no success.  These are detailed below.  She did have MRI back around a year ago in August 2021 which was unrevealing for secondary causes of headache. She does treat her EVELYN appropriately and she has recently had compliance check with another provider.     Migraine Treatment History:  She has tried and failed Imitrex and Ubrelvy for abortive treatments.  These were unsuccessful at completely aborting her headache and Ubrelvy didn't seen to have any effect at all. For preventative treatment she has tried Aimovig, Emgality, Botox, Qulipta, elavil, topamax, depakote, lamictal, and carvedolol.  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      Allergies:    Apriso [mesalamine er], Lamotrigine, Lithium, Tramadol, and Ultram [tramadol hcl]    Medications:  Current Outpatient Medications   Medication Sig Dispense Refill   • albuterol sulfate  (90 Base) MCG/ACT inhaler Inhale 2 puffs Every 4 (Four) Hours As Needed for Wheezing. 18 g 5   •  amLODIPine (NORVASC) 10 MG tablet TAKE 1 TABLET BY MOUTH ONCE DAILY 90 tablet 1   • Atogepant (Qulipta) 60 MG tablet Take 1 tablet by mouth Daily. 60 tablet 3   • atorvastatin (LIPITOR) 40 MG tablet Take 1 tablet by mouth every night at bedtime. 90 tablet 0   • baclofen (LIORESAL) 10 MG tablet Take  by mouth 3 (Three) Times a Day.     • BD Pen Needle Leena U/F 32G X 4 MM misc USE ONCE DAILY WITH VICTOZA 30 each 5   • Blood Glucose Monitoring Suppl (ONE TOUCH [employer] SYSTEM) w/Device kit 1 Device Daily. 1 each 0   • carvedilol (COREG) 3.125 MG tablet Take 2 tablets by mouth 2 (Two) Times a Day With Meals. 180 tablet 0   • clonazePAM (KlonoPIN) 1 MG tablet Take 1 mg by mouth 2 (Two) Times a Day As Needed.     • DULoxetine (CYMBALTA) 30 MG capsule Take 30 mg by mouth Daily.     • ezetimibe (ZETIA) 10 MG tablet Take 1 tablet by mouth Daily. 90 tablet 0   • famotidine (PEPCID) 20 MG tablet TAKE 1 TABLET BY MOUTH TWICE DAILY 60 tablet 5   • furosemide (Lasix) 20 MG tablet Take 1 tablet by mouth Daily. 30 tablet 5   • glucose blood (ONE TOUCH ULTRA TEST) test strip Use as instructed 50 each 12   • HYDROcodone-acetaminophen (NORCO) 5-325 MG per tablet Take 5-325 tablets by mouth 4 (Four) Times a Day.     • isosorbide mononitrate (IMDUR) 30 MG 24 hr tablet Take 1 tablet by mouth Daily. 90 tablet 0   • Lancets (onetouch ultrasoft) lancets 1 each by Other route Daily. Use as instructed     • Liraglutide (Victoza) 18 MG/3ML solution pen-injector injection Inject 1.8 mg SQ BID 12 mL 5   • meclizine (ANTIVERT) 25 MG tablet Take 1 tablet by mouth 3 (Three) Times a Day As Needed for Dizziness. 90 tablet 1   • metFORMIN (GLUCOPHAGE) 1000 MG tablet Take 1 tablet by mouth 2 (Two) Times a Day With Meals. 180 tablet 0   • pramipexole (MIRAPEX) 0.25 MG tablet Take 1 tablet by mouth Every Evening. 90 tablet 1   • promethazine (PHENERGAN) 25 MG tablet Take 1 tablet by mouth Every 6 (Six) Hours As Needed for Nausea or Vomiting. 30 tablet 2   •  QUEtiapine (SEROquel) 400 MG tablet Take 800 mg by mouth every night at bedtime.     • Rimegepant Sulfate (Nurtec) 75 MG tablet dispersible tablet Take 1 tablet by mouth Daily As Needed (migraine). 8 tablet 1   • cloNIDine HCl ER 0.1 MG tablet sustained-release 12 hour tablet Take 0.2 mg by mouth Every 12 (Twelve) Hours.       Current Facility-Administered Medications   Medication Dose Route Frequency Provider Last Rate Last Admin   • OnabotulinumtoxinA 155 Units  155 Units Intramuscular Once Poncho Rodriguez MD         Current outpatient and discharge medications have been reconciled for the patient.  Reviewed by: ARIELLE Garcia    Past Medical History:  Past Medical History:   Diagnosis Date   • Arrhythmia    • Arthropathy of lumbar facet joint    • Asthma    • Bipolar disorder (HCC)    • Cervico-occipital neuralgia    • Constipation    • Crohn's colitis (HCC)    • Diabetes mellitus (HCC)    • Disorder of small intestine     thicking jejunum, delayed transit      • Diverticular disease of colon    • Dysphagia    • Epigastric pain    • Generalized anxiety disorder    • H/O colonoscopy with polypectomy    • Hirsutism    • Hyperlipidemia    • Hyperprolactinemia (HCC)    • Hypertension    • Low back pain    • Migraine aura, persistent     Migraine - w/ aura of spots      • Myofascial pain    • Nausea and vomiting    • PCOS (polycystic ovarian syndrome)    • Periumbilical pain    • POTS (postural orthostatic tachycardia syndrome)    • Sleep apnea      Past Surgical History:   Procedure Laterality Date   • ABDOMINAL SURGERY  2014    Anesth, surgery of abdomen (1)      • CAPSULE ENDOSCOPY  2015    GI Imaging, capsule endoscopy 67744 (1)      •  SECTION  2010     Section (3)      • CHOLECYSTECTOMY  05/15/2003    Cholecystectomy, laparoscopic (1)      • COLON RESECTION     • COLONOSCOPY     • DILATATION AND CURETTAGE  10/21/2013    D&C (1)      • ENDOSCOPY  2015    EGD w/  biopsy 36529 (1)      • INJECTION OF MEDICATION  05/19/2016    Injection for nerve block (1)      • SUBTOTAL HYSTERECTOMY      Reports [facility], BS     Family History   Problem Relation Age of Onset   • Heart disease Mother    • Asthma Father    • Diabetes Father    • Heart disease Father    • Hypertension Father    • No Known Problems Brother      Social History     Tobacco Use   • Smoking status: Never Smoker   • Smokeless tobacco: Never Used   Vaping Use   • Vaping Use: Some days   • Substances: CBD   • Devices: Refillable tank   Substance Use Topics   • Alcohol use: No   • Drug use: No     Review of Systems   Eyes: Positive for photophobia.   Gastrointestinal: Positive for nausea and vomiting.   Neurological: Positive for dizziness and headache.   All other systems reviewed and are negative.        Objective   Vital Signs:  Heart Rate:  [96] 96  BP: (124)/(68) 124/68  There were no vitals filed for this visit.  No Height Documented This Encounter  There is no height or weight on file to calculate BMI.    Physical Exam  Vitals reviewed.   Constitutional:       Appearance: Normal appearance.   HENT:      Head: Normocephalic and atraumatic.   Eyes:      Extraocular Movements: Extraocular movements intact.      Pupils: Pupils are equal, round, and reactive to light.   Cardiovascular:      Rate and Rhythm: Normal rate and regular rhythm.      Pulses: Normal pulses.   Pulmonary:      Effort: Pulmonary effort is normal.   Musculoskeletal:         General: Normal range of motion.      Cervical back: Normal range of motion and neck supple.   Skin:     General: Skin is warm and dry.      Capillary Refill: Capillary refill takes less than 2 seconds.   Neurological:      Mental Status: She is alert.      Gait: Gait is intact.   Psychiatric:         Mood and Affect: Mood normal.         Behavior: Behavior is cooperative.       Neurologic Exam:  Mental Status:    -Awake. Alert. Oriented to person, place & time.  -No word finding  difficulties.  -No aphasia.  -No dysarthria.  -Follows simple commands.     CN II:  Full visual fields with confrontation.  Pupils equally reactive to light.  CN III, IV, VI:  Extraocular muscles function intact with no nystagmus.  CN V:  Facial sensory is symmetric.  CN VII:  Facial motor symmetric.  CN VIII:  Gross hearing intact bilaterally.  CN IX/X:  Palate elevates symmetrically.  CN XI:  Shoulder shrug symmetric.  CN XII:  Tongue is midline on protrusion.     Motor: (strength out of 5:  1= minimal movement, 2 = movement in plane of gravity, 3 = movement against gravity, 4 = movement against some resistance, 5 = full strength)     -5/5 in bilateral biceps, triceps, brachioradialis, wrist extensors and intrinsic muscles of the hand.    -5/5 in bilateral hip flexors, quadriceps, hamstrings, gastrocsoleus complex, anterior tibialis and extensor hallucis longus.       Deep Tendon Reflexes:  -Right              Biceps: 2+         Triceps: 2+      Brachioradialis: 2+              Patella: 2+       Ankle: 2+         Babinski:  negative  -Left              Biceps: 2+         Triceps: 2+      Brachioradialis: 2+              Patella: 2+       Ankle: 2+         Babinski:  negative    Tone (Modified Karen Scale):  No appreciable increase in tone or rigidity noted.     Sensory:  -Intact to light touch, pinprick BUE (C5-T1) and BLE (L2-S1).     Coordination:  -Finger to nose intact BUEs  -Heel to shin intact BLEs  -No ataxia     Gait  -No signs of ataxia  -ambulates unassisted     Results Review:    Lab Results   Component Value Date    GLUCOSE 107 (H) 11/11/2021    BUN 9 11/11/2021    CREATININE 0.75 11/11/2021    EGFRIFNONA 102 11/11/2021    EGFRIFAFRI 118 11/11/2021    BCR 12 11/11/2021    K 4.2 11/11/2021    CO2 22 11/11/2021    CALCIUM 9.2 11/11/2021    PROTENTOTREF 6.7 11/11/2021    ALBUMIN 3.5 (L) 11/11/2021    LABIL2 1.1 (L) 11/11/2021    AST 28 11/11/2021    ALT 39 (H) 11/11/2021     Lab Results   Component  Value Date    WBC 7.7 11/11/2021    HGB 13.1 11/11/2021    HCT 39.0 11/11/2021    MCV 87 11/11/2021     11/11/2021     Lab Results   Component Value Date    CHOL 277 (H) 10/23/2017    CHLPL 161 11/11/2021    TRIG 148 11/11/2021    HDL 38 (L) 11/11/2021    LDL 97 11/11/2021     Lab Results   Component Value Date    TSH 2.120 11/11/2021     Lab Results   Component Value Date    HGBA1C 5.2 04/18/2022     No results found for: FOLATE  Lab Results   Component Value Date    [id number] 689 03/31/2021     MRI Brain Without Contrast (08/17/2021 12:02)     Plan .  Impression:  Chelita Rosenbaum is a 36 y.o. female who presents with continued chronic migraine which is resistant to treatments.  She does have other underlying chronic conditions which I do believe are contributory to the refractory state of her headaches.  Additionally, she is on chronic opioid use which could be contributory to her headaches as well.  She has noted some mild improvement of severity of her migraines but continues to have frequency of migraines which is significant.     Plan:  • Continue Qulipta 60mg Daily  • Continue Nurtec 75mg as needed  • Continue Botox with Dr. Rodriguez  • Will start her on Vyepti  • Reyvow 50mg as needed for migraine onset.  Discussed driving restriction.  • Will refer to migraine specialist if we continue with difficulty treating migraines. She is agreeable.  • Recommend regular physical activity and balanced diet.    The patient and I have discussed the plan of care and she is in full agreement at this time.     Follow-Up:  Return in about 3 months (around 11/10/2022) for Migraine.       ARIELLE Garcia  08/10/22  15:46 CDT

## 2022-08-12 ENCOUNTER — OFFICE VISIT (OUTPATIENT)
Dept: OBSTETRICS AND GYNECOLOGY | Facility: CLINIC | Age: 37
End: 2022-08-12

## 2022-08-12 VITALS
HEIGHT: 64 IN | SYSTOLIC BLOOD PRESSURE: 140 MMHG | WEIGHT: 196.4 LBS | BODY MASS INDEX: 33.53 KG/M2 | DIASTOLIC BLOOD PRESSURE: 88 MMHG

## 2022-08-12 DIAGNOSIS — R10.2 PELVIC PAIN: Primary | ICD-10-CM

## 2022-08-12 DIAGNOSIS — Z90.49 HISTORY OF COLON RESECTION: ICD-10-CM

## 2022-08-12 DIAGNOSIS — E28.2 PCOS (POLYCYSTIC OVARIAN SYNDROME): ICD-10-CM

## 2022-08-12 DIAGNOSIS — Z90.710 HISTORY OF HYSTERECTOMY: ICD-10-CM

## 2022-08-12 PROCEDURE — 99214 OFFICE O/P EST MOD 30 MIN: CPT | Performed by: STUDENT IN AN ORGANIZED HEALTH CARE EDUCATION/TRAINING PROGRAM

## 2022-08-12 RX ORDER — SODIUM CHLORIDE, SODIUM LACTATE, POTASSIUM CHLORIDE, CALCIUM CHLORIDE 600; 310; 30; 20 MG/100ML; MG/100ML; MG/100ML; MG/100ML
125 INJECTION, SOLUTION INTRAVENOUS CONTINUOUS
Status: CANCELLED | OUTPATIENT
Start: 2022-09-22

## 2022-08-12 RX ORDER — SODIUM CHLORIDE 0.9 % (FLUSH) 0.9 %
3 SYRINGE (ML) INJECTION EVERY 12 HOURS SCHEDULED
Status: CANCELLED | OUTPATIENT
Start: 2022-09-22

## 2022-08-12 RX ORDER — SODIUM CHLORIDE 0.9 % (FLUSH) 0.9 %
10 SYRINGE (ML) INJECTION AS NEEDED
Status: CANCELLED | OUTPATIENT
Start: 2022-09-22

## 2022-08-12 NOTE — PROGRESS NOTES
[facility]  Gynecology  Date of Service: 2022    CC: GYN follow-up    HPI  Chelita Rosenbaum is a 36 y.o.  pre menopausal female who presents with complaints of pain in her ovaries after a partial hysterectomy in 2017.       Seen last 2022 and reported that she had a laparoscopic partial hysterectomy in 2017 with Dr. Walker in [place].  She reports a history of 6 prior miscarriages, heavy periods, with clots lasting 7 days straight.  Also reports a history of dyspareunia.  She reports that her cervix, uterus, and bilateral tubes were removed.  Patient states that she has had pain for a while, but that its been worse over the last couple of years.  She states that its on and off with no pattern.  She says she cannot do anything to help with the pain.  She is concerned because she has POTS and notes that her autonomic dysfunction is worse when she is in significant pain.  She states that she will get lightheaded, have emesis, and note that her blood pressure and heart rate will be significantly elevated when in pain.  Reports that she was diagnosed with PCOS long ago with Dr. Earnestine zepeda.  Previously on spironolactone but was not helping and was discontinued.  Has also tried Depo Provera in the Nexplanon implant, without significant improvement prior to her hysterectomy.  Patient does see pain management for bulging disc and degenerative disc disease.  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  On [place] 5 mg 4 times daily.  Patient also reports history of diverticulitis, partial colon resection, Crohn's disease.  Her hysterectomy has been since the partial colon resection.  She denies it she was told of any significant scar tissue at the time of her hysterectomy.  She states the partial colon resection was done because she had a lot of rectal bleeding and was told that her colon had not yet perforated but was close to  doing so.  It was done laparoscopically.     Did have mammogram in  in [place] for history of breast cancer in her family.     Denies any vaginal itching, burning, irritation, or discharge. Denies any urinary symptoms including incontinence, dysuria, frequency, urgency, nocturia.     She is getting   of this year.     Preliminary ultrasound today: Uterus surgically absent, right ovary 3.6 x 3.0 x 2.15 cm, left ovary 2.97 x 2.45 x 2.54 cm, on evaluation appears that ovaries may be have polycystic morphology, but no larger ovarian cysts or masses noted    No significant changes since she was last seen in the clinic.  Unfortunately, unable to obtain the patient's operative report from her hysterectomy due to time passed since it was performed.    ROS  Review of Systems   Constitutional: Negative.    HENT: Negative.    Respiratory: Negative.    Cardiovascular: Negative.    Gastrointestinal: Negative.    Genitourinary: Positive for pelvic pain.   Musculoskeletal: Negative.    Skin: Negative.    Psychiatric/Behavioral: Negative.        GYN HISTORY  Menarche: 11 yo  Menses: Hysterectomy in   History of STIs: Not reported  Last pap smear: Reports abnormal Paps in the past, normal Pap smear prior to her hysterectomy.  Last Completed Pap Smear     This patient has no relevant Health Maintenance data.        Abnormal pap smear history: Denies  Contraception: Hysterectomy     OB HISTORY  OB History    Para Term  AB Living   9 3 3   6 3   SAB IAB Ectopic Molar Multiple Live Births             3      # Outcome Date GA Lbr Dipesh/2nd Weight Sex Delivery Anes PTL Lv   9 AB            8 AB            7 AB            6 AB            5 AB            4 AB            3 Term      CS-Unspec   ARGELIA   2 Term      CS-Unspec   ARGELIA   1 Term      CS-Unspec   ARGELIA     PAST MEDICAL HISTORY  Past Medical History:   Diagnosis Date   • Arrhythmia    • Arthropathy of lumbar facet joint    • Asthma    • Bipolar  disorder (HCC)    • Cervico-occipital neuralgia    • Constipation    • Crohn's colitis (HCC)    • Diabetes mellitus (HCC)    • Disorder of small intestine     thicking jejunum, delayed transit      • Diverticular disease of colon    • Dysphagia    • Epigastric pain    • Generalized anxiety disorder    • H/O colonoscopy with polypectomy    • Hirsutism    • Hyperlipidemia    • Hyperprolactinemia (HCC)    • Hypertension    • Low back pain    • Migraine aura, persistent     Migraine - w/ aura of spots      • Myofascial pain    • Nausea and vomiting    • PCOS (polycystic ovarian syndrome)    • Periumbilical pain    • POTS (postural orthostatic tachycardia syndrome)    • Sleep apnea      PAST SURGICAL HISTORY  Past Surgical History:   Procedure Laterality Date   • ABDOMINAL SURGERY  2014    Anesth, surgery of abdomen (1)      • CAPSULE ENDOSCOPY  2015    [facility] Imaging, capsule endoscopy 74424 (1)      •  SECTION  2010     Section (3)      • CHOLECYSTECTOMY  05/15/2003    Cholecystectomy, laparoscopic (1)      • COLON RESECTION     • COLONOSCOPY     • DILATATION AND CURETTAGE  10/21/2013    D&C (1)      • ENDOSCOPY  2015    EGD w/ biopsy 37140 (1)      • INJECTION OF MEDICATION  2016    Injection for nerve block (1)      • SUBTOTAL HYSTERECTOMY      Reports TLH, BS     FAMILY HISTORY  Family History   Problem Relation Age of Onset   • Heart disease Mother    • Asthma Father    • Diabetes Father    • Heart disease Father    • Hypertension Father    • No Known Problems Brother      SOCIAL HISTORY  Social History     Socioeconomic History   • Marital status:    Tobacco Use   • Smoking status: Never Smoker   • Smokeless tobacco: Never Used   Vaping Use   • Vaping Use: Some days   • Substances: CBD   • Devices: Refillable tank   Substance and Sexual Activity   • Alcohol use: No   • Drug use: No   • Sexual activity: Never     Birth control/protection: Surgical      ALLERGIES  Allergies   Allergen Reactions   • Apriso [Mesalamine Er] Nausea And Vomiting     Vomiting    • Lamotrigine Rash   • Lithium Nausea And Vomiting     Difficulty breathing   • Tramadol Other (See Comments)     Hard to breath   • Ultram [Tramadol Hcl] Itching     HOME MEDICATIONS  Prior to Admission medications    Medication Sig Start Date End Date Taking? Authorizing Provider   albuterol sulfate  (90 Base) MCG/ACT inhaler Inhale 2 puffs Every 4 (Four) Hours As Needed for Wheezing. 2/22/21  Yes Best Gannon MD   amLODIPine (NORVASC) 10 MG tablet TAKE 1 TABLET BY MOUTH ONCE DAILY 2/8/22  Yes Idalmis Mcdermott APRN   Atogepant (Qulipta) 60 MG tablet Take 1 tablet by mouth Daily. 5/2/22  Yes Thien Jean Baptiste APRN   atorvastatin (LIPITOR) 40 MG tablet Take 1 tablet by mouth every night at bedtime. 6/29/22  Yes Idalmis Mcdermott APRN   baclofen (LIORESAL) 10 MG tablet Take  by mouth 3 (Three) Times a Day. 4/21/22  Yes Toby Mo MD   BD Pen Needle Leena U/F 32G X 4 MM misc USE ONCE DAILY WITH VICTOZA 7/28/22  Yes Idalmis Mcdermott APRN   Blood Glucose Monitoring Suppl (ONE TOUCH BASIC SYSTEM) w/Device kit 1 Device Daily. 10/25/17  Yes Meron Taylor APRN   carvedilol (COREG) 3.125 MG tablet Take 2 tablets by mouth 2 (Two) Times a Day With Meals. 7/12/22  Yes Idalmis Mcdermott APRN   clonazePAM (KlonoPIN) 1 MG tablet Take 1 mg by mouth 2 (Two) Times a Day As Needed. 1/18/21  Yes Toby Mo MD   DULoxetine (CYMBALTA) 30 MG capsule Take 30 mg by mouth Daily. 4/23/22  Yes Toby Mo MD   ezetimibe (ZETIA) 10 MG tablet Take 1 tablet by mouth Daily. 6/29/22  Yes Idalmis Mcdermott APRN   famotidine (PEPCID) 20 MG tablet TAKE 1 TABLET BY MOUTH TWICE DAILY 1/11/22  Yes Best Gannon MD   furosemide (Lasix) 20 MG tablet Take 1 tablet by mouth Daily. 7/12/22  Yes Idalmis Mcdermott APRN   glucose blood (ONE TOUCH ULTRA TEST) test strip Use as  "instructed 10/25/17  Yes Meron Taylor APRN   HYDROcodone-acetaminophen (NORCO) 5-325 MG per tablet Take 5-325 tablets by mouth 4 (Four) Times a Day. 1/7/21  Yes Toby Mo MD   isosorbide mononitrate (IMDUR) 30 MG 24 hr tablet Take 1 tablet by mouth Daily. 6/29/22  Yes Idalmsi Mcdermott APRN   Lancets (onetouch ultrasoft) lancets 1 each by Other route Daily. Use as instructed   Yes Toby Mo MD   Liraglutide (Victoza) 18 MG/3ML solution pen-injector injection Inject 1.8 mg SQ BID 7/12/22  Yes Idalmis Mcdermott APRN   meclizine (ANTIVERT) 25 MG tablet Take 1 tablet by mouth 3 (Three) Times a Day As Needed for Dizziness. 5/18/22  Yes Idalmis Mcdermott APRN   metFORMIN (GLUCOPHAGE) 1000 MG tablet Take 1 tablet by mouth 2 (Two) Times a Day With Meals. 5/25/22  Yes Idalmis Mcdermott APRN   pramipexole (MIRAPEX) 0.25 MG tablet Take 1 tablet by mouth Every Evening. 7/26/22  Yes Idalmis Mcdermott APRN   promethazine (PHENERGAN) 25 MG tablet Take 1 tablet by mouth Every 6 (Six) Hours As Needed for Nausea or Vomiting. 7/12/22  Yes Idalmis Mcdermott APRN   QUEtiapine (SEROquel) 400 MG tablet Take 800 mg by mouth every night at bedtime. 11/28/20  Yes Toby Mo MD   Rimegepant Sulfate (Nurtec) 75 MG tablet dispersible tablet Take 1 tablet by mouth Daily As Needed (migraine). 5/18/22  Yes Thien Jean Baptiste APRN   cloNIDine HCl ER 0.1 MG tablet sustained-release 12 hour tablet Take 0.2 mg by mouth Every 12 (Twelve) Hours. 1/3/22 7/12/22  Toby Mo MD PE  /88 (BP Location: Left arm, Patient Position: Sitting, Cuff Size: Adult)   Ht 162.6 cm (64\")   Wt 89.1 kg (196 lb 6.4 oz)   LMP  (LMP Unknown)   BMI 33.71 kg/m²        General: Alert, healthy, no distress, well nourished and well developed.  Neurologic: Alert, oriented to person, place, and time.  Gait normal.  Cranial nerves II-XII grossly intact.  HEENT: Normocephalic, atraumatic.  Extraocular " muscles intact.  Lungs: Normal respiratory effort.   Skin: No rash, no lesions.  Extremities: No cyanosis, clubbing or edema.  PELVIC EXAM:  See 2022 note    IMPRESSION  Chelita Rosenbaum is a 36 y.o.  presenting for ultrasound follow-up and discussion of plan of care.    PLAN    1. Pelvic pain  -Reports pain in her ovaries, indicating bilateral lower abdominal/pelvic pain  -History of PCOS, history of recurrent miscarriages and heavy menstrual bleeding prior to hysterectomy  -Reports had a TLH, BS, retains bilateral ovaries  -Complex history including reported history of Crohn's disease with a partial colon resection for diverticulitis?;  Performed laparoscopically and was prior to her hysterectomy  -Records requested from hysterectomy and bowel resection, we have been unable to obtain any of these records, will continue to try to obtain them but due to time passed since her surgeries this may not be feasible  -We did discuss the complex nature of pelvic pain which can include gynecologic, GI, , or musculoskeletal causes; she does have a significant history of GI issues with her prior bowel resection due to possible diverticulitis versus Crohn's, we did discuss that pain may be from scar tissue and/or inflammation from her bowel disease  -The patient very strongly desires removal of her bilateral ovaries for attempted definitive management of her pelvic pain  -I did discuss with the patient that she would be a relatively high risk for a repeat abdominal procedure due to history of 3 prior C-sections, hysterectomy, cholecystectomy, and partial colon resection;  I discussed with her the options for referral for her surgery at another facility versus attempt at bilateral oophorectomy here with us  -She denies significant adhesions during her hysterectomy, but unfortunately I am unable to review this record, I feel it is reasonable if she desires to attempt to perform her surgery here in our hospital.  We  did discuss that she is at increased risk of bowel or bladder injury due to her prior procedures as well as injury to structures such as her ureters, blood vessels, nerves; I also discussed with her that if she did have a very significant amount of scar tissue would not feel we could safely do her surgery here, there is a chance that we do diagnostic laparoscopy and then does decide to not continue with attempt at bilateral oophorectomy and refer her to a specialist at a center that has colorectal surgery or other higher intensity care available  - we discussed that she may have to have an open abdominal procedure if there is significant scar tissue or bleeding that would necessitate a larger incision, but we will attempt a laparoscopic approach  -We will plan for diagnostic laparoscopy, laparoscopic bilateral salpingo-oophorectomy, possible abdominal bilateral salpingo-oophorectomy, any other indicated procedures  - Discussed risks/benefits of surgery: risk of infection, bleeding, damage to surrounding structures, need for additional procedures; discussed risk of anesthesia including heart attack, stroke, and death; plan for outpatient surgery discussed.                 This document has been electronically signed by Megan Cummings DO on August 28, 2022 16:20 CDT

## 2022-08-16 DIAGNOSIS — F41.9 ANXIETY AND DEPRESSION: Primary | ICD-10-CM

## 2022-08-16 DIAGNOSIS — R10.2 PELVIC PAIN: ICD-10-CM

## 2022-08-16 DIAGNOSIS — F32.A ANXIETY AND DEPRESSION: Primary | ICD-10-CM

## 2022-08-16 RX ORDER — QUETIAPINE FUMARATE 400 MG/1
800 TABLET, FILM COATED ORAL
Qty: 60 TABLET | Refills: 2 | Status: SHIPPED | OUTPATIENT
Start: 2022-08-16 | End: 2022-11-17

## 2022-08-17 DIAGNOSIS — G43.719 INTRACTABLE CHRONIC MIGRAINE WITHOUT AURA AND WITHOUT STATUS MIGRAINOSUS: Primary | ICD-10-CM

## 2022-08-17 RX ORDER — LASMIDITAN 50 MG/1
50 TABLET ORAL DAILY PRN
Qty: 8 TABLET | Refills: 1 | Status: SHIPPED | OUTPATIENT
Start: 2022-08-17 | End: 2023-01-04

## 2022-08-17 NOTE — TELEPHONE ENCOUNTER
Caller: JE   Relationship to Patient: PT  Phone Number: [phone]  Reason for Call: PT CALLED AND STATES THAT THE PHARMACY INFORMED HER THAT SHE HAS TO HAVE PA ON THIS MEDICATION.

## 2022-08-19 LAB
AMBIG ABBREV CMP14 DEFAULT: NORMAL
BASOPHILS # BLD AUTO: 0.1 X10E3/UL (ref 0–0.2)
BASOPHILS NFR BLD AUTO: 1 %
BUN SERPL-MCNC: 16 MG/DL (ref 6–20)
BUN/CREAT SERPL: 17 (ref 9–23)
CALCIUM SERPL-MCNC: 9.5 MG/DL (ref 8.7–10.2)
CHLORIDE SERPL-SCNC: 98 MMOL/L (ref 96–106)
CO2 SERPL-SCNC: 18 MMOL/L (ref 20–29)
CREAT SERPL-MCNC: 0.92 MG/DL (ref 0.57–1)
EGFRCR-CYS SERPLBLD CKD-EPI 2021: 82 ML/MIN/1.73
EOSINOPHIL # BLD AUTO: 0.1 X10E3/UL (ref 0–0.4)
EOSINOPHIL NFR BLD AUTO: 1 %
ERYTHROCYTE [DISTWIDTH] IN BLOOD BY AUTOMATED COUNT: 13.1 % (ref 11.7–15.4)
GLUCOSE SERPL-MCNC: ABNORMAL MG/DL
HCT VFR BLD AUTO: 41.8 % (ref 34–46.6)
HGB BLD-MCNC: 13.5 G/DL (ref 11.1–15.9)
IMM GRANULOCYTES # BLD AUTO: 0 X10E3/UL (ref 0–0.1)
IMM GRANULOCYTES NFR BLD AUTO: 0 %
LYMPHOCYTES # BLD AUTO: 3.6 X10E3/UL (ref 0.7–3.1)
LYMPHOCYTES NFR BLD AUTO: 34 %
MCH RBC QN AUTO: 28.5 PG (ref 26.6–33)
MCHC RBC AUTO-ENTMCNC: 32.3 G/DL (ref 31.5–35.7)
MCV RBC AUTO: 88 FL (ref 79–97)
MONOCYTES # BLD AUTO: 0.6 X10E3/UL (ref 0.1–0.9)
MONOCYTES NFR BLD AUTO: 6 %
NEUTROPHILS # BLD AUTO: 6.4 X10E3/UL (ref 1.4–7)
NEUTROPHILS NFR BLD AUTO: 58 %
PLATELET # BLD AUTO: 372 X10E3/UL (ref 150–450)
POTASSIUM SERPL-SCNC: ABNORMAL MMOL/L
RBC # BLD AUTO: 4.73 X10E6/UL (ref 3.77–5.28)
SODIUM SERPL-SCNC: 140 MMOL/L (ref 134–144)
WBC # BLD AUTO: 10.8 X10E3/UL (ref 3.4–10.8)

## 2022-09-06 DIAGNOSIS — E11.9 TYPE 2 DIABETES MELLITUS WITHOUT COMPLICATION, WITHOUT LONG-TERM CURRENT USE OF INSULIN: ICD-10-CM

## 2022-09-06 NOTE — TELEPHONE ENCOUNTER
Rx Refill Note  Requested Prescriptions     Pending Prescriptions Disp Refills   • metFORMIN (GLUCOPHAGE) 1000 MG tablet [Pharmacy Med Name: METFORMIN HCL 1000MG TABS] 180 tablet 0     Sig: TAKE ONE TABLET BY MOUTH TWICE A DAY WITH MEALS      Last office visit with prescribing clinician: 7/12/2022      Next office visit with prescribing clinician: 11/18/2022     Dori Tyler MA  09/06/22, 13:26 CDT

## 2022-09-08 ENCOUNTER — PATIENT MESSAGE (OUTPATIENT)
Dept: NEUROLOGY | Facility: CLINIC | Age: 37
End: 2022-09-08

## 2022-09-09 ENCOUNTER — APPOINTMENT (OUTPATIENT)
Dept: ULTRASOUND IMAGING | Facility: HOSPITAL | Age: 37
End: 2022-09-09

## 2022-09-12 ENCOUNTER — HOSPITAL ENCOUNTER (OUTPATIENT)
Dept: ULTRASOUND IMAGING | Facility: HOSPITAL | Age: 37
Discharge: HOME | End: 2022-09-12
Admitting: STUDENT IN AN ORGANIZED HEALTH CARE EDUCATION/TRAINING PROGRAM

## 2022-09-12 DIAGNOSIS — R59.1 LYMPHADENOPATHY: ICD-10-CM

## 2022-09-12 PROCEDURE — 76536 US EXAM OF HEAD AND NECK: CPT

## 2022-09-12 NOTE — PROGRESS NOTES
YOB: 1985  Location: [facility]  Location Address: [address] [place] [address], [facility], [address] [place] [address]  Location Phone: [phone]    Chief Complaint   Patient presents with   • Swollen Glands       History of Present Illness  Chelita Rosenbaum is a 36 y.o. female.  Chelita Rosenbaum is here for evaluation of ENT complaints. The patient has had problems with lymphadenopathy.  The patient has had mild to moderate symptoms. The symptoms have been present for the last 2 months. There have been no identified factors that aggravate the symptoms. There have been no factors that have improved the symptoms.    US Head Neck Soft Tissue (2022 16:35)    She states that she received the covid booster vaccine early July. Two weeks later, she started noticing enlarged lymph nodes to the left neck. She was given several rounds of antibiotics but this did not resolve the swelling. She states that shortly after, she noticed swollen lymph nodes to the right side of the neck as well.     She does report a history of dysphagia, she has her esophagus stretched routinely. She reports a scratchy throat and raspy voice. She is currently taking pepcid before breakfast.    Study Result    Narrative & Impression   EXAM/TECHNIQUE: US HEAD NECK SOFT TISSUE-     INDICATION: L cervical lymph node; R59.1-Generalized enlarged lymph  nodes     COMPARISON: None available.     IMPRESSION:  FINDING/IMPRESSION:     Targeted LEFT neck soft tissue ultrasound over an area of palpable  concern. At the site of clinical concern, multiple prominent lymph nodes  are present. The largest node is in the LEFT submandibular region  measuring 1.9 cm with a cortex of 5 mm thickness. These lymph nodes are  likely reactive to an infectious or inflammatory process. Recommend  clinical follow-up to ensure resolution.  This report was finalized on 2022 15:15 by Dr. Brandon Peguero MD.          Past Medical History:   Diagnosis Date   • Arrhythmia     • Arthropathy of lumbar facet joint    • Asthma    • Bipolar disorder (HCC)    • Cervico-occipital neuralgia    • Constipation    • Crohn's colitis (HCC)    • Diabetes mellitus (HCC)    • Disorder of small intestine     thicking jejunum, delayed transit      • Diverticular disease of colon    • Dysphagia    • Epigastric pain    • Generalized anxiety disorder    • H/O colonoscopy with polypectomy    • Hirsutism    • Hyperlipidemia    • Hyperprolactinemia (HCC)    • Hypertension    • Low back pain    • Migraine aura, persistent     Migraine - w/ aura of spots      • Myofascial pain    • Nausea and vomiting    • PCOS (polycystic ovarian syndrome)    • Periumbilical pain    • POTS (postural orthostatic tachycardia syndrome)    • Sleep apnea        Past Surgical History:   Procedure Laterality Date   • ABDOMINAL SURGERY  2014    Anesth, surgery of abdomen (1)      • CAPSULE ENDOSCOPY  2015    GI Imaging, capsule endoscopy 79479 (1)      •  SECTION  2010     Section (3)      • CHOLECYSTECTOMY  05/15/2003    Cholecystectomy, laparoscopic (1)      • COLON RESECTION     • COLONOSCOPY     • DILATATION AND CURETTAGE  10/21/2013    D&C (1)      • ENDOSCOPY  2015    EGD w/ biopsy [id number] (1)      • INJECTION OF MEDICATION  2016    Injection for nerve block (1)      • SUBTOTAL HYSTERECTOMY      Reports TLH, BS       Outpatient Medications Marked as Taking for the 22 encounter (Office Visit) with Tommie Rosenbaum MD   Medication Sig Dispense Refill   • albuterol sulfate  (90 Base) MCG/ACT inhaler Inhale 2 puffs Every 4 (Four) Hours As Needed for Wheezing. 18 g 5   • amLODIPine (NORVASC) 10 MG tablet TAKE 1 TABLET BY MOUTH ONCE DAILY 90 tablet 1   • Atogepant (Qulipta) 60 MG tablet Take 1 tablet by mouth Daily. 60 tablet 3   • atorvastatin (LIPITOR) 40 MG tablet Take 1 tablet by mouth every night at bedtime. 90 tablet 0   • baclofen (LIORESAL) 20 MG tablet      • BD Pen  Needle Leena U/F 32G X 4 MM misc USE ONCE DAILY WITH VICTOZA 30 each 5   • Blood Glucose Monitoring Suppl (ONE TOUCH BASIC SYSTEM) w/Device kit 1 Device Daily. 1 each 0   • carvedilol (COREG) 3.125 MG tablet Take 2 tablets by mouth 2 (Two) Times a Day With Meals. 180 tablet 0   • clonazePAM (KlonoPIN) 1 MG tablet Take 1 mg by mouth 2 (Two) Times a Day As Needed.     • DULoxetine (CYMBALTA) 30 MG capsule Take 30 mg by mouth Daily.     • ezetimibe (ZETIA) 10 MG tablet Take 1 tablet by mouth Daily. 90 tablet 0   • furosemide (Lasix) 20 MG tablet Take 1 tablet by mouth Daily. 30 tablet 5   • glucose blood (ONE TOUCH ULTRA TEST) test strip Use as instructed 50 each 12   • HYDROcodone-acetaminophen (NORCO) 5-325 MG per tablet Take 5-325 tablets by mouth 4 (Four) Times a Day.     • isosorbide mononitrate (IMDUR) 30 MG 24 hr tablet Take 1 tablet by mouth Daily. 90 tablet 0   • Lancets (onetouch ultrasoft) lancets 1 each by Other route Daily. Use as instructed     • Lasmiditan Succinate (Reyvow) 50 MG tablet Take 50 mg by mouth Daily As Needed (migraine). Take 1 tablet at onset of migraine, may repeat in 24 hours if needed. 8 tablet 1   • Liraglutide (Victoza) 18 MG/3ML solution pen-injector injection Inject 1.8 mg SQ BID 12 mL 5   • meclizine (ANTIVERT) 25 MG tablet Take 1 tablet by mouth 3 (Three) Times a Day As Needed for Dizziness. 90 tablet 1   • metFORMIN (GLUCOPHAGE) 1000 MG tablet TAKE ONE TABLET BY MOUTH TWICE A DAY WITH MEALS 180 tablet 0   • pramipexole (MIRAPEX) 0.25 MG tablet Take 1 tablet by mouth Every Evening. 90 tablet 1   • promethazine (PHENERGAN) 25 MG tablet Take 1 tablet by mouth Every 6 (Six) Hours As Needed for Nausea or Vomiting. 30 tablet 2   • QUEtiapine (SEROquel) 400 MG tablet Take 2 tablets by mouth every night at bedtime. 60 tablet 2   • Rimegepant Sulfate (Nurtec) 75 MG tablet dispersible tablet Take 1 tablet by mouth Daily As Needed (migraine). 8 tablet 1   • [DISCONTINUED] baclofen (LIORESAL)  10 MG tablet Take  by mouth 3 (Three) Times a Day.     • [DISCONTINUED] famotidine (PEPCID) 20 MG tablet TAKE 1 TABLET BY MOUTH TWICE DAILY 60 tablet 5     Current Facility-Administered Medications for the 9/13/22 encounter (Office Visit) with Tommie Rosenbaum MD   Medication Dose Route Frequency Provider Last Rate Last Admin   • OnabotulinumtoxinA 155 Units  155 Units Intramuscular Once Poncho Rodriguez MD           Apriso [mesalamine er], Lamotrigine, Lithium, Tramadol, and Ultram [tramadol hcl]    Family History   Problem Relation Age of Onset   • Heart disease Mother    • Asthma Father    • Diabetes Father    • Heart disease Father    • Hypertension Father    • No Known Problems Brother        Social History     Socioeconomic History   • Marital status:    Tobacco Use   • Smoking status: Never Smoker   • Smokeless tobacco: Never Used   Vaping Use   • Vaping Use: Some days   • Substances: CBD   • Devices: Refillable tank   Substance and Sexual Activity   • Alcohol use: No   • Drug use: No   • Sexual activity: Never     Birth control/protection: Surgical       Review of Systems   Constitutional: Negative.    HENT: Positive for congestion, postnasal drip, sore throat and trouble swallowing.    Eyes: Negative.    Respiratory: Negative.    Cardiovascular: Negative.    Gastrointestinal: Negative.    Endocrine: Negative.    Genitourinary: Negative.    Musculoskeletal: Negative.    Skin: Negative.    Allergic/Immunologic: Negative.    Neurological: Negative.    Hematological: Negative.    Psychiatric/Behavioral: Negative.        Vitals:    09/13/22 1002   BP: 156/99   Pulse: 104   Temp: 98.2 °F (36.8 °C)       Body mass index is 33.13 kg/m².    Objective     Physical Exam  Vitals reviewed.   Constitutional:       Appearance: She is obese.   HENT:      Head: Normocephalic and atraumatic.      Right Ear: Hearing, tympanic membrane, ear canal and external ear normal.      Left Ear: Hearing, tympanic membrane,  ear canal and external ear normal.      Nose: Nose normal.      Mouth/Throat:      Lips: Pink.      Mouth: Mucous membranes are moist.      Pharynx: Oropharynx is clear. Uvula midline.   Musculoskeletal:      Cervical back: Full passive range of motion without pain.   Lymphadenopathy:      Cervical: Cervical adenopathy present.   Neurological:      Mental Status: She is alert.   Psychiatric:         Behavior: Behavior is cooperative.         Assessment & Plan   Diagnoses and all orders for this visit:    1. Lymphadenopathy of head and neck region (Primary)  -     CT Soft Tissue Neck With Contrast; Future    2. Laryngopharyngeal reflux (LPR)    Other orders  -     Omeprazole 20 MG tablet delayed-release; Take 20 mg by mouth 2 (Two) Times a Day for 30 days.  Dispense: 60 each; Refill: 4      * Surgery not found *  Orders Placed This Encounter   Procedures   • CT Soft Tissue Neck With Contrast     Standing Status:   Future     Standing Expiration Date:   9/13/2023     Order Specific Question:   Patient Pregnant     Answer:   Unknown     Order Specific Question:   Release to patient     Answer:   Routine Release     Stop pepcid  Start omeprazole before breakfast and before dinner  Ct of neck to be obtained  Follow up to discussed lymph node excision    Return in about 6 weeks (around 10/25/2022) for Recheck, CT.       Patient Instructions   Stop pepcid  Start omeprazole before breakfast and before dinner  Ct of neck to be obtained  Follow up to discussed lymph node excision    Gastroesophageal Reflux Disease (Laryngopharyngeal Reflux), Adult  Gastroesophageal reflux disease (GERD) and/or Laryngopharyngeal Reflux, (LPR) happens when acid from your stomach flows up into the esophagus and/or throat and voicebox or larynx. When acid comes in contact with the these organs, the acid can cause soreness (inflammation). Over time, GERD may create small holes (ulcers) in the lining of the esophagus and may lead to the development  of hoarseness, difficulty swallowing,   feeling of something stuck in the throat, increased mucous or drainage and even predispose to the development of malignancies, (cancer).    CAUSES   · Increased body weight. This puts pressure on the stomach, making acid rise from the stomach into the esophagus.  · Smoking. This increases acid production in the stomach.  · Drinking alcohol. This causes decreased pressure in the lower esophageal sphincter (valve or ring of muscle between the esophagus and stomach), allowing acid from the stomach into the esophagus.  · Late evening meals and a full stomach. This increases pressure and acid production in the stomach.  · A malformed lower esophageal sphincter  · Diet which can include avoidance of gluten and dairy products  · Age  SYMPTOMS   · Burning pain in the lower part of the mid-chest behind the breastbone and in the mid-stomach area. This may occur twice a week or more often.  · Trouble swallowing.  · Sore throat.  · Dry cough.  · Asthma-like symptoms including chest tightness, shortness of breath, or wheezing.  · Globus sensation-something stuck in the throat/fullness  · Hoarseness  DIAGNOSIS   Your caregiver may be able to diagnose GERD based on your symptoms. In some cases, X-rays and other tests may be done to check for complications or to check the condition of your stomach and esophagus.  You may need to see another doctor.  TREATMENT   Over-the-counter or prescription medicines to help decrease acid production.   Dietary and behavioral modifications or changes may be also recommended.  HOME CARE INSTRUCTIONS   · Change the factors that you can control. Ask your caregiver for guidance concerning weight loss, quitting smoking, and alcohol consumption.  · Avoid foods and drinks that make your symptoms worse, and MAY include such as:  ¨ Caffeine or alcoholic drinks.  ¨ Chocolate.  ¨ Gluten containing foods  ¨ Dairy  ¨ Peppermint or mint flavorings.  ¨ Garlic and  onions.  ¨ Spicy foods.  ¨ Citrus fruits, such as oranges, jorge a, or limes.  ¨ Tomato-based foods such as sauce, chili, salsa, and pizza.  ¨ Fried and fatty foods.  · Avoid lying down for the 3 hours prior to your bedtime or prior to taking a nap.  · Eat small, frequent meals instead of large meals.  · Wear loose-fitting clothing. Do not wear anything tight around your waist that causes pressure on your stomach.  · Raise the head of your bed 6 to 8 inches with wood blocks to help you sleep. Extra pillows will not help.  · Only take over-the-counter or prescription medicines for pain, discomfort, or fever as directed by your caregiver.  · Do not take aspirin, ibuprofen, or other nonsteroidal anti-inflammatory drugs if possible (NSAIDs).  SEEK IMMEDIATE MEDICAL CARE IF:   · You have pain in your arms, neck, jaw, teeth, or back.  · Your pain increases or changes in intensity or duration.  · You develop nausea, vomiting, or sweating (diaphoresis).  · You develop shortness of breath, or you faint.  · Your vomit is green, yellow, black, or looks like coffee grounds or blood.  · Your stool is red, bloody, or black.  These symptoms could be signs of other problems, such as heart disease, gastric bleeding, or esophageal bleeding.  MAKE SURE YOU:   · Understand these instructions.  · Will watch your condition.  · Will get help right away if you are not doing well or get worse.     This information is not intended to replace advice given to you by your physician. Make sure you discuss any questions you have with your health care provider.     Modified by Tommie Rosenbaum MD, FACS 9/8/2016.  Document Released: 09/27/2006 Document Revised: 01/08/2016 Document Reviewed: 04/13/2016  [employer] Interactive Patient Education ©2016 [employer]     CONTACT INFORMATION:  The main office phone number is [phone]. For emergencies after hours and on weekends, this number will convert over to our answering service and the on call  provider will answer. Please try to keep non emergent phone calls/ questions to office hours 9am-5pm Monday through Friday.      Wellsphere  As an alternative, you can sign up and use the Epic MyChart system for more direct and quicker access for non emergent questions/ problems.  Ghostery allows you to send messages to your doctor, view your test results, renew your prescriptions, schedule appointments, and more. To sign up, go to [employer] and click on the Sign Up Now link in the New User? box. Enter your Wellsphere Activation Code exactly as it appears below along with the last four digits of your Social Security Number and your Date of Birth () to complete the sign-up process. If you do not sign up before the expiration date, you must request a new code.     Wellsphere Activation Code: Activation code not generated  Current Wellsphere Status: Active     If you have questions, you can email [email or web address] or call [phone] to talk to our Wellsphere staff. Remember, Wellsphere is NOT to be used for urgent needs. For medical emergencies, dial 911.     IF YOU SMOKE OR USE TOBACCO PLEASE READ THE FOLLOWING:  Why is smoking bad for me?  Smoking increases the risk of heart disease, lung disease, vascular disease, stroke, and cancer. If you smoke, STOP!        IF YOU SMOKE OR USE TOBACCO PLEASE READ THE FOLLOWING:  Why is smoking bad for me?  Smoking increases the risk of heart disease, lung disease, vascular disease, stroke, and cancer. If you smoke, STOP!     For more information:  Quit Now [facility]  -QUIT-NOW  [email or web address]

## 2022-09-13 ENCOUNTER — OFFICE VISIT (OUTPATIENT)
Dept: OTOLARYNGOLOGY | Facility: CLINIC | Age: 37
End: 2022-09-13

## 2022-09-13 VITALS
HEIGHT: 64 IN | SYSTOLIC BLOOD PRESSURE: 156 MMHG | HEART RATE: 104 BPM | TEMPERATURE: 98.2 F | BODY MASS INDEX: 32.95 KG/M2 | DIASTOLIC BLOOD PRESSURE: 99 MMHG | WEIGHT: 193 LBS

## 2022-09-13 DIAGNOSIS — K21.9 LARYNGOPHARYNGEAL REFLUX (LPR): ICD-10-CM

## 2022-09-13 DIAGNOSIS — R59.0 LYMPHADENOPATHY OF HEAD AND NECK REGION: Primary | ICD-10-CM

## 2022-09-13 PROCEDURE — 99213 OFFICE O/P EST LOW 20 MIN: CPT | Performed by: NURSE PRACTITIONER

## 2022-09-13 RX ORDER — NICOTINE POLACRILEX 4 MG/1
20 GUM, CHEWING ORAL 2 TIMES DAILY
Qty: 60 EACH | Refills: 4 | Status: SHIPPED | OUTPATIENT
Start: 2022-09-13 | End: 2022-10-14

## 2022-09-13 RX ORDER — BACLOFEN 20 MG/1
20 TABLET ORAL 2 TIMES DAILY
COMMUNITY
Start: 2022-08-15 | End: 2023-03-14 | Stop reason: SDUPTHER

## 2022-09-13 NOTE — PATIENT INSTRUCTIONS
Stop pepcid  Start omeprazole before breakfast and before dinner  Ct of neck to be obtained  Follow up to discussed lymph node excision    Gastroesophageal Reflux Disease (Laryngopharyngeal Reflux), Adult  Gastroesophageal reflux disease (GERD) and/or Laryngopharyngeal Reflux, (LPR) happens when acid from your stomach flows up into the esophagus and/or throat and voicebox or larynx. When acid comes in contact with the these organs, the acid can cause soreness (inflammation). Over time, GERD may create small holes (ulcers) in the lining of the esophagus and may lead to the development of hoarseness, difficulty swallowing,   feeling of something stuck in the throat, increased mucous or drainage and even predispose to the development of malignancies, (cancer).    CAUSES   Increased body weight. This puts pressure on the stomach, making acid rise from the stomach into the esophagus.  Smoking. This increases acid production in the stomach.  Drinking alcohol. This causes decreased pressure in the lower esophageal sphincter (valve or ring of muscle between the esophagus and stomach), allowing acid from the stomach into the esophagus.  Late evening meals and a full stomach. This increases pressure and acid production in the stomach.  A malformed lower esophageal sphincter  Diet which can include avoidance of gluten and dairy products  Age  SYMPTOMS   Burning pain in the lower part of the mid-chest behind the breastbone and in the mid-stomach area. This may occur twice a week or more often.  Trouble swallowing.  Sore throat.  Dry cough.  Asthma-like symptoms including chest tightness, shortness of breath, or wheezing.  Globus sensation-something stuck in the throat/fullness  Hoarseness  DIAGNOSIS   Your caregiver may be able to diagnose GERD based on your symptoms. In some cases, X-rays and other tests may be done to check for complications or to check the condition of your stomach and esophagus.  You may need to see another  doctor.  TREATMENT   Over-the-counter or prescription medicines to help decrease acid production.   Dietary and behavioral modifications or changes may be also recommended.  HOME CARE INSTRUCTIONS   Change the factors that you can control. Ask your caregiver for guidance concerning weight loss, quitting smoking, and alcohol consumption.  Avoid foods and drinks that make your symptoms worse, and MAY include such as:  Caffeine or alcoholic drinks.  Chocolate.  Gluten containing foods  Dairy  Peppermint or mint flavorings.  Garlic and onions.  Spicy foods.  Citrus fruits, such as oranges, jorge a, or limes.  Tomato-based foods such as sauce, chili, salsa, and pizza.  Fried and fatty foods.  Avoid lying down for the 3 hours prior to your bedtime or prior to taking a nap.  Eat small, frequent meals instead of large meals.  Wear loose-fitting clothing. Do not wear anything tight around your waist that causes pressure on your stomach.  Raise the head of your bed 6 to 8 inches with wood blocks to help you sleep. Extra pillows will not help.  Only take over-the-counter or prescription medicines for pain, discomfort, or fever as directed by your caregiver.  Do not take aspirin, ibuprofen, or other nonsteroidal anti-inflammatory drugs if possible (NSAIDs).  SEEK IMMEDIATE MEDICAL CARE IF:   You have pain in your arms, neck, jaw, teeth, or back.  Your pain increases or changes in intensity or duration.  You develop nausea, vomiting, or sweating (diaphoresis).  You develop shortness of breath, or you faint.  Your vomit is green, yellow, black, or looks like coffee grounds or blood.  Your stool is red, bloody, or black.  These symptoms could be signs of other problems, such as heart disease, gastric bleeding, or esophageal bleeding.  MAKE SURE YOU:   Understand these instructions.  Will watch your condition.  Will get help right away if you are not doing well or get worse.     This information is not intended to replace advice  given to you by your physician. Make sure you discuss any questions you have with your health care provider.     Modified by Tommie Rosenbaum MD, FACS 2016.  Document Released: 2006 Document Revised: 2016 Document Reviewed: 2016  [employer] Interactive Patient Education  [employer]     CONTACT INFORMATION:  The main office phone number is [phone]. For emergencies after hours and on weekends, this number will convert over to our answering service and the on call provider will answer. Please try to keep non emergent phone calls/ questions to office hours 9am-5pm Monday through Friday.      Ringpay  As an alternative, you can sign up and use the Epic MyChart system for more direct and quicker access for non emergent questions/ problems.  [employer] allows you to send messages to your doctor, view your test results, renew your prescriptions, schedule appointments, and more. To sign up, go to [employer] and click on the Sign Up Now link in the New User? box. Enter your Ringpay Activation Code exactly as it appears below along with the last four digits of your Social Security Number and your Date of Birth () to complete the sign-up process. If you do not sign up before the expiration date, you must request a new code.     Ringpay Activation Code: Activation code not generated  Current Ringpay Status: Active     If you have questions, you can email [email or web address] or call [phone] to talk to our Ringpay staff. Remember, Ringpay is NOT to be used for urgent needs. For medical emergencies, dial 911.     IF YOU SMOKE OR USE TOBACCO PLEASE READ THE FOLLOWING:  Why is smoking bad for me?  Smoking increases the risk of heart disease, lung disease, vascular disease, stroke, and cancer. If you smoke, STOP!        IF YOU SMOKE OR USE TOBACCO PLEASE READ THE FOLLOWING:  Why is smoking bad for me?  Smoking increases the risk of heart disease, lung disease,  vascular disease, stroke, and cancer. If you smoke, STOP!     For more information:  [facility]uit Now [facility]  1-800-QUIT-NOW  [email or web address]

## 2022-09-20 ENCOUNTER — PRE-ADMISSION TESTING (OUTPATIENT)
Dept: PREADMISSION TESTING | Facility: HOSPITAL | Age: 37
End: 2022-09-20

## 2022-09-20 ENCOUNTER — OFFICE VISIT (OUTPATIENT)
Dept: OBSTETRICS AND GYNECOLOGY | Facility: CLINIC | Age: 37
End: 2022-09-20

## 2022-09-20 VITALS
WEIGHT: 195.2 LBS | HEIGHT: 64 IN | DIASTOLIC BLOOD PRESSURE: 98 MMHG | SYSTOLIC BLOOD PRESSURE: 152 MMHG | BODY MASS INDEX: 33.32 KG/M2

## 2022-09-20 VITALS — HEART RATE: 100 BPM | OXYGEN SATURATION: 97 %

## 2022-09-20 DIAGNOSIS — R10.2 PELVIC PAIN: ICD-10-CM

## 2022-09-20 DIAGNOSIS — R10.2 PELVIC PAIN: Primary | ICD-10-CM

## 2022-09-20 DIAGNOSIS — Z90.710 HISTORY OF HYSTERECTOMY: ICD-10-CM

## 2022-09-20 LAB
ABO GROUP BLD: NORMAL
ANION GAP SERPL CALCULATED.3IONS-SCNC: 10 MMOL/L (ref 5–15)
BASOPHILS # BLD AUTO: 0.06 10*3/MM3 (ref 0–0.2)
BASOPHILS NFR BLD AUTO: 0.6 % (ref 0–1.5)
BLD GP AB SCN SERPL QL: NEGATIVE
BUN SERPL-MCNC: 12 MG/DL (ref 6–20)
BUN/CREAT SERPL: 15 (ref 7–25)
CALCIUM SPEC-SCNC: 9.6 MG/DL (ref 8.6–10.5)
CHLORIDE SERPL-SCNC: 101 MMOL/L (ref 98–107)
CO2 SERPL-SCNC: 28 MMOL/L (ref 22–29)
CREAT SERPL-MCNC: 0.8 MG/DL (ref 0.57–1)
DEPRECATED RDW RBC AUTO: 42.6 FL (ref 37–54)
EGFRCR SERPLBLD CKD-EPI 2021: 97.5 ML/MIN/1.73
EOSINOPHIL # BLD AUTO: 0.08 10*3/MM3 (ref 0–0.4)
EOSINOPHIL NFR BLD AUTO: 0.8 % (ref 0.3–6.2)
ERYTHROCYTE [DISTWIDTH] IN BLOOD BY AUTOMATED COUNT: 13.2 % (ref 12.3–15.4)
GLUCOSE SERPL-MCNC: 88 MG/DL (ref 65–99)
HCT VFR BLD AUTO: 39.9 % (ref 34–46.6)
HGB BLD-MCNC: 13.3 G/DL (ref 12–15.9)
IMM GRANULOCYTES # BLD AUTO: 0.05 10*3/MM3 (ref 0–0.05)
IMM GRANULOCYTES NFR BLD AUTO: 0.5 % (ref 0–0.5)
LYMPHOCYTES # BLD AUTO: 2.61 10*3/MM3 (ref 0.7–3.1)
LYMPHOCYTES NFR BLD AUTO: 26.2 % (ref 19.6–45.3)
Lab: NORMAL
MCH RBC QN AUTO: 29.5 PG (ref 26.6–33)
MCHC RBC AUTO-ENTMCNC: 33.3 G/DL (ref 31.5–35.7)
MCV RBC AUTO: 88.5 FL (ref 79–97)
MONOCYTES # BLD AUTO: 0.48 10*3/MM3 (ref 0.1–0.9)
MONOCYTES NFR BLD AUTO: 4.8 % (ref 5–12)
NEUTROPHILS NFR BLD AUTO: 6.7 10*3/MM3 (ref 1.7–7)
NEUTROPHILS NFR BLD AUTO: 67.1 % (ref 42.7–76)
NRBC BLD AUTO-RTO: 0 /100 WBC (ref 0–0.2)
PLATELET # BLD AUTO: 340 10*3/MM3 (ref 140–450)
PMV BLD AUTO: 8.4 FL (ref 6–12)
POTASSIUM SERPL-SCNC: 4.2 MMOL/L (ref 3.5–5.2)
QT INTERVAL: 366 MS
QTC INTERVAL: 472 MS
RBC # BLD AUTO: 4.51 10*6/MM3 (ref 3.77–5.28)
RH BLD: POSITIVE
SODIUM SERPL-SCNC: 139 MMOL/L (ref 136–145)
T&S EXPIRATION DATE: NORMAL
WBC NRBC COR # BLD: 9.98 10*3/MM3 (ref 3.4–10.8)

## 2022-09-20 PROCEDURE — 99214 OFFICE O/P EST MOD 30 MIN: CPT | Performed by: STUDENT IN AN ORGANIZED HEALTH CARE EDUCATION/TRAINING PROGRAM

## 2022-09-20 PROCEDURE — 86900 BLOOD TYPING SEROLOGIC ABO: CPT

## 2022-09-20 PROCEDURE — 80048 BASIC METABOLIC PNL TOTAL CA: CPT | Performed by: STUDENT IN AN ORGANIZED HEALTH CARE EDUCATION/TRAINING PROGRAM

## 2022-09-20 PROCEDURE — 86901 BLOOD TYPING SEROLOGIC RH(D): CPT

## 2022-09-20 PROCEDURE — 93005 ELECTROCARDIOGRAM TRACING: CPT

## 2022-09-20 PROCEDURE — 85025 COMPLETE CBC W/AUTO DIFF WBC: CPT | Performed by: STUDENT IN AN ORGANIZED HEALTH CARE EDUCATION/TRAINING PROGRAM

## 2022-09-20 PROCEDURE — 86850 RBC ANTIBODY SCREEN: CPT

## 2022-09-20 PROCEDURE — 93010 ELECTROCARDIOGRAM REPORT: CPT | Performed by: INTERNAL MEDICINE

## 2022-09-20 PROCEDURE — 36415 COLL VENOUS BLD VENIPUNCTURE: CPT

## 2022-09-20 RX ORDER — LIRAGLUTIDE 6 MG/ML
1.8 INJECTION SUBCUTANEOUS 2 TIMES DAILY
COMMUNITY
End: 2023-01-23

## 2022-09-20 RX ORDER — AMLODIPINE BESYLATE 10 MG/1
10 TABLET ORAL DAILY
COMMUNITY

## 2022-09-20 NOTE — PROGRESS NOTES
[facility]  H&P Gynecology  Date of Service: 2022    CC: preop appointment    HPI  Chelita Rosenbaum is a 36 y.o.  premenopausal female who presents preoperative bilateral oophorectomy, any other indicated procedures secondary to complaints of pain in her ovaries after a partial hysterectomy in 2017.       Seen last 22, previously reported that she had a laparoscopic partial hysterectomy in 2017 with Dr. Walker in [place].  She reports a history of 6 prior miscarriages, heavy periods, with clots lasting 7 days.  Also reports history of dyspareunia.  She reports that her cervix, uterus, and bilateral tubes were removed.  Patient states that she has had pain for a while, but that its been worse over the last couple of years.  She states that it's on and off with no pattern.  She says she cannot do anything to help with the pain.  She is concerned because she has POTS and notes that her autonomic dysfunction is worse when she is in significant pain.  She states that she will get lightheaded, have emesis, and note that her blood pressure and heart rate will be significantly elevated when in pain.  Reports that she was diagnosed with PCOS long ago with Dr. Laguerre.  Previously on spironolactone but was not helping and was discontinued.  Has also tried Depo Provera in the Nexplanon implant, without significant improvement prior to her hysterectomy.  Patient does see pain management for bulging disc and degenerative disc disease.  She states that previously she had a tumor in her spine and sees orthopedic [place] [facility] in [place].  On Browning 5 mg 4 times daily.  Patient also reports history of diverticulitis, partial colon resection, Crohn's disease.  Her hysterectomy has been since the partial colon resection.  She denies it she was told of any significant scar tissue at the time of her hysterectomy.  She states the partial colon resection was done because she had a lot of rectal  bleeding and was told that her colon had not yet perforated but was close to doing so.  It was done laparoscopically.     Did have mammogram in  in [place] for history of breast cancer in her family.     Denies any vaginal itching, burning, irritation, or discharge. Denies any urinary symptoms including incontinence, dysuria, frequency, urgency, nocturia.     She is getting   of this year.      22 US: Uterus surgically absent, right ovary 3.6 x 3.0 x 2.15 cm, left ovary 2.97 x 2.45 x 2.54 cm, on evaluation appears that ovaries may have polycystic morphology, but no larger ovarian cysts or masses noted     No significant changes since she was last seen in the clinic.  Unfortunately, unable to obtain the patient's operative report from her hysterectomy due to time passed since it was performed.     Persisting pain and still desires to proceed.    ROS  Review of Systems   Constitutional: Negative.    HENT: Negative.    Respiratory: Negative.    Cardiovascular: Negative.    Gastrointestinal: Negative.    Genitourinary: Positive for pelvic pain.   Musculoskeletal: Negative.    Skin: Negative.    Psychiatric/Behavioral: Negative.        GYN HISTORY  Menarche: 11 yo  Menses: Hysterectomy in 2017  History of STIs: Not reported  Last pap smear: Reports abnormal Paps in the past, normal Pap smear prior to her hysterectomy.  Last Completed Pap Smear     This patient has no relevant Health Maintenance data.        Abnormal pap smear history: see above  Contraception: Hysterectomy     OB HISTORY  OB History    Para Term  AB Living   9 3 3   6 3   SAB IAB Ectopic Molar Multiple Live Births             3      # Outcome Date GA Lbr Dipesh/2nd Weight Sex Delivery Anes PTL Lv   9 AB            8 AB            7 AB            6 AB            5 AB            4 AB            3 Term      CS-Unspec   ARGELIA   2 Term      CS-Unspec   ARGELIA   1 Term      CS-Unspec   ARGELIA     PAST MEDICAL HISTORY  Past  Medical History:   Diagnosis Date   • Arrhythmia    • Arthropathy of lumbar facet joint    • Asthma    • Autonomic dysfunction    • Bipolar disorder (HCC)    • Cervico-occipital neuralgia    • Constipation    • Crohn's colitis (HCC)    • DDD (degenerative disc disease), lumbar    • Diabetes mellitus (HCC)    • Disorder of small intestine     thicking jejunum, delayed transit      • Diverticular disease of colon    • Dysphagia    • Elevated cholesterol    • Epigastric pain    • Generalized anxiety disorder    • GERD (gastroesophageal reflux disease)    • H/O colonoscopy with polypectomy    • Hirsutism    • Hyperlipidemia    • Hyperprolactinemia (HCC)    • Hypertension    • Low back pain    • Migraine aura, persistent     Migraine - w/ aura of spots      • Myofascial pain    • Nausea and vomiting    • Osteoarthritis    • PCOS (polycystic ovarian syndrome)    • Periumbilical pain    • PONV (postoperative nausea and vomiting)    • POTS (postural orthostatic tachycardia syndrome)     followed by Dr. Francis at [facility]   • Sleep apnea      PAST SURGICAL HISTORY  Past Surgical History:   Procedure Laterality Date   • CAPSULE ENDOSCOPY  2015    [facility], capsule endoscopy 18988 (1)      •  SECTION  2010     Section (3)      • CHOLECYSTECTOMY  05/15/2003    Cholecystectomy, laparoscopic (1)      • COLON RESECTION     • COLONOSCOPY     • DILATATION AND CURETTAGE  10/21/2013    D&C (1)      • ENDOSCOPY  2015    EGD w/ biopsy 26817 (1)      • INJECTION OF MEDICATION  2016    Injection for nerve block (1)      • SUBTOTAL HYSTERECTOMY       FAMILY HISTORY  Family History   Problem Relation Age of Onset   • Heart disease Mother    • Asthma Father    • Diabetes Father    • Heart disease Father    • Hypertension Father    • No Known Problems Brother      SOCIAL HISTORY  Social History     Socioeconomic History   • Marital status:    Tobacco Use   • Smoking status: Never Smoker    • Smokeless tobacco: Never Used   Vaping Use   • Vaping Use: Some days   • Substances: CBD   • Devices: Refillable tank   Substance and Sexual Activity   • Alcohol use: No   • Drug use: No   • Sexual activity: Defer     Birth control/protection: Surgical     ALLERGIES  Allergies   Allergen Reactions   • Apriso [Mesalamine Er] Nausea And Vomiting     Vomiting    • Lamotrigine Rash   • Lithium Nausea And Vomiting     Difficulty breathing   • Tramadol Other (See Comments)     Hard to breath   • Ultram [Tramadol Hcl] Itching     HOME MEDICATIONS  Prior to Admission medications    Medication Sig Start Date End Date Taking? Authorizing Provider   albuterol sulfate  (90 Base) MCG/ACT inhaler Inhale 2 puffs Every 4 (Four) Hours As Needed for Wheezing. 2/22/21  Yes Best Gannon MD   amLODIPine (NORVASC) 10 MG tablet TAKE 1 TABLET BY MOUTH ONCE DAILY 2/8/22  Yes Idalmis Mcdermott APRN   Atogepant (Qulipta) 60 MG tablet Take 1 tablet by mouth Daily. 5/2/22  Yes Thien Jean Baptiste APRN   atorvastatin (LIPITOR) 40 MG tablet Take 1 tablet by mouth every night at bedtime. 6/29/22  Yes Idalmis Mcdermott APRN   baclofen (LIORESAL) 20 MG tablet  8/15/22  Yes ProviderToby MD   BD Pen Needle Leena U/F 32G X 4 MM misc USE ONCE DAILY WITH VICTOZA 7/28/22  Yes Idalmis Mcdermott APRN   Blood Glucose Monitoring Suppl (ONE TOUCH BASIC SYSTEM) w/Device kit 1 Device Daily. 10/25/17  Yes Meron Taylor APRN   carvedilol (COREG) 3.125 MG tablet Take 2 tablets by mouth 2 (Two) Times a Day With Meals. 7/12/22  Yes Idalmis Mcdermott APRN   DULoxetine (CYMBALTA) 30 MG capsule Take 30 mg by mouth Daily. 4/23/22  Yes Provider, MD Toby   ezetimibe (ZETIA) 10 MG tablet Take 1 tablet by mouth Daily. 6/29/22  Yes Idalmis Mcdermott APRN   furosemide (Lasix) 20 MG tablet Take 1 tablet by mouth Daily. 7/12/22  Yes Idalmis Mcdermott APRN   glucose blood (ONE TOUCH ULTRA TEST) test strip Use as instructed  10/25/17  Yes Meron Taylor APRN   HYDROcodone-acetaminophen (NORCO) 5-325 MG per tablet Take 5-325 tablets by mouth 4 (Four) Times a Day. 1/7/21  Yes Toby Mo MD   isosorbide mononitrate (IMDUR) 30 MG 24 hr tablet Take 1 tablet by mouth Daily. 6/29/22  Yes Idalmis Mcdermott APRN   Lancets (onetouch ultrasoft) lancets 1 each by Other route Daily. Use as instructed   Yes ProviderToby MD   Lasmiditan Succinate (Reyvow) 50 MG tablet Take 50 mg by mouth Daily As Needed (migraine). Take 1 tablet at onset of migraine, may repeat in 24 hours if needed. 8/17/22  Yes Poncho Rodriguez MD   Liraglutide (Victoza) 18 MG/3ML solution pen-injector injection Inject 1.8 mg SQ BID 7/12/22  Yes Idalmis Mcdermott APRN   meclizine (ANTIVERT) 25 MG tablet Take 1 tablet by mouth 3 (Three) Times a Day As Needed for Dizziness. 5/18/22  Yes Idalmis Mcdermott APRN   metFORMIN (GLUCOPHAGE) 1000 MG tablet TAKE ONE TABLET BY MOUTH TWICE A DAY WITH MEALS 9/6/22  Yes Idalmis Mcdermott APRN   Omeprazole 20 MG tablet delayed-release Take 20 mg by mouth 2 (Two) Times a Day for 30 days. 9/13/22 10/13/22 Yes Cesar Stevens APRN   pramipexole (MIRAPEX) 0.25 MG tablet Take 1 tablet by mouth Every Evening. 7/26/22  Yes Idalmis Mcdermott APRN   promethazine (PHENERGAN) 25 MG tablet Take 1 tablet by mouth Every 6 (Six) Hours As Needed for Nausea or Vomiting. 7/12/22  Yes Idalmis Mcdermott APRN   QUEtiapine (SEROquel) 400 MG tablet Take 2 tablets by mouth every night at bedtime. 8/16/22  Yes Idalmis Mcdermott APRN   Rimegepant Sulfate (Nurtec) 75 MG tablet dispersible tablet Take 1 tablet by mouth Daily As Needed (migraine). 5/18/22  Yes Thien Jean Baptiste APRN   clonazePAM (KlonoPIN) 1 MG tablet Take 1 mg by mouth 2 (Two) Times a Day As Needed. 1/18/21   Provider, MD Toby   cloNIDine HCl ER 0.1 MG tablet sustained-release 12 hour tablet Take 0.2 mg by mouth Every 12 (Twelve) Hours. 1/3/22 7/12/22   "Provider, MD Toby     PE  /98   Ht 162.6 cm (64\")   Wt 88.5 kg (195 lb 3.2 oz)   LMP  (LMP Unknown)   BMI 33.51 kg/m²        General: Alert, healthy, no distress, well nourished and well developed.  Neurologic: Alert, oriented to person, place, and time.  Gait normal.  Cranial nerves II-XII grossly intact.  HEENT: Normocephalic, atraumatic.  Extraocular muscles intact.  Lungs: Normal respiratory effort.    Heart: Regular rate and rhythm.    Abdomen: Soft, non-tender, non-distended,no masses, no hepatosplenomegaly, no hernia.  Skin: No rash, no lesions.  Extremities: No cyanosis, clubbing or edema.  PELVIC EXAM: see 22 US    IMPRESSION  Chelita Rosenbaum is a 36 y.o.  presenting with pelvic pain, h/o TLH, BS, h/o partial colon resection, desiring bilateral oophorectomy.    PLAN    1. Pelvic pain  2. History of hysterectomy  -Reports pain in her ovaries, indicating bilateral lower abdominal/pelvic pain  -History of PCOS, history of recurrent miscarriages and heavy menstrual bleeding prior to hysterectomy  -Reports had a TLH, BS, retains bilateral ovaries  -Complex history including reported history of Crohn's disease with a partial colon resection for diverticulitis?;  Performed laparoscopically and was prior to her hysterectomy  -Records requested from hysterectomy and bowel resection, we have been unable to obtain them due to time passed since her surgeries  -We did discuss the complex nature of pelvic pain which can include gynecologic, GI, , or musculoskeletal causes; she does have a significant history of GI issues with her prior bowel resection due to possible diverticulitis versus Crohn's, we did discuss that pain may be from scar tissue and/or inflammation from her bowel disease  -The patient very strongly desires removal of her bilateral ovaries for attempted definitive management of her pelvic pain  -I did discuss with the patient that she would be a relatively high risk for a " repeat abdominal procedure due to history of 3 prior C-sections, hysterectomy, cholecystectomy, and partial colon resection;  I discussed with her the options for referral for her surgery at another facility versus attempt at bilateral oophorectomy here with us  -She denies significant adhesions during her hysterectomy, but unfortunately I am unable to review this record, I feel it is reasonable if she desires to attempt to perform her surgery here in our hospital.  We did discuss that she is at increased risk of bowel or bladder injury due to her prior procedures as well as injury to structures such as her ureters, blood vessels, nerves; I also discussed with her that if she did have a very significant amount of scar tissue would not feel we could safely do her surgery here, there is a chance that we do diagnostic laparoscopy and then decide to not continue with attempt at bilateral oophorectomy and refer her to a specialist at a center that has colorectal surgery or other higher intensity care available  - we discussed that she may have to have an open abdominal procedure if there is significant scar tissue or bleeding that would necessitate a larger incision, but we will attempt a laparoscopic approach  -We will plan for diagnostic laparoscopy, laparoscopic bilateral salpingo-oophorectomy, possible abdominal bilateral salpingo-oophorectomy, any other indicated procedures  - Discussed risks/benefits of surgery: risk of infection, bleeding, damage to surrounding structures, need for additional procedures; discussed risk of anesthesia including heart attack, stroke, and death; plan for outpatient surgery discussed.  - Sees pain management, stated she spoke with them and they were okay with us treating her as we felt appropriate; she is to provide documentation of prescribed medications for their record               This document has been electronically signed by Megan Cummings DO on September 21, 2022 20:53 CDT

## 2022-09-20 NOTE — PAT
Chlorhexidine scrub given with instruction sheet. Instructions reviewed in PAT, understanding verbalized.    Dr. Echevarria here to review EKG results and informed of patient's history of POTS and following with cardiology. No orders received, ok to proceed.

## 2022-09-20 NOTE — DISCHARGE INSTRUCTIONS
[facility]  Pre-op Information and Guidelines    You will be called after 2 p.m. the day before your surgery (Friday for Monday surgery) and notified of your time for arrival and approximate surgery time.  If you have not received a call by 4P.M., please contact Same Day Surgery at [phone] of if outside [facility] call [phone].    Please Follow these Important Safety Guidelines:    The morning of your procedure, take only the medications listed below with   A sip of water:_____________________________________________       ______________________________________________    DO NOT eat or drink anything after 12:00 midnight the night before surgery  Specific instructions concerning drinking clear liquids will be discussed during  the pre-surgery instruction call the day before your surgery.    If you take a blood thinner (ex. Plavix, Coumadin, aspirin), ask your doctor when to stop it before surgery  STOP DATE: _________________    Only 2 visitors are allowed in patient rooms at a time  Your visitors will be asked to wait in the lobby until the admission process is complete with the exception of a parent with a child and patients in need of special assistance.    YOU CANNOT DRIVE YOURSELF HOME  You must be accompanied by someone who will be responsible for driving you home after surgery and for your care at home.    DO NOT chew gum, use breath mints, hard candy, or smoke the day of surgery  DO NOT drink alcohol for at least 24 hours before your surgery  DO NOT wear any jewelry and remove all body piercing before coming to the hospital  DO NOT wear make-up to the hospital  If you are having surgery on an extremity (arm/leg/foot) remove nail polish/artificial nails on the surgical side  Clothing, glasses, contacts, dentures, and hairpieces must be removed before surgery  Bathe the night before or the morning of your surgery and do not use powders/lotions on skin.

## 2022-09-20 NOTE — H&P (VIEW-ONLY)
[facility]  H&P Gynecology  Date of Service: 2022    CC: preop appointment    HPI  Chelita Rosenbaum is a 36 y.o.  premenopausal female who presents preoperative bilateral oophorectomy, any other indicated procedures secondary to complaints of pain in her ovaries after a partial hysterectomy in 2017.       Seen last 22, previously reported that she had a laparoscopic partial hysterectomy in 2017 with Dr. Walker in [place].  She reports a history of 6 prior miscarriages, heavy periods, with clots lasting 7 days.  Also reports history of dyspareunia.  She reports that her cervix, uterus, and bilateral tubes were removed.  Patient states that she has had pain for a while, but that its been worse over the last couple of years.  She states that it's on and off with no pattern.  She says she cannot do anything to help with the pain.  She is concerned because she has POTS and notes that her autonomic dysfunction is worse when she is in significant pain.  She states that she will get lightheaded, have emesis, and note that her blood pressure and heart rate will be significantly elevated when in pain.  Reports that she was diagnosed with PCOS long ago with Dr. Laguerre.  Previously on spironolactone but was not helping and was discontinued.  Has also tried Depo Provera in the Nexplanon implant, without significant improvement prior to her hysterectomy.  Patient does see pain management for bulging disc and degenerative disc disease.  She states that previously she had a tumor in her spine and sees orthopedic [place] [facility] pain in [place].  On [place] 5 mg 4 times daily.  Patient also reports history of diverticulitis, partial colon resection, Crohn's disease.  Her hysterectomy has been since the partial colon resection.  She denies it she was told of any significant scar tissue at the time of her hysterectomy.  She states the partial colon resection was done because she had a lot of rectal  bleeding and was told that her colon had not yet perforated but was close to doing so.  It was done laparoscopically.     Did have mammogram in  in [place] for history of breast cancer in her family.     Denies any vaginal itching, burning, irritation, or discharge. Denies any urinary symptoms including incontinence, dysuria, frequency, urgency, nocturia.     She is getting   of this year.      22 US: Uterus surgically absent, right ovary 3.6 x 3.0 x 2.15 cm, left ovary 2.97 x 2.45 x 2.54 cm, on evaluation appears that ovaries may have polycystic morphology, but no larger ovarian cysts or masses noted     No significant changes since she was last seen in the clinic.  Unfortunately, unable to obtain the patient's operative report from her hysterectomy due to time passed since it was performed.     Persisting pain and still desires to proceed.    ROS  Review of Systems   Constitutional: Negative.    HENT: Negative.    Respiratory: Negative.    Cardiovascular: Negative.    Gastrointestinal: Negative.    Genitourinary: Positive for pelvic pain.   Musculoskeletal: Negative.    Skin: Negative.    Psychiatric/Behavioral: Negative.        GYN HISTORY  Menarche: 11 yo  Menses: Hysterectomy in 2017  History of STIs: Not reported  Last pap smear: Reports abnormal Paps in the past, normal Pap smear prior to her hysterectomy.  Last Completed Pap Smear     This patient has no relevant Health Maintenance data.        Abnormal pap smear history: see above  Contraception: Hysterectomy     OB HISTORY  OB History    Para Term  AB Living   9 3 3   6 3   SAB IAB Ectopic Molar Multiple Live Births             3      # Outcome Date GA Lbr Dipesh/2nd Weight Sex Delivery Anes PTL Lv   9 AB            8 AB            7 AB            6 AB            5 AB            4 AB            3 Term      CS-Unspec   ARGELIA   2 Term      CS-Unspec   ARGELIA   1 Term      CS-Unspec   ARGELIA     PAST MEDICAL HISTORY  Past  Medical History:   Diagnosis Date   • Arrhythmia    • Arthropathy of lumbar facet joint    • Asthma    • Autonomic dysfunction    • Bipolar disorder (HCC)    • Cervico-occipital neuralgia    • Constipation    • Crohn's colitis (HCC)    • DDD (degenerative disc disease), lumbar    • Diabetes mellitus (HCC)    • Disorder of small intestine     thicking jejunum, delayed transit      • Diverticular disease of colon    • Dysphagia    • Elevated cholesterol    • Epigastric pain    • Generalized anxiety disorder    • GERD (gastroesophageal reflux disease)    • H/O colonoscopy with polypectomy    • Hirsutism    • Hyperlipidemia    • Hyperprolactinemia (HCC)    • Hypertension    • Low back pain    • Migraine aura, persistent     Migraine - w/ aura of spots      • Myofascial pain    • Nausea and vomiting    • Osteoarthritis    • PCOS (polycystic ovarian syndrome)    • Periumbilical pain    • PONV (postoperative nausea and vomiting)    • POTS (postural orthostatic tachycardia syndrome)     followed by Dr. Francis at [place]   • Sleep apnea      PAST SURGICAL HISTORY  Past Surgical History:   Procedure Laterality Date   • CAPSULE ENDOSCOPY  2015    [facility], capsule endoscopy 23175 (1)      •  SECTION  2010     Section (3)      • CHOLECYSTECTOMY  05/15/2003    Cholecystectomy, laparoscopic (1)      • COLON RESECTION     • COLONOSCOPY     • DILATATION AND CURETTAGE  10/21/2013    D&C (1)      • ENDOSCOPY  2015    EGD w/ biopsy 79679 (1)      • INJECTION OF MEDICATION  2016    Injection for nerve block (1)      • SUBTOTAL HYSTERECTOMY       FAMILY HISTORY  Family History   Problem Relation Age of Onset   • Heart disease Mother    • Asthma Father    • Diabetes Father    • Heart disease Father    • Hypertension Father    • No Known Problems Brother      SOCIAL HISTORY  Social History     Socioeconomic History   • Marital status:    Tobacco Use   • Smoking status: Never Smoker    • Smokeless tobacco: Never Used   Vaping Use   • Vaping Use: Some days   • Substances: CBD   • Devices: Refillable tank   Substance and Sexual Activity   • Alcohol use: No   • Drug use: No   • Sexual activity: Defer     Birth control/protection: Surgical     ALLERGIES  Allergies   Allergen Reactions   • Apriso [Mesalamine Er] Nausea And Vomiting     Vomiting    • Lamotrigine Rash   • Lithium Nausea And Vomiting     Difficulty breathing   • Tramadol Other (See Comments)     Hard to breath   • Ultram [Tramadol Hcl] Itching     HOME MEDICATIONS  Prior to Admission medications    Medication Sig Start Date End Date Taking? Authorizing Provider   albuterol sulfate  (90 Base) MCG/ACT inhaler Inhale 2 puffs Every 4 (Four) Hours As Needed for Wheezing. 2/22/21  Yes Best Gannon MD   amLODIPine (NORVASC) 10 MG tablet TAKE 1 TABLET BY MOUTH ONCE DAILY 2/8/22  Yes Idalmis Mcdermott APRN   Atogepant (Qulipta) 60 MG tablet Take 1 tablet by mouth Daily. 5/2/22  Yes Thien Jean Baptiste APRN   atorvastatin (LIPITOR) 40 MG tablet Take 1 tablet by mouth every night at bedtime. 6/29/22  Yes Idalmis Mcdermott APRN   baclofen (LIORESAL) 20 MG tablet  8/15/22  Yes ProviderToby MD   BD Pen Needle Leena U/F 32G X 4 MM misc USE ONCE DAILY WITH VICTOZA 7/28/22  Yes Idalmis Mcdermott APRN   Blood Glucose Monitoring Suppl (ONE TOUCH BASIC SYSTEM) w/Device kit 1 Device Daily. 10/25/17  Yes Meron Taylor APRN   carvedilol (COREG) 3.125 MG tablet Take 2 tablets by mouth 2 (Two) Times a Day With Meals. 7/12/22  Yes Idalmis Mcdermott APRN   DULoxetine (CYMBALTA) 30 MG capsule Take 30 mg by mouth Daily. 4/23/22  Yes Provider, MD Toby   ezetimibe (ZETIA) 10 MG tablet Take 1 tablet by mouth Daily. 6/29/22  Yes Idalmis Mcdermott APRN   furosemide (Lasix) 20 MG tablet Take 1 tablet by mouth Daily. 7/12/22  Yes Idalmis Mcdermott APRN   glucose blood (ONE TOUCH ULTRA TEST) test strip Use as instructed  10/25/17  Yes Meron Taylor APRN   HYDROcodone-acetaminophen (NORCO) 5-325 MG per tablet Take 5-325 tablets by mouth 4 (Four) Times a Day. 1/7/21  Yes Toby Mo MD   isosorbide mononitrate (IMDUR) 30 MG 24 hr tablet Take 1 tablet by mouth Daily. 6/29/22  Yes Idalmis Mcdermott APRN   Lancets (onetouch ultrasoft) lancets 1 each by Other route Daily. Use as instructed   Yes ProviderToby MD   Lasmiditan Succinate (Reyvow) 50 MG tablet Take 50 mg by mouth Daily As Needed (migraine). Take 1 tablet at onset of migraine, may repeat in 24 hours if needed. 8/17/22  Yes Poncho Rodriguez MD   Liraglutide (Victoza) 18 MG/3ML solution pen-injector injection Inject 1.8 mg SQ BID 7/12/22  Yes Idalmis Mcdermott APRN   meclizine (ANTIVERT) 25 MG tablet Take 1 tablet by mouth 3 (Three) Times a Day As Needed for Dizziness. 5/18/22  Yes Idalmis Mcdermott APRN   metFORMIN (GLUCOPHAGE) 1000 MG tablet TAKE ONE TABLET BY MOUTH TWICE A DAY WITH MEALS 9/6/22  Yes Idalmis Mcdermott APRN   Omeprazole 20 MG tablet delayed-release Take 20 mg by mouth 2 (Two) Times a Day for 30 days. 9/13/22 10/13/22 Yes Cesar Stevens APRN   pramipexole (MIRAPEX) 0.25 MG tablet Take 1 tablet by mouth Every Evening. 7/26/22  Yes Idalmis Mcdermott APRN   promethazine (PHENERGAN) 25 MG tablet Take 1 tablet by mouth Every 6 (Six) Hours As Needed for Nausea or Vomiting. 7/12/22  Yes Idalmis Mcdermott APRN   QUEtiapine (SEROquel) 400 MG tablet Take 2 tablets by mouth every night at bedtime. 8/16/22  Yes Idalmis Mcdermott APRN   Rimegepant Sulfate (Nurtec) 75 MG tablet dispersible tablet Take 1 tablet by mouth Daily As Needed (migraine). 5/18/22  Yes Thien Jean Baptiste APRN   clonazePAM (KlonoPIN) 1 MG tablet Take 1 mg by mouth 2 (Two) Times a Day As Needed. 1/18/21   Provider, MD Toby   cloNIDine HCl ER 0.1 MG tablet sustained-release 12 hour tablet Take 0.2 mg by mouth Every 12 (Twelve) Hours. 1/3/22 7/12/22   "Provider, MD Toby     PE  /98   Ht 162.6 cm (64\")   Wt 88.5 kg (195 lb 3.2 oz)   LMP  (LMP Unknown)   BMI 33.51 kg/m²        General: Alert, healthy, no distress, well nourished and well developed.  Neurologic: Alert, oriented to person, place, and time.  Gait normal.  Cranial nerves II-XII grossly intact.  HEENT: Normocephalic, atraumatic.  Extraocular muscles intact.  Lungs: Normal respiratory effort.    Heart: Regular rate and rhythm.    Abdomen: Soft, non-tender, non-distended,no masses, no hepatosplenomegaly, no hernia.  Skin: No rash, no lesions.  Extremities: No cyanosis, clubbing or edema.  PELVIC EXAM: see 22 US    IMPRESSION  Chelita Rosenbaum is a 36 y.o.  presenting with pelvic pain, h/o TLH, BS, h/o partial colon resection, desiring bilateral oophorectomy.    PLAN    1. Pelvic pain  2. History of hysterectomy  -Reports pain in her ovaries, indicating bilateral lower abdominal/pelvic pain  -History of PCOS, history of recurrent miscarriages and heavy menstrual bleeding prior to hysterectomy  -Reports had a TLH, BS, retains bilateral ovaries  -Complex history including reported history of Crohn's disease with a partial colon resection for diverticulitis?;  Performed laparoscopically and was prior to her hysterectomy  -Records requested from hysterectomy and bowel resection, we have been unable to obtain them due to time passed since her surgeries  -We did discuss the complex nature of pelvic pain which can include gynecologic, GI, , or musculoskeletal causes; she does have a significant history of GI issues with her prior bowel resection due to possible diverticulitis versus Crohn's, we did discuss that pain may be from scar tissue and/or inflammation from her bowel disease  -The patient very strongly desires removal of her bilateral ovaries for attempted definitive management of her pelvic pain  -I did discuss with the patient that she would be a relatively high risk for a " repeat abdominal procedure due to history of 3 prior C-sections, hysterectomy, cholecystectomy, and partial colon resection;  I discussed with her the options for referral for her surgery at another facility versus attempt at bilateral oophorectomy here with us  -She denies significant adhesions during her hysterectomy, but unfortunately I am unable to review this record, I feel it is reasonable if she desires to attempt to perform her surgery here in our hospital.  We did discuss that she is at increased risk of bowel or bladder injury due to her prior procedures as well as injury to structures such as her ureters, blood vessels, nerves; I also discussed with her that if she did have a very significant amount of scar tissue would not feel we could safely do her surgery here, there is a chance that we do diagnostic laparoscopy and then decide to not continue with attempt at bilateral oophorectomy and refer her to a specialist at a center that has colorectal surgery or other higher intensity care available  - we discussed that she may have to have an open abdominal procedure if there is significant scar tissue or bleeding that would necessitate a larger incision, but we will attempt a laparoscopic approach  -We will plan for diagnostic laparoscopy, laparoscopic bilateral salpingo-oophorectomy, possible abdominal bilateral salpingo-oophorectomy, any other indicated procedures  - Discussed risks/benefits of surgery: risk of infection, bleeding, damage to surrounding structures, need for additional procedures; discussed risk of anesthesia including heart attack, stroke, and death; plan for outpatient surgery discussed.  - Sees pain management, stated she spoke with them and they were okay with us treating her as we felt appropriate; she is to provide documentation of prescribed medications for their record               This document has been electronically signed by Megan Cummings DO on September 21, 2022 20:53 CDT

## 2022-09-22 ENCOUNTER — ANESTHESIA (OUTPATIENT)
Dept: PERIOP | Facility: HOSPITAL | Age: 37
End: 2022-09-22

## 2022-09-22 ENCOUNTER — HOSPITAL ENCOUNTER (OUTPATIENT)
Facility: HOSPITAL | Age: 37
Setting detail: HOSPITAL OUTPATIENT SURGERY
Discharge: HOME | End: 2022-09-22
Attending: STUDENT IN AN ORGANIZED HEALTH CARE EDUCATION/TRAINING PROGRAM | Admitting: STUDENT IN AN ORGANIZED HEALTH CARE EDUCATION/TRAINING PROGRAM

## 2022-09-22 ENCOUNTER — ANESTHESIA EVENT (OUTPATIENT)
Dept: PERIOP | Facility: HOSPITAL | Age: 37
End: 2022-09-22

## 2022-09-22 VITALS
OXYGEN SATURATION: 98 % | WEIGHT: 193.56 LBS | DIASTOLIC BLOOD PRESSURE: 100 MMHG | SYSTOLIC BLOOD PRESSURE: 150 MMHG | RESPIRATION RATE: 18 BRPM | HEART RATE: 98 BPM | BODY MASS INDEX: 33.05 KG/M2 | HEIGHT: 64 IN | TEMPERATURE: 98.2 F

## 2022-09-22 DIAGNOSIS — Z90.722 S/P BILATERAL OOPHORECTOMY: Primary | ICD-10-CM

## 2022-09-22 DIAGNOSIS — R10.2 PELVIC PAIN: ICD-10-CM

## 2022-09-22 LAB
ABO GROUP BLD: NORMAL
BLD GP AB SCN SERPL QL: NEGATIVE
GLUCOSE BLDC GLUCOMTR-MCNC: 153 MG/DL (ref 70–130)
GLUCOSE BLDC GLUCOMTR-MCNC: 98 MG/DL (ref 70–130)
Lab: NORMAL
RH BLD: POSITIVE
T&S EXPIRATION DATE: NORMAL

## 2022-09-22 PROCEDURE — 86901 BLOOD TYPING SEROLOGIC RH(D): CPT | Performed by: ANESTHESIOLOGY

## 2022-09-22 PROCEDURE — 58661 LAPAROSCOPY REMOVE ADNEXA: CPT

## 2022-09-22 PROCEDURE — 25010000002 HYDROMORPHONE 1 MG/ML SOLUTION: Performed by: NURSE ANESTHETIST, CERTIFIED REGISTERED

## 2022-09-22 PROCEDURE — 25010000002 NEOSTIGMINE 10 MG/10ML SOLUTION: Performed by: NURSE ANESTHETIST, CERTIFIED REGISTERED

## 2022-09-22 PROCEDURE — 25010000002 KETOROLAC TROMETHAMINE PER 15 MG: Performed by: NURSE ANESTHETIST, CERTIFIED REGISTERED

## 2022-09-22 PROCEDURE — 25010000002 HYDRALAZINE PER 20 MG: Performed by: NURSE ANESTHETIST, CERTIFIED REGISTERED

## 2022-09-22 PROCEDURE — 82962 GLUCOSE BLOOD TEST: CPT

## 2022-09-22 PROCEDURE — 25010000002 MIDAZOLAM PER 1 MG: Performed by: NURSE ANESTHETIST, CERTIFIED REGISTERED

## 2022-09-22 PROCEDURE — 25010000002 SUCCINYLCHOLINE PER 20 MG: Performed by: NURSE ANESTHETIST, CERTIFIED REGISTERED

## 2022-09-22 PROCEDURE — 25010000002 FENTANYL CITRATE (PF) 50 MCG/ML SOLUTION: Performed by: NURSE ANESTHETIST, CERTIFIED REGISTERED

## 2022-09-22 PROCEDURE — 86850 RBC ANTIBODY SCREEN: CPT | Performed by: ANESTHESIOLOGY

## 2022-09-22 PROCEDURE — 25010000002 ONDANSETRON PER 1 MG: Performed by: NURSE ANESTHETIST, CERTIFIED REGISTERED

## 2022-09-22 PROCEDURE — 58661 LAPAROSCOPY REMOVE ADNEXA: CPT | Performed by: STUDENT IN AN ORGANIZED HEALTH CARE EDUCATION/TRAINING PROGRAM

## 2022-09-22 PROCEDURE — 86900 BLOOD TYPING SEROLOGIC ABO: CPT | Performed by: ANESTHESIOLOGY

## 2022-09-22 PROCEDURE — 25010000002 PROPOFOL 10 MG/ML EMULSION: Performed by: NURSE ANESTHETIST, CERTIFIED REGISTERED

## 2022-09-22 RX ORDER — HYDRALAZINE HYDROCHLORIDE 20 MG/ML
5 INJECTION INTRAMUSCULAR; INTRAVENOUS
Status: DISCONTINUED | OUTPATIENT
Start: 2022-09-22 | End: 2022-09-22 | Stop reason: HOSPADM

## 2022-09-22 RX ORDER — FLUMAZENIL 0.1 MG/ML
0.2 INJECTION INTRAVENOUS AS NEEDED
Status: DISCONTINUED | OUTPATIENT
Start: 2022-09-22 | End: 2022-09-22 | Stop reason: HOSPADM

## 2022-09-22 RX ORDER — ACETAMINOPHEN 325 MG/1
650 TABLET ORAL ONCE AS NEEDED
Status: DISCONTINUED | OUTPATIENT
Start: 2022-09-22 | End: 2022-09-22 | Stop reason: HOSPADM

## 2022-09-22 RX ORDER — ACETAMINOPHEN 325 MG/1
650 TABLET ORAL EVERY 4 HOURS PRN
Qty: 100 TABLET | Refills: 2 | Status: SHIPPED | OUTPATIENT
Start: 2022-09-22 | End: 2023-09-22

## 2022-09-22 RX ORDER — SODIUM CHLORIDE, SODIUM LACTATE, POTASSIUM CHLORIDE, CALCIUM CHLORIDE 600; 310; 30; 20 MG/100ML; MG/100ML; MG/100ML; MG/100ML
125 INJECTION, SOLUTION INTRAVENOUS CONTINUOUS
Status: DISCONTINUED | OUTPATIENT
Start: 2022-09-22 | End: 2022-09-22 | Stop reason: HOSPADM

## 2022-09-22 RX ORDER — ACETAMINOPHEN 650 MG/1
650 SUPPOSITORY RECTAL ONCE AS NEEDED
Status: DISCONTINUED | OUTPATIENT
Start: 2022-09-22 | End: 2022-09-22 | Stop reason: HOSPADM

## 2022-09-22 RX ORDER — NALOXONE HCL 0.4 MG/ML
0.4 VIAL (ML) INJECTION AS NEEDED
Status: DISCONTINUED | OUTPATIENT
Start: 2022-09-22 | End: 2022-09-22 | Stop reason: HOSPADM

## 2022-09-22 RX ORDER — IBUPROFEN 600 MG/1
600 TABLET ORAL EVERY 6 HOURS PRN
Qty: 40 TABLET | Refills: 1 | Status: SHIPPED | OUTPATIENT
Start: 2022-09-22

## 2022-09-22 RX ORDER — LABETALOL HYDROCHLORIDE 5 MG/ML
5 INJECTION, SOLUTION INTRAVENOUS
Status: DISCONTINUED | OUTPATIENT
Start: 2022-09-22 | End: 2022-09-22 | Stop reason: HOSPADM

## 2022-09-22 RX ORDER — SODIUM CHLORIDE 0.9 % (FLUSH) 0.9 %
10 SYRINGE (ML) INJECTION AS NEEDED
Status: DISCONTINUED | OUTPATIENT
Start: 2022-09-22 | End: 2022-09-22 | Stop reason: HOSPADM

## 2022-09-22 RX ORDER — PROPOFOL 10 MG/ML
VIAL (ML) INTRAVENOUS AS NEEDED
Status: DISCONTINUED | OUTPATIENT
Start: 2022-09-22 | End: 2022-09-22 | Stop reason: SURG

## 2022-09-22 RX ORDER — DIPHENHYDRAMINE HYDROCHLORIDE 50 MG/ML
12.5 INJECTION INTRAMUSCULAR; INTRAVENOUS
Status: DISCONTINUED | OUTPATIENT
Start: 2022-09-22 | End: 2022-09-22 | Stop reason: HOSPADM

## 2022-09-22 RX ORDER — SCOLOPAMINE TRANSDERMAL SYSTEM 1 MG/1
1 PATCH, EXTENDED RELEASE TRANSDERMAL
Status: DISCONTINUED | OUTPATIENT
Start: 2022-09-22 | End: 2022-09-22 | Stop reason: HOSPADM

## 2022-09-22 RX ORDER — ONDANSETRON 2 MG/ML
INJECTION INTRAMUSCULAR; INTRAVENOUS AS NEEDED
Status: DISCONTINUED | OUTPATIENT
Start: 2022-09-22 | End: 2022-09-22 | Stop reason: SURG

## 2022-09-22 RX ORDER — EPHEDRINE SULFATE 50 MG/ML
5 INJECTION, SOLUTION INTRAVENOUS ONCE AS NEEDED
Status: DISCONTINUED | OUTPATIENT
Start: 2022-09-22 | End: 2022-09-22 | Stop reason: HOSPADM

## 2022-09-22 RX ORDER — SODIUM CHLORIDE 0.9 % (FLUSH) 0.9 %
3 SYRINGE (ML) INJECTION EVERY 12 HOURS SCHEDULED
Status: DISCONTINUED | OUTPATIENT
Start: 2022-09-22 | End: 2022-09-22 | Stop reason: HOSPADM

## 2022-09-22 RX ORDER — BUPIVACAINE HYDROCHLORIDE 2.5 MG/ML
INJECTION, SOLUTION EPIDURAL; INFILTRATION; INTRACAUDAL AS NEEDED
Status: DISCONTINUED | OUTPATIENT
Start: 2022-09-22 | End: 2022-09-22 | Stop reason: HOSPADM

## 2022-09-22 RX ORDER — LABETALOL HYDROCHLORIDE 5 MG/ML
INJECTION, SOLUTION INTRAVENOUS AS NEEDED
Status: DISCONTINUED | OUTPATIENT
Start: 2022-09-22 | End: 2022-09-22 | Stop reason: SURG

## 2022-09-22 RX ORDER — MIDAZOLAM HYDROCHLORIDE 1 MG/ML
INJECTION INTRAMUSCULAR; INTRAVENOUS AS NEEDED
Status: DISCONTINUED | OUTPATIENT
Start: 2022-09-22 | End: 2022-09-22 | Stop reason: SURG

## 2022-09-22 RX ORDER — PROMETHAZINE HYDROCHLORIDE 25 MG/1
25 TABLET ORAL ONCE AS NEEDED
Status: DISCONTINUED | OUTPATIENT
Start: 2022-09-22 | End: 2022-09-22 | Stop reason: HOSPADM

## 2022-09-22 RX ORDER — NITROGLYCERIN 5 MG/ML
INJECTION, SOLUTION INTRAVENOUS AS NEEDED
Status: DISCONTINUED | OUTPATIENT
Start: 2022-09-22 | End: 2022-09-22 | Stop reason: SURG

## 2022-09-22 RX ORDER — SUCCINYLCHOLINE CHLORIDE 20 MG/ML
INJECTION INTRAMUSCULAR; INTRAVENOUS AS NEEDED
Status: DISCONTINUED | OUTPATIENT
Start: 2022-09-22 | End: 2022-09-22 | Stop reason: SURG

## 2022-09-22 RX ORDER — NEOSTIGMINE METHYLSULFATE 1 MG/ML
INJECTION, SOLUTION INTRAVENOUS AS NEEDED
Status: DISCONTINUED | OUTPATIENT
Start: 2022-09-22 | End: 2022-09-22 | Stop reason: SURG

## 2022-09-22 RX ORDER — LIDOCAINE HYDROCHLORIDE 20 MG/ML
INJECTION, SOLUTION INFILTRATION; PERINEURAL AS NEEDED
Status: DISCONTINUED | OUTPATIENT
Start: 2022-09-22 | End: 2022-09-22 | Stop reason: SURG

## 2022-09-22 RX ORDER — HYDRALAZINE HYDROCHLORIDE 20 MG/ML
INJECTION INTRAMUSCULAR; INTRAVENOUS AS NEEDED
Status: DISCONTINUED | OUTPATIENT
Start: 2022-09-22 | End: 2022-09-22 | Stop reason: SURG

## 2022-09-22 RX ORDER — OXYCODONE HYDROCHLORIDE 5 MG/1
5 TABLET ORAL EVERY 4 HOURS PRN
Qty: 30 TABLET | Refills: 0 | Status: SHIPPED | OUTPATIENT
Start: 2022-09-22 | End: 2022-10-14

## 2022-09-22 RX ORDER — FENTANYL CITRATE 50 UG/ML
INJECTION, SOLUTION INTRAMUSCULAR; INTRAVENOUS AS NEEDED
Status: DISCONTINUED | OUTPATIENT
Start: 2022-09-22 | End: 2022-09-22 | Stop reason: SURG

## 2022-09-22 RX ORDER — ONDANSETRON 2 MG/ML
4 INJECTION INTRAMUSCULAR; INTRAVENOUS ONCE AS NEEDED
Status: DISCONTINUED | OUTPATIENT
Start: 2022-09-22 | End: 2022-09-22 | Stop reason: HOSPADM

## 2022-09-22 RX ORDER — PROMETHAZINE HYDROCHLORIDE 25 MG/1
25 SUPPOSITORY RECTAL ONCE AS NEEDED
Status: DISCONTINUED | OUTPATIENT
Start: 2022-09-22 | End: 2022-09-22 | Stop reason: HOSPADM

## 2022-09-22 RX ORDER — ROCURONIUM BROMIDE 10 MG/ML
INJECTION, SOLUTION INTRAVENOUS AS NEEDED
Status: DISCONTINUED | OUTPATIENT
Start: 2022-09-22 | End: 2022-09-22 | Stop reason: SURG

## 2022-09-22 RX ORDER — MEPERIDINE HYDROCHLORIDE 25 MG/ML
12.5 INJECTION INTRAMUSCULAR; INTRAVENOUS; SUBCUTANEOUS
Status: DISCONTINUED | OUTPATIENT
Start: 2022-09-22 | End: 2022-09-22 | Stop reason: HOSPADM

## 2022-09-22 RX ORDER — KETOROLAC TROMETHAMINE 30 MG/ML
INJECTION, SOLUTION INTRAMUSCULAR; INTRAVENOUS AS NEEDED
Status: DISCONTINUED | OUTPATIENT
Start: 2022-09-22 | End: 2022-09-22 | Stop reason: SURG

## 2022-09-22 RX ADMIN — NITROGLYCERIN 40 MCG: 5 INJECTION, SOLUTION INTRAVENOUS at 10:36

## 2022-09-22 RX ADMIN — LIDOCAINE HYDROCHLORIDE 80 MG: 20 INJECTION, SOLUTION INFILTRATION; PERINEURAL at 09:44

## 2022-09-22 RX ADMIN — SUCCINYLCHOLINE CHLORIDE 120 MG: 20 INJECTION, SOLUTION INTRAMUSCULAR; INTRAVENOUS at 09:44

## 2022-09-22 RX ADMIN — ROCURONIUM BROMIDE 5 MG: 10 INJECTION INTRAVENOUS at 09:44

## 2022-09-22 RX ADMIN — PROPOFOL 50 MG: 10 INJECTION, EMULSION INTRAVENOUS at 09:57

## 2022-09-22 RX ADMIN — NITROGLYCERIN 40 MCG: 5 INJECTION, SOLUTION INTRAVENOUS at 10:29

## 2022-09-22 RX ADMIN — NEOSTIGMINE METHYLSULFATE 2.5 MG: 0.5 INJECTION INTRAVENOUS at 10:51

## 2022-09-22 RX ADMIN — FENTANYL CITRATE 100 MCG: 50 INJECTION INTRAMUSCULAR; INTRAVENOUS at 09:44

## 2022-09-22 RX ADMIN — NITROGLYCERIN 80 MCG: 5 INJECTION, SOLUTION INTRAVENOUS at 10:41

## 2022-09-22 RX ADMIN — LABETALOL HYDROCHLORIDE 10 MG: 5 INJECTION, SOLUTION INTRAVENOUS at 10:23

## 2022-09-22 RX ADMIN — ROCURONIUM BROMIDE 25 MG: 10 INJECTION INTRAVENOUS at 10:04

## 2022-09-22 RX ADMIN — HYDROMORPHONE HYDROCHLORIDE 0.5 MG: 1 INJECTION, SOLUTION INTRAMUSCULAR; INTRAVENOUS; SUBCUTANEOUS at 11:02

## 2022-09-22 RX ADMIN — KETOROLAC TROMETHAMINE 30 MG: 30 INJECTION, SOLUTION INTRAMUSCULAR at 10:58

## 2022-09-22 RX ADMIN — GLYCOPYRROLATE 0.4 MG: 0.2 INJECTION, SOLUTION INTRAMUSCULAR; INTRAVITREAL at 10:51

## 2022-09-22 RX ADMIN — MIDAZOLAM HYDROCHLORIDE 2 MG: 1 INJECTION, SOLUTION INTRAMUSCULAR; INTRAVENOUS at 09:39

## 2022-09-22 RX ADMIN — ONDANSETRON 4 MG: 2 INJECTION INTRAMUSCULAR; INTRAVENOUS at 10:58

## 2022-09-22 RX ADMIN — HYDROMORPHONE HYDROCHLORIDE 0.5 MG: 1 INJECTION, SOLUTION INTRAMUSCULAR; INTRAVENOUS; SUBCUTANEOUS at 11:19

## 2022-09-22 RX ADMIN — PROPOFOL 100 MG: 10 INJECTION, EMULSION INTRAVENOUS at 10:23

## 2022-09-22 RX ADMIN — HYDROMORPHONE HYDROCHLORIDE 0.5 MG: 1 INJECTION, SOLUTION INTRAMUSCULAR; INTRAVENOUS; SUBCUTANEOUS at 11:29

## 2022-09-22 RX ADMIN — HYDRALAZINE HYDROCHLORIDE 10 MG: 20 INJECTION INTRAMUSCULAR; INTRAVENOUS at 10:44

## 2022-09-22 RX ADMIN — LABETALOL HYDROCHLORIDE 10 MG: 5 INJECTION, SOLUTION INTRAVENOUS at 10:00

## 2022-09-22 RX ADMIN — HYDROMORPHONE HYDROCHLORIDE 0.5 MG: 1 INJECTION, SOLUTION INTRAMUSCULAR; INTRAVENOUS; SUBCUTANEOUS at 11:39

## 2022-09-22 RX ADMIN — HYDROMORPHONE HYDROCHLORIDE 0.5 MG: 1 INJECTION, SOLUTION INTRAMUSCULAR; INTRAVENOUS; SUBCUTANEOUS at 11:51

## 2022-09-22 RX ADMIN — PROPOFOL 50 MG: 10 INJECTION, EMULSION INTRAVENOUS at 09:54

## 2022-09-22 RX ADMIN — SCOPALAMINE 1 PATCH: 1 PATCH, EXTENDED RELEASE TRANSDERMAL at 08:51

## 2022-09-22 RX ADMIN — PROPOFOL 200 MG: 10 INJECTION, EMULSION INTRAVENOUS at 09:44

## 2022-09-22 RX ADMIN — SODIUM CHLORIDE, POTASSIUM CHLORIDE, SODIUM LACTATE AND CALCIUM CHLORIDE 125 ML/HR: 600; 310; 30; 20 INJECTION, SOLUTION INTRAVENOUS at 08:02

## 2022-09-22 RX ADMIN — HYDROMORPHONE HYDROCHLORIDE 0.5 MG: 1 INJECTION, SOLUTION INTRAMUSCULAR; INTRAVENOUS; SUBCUTANEOUS at 10:55

## 2022-09-22 NOTE — ANESTHESIA PREPROCEDURE EVALUATION
Anesthesia Evaluation     Patient summary reviewed and Nursing notes reviewed   history of anesthetic complications: PONV  NPO Solid Status: > 8 hours  NPO Liquid Status: > 2 hours           Airway   Mallampati: I  TM distance: >3 FB  Neck ROM: full  No difficulty expected  Dental    (+) poor dentition        Pulmonary     breath sounds clear to auscultation  (+) asthma,sleep apnea on CPAP,   (-) shortness of breath, rhonchi, decreased breath sounds, wheezes, not a smoker, no home oxygen  Cardiovascular   Exercise tolerance: good (4-7 METS)    ECG reviewed  Patient on routine beta blocker and Beta blocker given within 24 hours of surgery  Rhythm: regular  Rate: abnormal    (+) hypertension 2 medications or greater, dysrhythmias Tachycardia, hyperlipidemia,   (-) past MI, angina, murmur, cardiac stents, CABG, DVT    ROS comment: EKG 9/20/2022:  Normal sinus rhythm  Prolong QTC  Possible Left atrial enlargement  Left ventricular hypertrophy  Abnormal ECG  When compared with ECG of 06-JUL-2017 13:19,    Neuro/Psych  (+) headaches, psychiatric history Anxiety,    (-) seizures, TIA, CVA  GI/Hepatic/Renal/Endo    (+) obesity,  GERD well controlled,  liver disease, diabetes mellitus type 2 well controlled,   (-) no renal disease, no thyroid disorder    Musculoskeletal     (+) back pain, chronic pain, neck pain,   Abdominal    Substance History      OB/GYN negative ob/gyn ROS   (-)  Pregnant        Other   arthritis,      ROS/Med Hx Other: Hgb=13.3  Per pt HR rarely gets under 100 due to POTS syndrome.     Hx of asthma: Uses albuterol inhaler a couple times a month. Did not use inhaler this AM. Only inhaler. Never been hospitalized for asthma.    Hx of POTS syndrome- follows with Dr. Francis at [place]. Last seen 6/2022. Follows with every six months.     Hx of EVELYN: CPAP compliant    Hx of peripheral edema- lasix 20mg daily. No peripheral edema on physical exam.     Hx of GERD controlled on PRN omeprazole    Hx of  anxiety: clonazepan 1mg BID.     Hx of Crohn's disease- colon resection was due to diverticulitis. Doesn't take any medications for Crohns.     Hx of migraines: takes qulipta, nurtec, & reyvow.     Hx of DM: controlled on metformin & victoza. Last ElvU1Q=2.3    Hx of subtotal hysterectomy      Phys Exam Other: Very poor dentition. Denies any loose teeth                Anesthesia Plan    ASA 3     general     (Scopolamine patch ordered for PONV. Pt has had in the past and it has helped. )  intravenous induction     Anesthetic plan, risks, benefits, and alternatives have been provided, discussed and informed consent has been obtained with: patient.  Pre-procedure education provided  Use of blood products discussed with patient  Consented to blood products.   Plan discussed with CRNA.        CODE STATUS:

## 2022-09-22 NOTE — ANESTHESIA PROCEDURE NOTES
Airway  Urgency: elective    Date/Time: 9/22/2022 9:46 AM  Airway not difficult    General Information and Staff    Patient location during procedure: OR  CRNA/CAA: Jadiel Singleton, SEBASTIEN    Indications and Patient Condition  Indications for airway management: airway protection    Preoxygenated: yes  MILS not maintained throughout  Mask difficulty assessment: 1 - vent by mask    Final Airway Details  Final airway type: endotracheal airway      Successful airway: ETT  Cuffed: yes   Successful intubation technique: direct laryngoscopy  Facilitating devices/methods: intubating stylet  Endotracheal tube insertion site: oral  Blade: Byron  Blade size: 3  ETT size (mm): 7.5  Cormack-Lehane Classification: grade I - full view of glottis  Placement verified by: chest auscultation and capnometry   Cuff volume (mL): 6  Measured from: lips  ETT/EBT  to lips (cm): 20  Number of attempts at approach: 1  Assessment: lips, teeth, and gum same as pre-op and atraumatic intubation

## 2022-09-22 NOTE — NURSING NOTE
DR MATTHEWS IN ROOM WITH PATIENT ADDED TO CONSENT ANY OTHER INDICATED PROCEDURES. TALK WITH PATIENT AND ADDED ON WRITTEN CONSENT AND INITIAL  PER PATIENT. ORDER UPDATED.

## 2022-09-22 NOTE — INTERVAL H&P NOTE
"35 yo  presenting for dx lsc, bilateral oophorectomy, any other indicated procedures. Nervous but denies any changes since seen this week.     Vitals:    22 0754   BP: 171/99   Pulse: 92   Resp: 18   Temp: 97.5 °F (36.4 °C)   TempSrc: Temporal   SpO2: 97%   Weight: 87.8 kg (193 lb 9 oz)   Height: 162.6 cm (64\")       Gen: well appearing, NAD  CV: RRR  Chest: no increased work of breathing  Abd: nontender, gravid    WBC   Date Value Ref Range Status   2022 9.98 3.40 - 10.80 10*3/mm3 Final     RBC   Date Value Ref Range Status   2022 4.51 3.77 - 5.28 10*6/mm3 Final     Hemoglobin   Date Value Ref Range Status   2022 13.3 12.0 - 15.9 g/dL Final     Hematocrit   Date Value Ref Range Status   2022 39.9 34.0 - 46.6 % Final     MCV   Date Value Ref Range Status   2022 88.5 79.0 - 97.0 fL Final     MCH   Date Value Ref Range Status   2022 29.5 26.6 - 33.0 pg Final     MCHC   Date Value Ref Range Status   2022 33.3 31.5 - 35.7 g/dL Final     RDW   Date Value Ref Range Status   2022 13.2 12.3 - 15.4 % Final     RDW-SD   Date Value Ref Range Status   2022 42.6 37.0 - 54.0 fl Final     MPV   Date Value Ref Range Status   2022 8.4 6.0 - 12.0 fL Final     Platelets   Date Value Ref Range Status   2022 340 140 - 450 10*3/mm3 Final     Neutrophil %   Date Value Ref Range Status   2022 67.1 42.7 - 76.0 % Final     Lymphocyte %   Date Value Ref Range Status   2022 26.2 19.6 - 45.3 % Final     Monocyte %   Date Value Ref Range Status   2022 4.8 (L) 5.0 - 12.0 % Final     Eosinophil %   Date Value Ref Range Status   2022 0.8 0.3 - 6.2 % Final     Basophil %   Date Value Ref Range Status   2022 0.6 0.0 - 1.5 % Final     Immature Grans %   Date Value Ref Range Status   2022 0.5 0.0 - 0.5 % Final     Neutrophils, Absolute   Date Value Ref Range Status   2022 6.70 1.70 - 7.00 10*3/mm3 Final     Lymphocytes, Absolute "   Date Value Ref Range Status   2022 2.61 0.70 - 3.10 10*3/mm3 Final     Monocytes, Absolute   Date Value Ref Range Status   2022 0.48 0.10 - 0.90 10*3/mm3 Final     Eosinophils, Absolute   Date Value Ref Range Status   2022 0.08 0.00 - 0.40 10*3/mm3 Final     Basophils, Absolute   Date Value Ref Range Status   2022 0.06 0.00 - 0.20 10*3/mm3 Final     Immature Grans, Absolute   Date Value Ref Range Status   2022 0.05 0.00 - 0.05 10*3/mm3 Final     nRBC   Date Value Ref Range Status   2022 0.0 0.0 - 0.2 /100 WBC Final     Lab Results   Component Value Date    GLUCOSE 88 2022    BUN 12 2022    CREATININE 0.80 2022    EGFRIFNONA 102 2021    EGFRIFAFRI 118 2021    BCR 15.0 2022    K 4.2 2022    CO2 28.0 2022    CALCIUM 9.6 2022    PROTENTOTREF 6.7 2021    ALBUMIN 3.5 (L) 2021    LABIL2 1.1 (L) 2021    AST 28 2021    ALT 39 (H) 2021     37 yo  presenting for dx lsc, bilateral oophorectomy, any other indicated procedures.   Doing well. Ready to proceed.  Again reviewed risk of surgery with prior TLH and partial colectomy, possible need for exploratory laparotomy  Patient would desire laparotomy if we feel that is what is necessary to complete procedure.        This document has been electronically signed by Megan Cummings DO on 2022 09:35 CDT

## 2022-09-22 NOTE — OP NOTE
[facility]  Operative Report    Name: Chelita Rosenbaum  MRN: [id number]  Date: 9/22/2022  CSN: [id number]      Location: [facility]    Service: Gynecology    Pre-op Diagnosis: Pelvic pain    Post-op Diagnosis: Same, pelvic adhesions status post bilateral oophorectomy, lysis of adhesions    Surgeon: Megan Cummings DO    Assistant: Debra Billings, CST was responsible for performing the following activities: Retraction, Suction, Irrigation, Closing, Placing Dressing and Held/Positioned Camera and their skilled assistance was necessary for the success of this case.    Staff:  Circulator: Anisa Rosenbaum RN  Scrub Person: Mrai Hagen  Assistant: Debra Billings CSFA    Anesthesia: General    Anesthesia Staff:  Anesthesiologist: Adeline Duncan DO  CRNA: Jadiel Singleton CRNA    Operation: Laparoscopic left salpingo-oophorectomy, right oophorectomy, lysis of adhesions    Drains: None    Complications: No surgical complications, difficult to control hypertension during her case per anesthesia    Findings: Normal-appearing bilateral ovaries, adhesions from the omentum to the left and right ovaries as well as to the peritoneum overlying the bladder, no bowel involved in the adhesions    Condition: Stable    Specimens/Disposition: Left tube and ovary, right ovary    Estimated Blood Loss: 20 mL  IV Fluids: See anesthesia documentation  Urine Output: See anesthesia documentation    Indications: Chelita Rosenbaum, 36 y.o., G 9 P 3063 with chief complaint of pelvic pain, status post total laparoscopic hysterectomy, partial colectomy, desiring removal of her ovaries.    Description of Operation:  The patient was identified and the procedure was verified. Pt was taken to the OR where general endotracheal anesthesia was given. The patient was placed in modified dorsal lithotomy position where she was prepped and draped in the normal, sterile fashion. A 5 mm supraumbilical incision was made.  A 5 mm trocar and  sleeve were inserted along with the laparoscope under direct visualization.  Pneumoperitoneum was established. The above findings noted.  An additional 8 mm left lateral incision was made through which an 8mm trocar and sleeve were placed under direct visualization.  Then, a 5 mm right lateral incision was made through which a 5 mm trocar and sleeve were passed under direct visualization.    The patient's omentum was adhered to the bladder peritoneum, and overlying the left and right ovaries.  Thin filmy adhesions between the omentum and the structures were carefully taken down with the Enseal.  The left ovary was then carefully dissected away from the left pelvic sidewall.  The left IP was isolated and coagulated and cut with the Enseal device.  The right IP was then isolated and coagulated and cut with the Enseal device.  The 8 mm left lower quadrant trocar site was extended at the skin with a scalpel and at the fascia layer with Randall scissors next to a gloved finger.  An 11 mm trocar was then placed through this site.  The 2 ovaries were placed in Enseal bag and removed through the 11 mm trocar site.  All excision sites were noted to be hemostatic.  A Felipe-Emerson was then used to close the the left 11 millimeter port site.   The CO2 was turned off and evacuated from the abdomen.  The right lateral port was removed under direct visualization.  The umbilical port was then removed under direct visualization. The skin incisions were then closed with 3-0 monocryl in a subcuticular fashion and then surgical glue.  All instruments were removed from the vagina and the vagina was noted to be free from foreign objects.  Sponge, lap, needle and instrument counts were correct times 2. The patient was taken out of the dorsolithotomy position and awaked from general anesthesia. The patient tolerated the procedure well and was taken to the PACU in stable condition. There were no intraoperative complications.    Antibiotic  prophylaxis was not indicated for this procedure.        This document has been electronically signed by Megan Cummings DO on September 22, 2022 11:26 CDT

## 2022-09-22 NOTE — ANESTHESIA POSTPROCEDURE EVALUATION
Patient: Chelita Rosenbaum    Procedure Summary     Date: 09/22/22 Room / Location: [facility] OR [address] [place] [address] OR    Anesthesia Start: 0940 Anesthesia Stop: 1112    Procedure: LEFT SALPINGO OOPHORECTOMY, RIGHT OOPHORECTOMY LAPAROSCOPIC, LYSIS OF ADHESIONS (N/A Abdomen) Diagnosis:       Pelvic pain      (Pelvic pain [R10.2])    Surgeons: Megan Cummings DO Provider: Adeline Duncan DO    Anesthesia Type: general ASA Status: 3          Anesthesia Type: general    Vitals  Vitals Value Taken Time   /76 09/22/22 1107   Temp 97.3 °F (36.3 °C) 09/22/22 1107   Pulse 84 09/22/22 1107   Resp 18 09/22/22 1107   SpO2 97 % 09/22/22 1107           Post Anesthesia Care and Evaluation    Patient location during evaluation: PACU  Patient participation: complete - patient participated  Level of consciousness: awake and awake and alert  Pain score: 3  Pain management: adequate    Airway patency: patent  Anesthetic complications: No anesthetic complications  PONV Status: none  Cardiovascular status: acceptable and stable  Respiratory status: acceptable, room air and spontaneous ventilation  Hydration status: acceptable    Comments: BP:    HR:    SAT:    RR:    TEMP:

## 2022-09-27 LAB — REF LAB TEST METHOD: NORMAL

## 2022-10-05 ENCOUNTER — OFFICE VISIT (OUTPATIENT)
Dept: OBSTETRICS AND GYNECOLOGY | Facility: CLINIC | Age: 37
End: 2022-10-05

## 2022-10-05 VITALS
DIASTOLIC BLOOD PRESSURE: 82 MMHG | HEIGHT: 64 IN | WEIGHT: 198.2 LBS | BODY MASS INDEX: 33.84 KG/M2 | SYSTOLIC BLOOD PRESSURE: 124 MMHG

## 2022-10-05 DIAGNOSIS — Z09 POSTOPERATIVE EXAMINATION: ICD-10-CM

## 2022-10-05 DIAGNOSIS — E89.40 SURGICAL MENOPAUSE: Primary | ICD-10-CM

## 2022-10-05 DIAGNOSIS — Z90.722 STATUS POST BILATERAL OOPHORECTOMY: ICD-10-CM

## 2022-10-05 PROCEDURE — 99212 OFFICE O/P EST SF 10 MIN: CPT | Performed by: STUDENT IN AN ORGANIZED HEALTH CARE EDUCATION/TRAINING PROGRAM

## 2022-10-05 NOTE — PROGRESS NOTES
"[facility]  Gynecology  Post-operative Visit    DOS: 22  Pre-op diagnosis: pelvic pain  Procedure: Laparoscopic left salpingo-oophorectomy, right oophorectomy, lysis of adhesions  Pathology:   DIAGNOSIS:   BILATERAL OVARIES AND LEFT FALLOPIAN TUBE:   RIGHT OVARY:  Cystic follicles   LEFT OVARY:  Cystic follicles   LEFT FALLOPIAN TUBE:  No significant histologic abnormality     JBS/erin     Patient presents for post-op visit.  She states overall she is doing well since surgery. No vaginal bleeding. No vaginal dyrness. Little mood swings. Occasional night sweats. Hotflashes are daily and bothersome. No significant pelvic or abdominal pain. BM and urination normal.    /82 (BP Location: Left arm, Patient Position: Sitting, Cuff Size: Adult)   Ht 162.6 cm (64\")   Wt 89.9 kg (198 lb 3.2 oz)   LMP  (LMP Unknown)   BMI 34.02 kg/m²   Gen: NAD, AAO x3  Chest: no increased work of breathing  Abd: incisions c/d/i, healing well, abd nontender, no masses    A/P: Chelita Rosenbaum is a 36 y.o.  s/p Laparoscopic left salpingo-oophorectomy, right oophorectomy, lysis of adhesions on 22.  Doing well.  - Reviewed pathology  - Significant and bothersome hot flashes, would like to start HRT with estrogen patch  - Will followup in 2 mos to see how she is doing with this        This document has been electronically signed by Megan Cummings DO on 2022 11:47 CDT        "

## 2022-10-07 ENCOUNTER — HOSPITAL ENCOUNTER (OUTPATIENT)
Dept: CT IMAGING | Facility: HOSPITAL | Age: 37
Discharge: HOME | End: 2022-10-07
Admitting: NURSE PRACTITIONER

## 2022-10-07 DIAGNOSIS — R59.0 LYMPHADENOPATHY OF HEAD AND NECK REGION: ICD-10-CM

## 2022-10-07 LAB — CREAT BLDA-MCNC: 0.9 MG/DL (ref 0.6–1.3)

## 2022-10-07 PROCEDURE — 25010000002 IOPAMIDOL 61 % SOLUTION: Performed by: NURSE PRACTITIONER

## 2022-10-07 PROCEDURE — 70491 CT SOFT TISSUE NECK W/DYE: CPT

## 2022-10-07 PROCEDURE — 82565 ASSAY OF CREATININE: CPT

## 2022-10-07 RX ORDER — ESTRADIOL 0.04 MG/D
1 FILM, EXTENDED RELEASE TRANSDERMAL 2 TIMES WEEKLY
Qty: 8 PATCH | Refills: 12 | Status: SHIPPED | OUTPATIENT
Start: 2022-10-10 | End: 2022-10-29

## 2022-10-07 RX ADMIN — IOPAMIDOL 100 ML: 612 INJECTION, SOLUTION INTRAVENOUS at 14:57

## 2022-10-11 NOTE — TELEPHONE ENCOUNTER
Rx Refill Note  Requested Prescriptions     Pending Prescriptions Disp Refills   • clonazePAM (KlonoPIN) 1 MG tablet       Sig: Take 1 tablet by mouth 2 (Two) Times a Day As Needed.      Last office visit with prescribing clinician: 7/12/2022      Next office visit with prescribing clinician: 11/18/2022     Dori Tyler MA  10/11/22, 07:30 CDT

## 2022-10-12 ENCOUNTER — TELEPHONE (OUTPATIENT)
Dept: OTOLARYNGOLOGY | Facility: CLINIC | Age: 37
End: 2022-10-12

## 2022-10-12 RX ORDER — CLONAZEPAM 1 MG/1
1 TABLET ORAL 2 TIMES DAILY PRN
OUTPATIENT
Start: 2022-10-12

## 2022-10-12 NOTE — TELEPHONE ENCOUNTER
----- Message from ARIELLE Marlow sent at 10/12/2022  8:43 AM CDT -----  CT neck does show enlarged lymph nodes, but appears smaller in size than ultrasound. Continue follow up with Dr. Rosenbaum as scheduled.

## 2022-10-14 ENCOUNTER — TELEPHONE (OUTPATIENT)
Dept: FAMILY MEDICINE CLINIC | Facility: CLINIC | Age: 37
End: 2022-10-14

## 2022-10-14 ENCOUNTER — OFFICE VISIT (OUTPATIENT)
Dept: FAMILY MEDICINE CLINIC | Facility: CLINIC | Age: 37
End: 2022-10-14

## 2022-10-14 VITALS
BODY MASS INDEX: 34.66 KG/M2 | HEART RATE: 100 BPM | WEIGHT: 203 LBS | OXYGEN SATURATION: 99 % | HEIGHT: 64 IN | SYSTOLIC BLOOD PRESSURE: 130 MMHG | DIASTOLIC BLOOD PRESSURE: 88 MMHG | TEMPERATURE: 97.6 F

## 2022-10-14 DIAGNOSIS — Z79.899 LONG-TERM USE OF HIGH-RISK MEDICATION: Primary | ICD-10-CM

## 2022-10-14 DIAGNOSIS — F51.01 PRIMARY INSOMNIA: ICD-10-CM

## 2022-10-14 DIAGNOSIS — Z23 NEED FOR IMMUNIZATION AGAINST INFLUENZA: ICD-10-CM

## 2022-10-14 DIAGNOSIS — F41.9 ANXIETY: ICD-10-CM

## 2022-10-14 DIAGNOSIS — K12.2 ORAL ABSCESS: ICD-10-CM

## 2022-10-14 DIAGNOSIS — R42 DIZZINESS: ICD-10-CM

## 2022-10-14 PROCEDURE — 90471 IMMUNIZATION ADMIN: CPT | Performed by: NURSE PRACTITIONER

## 2022-10-14 PROCEDURE — 99214 OFFICE O/P EST MOD 30 MIN: CPT | Performed by: NURSE PRACTITIONER

## 2022-10-14 PROCEDURE — 90686 IIV4 VACC NO PRSV 0.5 ML IM: CPT | Performed by: NURSE PRACTITIONER

## 2022-10-14 RX ORDER — CLONAZEPAM 1 MG/1
1 TABLET ORAL 2 TIMES DAILY PRN
Qty: 60 TABLET | Refills: 2 | Status: SHIPPED | OUTPATIENT
Start: 2022-10-14 | End: 2023-02-06

## 2022-10-14 RX ORDER — AMITRIPTYLINE HYDROCHLORIDE 50 MG/1
50 TABLET, FILM COATED ORAL NIGHTLY
Qty: 30 TABLET | Refills: 2 | Status: SHIPPED | OUTPATIENT
Start: 2022-10-14 | End: 2023-01-13

## 2022-10-14 RX ORDER — AMOXICILLIN 500 MG/1
500 CAPSULE ORAL 3 TIMES DAILY
Qty: 21 CAPSULE | Refills: 0 | Status: SHIPPED | OUTPATIENT
Start: 2022-10-14 | End: 2022-10-21

## 2022-10-14 RX ORDER — HYDROCODONE BITARTRATE AND ACETAMINOPHEN 5; 325 MG/1; MG/1
1 TABLET ORAL 4 TIMES DAILY
COMMUNITY
Start: 2022-10-10 | End: 2023-03-14 | Stop reason: SDUPTHER

## 2022-10-14 RX ORDER — MECLIZINE HYDROCHLORIDE 25 MG/1
25 TABLET ORAL 3 TIMES DAILY PRN
Qty: 90 TABLET | Refills: 1 | Status: SHIPPED | OUTPATIENT
Start: 2022-10-14 | End: 2023-02-08

## 2022-10-14 NOTE — TELEPHONE ENCOUNTER
DRY EYE OU:  I have explained that dry eye syndrome may cause many ocular symptoms including irritation, burning, tearing, and blurry vision. Frequent high quality artificial tears will help relieve these symptoms. Recommend patient use over the counter artificial tears at least four times daily. The patient was given a list of quality artificial tears to choose from (Genteal, Systane, Refresh, TheraTears, Blink) and was advised not to use Visine or Clear Eyes. Advised patient that if symptoms of discomfort did not improve or if they worse, then the patient should return to clinic. Will continue to monitor. PA approved-pharmacy notified

## 2022-10-14 NOTE — PROGRESS NOTES
"Chief Complaint  Drug / Alcohol Assessment (KLONOPIN refill), Rash (Under bilateral eyes), and Medication Problem (LASIX dosage isn't working per patient)    Subjective        Chelita Rosenbaum presents to [facility]  History of Present Illness  ANXIETY:  Patient has been receiving her clonazepam from [facility] in [place]; however, they are currently without a prescriber.  She is wanting to get that medication here.  She has been on it for a long time.  It treats anxiety.  She is completing UDS and controlled substance agreement today.  PREM is reviewed.  She admits to use of CBD gummies.  These have been used long term as well and are known to have shown positive for THC in the past.  She continues to see PM for pain medication.    Objective   Vital Signs:  /88 (BP Location: Left arm, Patient Position: Sitting, Cuff Size: Adult)   Pulse 100   Temp 97.6 °F (36.4 °C)   Ht 162.6 cm (64\")   Wt 92.1 kg (203 lb)   SpO2 99%   BMI 34.84 kg/m²   Estimated body mass index is 34.84 kg/m² as calculated from the following:    Height as of this encounter: 162.6 cm (64\").    Weight as of this encounter: 92.1 kg (203 lb).    BMI is >= 30 and <35. (Class 1 Obesity). The following options were offered after discussion;: exercise counseling/recommendations and nutrition counseling/recommendations      Physical Exam  Vitals and nursing note reviewed.   Constitutional:       General: She is not in acute distress.     Appearance: Normal appearance. She is obese. She is not ill-appearing.   HENT:      Head: Normocephalic.      Mouth/Throat:      Pharynx: Posterior oropharyngeal erythema present.      Comments: Swelling with pus formation in the gumline on the left side.  Cardiovascular:      Rate and Rhythm: Regular rhythm. Tachycardia present.      Heart sounds: Normal heart sounds.   Pulmonary:      Effort: Pulmonary effort is normal.      Breath sounds: Normal breath sounds. "   Musculoskeletal:      Right lower leg: No edema.      Left lower leg: No edema.   Skin:     General: Skin is warm and dry.   Neurological:      Mental Status: She is alert and oriented to person, place, and time.        Result Review :                Assessment and Plan   Diagnoses and all orders for this visit:    1. Long-term use of high-risk medication (Primary)  -     Compliance Drug Analysis, Ur - Urine, Clean Catch    2. Need for immunization against influenza  -     FluLaval/Fluzone >6 mos ([id number])    3. Primary insomnia  -     amitriptyline (ELAVIL) 50 MG tablet; Take 1 tablet by mouth Every Night.  Dispense: 30 tablet; Refill: 2    4. Oral abscess  -     amoxicillin (AMOXIL) 500 MG capsule; Take 1 capsule by mouth 3 (Three) Times a Day for 7 days.  Dispense: 21 capsule; Refill: 0    5. Anxiety  -     clonazePAM (KlonoPIN) 1 MG tablet; Take 1 tablet by mouth 2 (Two) Times a Day As Needed for Anxiety.  Dispense: 60 tablet; Refill: 2    6. Dizziness  -     meclizine (ANTIVERT) 25 MG tablet; Take 1 tablet by mouth 3 (Three) Times a Day As Needed for Dizziness.  Dispense: 90 tablet; Refill: 1             Follow Up   No follow-ups on file.  Patient was given instructions and counseling regarding her condition or for health maintenance advice. Please see specific information pulled into the AVS if appropriate.     ARIELEL Castro  This note is electronically signed.

## 2022-10-21 LAB — DRUGS UR: NORMAL

## 2022-10-27 ENCOUNTER — TELEPHONE (OUTPATIENT)
Dept: OBSTETRICS AND GYNECOLOGY | Facility: CLINIC | Age: 37
End: 2022-10-27

## 2022-10-27 NOTE — TELEPHONE ENCOUNTER
PT sent Follicum message on 10/25 and has not heard anything. PT requested a returned call to [phone] to discuss.

## 2022-10-29 RX ORDER — ESTRADIOL 0.05 MG/D
1 FILM, EXTENDED RELEASE TRANSDERMAL 2 TIMES WEEKLY
Qty: 8 PATCH | Refills: 5 | Status: SHIPPED | OUTPATIENT
Start: 2022-10-31 | End: 2022-12-20

## 2022-11-01 ENCOUNTER — OFFICE VISIT (OUTPATIENT)
Dept: OTOLARYNGOLOGY | Facility: CLINIC | Age: 37
End: 2022-11-01

## 2022-11-01 VITALS
HEIGHT: 64 IN | RESPIRATION RATE: 16 BRPM | DIASTOLIC BLOOD PRESSURE: 104 MMHG | HEART RATE: 105 BPM | WEIGHT: 203 LBS | SYSTOLIC BLOOD PRESSURE: 138 MMHG | BODY MASS INDEX: 34.66 KG/M2 | TEMPERATURE: 98.2 F

## 2022-11-01 DIAGNOSIS — R13.10 DYSPHAGIA, UNSPECIFIED TYPE: Primary | ICD-10-CM

## 2022-11-01 DIAGNOSIS — K21.9 LARYNGOPHARYNGEAL REFLUX (LPR): ICD-10-CM

## 2022-11-01 DIAGNOSIS — R59.0 LYMPHADENOPATHY OF HEAD AND NECK REGION: ICD-10-CM

## 2022-11-01 PROCEDURE — 99213 OFFICE O/P EST LOW 20 MIN: CPT | Performed by: NURSE PRACTITIONER

## 2022-11-01 NOTE — PATIENT INSTRUCTIONS
We will continue to monitor lymph nodes as they have decreased in size.   Will obtain dysphagia study to further evaluate dysphagia.  Call with any new/worsening problems or concerns

## 2022-11-01 NOTE — PROGRESS NOTES
YOB: 1985  Location: [facility]  Location Address: [address] [place] [address], [facility], [address] [place] [address]  Location Phone: [phone]    Chief Complaint   Patient presents with   • Swollen Glands     HAD CT       History of Present Illness  Chelita Rosenbaum is a 36 y.o. female.  Chelita Rosenbaum is here for follow up of ENT complaints. The patient has had problems with lymphadenopathy. The patient has had mild to moderate symptoms. The symptoms have been present for the last several months. There have been no identified factors that aggravate the symptoms. There have been no factors that have improved the symptoms.    Patient reports that the swelling started shortly after she received the covid booster.     She is also with a history of dysphagia and she has her esophagus stretched routinely, but has not had this done for approximately 3 years.    Past Medical History:   Diagnosis Date   • Arrhythmia    • Arthropathy of lumbar facet joint    • Asthma    • Autonomic dysfunction    • Bipolar disorder (HCC)    • Cervico-occipital neuralgia    • Constipation    • Crohn's colitis ([facility])    • DDD (degenerative disc disease), lumbar    • Diabetes mellitus (HCC)    • Disorder of small intestine     thicking jejunum, delayed transit      • Diverticular disease of colon    • Dysphagia    • Elevated cholesterol    • Epigastric pain    • Generalized anxiety disorder    • GERD (gastroesophageal reflux disease)    • H/O colonoscopy with polypectomy    • Hirsutism    • Hyperlipidemia    • Hyperprolactinemia (HCC)    • Hypertension    • Low back pain    • Migraine aura, persistent     Migraine - w/ aura of spots      • Myofascial pain    • Nausea and vomiting    • Osteoarthritis    • PCOS (polycystic ovarian syndrome)    • Periumbilical pain    • PONV (postoperative nausea and vomiting)    • POTS (postural orthostatic tachycardia syndrome)     followed by Dr. Francis at [facility]   • Sleep apnea        Past Surgical  History:   Procedure Laterality Date   • CAPSULE ENDOSCOPY  2015    [facility], capsule endoscopy 63403 (1)      •  SECTION  2010     Section (3)      • CHOLECYSTECTOMY  05/15/2003    Cholecystectomy, laparoscopic (1)      • COLON RESECTION     • COLONOSCOPY     • DIAGNOSTIC LAPAROSCOPY, SALPINGO OOPHORECTOMY LAPAROSCOPIC N/A 2022    Procedure: LEFT SALPINGO OOPHORECTOMY, RIGHT OOPHORECTOMY LAPAROSCOPIC, LYSIS OF ADHESIONS;  Surgeon: Megan Cummings DO;  Location: [facility];  Service: Gynecology;  Laterality: N/A;   • DILATATION AND CURETTAGE  10/21/2013    D&C (1)      • ENDOSCOPY  2015    EGD w/ biopsy 87311 (1)      • INJECTION OF MEDICATION  2016    Injection for nerve block (1)      • SUBTOTAL HYSTERECTOMY         Outpatient Medications Marked as Taking for the 22 encounter (Office Visit) with Tommie Rosenbaum MD   Medication Sig Dispense Refill   • albuterol sulfate  (90 Base) MCG/ACT inhaler Inhale 2 puffs Every 4 (Four) Hours As Needed for Wheezing. 18 g 5   • amitriptyline (ELAVIL) 50 MG tablet Take 1 tablet by mouth Every Night. 30 tablet 2   • amLODIPine (NORVASC) 10 MG tablet Take 10 mg by mouth Daily.     • Atogepant (Qulipta) 60 MG tablet Take 1 tablet by mouth Daily. 60 tablet 3   • atorvastatin (LIPITOR) 40 MG tablet Take 1 tablet by mouth every night at bedtime. 90 tablet 0   • baclofen (LIORESAL) 20 MG tablet Take 20 mg by mouth 2 (Two) Times a Day.     • BD Pen Needle Leena U/F 32G X 4 MM misc USE ONCE DAILY WITH VICTOZA 30 each 5   • Blood Glucose Monitoring Suppl (ONE TOUCH BASIC SYSTEM) w/Device kit 1 Device Daily. 1 each 0   • carvedilol (COREG) 3.125 MG tablet Take 2 tablets by mouth 2 (Two) Times a Day With Meals. 180 tablet 0   • clonazePAM (KlonoPIN) 1 MG tablet Take 1 tablet by mouth 2 (Two) Times a Day As Needed for Anxiety. 60 tablet 2   • DULoxetine (CYMBALTA) 30 MG capsule Take 30 mg by mouth Daily.     • estradiol  (Vivelle-Dot) 0.05 MG/24HR patch Place 1 patch on the skin as directed by provider 2 (Two) Times a Week. 8 patch 5   • ezetimibe (ZETIA) 10 MG tablet Take 1 tablet by mouth Daily. 90 tablet 0   • furosemide (Lasix) 20 MG tablet Take 1 tablet by mouth Daily. 30 tablet 5   • glucose blood (ONE TOUCH ULTRA TEST) test strip Use as instructed 50 each 12   • HYDROcodone-acetaminophen (NORCO) 5-325 MG per tablet Take 1 tablet by mouth 4 (Four) Times a Day.     • isosorbide mononitrate (IMDUR) 30 MG 24 hr tablet Take 1 tablet by mouth Daily. 90 tablet 0   • Lancets (onetouch ultrasoft) lancets 1 each by Other route Daily. Use as instructed     • Lasmiditan Succinate (Reyvow) 50 MG tablet Take 50 mg by mouth Daily As Needed (migraine). Take 1 tablet at onset of migraine, may repeat in 24 hours if needed. 8 tablet 1   • Liraglutide (Victoza) 18 MG/3ML solution pen-injector injection Inject 1.8 mg under the skin into the appropriate area as directed 2 (Two) Times a Day.     • meclizine (ANTIVERT) 25 MG tablet Take 1 tablet by mouth 3 (Three) Times a Day As Needed for Dizziness. 90 tablet 1   • metFORMIN (GLUCOPHAGE) 1000 MG tablet Take 1,000 mg by mouth 2 (Two) Times a Day With Meals.     • pramipexole (MIRAPEX) 0.25 MG tablet Take 1 tablet by mouth Every Evening. 90 tablet 1   • promethazine (PHENERGAN) 25 MG tablet Take 1 tablet by mouth Every 6 (Six) Hours As Needed for Nausea or Vomiting. 30 tablet 2   • QUEtiapine (SEROquel) 400 MG tablet Take 2 tablets by mouth every night at bedtime. 60 tablet 2   • Rimegepant Sulfate (Nurtec) 75 MG tablet dispersible tablet Take 1 tablet by mouth Daily As Needed (migraine). 8 tablet 1       Apriso [mesalamine er], Lamotrigine, Lithium, Tramadol, and Ultram [tramadol hcl]    Family History   Problem Relation Age of Onset   • Heart disease Mother    • Asthma Father    • Diabetes Father    • Heart disease Father    • Hypertension Father    • No Known Problems Brother        Social History      Socioeconomic History   • Marital status:    Tobacco Use   • Smoking status: Never   • Smokeless tobacco: Never   Vaping Use   • Vaping Use: Some days   • Substances: CBD   • Devices: Refillable tank   Substance and Sexual Activity   • Alcohol use: No   • Drug use: No   • Sexual activity: Defer     Birth control/protection: Surgical       Review of Systems   Constitutional: Negative.    HENT: Positive for trouble swallowing. Negative for voice change.         Admits swollen lymph nodes to bilateral neck   Eyes: Negative.    Respiratory: Negative.    Cardiovascular: Negative.    Gastrointestinal: Negative.    Endocrine: Negative.    Genitourinary: Negative.    Musculoskeletal: Negative.    Skin: Negative.    Allergic/Immunologic: Negative.    Neurological: Negative.    Hematological: Negative.    Psychiatric/Behavioral: Negative.        Vitals:    11/01/22 1125   BP: (!) 138/104   Pulse: 105   Resp: 16   Temp: 98.2 °F (36.8 °C)       Body mass index is 34.84 kg/m².    Objective     Physical Exam  Vitals reviewed.   Constitutional:       Appearance: She is obese.   HENT:      Head: Normocephalic and atraumatic.      Right Ear: Hearing and external ear normal.      Left Ear: Hearing and external ear normal.      Nose: Nose normal.      Mouth/Throat:      Lips: Pink.      Mouth: Mucous membranes are moist.      Pharynx: Oropharynx is clear. Uvula midline.   Musculoskeletal:      Cervical back: Full passive range of motion without pain.   Lymphadenopathy:      Cervical: Cervical adenopathy present.   Neurological:      Mental Status: She is alert.   Psychiatric:         Behavior: Behavior is cooperative.         Assessment & Plan   Diagnoses and all orders for this visit:    1. Dysphagia, unspecified type (Primary)  -     FL Video Swallow With Speech Single Contrast; Future    2. Lymphadenopathy of head and neck region    3. Laryngopharyngeal reflux (LPR)      * Surgery not found *  Orders Placed This  Encounter   Procedures   • FL Video Swallow With Speech Single Contrast     Standing Status:   Future     Standing Expiration Date:   11/1/2023     Order Specific Question:   Patient Pregnant     Answer:   Unknown     Order Specific Question:   Reason for Exam:     Answer:   dysphagia     Order Specific Question:   Release to patient     Answer:   Immediate     We will continue to monitor lymph nodes as they have decreased in size.   Will obtain dysphagia study to further evaluate dysphagia.  Call with any new/worsening problems or concerns    Dr. Rosenbaum examined and discussed care with patient and agrees with treatment plan.     Return in about 3 months (around 2/1/2023).       Patient Instructions   We will continue to monitor lymph nodes as they have decreased in size.   Will obtain dysphagia study to further evaluate dysphagia.  Call with any new/worsening problems or concerns

## 2022-11-09 ENCOUNTER — HOSPITAL ENCOUNTER (OUTPATIENT)
Dept: GENERAL RADIOLOGY | Facility: HOSPITAL | Age: 37
Discharge: HOME | End: 2022-11-09
Admitting: NURSE PRACTITIONER

## 2022-11-09 DIAGNOSIS — R13.10 DYSPHAGIA, UNSPECIFIED TYPE: ICD-10-CM

## 2022-11-09 PROCEDURE — 74230 X-RAY XM SWLNG FUNCJ C+: CPT

## 2022-11-09 PROCEDURE — 92611 MOTION FLUOROSCOPY/SWALLOW: CPT

## 2022-11-09 RX ADMIN — BARIUM SULFATE 250 ML: 400 SUSPENSION ORAL at 08:42

## 2022-11-09 RX ADMIN — BARIUM SULFATE 20 ML: 400 PASTE ORAL at 08:42

## 2022-11-09 RX ADMIN — BARIUM SULFATE 55 ML: 0.81 POWDER, FOR SUSPENSION ORAL at 08:42

## 2022-11-09 NOTE — THERAPY EVALUATION
"Speech Language Pathology   [facility] [place]  [facility]       Patient Name: Chelita Rosenbaum  : 1985  MRN: [id number]    Today's Date: 2022      Visit Date: 2022     SPEECH-LANGUAGE PATHOLOGY EVALUTION - VFSS  Subjective: The patient was seen on this date for a VFSS(Videofluoroscopic Swallowing Study).  Patient was alert and cooperative.    Significant history: Patient reports having a history of esophageal dilations due to food \"sticking\". Patient reports times where she is unable to swallow her own saliva. Patient currently does not alter her diet or modify how she is eating, except to avoid foods when she states she is unable to swallow. Patient history includes previous esophageal dilation, POTS, DM, HTN, hysterectomy,  x3, cholecystectomy, and colon resection.    Objective: Risks/benefits were reviewed with the patient, and consent was obtained. The study was completed with SLP and Radiologist present. The patient was seen in lateral view(s). Textures given included thin liquid, nectar thick liquid, honey thick liquid, mechanical soft consistency and regular consistency.  Assessment: Difficulties were noted with none of the above consistencies, characterized by WFL swallow ability.  Slight valeculla stasis with the initial swallow, no further occurances noted.    The patient demonstrated no penetration or aspiration.  Residue was slight, only during the inital swallow in the valeculla.  Esophageal screen was performed and demonstrated functional esophageal swallow.  SLP Findings: Patient presents with functional swallow.   Comments: N/A  Recommendations: Diet Textures: thin liquid, regular consistency food. Medications should be taken whole with thin liquids. May have water and Ice between meals after oral care, under staff or family supervision and with the recommended strategies for safe swallowing.  Recommended Strategies: Upright for PO, small bites and sips, may use " straw and alternate liquids and solids. Oral care before breakfast, after all meals and PRN.  Other Recommended Evaluations: Referral to GI to further evaluate the esophagus    Dysphagia therapy is not recommended. Rationale: WFL swallow ability.    Thank you for this referral,    Elda Miller, SLP 11/9/2022 11:00 CST          Visit Dx:     ICD-10-CM ICD-9-CM   1. Dysphagia, unspecified type  R13.10 787.20       Patient Active Problem List   Diagnosis   • Osteoarthritis of spine with myelopathy, lumbosacral region   • Low back pain   • Tachycardia   • Palpitation   • Intercostal pain   • Vertigo   • PCOS (polycystic ovarian syndrome)   • POTS (postural orthostatic tachycardia syndrome)   • Asthma   • HLD (hyperlipidemia)   • EVELYN (obstructive sleep apnea)   • Essential hypertension   • Chronic pain   • RLS (restless legs syndrome)   • Type 2 diabetes mellitus without complication, without long-term current use of insulin ([facility])   • BMI 36.0-36.9,adult   • Former smoker   • Morbidly obese ([facility])   • Anxiety state   • Basilar artery migraine, intractable   • Chronic tension headaches   • Crohn's disease ([facility])   • DDD (degenerative disc disease), lumbosacral   • Depressive disorder   • DRESS syndrome   • Drug-induced hypersensitivity reaction   • Hepatitis   • History of cholecystectomy   • S/P dilatation of esophageal stricture   • History of gestational diabetes   • Iron deficiency anemia   • Right carpal tunnel syndrome   • Preventative health care   • S/P partial resection of colon   • Somnolence, daytime   • 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[facility])   • Sacroiliitis ([facility])   • Insomnia   • Family history of breast cancer   • Intractable chronic migraine without aura and without status migrainosus   • Pelvic pain   • S/P bilateral oophorectomy        Past Medical History:   Diagnosis Date   • Arrhythmia    • Arthropathy of lumbar facet joint    • Asthma    • Autonomic  dysfunction    • Bipolar disorder (HCC)    • Cervico-occipital neuralgia    • Constipation    • Crohn's colitis (HCC)    • DDD (degenerative disc disease), lumbar    • Diabetes mellitus (HCC)    • Disorder of small intestine     thicking jejunum, delayed transit      • Diverticular disease of colon    • Dysphagia    • Elevated cholesterol    • Epigastric pain    • Generalized anxiety disorder    • GERD (gastroesophageal reflux disease)    • H/O colonoscopy with polypectomy    • Hirsutism    • Hyperlipidemia    • Hyperprolactinemia (HCC)    • Hypertension    • Low back pain    • Migraine aura, persistent     Migraine - w/ aura of spots      • Myofascial pain    • Nausea and vomiting    • Osteoarthritis    • PCOS (polycystic ovarian syndrome)    • Periumbilical pain    • PONV (postoperative nausea and vomiting)    • POTS (postural orthostatic tachycardia syndrome)     followed by Dr. Francis at [place]   • Sleep apnea         Past Surgical History:   Procedure Laterality Date   • CAPSULE ENDOSCOPY  2015    GI Imaging, capsule endoscopy 03006 (1)      •  SECTION  2010     Section (3)      • CHOLECYSTECTOMY  05/15/2003    Cholecystectomy, laparoscopic (1)      • COLON RESECTION     • COLONOSCOPY     • DIAGNOSTIC LAPAROSCOPY, SALPINGO OOPHORECTOMY LAPAROSCOPIC N/A 2022    Procedure: LEFT SALPINGO OOPHORECTOMY, RIGHT OOPHORECTOMY LAPAROSCOPIC, LYSIS OF ADHESIONS;  Surgeon: Megan Cummings DO;  Location: [facility];  Service: Gynecology;  Laterality: N/A;   • DILATATION AND CURETTAGE  10/21/2013    D&C (1)      • ENDOSCOPY  2015    EGD w/ biopsy 90191 (1)      • INJECTION OF MEDICATION  2016    Injection for nerve block (1)      • SUBTOTAL HYSTERECTOMY                        SLP Adult Swallow Evaluation     Row Name 22 0830       Rehab Evaluation    Document Type evaluation  -MD    Subjective Information complains of  food coming back up after eating  -MD    Patient  Observations alert;cooperative;agree to therapy  -MD    Patient Effort excellent  -MD       General Information    Patient Profile Reviewed yes  -MD    Pertinent History Of Current Problem Patient reorts difficulty swallowing with food coming back up after eating. Patient has a history of esophageal dilation, POTS, DM, HTN, hysterectomy,  x3, cholecystectomy, and colon resection.  -MD    Current Method of Nutrition regular textures;thin liquids  -MD    Precautions/Limitations, Vision WFL  -MD    Precautions/Limitations, Hearing WFL  -MD    Prior Level of Function-Communication WFL  -MD    Prior Level of Function-Swallowing no diet consistency restrictions  -MD    Plans/Goals Discussed with patient  -MD    Barriers to Rehab none identified  -MD       Oral Motor Structure and Function    Dentition Assessment natural, present and adequate  -MD    Secretion Management WNL/WFL  -MD    Mucosal Quality moist, healthy  -MD    Gag Response WFL  -MD    Volitional Swallow WFL  -MD    Volitional Cough WFL  -MD       Oral Musculature and Cranial Nerve Assessment    Oral Motor General Assessment WFL  -MD       General Eating/Swallowing Observations    Respiratory Support Currently in Use room air  -MD       MBS/VFSS    Utensils Used spoon;cup;straw  -MD    Consistencies Trialed honey-thick liquids;nectar/syrup-thick liquids;thin liquids;regular textures;soft textures  -MD       MBS/VFSS Interpretation    Oral Prep Phase WFL  -MD    Oral Transit Phase WFL  -MD    Oral Residue WFL  -MD    VFSS Summary see note  -MD       Initiation of Pharyngeal Swallow    Initiation of Pharyngeal Swallow WFL  -MD    Pharyngeal Phase functional pharyngeal phase of swallowing  -MD    Pharyngeal Phase, Comment slight residue in the valeculla with initial swallow, no further noted  -MD       Esophageal Phase    Esophageal Phase no impairments;see radiology report for further details  -MD       SLP Evaluation Clinical Impression    SLP  Swallowing Diagnosis swallow WFL/no suspected pharyngeal impairment  -MD    Functional Impact no impact on function  -MD    Rehab Potential/Prognosis, Swallowing other (see comments)  evaluation only  -MD       Recommendations    SLP Diet Recommendation regular textures;thin liquids  -MD    Recommended Diagnostics No further SLP services recommended  -MD    Recommended Precautions and Strategies upright posture during/after eating;small bites of food and sips of liquid;alternate between small bites of food and sips of liquid;reflux precautions  -MD    Demonstrates Need for Referral to Another Service gastroenterology  -MD          User Key  (r) = Recorded By, (t) = Taken By, (c) = Cosigned By    Initials Name Provider Type    Elda Gale, SLP Speech and Language Pathologist                                OP SLP Education     Row Name 11/09/22 1052       Education    Barriers to Learning No barriers identified  -MD    Education Provided Described results of evaluation;Patient expressed understanding of evaluation  -MD    Assessed Learning needs  -MD    Learning Motivation Strong  -MD    Learning Method Explanation  -MD    Teaching Response Verbalized understanding  -MD          User Key  (r) = Recorded By, (t) = Taken By, (c) = Cosigned By    Initials Name Effective Dates    Elda Gale, SLP 06/21/22 -                                    Time Calculation:        Therapy Charges for Today     Code Description Service Date Service Provider Modifiers Qty    11062272553 HC ST MOTION FLUORO EVAL SWALLOW 4 11/9/2022 Elda Miller, SLP GN 1                  CYNTHIA Mckenna  11/9/2022

## 2022-11-10 ENCOUNTER — TELEPHONE (OUTPATIENT)
Dept: NEUROLOGY | Facility: CLINIC | Age: 37
End: 2022-11-10

## 2022-11-10 NOTE — TELEPHONE ENCOUNTER
PATIENT HAS ANOTHER APPOINTMENT ON 11/16/22 AND IS REQUESTING HER APPOINTMENT BE MADE A VIDEO VISIT.    PLEASE CHANGE AND SHE WILL VERIFY VIA MY CHART.    THANK YOU

## 2022-11-14 ENCOUNTER — LAB (OUTPATIENT)
Dept: FAMILY MEDICINE CLINIC | Facility: CLINIC | Age: 37
End: 2022-11-14

## 2022-11-14 ENCOUNTER — TELEPHONE (OUTPATIENT)
Dept: OTOLARYNGOLOGY | Facility: CLINIC | Age: 37
End: 2022-11-14

## 2022-11-14 DIAGNOSIS — R13.10 DYSPHAGIA, UNSPECIFIED TYPE: Primary | ICD-10-CM

## 2022-11-14 DIAGNOSIS — Z00.00 ANNUAL PHYSICAL EXAM: ICD-10-CM

## 2022-11-14 DIAGNOSIS — R53.83 FATIGUE, UNSPECIFIED TYPE: ICD-10-CM

## 2022-11-14 DIAGNOSIS — E11.9 TYPE 2 DIABETES MELLITUS WITHOUT COMPLICATION, WITHOUT LONG-TERM CURRENT USE OF INSULIN: Primary | ICD-10-CM

## 2022-11-14 DIAGNOSIS — E78.2 MIXED HYPERLIPIDEMIA: ICD-10-CM

## 2022-11-14 DIAGNOSIS — I10 ESSENTIAL HYPERTENSION: ICD-10-CM

## 2022-11-14 NOTE — TELEPHONE ENCOUNTER
----- Message from ARIELLE Marlow sent at 11/14/2022 12:47 PM CST -----  Speech has not recommended dysphagia therapy. GI referral made to further evaluate the esophagus.

## 2022-11-15 LAB
ALBUMIN SERPL-MCNC: 4.2 G/DL (ref 3.8–4.8)
ALBUMIN/GLOB SERPL: 1.2 {RATIO} (ref 1.2–2.2)
ALP SERPL-CCNC: 105 IU/L (ref 44–121)
ALT SERPL-CCNC: 28 IU/L (ref 0–32)
AST SERPL-CCNC: 22 IU/L (ref 0–40)
BASOPHILS # BLD AUTO: 0 X10E3/UL (ref 0–0.2)
BASOPHILS NFR BLD AUTO: 1 %
BILIRUB SERPL-MCNC: 0.3 MG/DL (ref 0–1.2)
BUN SERPL-MCNC: 16 MG/DL (ref 6–20)
BUN/CREAT SERPL: 16 (ref 9–23)
CALCIUM SERPL-MCNC: 9.7 MG/DL (ref 8.7–10.2)
CHLORIDE SERPL-SCNC: 99 MMOL/L (ref 96–106)
CHOLEST SERPL-MCNC: 245 MG/DL (ref 100–199)
CO2 SERPL-SCNC: 28 MMOL/L (ref 20–29)
CREAT SERPL-MCNC: 1.03 MG/DL (ref 0.57–1)
EGFRCR SERPLBLD CKD-EPI 2021: 71 ML/MIN/1.73
EOSINOPHIL # BLD AUTO: 0.2 X10E3/UL (ref 0–0.4)
EOSINOPHIL NFR BLD AUTO: 3 %
ERYTHROCYTE [DISTWIDTH] IN BLOOD BY AUTOMATED COUNT: 12.2 % (ref 11.7–15.4)
GLOBULIN SER CALC-MCNC: 3.5 G/DL (ref 1.5–4.5)
GLUCOSE SERPL-MCNC: 94 MG/DL (ref 70–99)
HBA1C MFR BLD: 5.3 % (ref 4.8–5.6)
HCT VFR BLD AUTO: 40 % (ref 34–46.6)
HDLC SERPL-MCNC: 50 MG/DL
HGB BLD-MCNC: 13.3 G/DL (ref 11.1–15.9)
IMM GRANULOCYTES # BLD AUTO: 0 X10E3/UL (ref 0–0.1)
IMM GRANULOCYTES NFR BLD AUTO: 0 %
LDLC SERPL CALC-MCNC: 156 MG/DL (ref 0–99)
LDLC/HDLC SERPL: 3.1 RATIO (ref 0–3.2)
LYMPHOCYTES # BLD AUTO: 2.7 X10E3/UL (ref 0.7–3.1)
LYMPHOCYTES NFR BLD AUTO: 31 %
MCH RBC QN AUTO: 29.6 PG (ref 26.6–33)
MCHC RBC AUTO-ENTMCNC: 33.3 G/DL (ref 31.5–35.7)
MCV RBC AUTO: 89 FL (ref 79–97)
MICROALBUMIN UR-MCNC: 19 UG/ML
MONOCYTES # BLD AUTO: 0.5 X10E3/UL (ref 0.1–0.9)
MONOCYTES NFR BLD AUTO: 6 %
NEUTROPHILS # BLD AUTO: 5.3 X10E3/UL (ref 1.4–7)
NEUTROPHILS NFR BLD AUTO: 59 %
PLATELET # BLD AUTO: 335 X10E3/UL (ref 150–450)
POTASSIUM SERPL-SCNC: 4.3 MMOL/L (ref 3.5–5.2)
PROT SERPL-MCNC: 7.7 G/DL (ref 6–8.5)
RBC # BLD AUTO: 4.5 X10E6/UL (ref 3.77–5.28)
SODIUM SERPL-SCNC: 142 MMOL/L (ref 134–144)
T4 SERPL-MCNC: 4.3 UG/DL (ref 4.5–12)
TRIGL SERPL-MCNC: 214 MG/DL (ref 0–149)
TSH SERPL DL<=0.005 MIU/L-ACNC: 1.42 UIU/ML (ref 0.45–4.5)
VLDLC SERPL CALC-MCNC: 39 MG/DL (ref 5–40)
WBC # BLD AUTO: 8.8 X10E3/UL (ref 3.4–10.8)

## 2022-11-16 ENCOUNTER — TELEMEDICINE (OUTPATIENT)
Dept: NEUROLOGY | Facility: CLINIC | Age: 37
End: 2022-11-16

## 2022-11-16 ENCOUNTER — TELEPHONE (OUTPATIENT)
Dept: FAMILY MEDICINE CLINIC | Facility: CLINIC | Age: 37
End: 2022-11-16

## 2022-11-16 DIAGNOSIS — F41.9 ANXIETY AND DEPRESSION: ICD-10-CM

## 2022-11-16 DIAGNOSIS — G47.33 OSA (OBSTRUCTIVE SLEEP APNEA): ICD-10-CM

## 2022-11-16 DIAGNOSIS — F32.A ANXIETY AND DEPRESSION: ICD-10-CM

## 2022-11-16 DIAGNOSIS — G43.719 INTRACTABLE CHRONIC MIGRAINE WITHOUT AURA AND WITHOUT STATUS MIGRAINOSUS: Primary | ICD-10-CM

## 2022-11-16 PROCEDURE — 99422 OL DIG E/M SVC 11-20 MIN: CPT | Performed by: NURSE PRACTITIONER

## 2022-11-16 NOTE — PROGRESS NOTES
You have chosen to receive care through a telehealth visit.  Do you consent to use a video/audio connection for your medical care today? Yes      Neurology Progress Note    TELEMEDICINE ENCOUNTER      Chief Complaint:    Migraine    Subjective   History of Present Illness:  Chelita Rosenbaum is a 37 y.o. female who presents today via telemedicine for migraine.  She is routinely followed by Idalmis Mcdermott APRN for primary care.     Migraine  She notes her migraines have no changed.  She continues with anywhere from 2-3 migraines per week at times.  She continues to experience throbbing pains located in a holocephalic presentation.  She also has associated nausea, vomiting, dizziness, photophobia, and phonophobia.  She continues Qulipta 60mg daily and Nurtec 75mg/Reyvow 50mg as needed for treatment.  She stopped Botox as it was felt to not be beneficial.  She is still unsure if Qulipta is very effective but continues as there may be some effect that is helping lessen her migraines.     Vyepti has been denied with a denial reason of her still being on Botox.  However, she has now stopped the Botox as it is not effective for her.    Migraine Treatment History:  She has tried and failed Imitrex and Ubrelvy for abortive treatments.  These were unsuccessful at completely aborting her headache and Ubrelvy didn't seen to have any effect at all. For preventative treatment she has tried Aimovig, Emgality, Botox, Qulipta, elavil, topamax, depakote, lamictal, and carvedolol.  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      Allergies:    Apriso [mesalamine er], Lamotrigine, Lithium, Tramadol, and Ultram [tramadol hcl]    Medications:  Current Outpatient Medications   Medication Sig Dispense Refill   • acetaminophen (Tylenol) 325 MG tablet Take 2 tablets by mouth Every 4 (Four) Hours As Needed for Mild Pain. 100 tablet 2   • albuterol sulfate  (90 Base) MCG/ACT  inhaler Inhale 2 puffs Every 4 (Four) Hours As Needed for Wheezing. 18 g 5   • amitriptyline (ELAVIL) 50 MG tablet Take 1 tablet by mouth Every Night. 30 tablet 2   • amLODIPine (NORVASC) 10 MG tablet Take 10 mg by mouth Daily.     • Atogepant (Qulipta) 60 MG tablet Take 1 tablet by mouth Daily. 60 tablet 3   • atorvastatin (LIPITOR) 40 MG tablet Take 1 tablet by mouth every night at bedtime. 90 tablet 0   • baclofen (LIORESAL) 20 MG tablet Take 20 mg by mouth 2 (Two) Times a Day.     • BD Pen Needle Leena U/F 32G X 4 MM misc USE ONCE DAILY WITH VICTOZA 30 each 5   • Blood Glucose Monitoring Suppl (ONE TOUCH [employer] SYSTEM) w/Device kit 1 Device Daily. 1 each 0   • carvedilol (COREG) 3.125 MG tablet Take 2 tablets by mouth 2 (Two) Times a Day With Meals. 180 tablet 0   • clonazePAM (KlonoPIN) 1 MG tablet Take 1 tablet by mouth 2 (Two) Times a Day As Needed for Anxiety. 60 tablet 2   • DULoxetine (CYMBALTA) 30 MG capsule Take 30 mg by mouth Daily.     • estradiol (Vivelle-Dot) 0.05 MG/24HR patch Place 1 patch on the skin as directed by provider 2 (Two) Times a Week. 8 patch 5   • ezetimibe (ZETIA) 10 MG tablet Take 1 tablet by mouth Daily. 90 tablet 0   • furosemide (Lasix) 20 MG tablet Take 1 tablet by mouth Daily. 30 tablet 5   • glucose blood (ONE TOUCH ULTRA TEST) test strip Use as instructed 50 each 12   • HYDROcodone-acetaminophen (NORCO) 5-325 MG per tablet Take 1 tablet by mouth 4 (Four) Times a Day.     • ibuprofen (ADVIL,MOTRIN) 600 MG tablet Take 1 tablet by mouth Every 6 (Six) Hours As Needed for Mild Pain.  **Take with food** 40 tablet 1   • isosorbide mononitrate (IMDUR) 30 MG 24 hr tablet Take 1 tablet by mouth Daily. 90 tablet 0   • Lancets (onetouch ultrasoft) lancets 1 each by Other route Daily. Use as instructed     • Lasmiditan Succinate (Reyvow) 50 MG tablet Take 50 mg by mouth Daily As Needed (migraine). Take 1 tablet at onset of migraine, may repeat in 24 hours if needed. 8 tablet 1   •  Liraglutide (Victoza) 18 MG/3ML solution pen-injector injection Inject 1.8 mg under the skin into the appropriate area as directed 2 (Two) Times a Day.     • meclizine (ANTIVERT) 25 MG tablet Take 1 tablet by mouth 3 (Three) Times a Day As Needed for Dizziness. 90 tablet 1   • metFORMIN (GLUCOPHAGE) 1000 MG tablet Take 1,000 mg by mouth 2 (Two) Times a Day With Meals.     • pramipexole (MIRAPEX) 0.25 MG tablet Take 1 tablet by mouth Every Evening. 90 tablet 1   • promethazine (PHENERGAN) 25 MG tablet Take 1 tablet by mouth Every 6 (Six) Hours As Needed for Nausea or Vomiting. 30 tablet 2   • QUEtiapine (SEROquel) 400 MG tablet Take 2 tablets by mouth every night at bedtime. 60 tablet 2   • Rimegepant Sulfate (Nurtec) 75 MG tablet dispersible tablet Take 1 tablet by mouth Daily As Needed (migraine). 8 tablet 1     No current facility-administered medications for this visit.     Current outpatient and discharge medications have been reconciled for the patient.  Reviewed by: ARIELLE Garcia    Past Medical History:  Past Medical History:   Diagnosis Date   • Arrhythmia    • Arthropathy of lumbar facet joint    • Asthma    • Autonomic dysfunction    • Bipolar disorder (HCC)    • Cervico-occipital neuralgia    • Constipation    • Crohn's colitis (HCC)    • DDD (degenerative disc disease), lumbar    • Diabetes mellitus (HCC)    • Disorder of small intestine     thicking jejunum, delayed transit      • Diverticular disease of colon    • Dysphagia    • Elevated cholesterol    • Epigastric pain    • Generalized anxiety disorder    • GERD (gastroesophageal reflux disease)    • H/O colonoscopy with polypectomy    • Hirsutism    • Hyperlipidemia    • Hyperprolactinemia (HCC)    • Hypertension    • Low back pain    • Migraine aura, persistent     Migraine - w/ aura of spots      • Myofascial pain    • Nausea and vomiting    • Osteoarthritis    • PCOS (polycystic ovarian syndrome)    • Periumbilical pain    • PONV  (postoperative nausea and vomiting)    • POTS (postural orthostatic tachycardia syndrome)     followed by Dr. Francis at [place]   • Sleep apnea      Past Surgical History:   Procedure Laterality Date   • CAPSULE ENDOSCOPY  2015    GI Imaging, capsule endoscopy 12507 (1)      •  SECTION  2010     Section (3)      • CHOLECYSTECTOMY  05/15/2003    Cholecystectomy, laparoscopic (1)      • COLON RESECTION     • COLONOSCOPY     • DIAGNOSTIC LAPAROSCOPY, SALPINGO OOPHORECTOMY LAPAROSCOPIC N/A 2022    Procedure: LEFT SALPINGO OOPHORECTOMY, RIGHT OOPHORECTOMY LAPAROSCOPIC, LYSIS OF ADHESIONS;  Surgeon: Megan Cummings DO;  Location: [facility];  Service: Gynecology;  Laterality: N/A;   • DILATATION AND CURETTAGE  10/21/2013    D&C (1)      • ENDOSCOPY  2015    EGD w/ biopsy 97573 (1)      • INJECTION OF MEDICATION  2016    Injection for nerve block (1)      • SUBTOTAL HYSTERECTOMY       Family History   Problem Relation Age of Onset   • Heart disease Mother    • Asthma Father    • Diabetes Father    • Heart disease Father    • Hypertension Father    • No Known Problems Brother      Social History     Tobacco Use   • Smoking status: Never   • Smokeless tobacco: Never   Vaping Use   • Vaping Use: Some days   • Substances: CBD   • Devices: Refillable tank   Substance Use Topics   • Alcohol use: No   • Drug use: No     Review of Systems:   Review of Systems   Neurological: Positive for headache.         Objective   General Exam:  Head:  Normocephalic, atraumatic.  HEENT: PERRLA.  Full EOM.  Hearing intact.  Neck: Full range of motion.  No mass.  No visible goiter.  Lungs:  No audible wheeze, rales or rhonchi.  Normal respiratory effort.  No distress.  Musculoskeletal: No crepitus.  No joint swelling.  No erythema.  Full extremities.  Extremities: No edema.  No cyanosis.  Skin: No rash.  Psych: Normal affect, mood, interaction and eye contact.  Normal judgement.      Neurologic  Exam:  Mental Status:    -Awake. Alert. Oriented to person, place & time.  -No word finding difficulties.  -No aphasia.  -No dysarthria.  -Follows simple commands.    CN II:  Full visual fields with confrontation.  Pupils equally reactive to light.  CN III, IV, VI:  Extraocular muscles function intact with no nystagmus.  CN V:  Facial sensory is symmetric.  CN VII:  Facial motor symmetric.  CN VIII:  Gross hearing intact bilaterally.  CN IX/X:  Palate elevates symmetrically.  CN XI:  Shoulder shrug symmetric.  CN XII:  Tongue is midline on protrusion.     Plan .  Impression:  Chelita Rosenbaum is a 37 y.o. female who presents with migraines.  She continues with Qulipta 60mg daily and Nurtec 75mg and [id number] as needed for migraine onset.  She has had no changes in her migraines since last being seen.  She has stopped Botox as she did not feel this is effective.  She would like to continue to try Vyepti for treatment.  We will resubmit the PA as she is now off of Botox and we will see how this works for approval.  We did discuss the idea of referral to a headache clinic at [facility] if this cannot get approved. She has exhausted numerous options for her headaches. She is agreeable to this if we cannot get Vyepti.  I do believe that her headaches will continue to be difficult to manage due to her underlying disease and numberous medications.  I did discuss this with her today and she states understanding with this.  We will proceed with Vyepti PA again.     Plan:  • Continue Qulipta 60mg Daily  • Continue Nurtec 75mg as needed  • Discontinue Botox  • Will try Vyepti PA again as she has stopped Botox  • Continue Reyvow 50mg as needed.  Discussed driving restriction.  • Will refer to migraine specialist if we continue with difficulty treating migraines. She is agreeable.  • Recommend regular physical activity and balanced diet.    Follow-Up:  Return in about 6 weeks (around 12/28/2022) for Migraine.         Thien TOBIN  ARIELLE Jean Baptiste  11/16/22  13:35 CST    13 minutes was spent with face-to-face assessment, education, evaluation, counseling, plan formation, and documentation of this encounter.

## 2022-11-17 RX ORDER — QUETIAPINE FUMARATE 400 MG/1
TABLET, FILM COATED ORAL
Qty: 60 TABLET | Refills: 2 | Status: SHIPPED | OUTPATIENT
Start: 2022-11-17 | End: 2022-11-18 | Stop reason: SDUPTHER

## 2022-11-17 NOTE — TELEPHONE ENCOUNTER
Rx Refill Note  Requested Prescriptions     Pending Prescriptions Disp Refills   • QUEtiapine (SEROquel) 400 MG tablet [Pharmacy Med Name: QUETIAPINE FUMARATE 400MG TABS] 60 tablet 2     Sig: TAKE TWO TABLETS BY MOUTH EVERY NIGHT AT BEDTIME      Last office visit with prescribing clinician: 10/14/2022      Next office visit with prescribing clinician: 11/18/2022     Dori Tyler MA  11/17/22, 07:55 CST

## 2022-11-18 ENCOUNTER — TELEPHONE (OUTPATIENT)
Dept: FAMILY MEDICINE CLINIC | Facility: CLINIC | Age: 37
End: 2022-11-18

## 2022-11-18 ENCOUNTER — OFFICE VISIT (OUTPATIENT)
Dept: FAMILY MEDICINE CLINIC | Facility: CLINIC | Age: 37
End: 2022-11-18

## 2022-11-18 VITALS
SYSTOLIC BLOOD PRESSURE: 138 MMHG | BODY MASS INDEX: 35 KG/M2 | WEIGHT: 205 LBS | DIASTOLIC BLOOD PRESSURE: 97 MMHG | HEIGHT: 64 IN | HEART RATE: 114 BPM | OXYGEN SATURATION: 99 % | TEMPERATURE: 97.9 F

## 2022-11-18 DIAGNOSIS — Z80.3 FAMILY HISTORY OF BREAST CANCER IN FEMALE: ICD-10-CM

## 2022-11-18 DIAGNOSIS — E78.2 MIXED HYPERLIPIDEMIA: ICD-10-CM

## 2022-11-18 DIAGNOSIS — G90.A POTS (POSTURAL ORTHOSTATIC TACHYCARDIA SYNDROME): ICD-10-CM

## 2022-11-18 DIAGNOSIS — F51.01 PRIMARY INSOMNIA: ICD-10-CM

## 2022-11-18 DIAGNOSIS — Z12.31 BREAST CANCER SCREENING BY MAMMOGRAM: ICD-10-CM

## 2022-11-18 DIAGNOSIS — I10 ESSENTIAL HYPERTENSION: ICD-10-CM

## 2022-11-18 DIAGNOSIS — F41.9 ANXIETY AND DEPRESSION: ICD-10-CM

## 2022-11-18 DIAGNOSIS — Z00.00 ANNUAL PHYSICAL EXAM: Primary | ICD-10-CM

## 2022-11-18 DIAGNOSIS — F32.A ANXIETY AND DEPRESSION: ICD-10-CM

## 2022-11-18 PROCEDURE — 3008F BODY MASS INDEX DOCD: CPT | Performed by: NURSE PRACTITIONER

## 2022-11-18 PROCEDURE — 99395 PREV VISIT EST AGE 18-39: CPT | Performed by: NURSE PRACTITIONER

## 2022-11-18 PROCEDURE — 2014F MENTAL STATUS ASSESS: CPT | Performed by: NURSE PRACTITIONER

## 2022-11-18 RX ORDER — DOXEPIN HYDROCHLORIDE 50 MG/1
50 CAPSULE ORAL NIGHTLY
Qty: 60 CAPSULE | Refills: 0 | Status: SHIPPED | OUTPATIENT
Start: 2022-11-18 | End: 2022-12-14

## 2022-11-18 RX ORDER — ATORVASTATIN CALCIUM 80 MG/1
80 TABLET, FILM COATED ORAL
Qty: 90 TABLET | Refills: 3 | Status: SHIPPED | OUTPATIENT
Start: 2022-11-18

## 2022-11-18 RX ORDER — EZETIMIBE 10 MG/1
10 TABLET ORAL DAILY
Qty: 90 TABLET | Refills: 3 | Status: SHIPPED | OUTPATIENT
Start: 2022-11-18

## 2022-11-18 RX ORDER — QUETIAPINE FUMARATE 400 MG/1
800 TABLET, FILM COATED ORAL NIGHTLY
Qty: 180 TABLET | Refills: 1 | Status: SHIPPED | OUTPATIENT
Start: 2022-11-18 | End: 2023-03-14

## 2022-11-18 NOTE — PROGRESS NOTES
"Chief Complaint  Annual Exam    Subjective        Chelita Rosenbaum presents to [facility]  History of Present Illness  Patient has complaints of insomnia.  The seroquel does not work well for her anymore and she is requesting a different medication for bedtime.    Objective   Vital Signs:  /97 (BP Location: Right arm, Patient Position: Sitting, Cuff Size: Adult)   Pulse 114   Temp 97.9 °F (36.6 °C)   Ht 162.6 cm (64\")   Wt 93 kg (205 lb)   SpO2 99%   BMI 35.19 kg/m²   Estimated body mass index is 35.19 kg/m² as calculated from the following:    Height as of this encounter: 162.6 cm (64\").    Weight as of this encounter: 93 kg (205 lb).    BMI is >= 30 and <35. (Class 1 Obesity). The following options were offered after discussion;: exercise counseling/recommendations and nutrition counseling/recommendations      Physical Exam  Vitals and nursing note reviewed.   Constitutional:       General: She is not in acute distress.     Appearance: Normal appearance. She is obese. She is not ill-appearing.   HENT:      Head: Normocephalic and atraumatic.   Neck:      Vascular: No carotid bruit.   Cardiovascular:      Rate and Rhythm: Regular rhythm. Tachycardia present.      Pulses: Normal pulses.      Heart sounds: Normal heart sounds. No murmur heard.  Pulmonary:      Effort: Pulmonary effort is normal.      Breath sounds: Normal breath sounds.   Abdominal:      General: Bowel sounds are normal.      Palpations: Abdomen is soft.   Musculoskeletal:         General: Normal range of motion.      Right lower leg: Edema present.      Left lower leg: Edema present.      Comments: Trace bilateral ankle edema, pitting.   Lymphadenopathy:      Cervical: No cervical adenopathy.   Skin:     General: Skin is warm and dry.      Capillary Refill: Capillary refill takes less than 2 seconds.   Neurological:      Mental Status: She is alert and oriented to person, place, and time.   Psychiatric:    "      Thought Content: Thought content normal.        Result Review :    CMP    CMP 9/20/22 10/7/22 11/14/22   Glucose 88  94   BUN 12  16   Creatinine 0.80 0.90 1.03 (A)   Sodium 139  142   Potassium 4.2  4.3   Chloride 101  99   Calcium 9.6  9.7   Total Protein   7.7   Albumin   4.2   Globulin   3.5   Total Bilirubin   0.3   Alkaline Phosphatase   105   AST (SGOT)   22   ALT (SGPT)   28   (A) Abnormal value       Comments are available for some flowsheets but are not being displayed.           CBC w/diff    CBC w/Diff 8/15/22 9/20/22 11/14/22   WBC 10.8 9.98 8.8   RBC 4.73 4.51 4.50   Hemoglobin 13.5 13.3 13.3   Hematocrit 41.8 39.9 40.0   MCV 88 88.5 89   MCH 28.5 29.5 29.6   MCHC 32.3 33.3 33.3   RDW 13.1 13.2 12.2   Platelets 372 340 335   Neutrophil Rel % 58 67.1 59   Immature Granulocyte Rel %  0.5    Lymphocyte Rel % 34 26.2 31   Monocyte Rel % 6 4.8 (A) 6   Eosinophil Rel % 1 0.8 3   Basophil Rel % 1 0.6 1   (A) Abnormal value            Lipid Panel    Lipid Panel 11/14/22   Total Cholesterol 245 (A)   Triglycerides 214 (A)   HDL Cholesterol 50   VLDL Cholesterol 39   LDL Cholesterol  156 (A)   LDL/HDL Ratio 3.1   (A) Abnormal value       Comments are available for some flowsheets but are not being displayed.                     Assessment and Plan   Diagnoses and all orders for this visit:    1. Annual physical exam (Primary)    2. Mixed hyperlipidemia  -     atorvastatin (LIPITOR) 80 MG tablet; Take 1 tablet by mouth every night at bedtime.  Dispense: 90 tablet; Refill: 3  -     ezetimibe (ZETIA) 10 MG tablet; Take 1 tablet by mouth Daily.  Dispense: 90 tablet; Refill: 3    3. Anxiety and depression  -     QUEtiapine (SEROquel) 400 MG tablet; Take 2 tablets by mouth Every Night.  Dispense: 180 tablet; Refill: 1    4. Essential hypertension    5. POTS (postural orthostatic tachycardia syndrome)    6. Primary insomnia  -     doxepin (SINEquan) 50 MG capsule; Take 1 capsule by mouth Every Night. Can take 2nd  dose after 1 -2 hours if sleep not achieved.  Dispense: 60 capsule; Refill: 0    7. Breast cancer screening by mammogram  -     Mammo Screening Digital Tomosynthesis Bilateral With CAD; Future    8. Family history of breast cancer in female  -     Mammo Screening Digital Tomosynthesis Bilateral With CAD; Future    Patient encouraged to partake of healthy diet rich in fresh fruits and vegetables as well as lean proteins.  Patient encouraged to participate in daily low impact exercise with goal of 30 min sustained activity.  Patient should not bend over at the waist and only limited squatting due to tachycardia and risk of fainting and further increase in blood pressure or tachycardia.         Follow Up   Return in about 3 months (around 2/18/2023) for Next scheduled follow up.  Patient was given instructions and counseling regarding her condition or for health maintenance advice. Please see specific information pulled into the AVS if appropriate.     ARIELLE Castro  This note is electronically signed.

## 2022-11-18 NOTE — TELEPHONE ENCOUNTER
Caller: Chelita Rosenbaum    Relationship: Self    Best call back number: [phone]    What form or medical record are you requesting: LETTER OF RESTRICTIONS    Who is requesting this form or medical record from you: PATIENT    How would you like to receive the form or medical records (pick-up, mail, fax): FAX TO [address] [address]    Timeframe paperwork needed: ASAP

## 2022-11-21 DIAGNOSIS — K12.2 ORAL ABSCESS: Primary | ICD-10-CM

## 2022-11-21 RX ORDER — AMOXICILLIN 500 MG/1
500 CAPSULE ORAL 3 TIMES DAILY
Qty: 21 CAPSULE | Refills: 0 | Status: SHIPPED | OUTPATIENT
Start: 2022-11-21 | End: 2022-11-29

## 2022-11-21 NOTE — TELEPHONE ENCOUNTER
Called patient to get more info on what she was needing, states she spoke with someone about 20 mins ago and gave them the info. Did state it was restrictions due to bending etc?

## 2022-11-23 ENCOUNTER — TELEPHONE (OUTPATIENT)
Dept: NEUROLOGY | Facility: CLINIC | Age: 37
End: 2022-11-23

## 2022-11-23 NOTE — TELEPHONE ENCOUNTER
Caller: EDGAR    Relationship: ALVAREZ    Best call back number: [phone]    What is the best time to reach you: ANY    Who are you requesting to speak with (clinical staff, provider,  specific staff member): KEENA    What was the call regarding: TELEPHONED RE: VYEPT[employer] APPEAL. THEY ARE WONDERING IF AN APPEAL IS GOING TO BE DONE OR CAN THEY CLOSE THE REQUEST     PLEASE CALL & ADVISE    THANK YOU

## 2022-11-28 LAB
HSV1 IGG SER IA-ACNC: 15.6 INDEX (ref 0–0.9)
HSV2 IGG SER IA-ACNC: 2.58 INDEX (ref 0–0.9)
HSV2 IGG SERPL QL IA: POSITIVE
WRITTEN AUTHORIZATION: NORMAL

## 2022-11-28 NOTE — TELEPHONE ENCOUNTER
CALLED BACK AND TOLD THEM I WASN'T AWARE THAT AN APPEAL WAS AN OPTION THIS LATE BECAUSE THE PAPERWORK HAD STATED IT HAD TO BE DONE IN A CERTAIN AMOUNT OF TIME. TOLD HER THAT I HAD ACTUALLY SENT IN A NEW AUTH. SHE SAID I JUST WOULD NEED TO WAIT TO HEAR BACK.

## 2022-11-29 ENCOUNTER — OFFICE VISIT (OUTPATIENT)
Dept: FAMILY MEDICINE CLINIC | Facility: CLINIC | Age: 37
End: 2022-11-29

## 2022-11-29 VITALS
TEMPERATURE: 98.1 F | DIASTOLIC BLOOD PRESSURE: 94 MMHG | SYSTOLIC BLOOD PRESSURE: 131 MMHG | HEART RATE: 120 BPM | HEIGHT: 64 IN | WEIGHT: 200.8 LBS | BODY MASS INDEX: 34.28 KG/M2 | OXYGEN SATURATION: 98 %

## 2022-11-29 DIAGNOSIS — R43.2 LOSS OF TASTE: ICD-10-CM

## 2022-11-29 DIAGNOSIS — H66.001 RIGHT ACUTE SUPPURATIVE OTITIS MEDIA: ICD-10-CM

## 2022-11-29 DIAGNOSIS — Z72.51 UNPROTECTED SEXUAL INTERCOURSE: Primary | ICD-10-CM

## 2022-11-29 DIAGNOSIS — J34.89 FRONTAL SINUS PAIN: ICD-10-CM

## 2022-11-29 DIAGNOSIS — J04.0 ACUTE LARYNGITIS: ICD-10-CM

## 2022-11-29 LAB
EXPIRATION DATE: NORMAL
FLUAV AG UPPER RESP QL IA.RAPID: NOT DETECTED
FLUBV AG UPPER RESP QL IA.RAPID: NOT DETECTED
INTERNAL CONTROL: NORMAL
Lab: NORMAL
SARS-COV-2 AG UPPER RESP QL IA.RAPID: NOT DETECTED

## 2022-11-29 PROCEDURE — 87428 SARSCOV & INF VIR A&B AG IA: CPT | Performed by: NURSE PRACTITIONER

## 2022-11-29 PROCEDURE — 99214 OFFICE O/P EST MOD 30 MIN: CPT | Performed by: NURSE PRACTITIONER

## 2022-11-29 RX ORDER — AZITHROMYCIN 250 MG/1
TABLET, FILM COATED ORAL
Qty: 6 TABLET | Refills: 0 | Status: SHIPPED | OUTPATIENT
Start: 2022-11-29 | End: 2023-02-14

## 2022-11-29 NOTE — PROGRESS NOTES
"Chief Complaint  Loss Of Taste and Abnormal Lab (DISCUSS HSV RESULTS AND COMPLETE HIV TESTING TODAY)    Subjective        Chelita Jimenes presents to [facility]  History of Present Illness  URI:  Symptoms of head congestion and loss of taste started 4-5 days ago.  Now she has hoarseness as well.  Home Covid test was negative x 2 but patient wants to make sure she is not contagious.  No fever.  No other symptoms are present.  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  She denies any breakouts of oral area or genital area.  She states her  has some small white lesions on his testicles that can be painful and itchy.  She is requesting test for HIV as well.    Objective   Vital Signs:  /94 (BP Location: Left arm, Patient Position: Sitting, Cuff Size: Adult)   Pulse 120   Temp 98.1 °F (36.7 °C)   Ht 162.6 cm (64\")   Wt 91.1 kg (200 lb 12.8 oz)   SpO2 98%   BMI 34.47 kg/m²   Estimated body mass index is 34.47 kg/m² as calculated from the following:    Height as of this encounter: 162.6 cm (64\").    Weight as of this encounter: 91.1 kg (200 lb 12.8 oz).    BMI is >= 30 and <35. (Class 1 Obesity). The following options were offered after discussion;: exercise counseling/recommendations and nutrition counseling/recommendations      Physical Exam  Vitals and nursing note reviewed.   Constitutional:       General: She is not in acute distress.     Appearance: Normal appearance. She is obese. She is not ill-appearing.   HENT:      Head: Normocephalic and atraumatic.      Right Ear: Ear canal normal.      Left Ear: Tympanic membrane and ear canal normal.      Ears:      Comments: Right TM erythematous with suppurative effusion.     Nose: Congestion present.      Mouth/Throat:      Mouth: Mucous membranes are moist.      Pharynx: Oropharynx is clear.   Cardiovascular:      Rate and Rhythm: Regular rhythm. Tachycardia present.      Heart sounds: Normal heart sounds. "   Pulmonary:      Effort: Pulmonary effort is normal.      Breath sounds: Normal breath sounds.   Lymphadenopathy:      Cervical: No cervical adenopathy.   Skin:     General: Skin is warm and dry.   Neurological:      Mental Status: She is alert and oriented to person, place, and time.        Result Review :                Assessment and Plan   Diagnoses and all orders for this visit:    1. Unprotected sexual intercourse (Primary)  -     HIV-1/O/2 Ag/Ab w Reflex    2. Acute laryngitis  -     POCT SARS-CoV-2 Antigen JUAN ALBERTO + Flu    3. Loss of taste  -     POCT SARS-CoV-2 Antigen JUAN ALBERTO + Flu    4. Frontal sinus pain  -     POCT SARS-CoV-2 Antigen JUAN ALBERTO + Flu    5. Right acute suppurative otitis media  -     azithromycin (Zithromax Z-Maksim) 250 MG tablet; Take 2 tablets the first day, then 1 tablet daily for 4 days.  Dispense: 6 tablet; Refill: 0             Follow Up   Return if symptoms worsen or fail to improve.  Patient was given instructions and counseling regarding her condition or for health maintenance advice. Please see specific information pulled into the AVS if appropriate.     ARIELLE Castro  This note is electronically signed.

## 2022-11-30 LAB — HIV 1+2 AB+HIV1 P24 AG SERPL QL IA: NON REACTIVE

## 2022-12-13 ENCOUNTER — OFFICE VISIT (OUTPATIENT)
Dept: GASTROENTEROLOGY | Age: 37
End: 2022-12-13
Payer: MEDICAID

## 2022-12-13 VITALS
BODY MASS INDEX: 34.15 KG/M2 | HEART RATE: 113 BPM | OXYGEN SATURATION: 97 % | DIASTOLIC BLOOD PRESSURE: 70 MMHG | SYSTOLIC BLOOD PRESSURE: 139 MMHG | HEIGHT: 64 IN | WEIGHT: 200 LBS

## 2022-12-13 DIAGNOSIS — R13.10 DYSPHAGIA, UNSPECIFIED TYPE: Primary | ICD-10-CM

## 2022-12-13 PROCEDURE — 99214 OFFICE O/P EST MOD 30 MIN: CPT | Performed by: NURSE PRACTITIONER

## 2022-12-13 RX ORDER — LASMIDITAN 50 MG/1
50 TABLET ORAL PRN
COMMUNITY

## 2022-12-13 RX ORDER — MECLIZINE HYDROCHLORIDE 25 MG/1
25 TABLET ORAL 3 TIMES DAILY PRN
COMMUNITY

## 2022-12-13 RX ORDER — DULOXETIN HYDROCHLORIDE 30 MG/1
30 CAPSULE, DELAYED RELEASE ORAL DAILY
COMMUNITY

## 2022-12-13 RX ORDER — ATORVASTATIN CALCIUM 80 MG/1
80 TABLET, FILM COATED ORAL NIGHTLY
COMMUNITY

## 2022-12-13 RX ORDER — AMITRIPTYLINE HYDROCHLORIDE 50 MG/1
50 TABLET, FILM COATED ORAL NIGHTLY
COMMUNITY

## 2022-12-13 RX ORDER — EZETIMIBE 10 MG/1
10 TABLET ORAL DAILY
COMMUNITY

## 2022-12-13 RX ORDER — PANTOPRAZOLE SODIUM 40 MG/1
40 TABLET, DELAYED RELEASE ORAL
Qty: 90 TABLET | Refills: 2 | Status: SHIPPED | OUTPATIENT
Start: 2022-12-13

## 2022-12-13 RX ORDER — FUROSEMIDE 20 MG/1
20 TABLET ORAL DAILY
COMMUNITY

## 2022-12-13 RX ORDER — DOXEPIN HYDROCHLORIDE 50 MG/1
50 CAPSULE ORAL NIGHTLY
COMMUNITY

## 2022-12-13 RX ORDER — IBUPROFEN 600 MG/1
600 TABLET ORAL EVERY 6 HOURS PRN
COMMUNITY

## 2022-12-13 RX ORDER — RIMEGEPANT SULFATE 75 MG/75MG
TABLET, ORALLY DISINTEGRATING ORAL PRN
COMMUNITY

## 2022-12-13 RX ORDER — ACETAMINOPHEN 325 MG/1
650 TABLET ORAL EVERY 6 HOURS PRN
COMMUNITY

## 2022-12-13 RX ORDER — ESTRADIOL 0.05 MG/D
1 FILM, EXTENDED RELEASE TRANSDERMAL
COMMUNITY

## 2022-12-13 RX ORDER — ATOGEPANT 60 MG/1
60 TABLET ORAL DAILY
COMMUNITY

## 2022-12-13 RX ORDER — BACLOFEN 20 MG/1
20 TABLET ORAL DAILY
COMMUNITY

## 2022-12-13 RX ORDER — ALBUTEROL SULFATE 90 UG/1
2 AEROSOL, METERED RESPIRATORY (INHALATION) EVERY 6 HOURS PRN
COMMUNITY

## 2022-12-13 RX ORDER — PROMETHAZINE HYDROCHLORIDE 25 MG/1
25 TABLET ORAL EVERY 6 HOURS PRN
COMMUNITY

## 2022-12-13 ASSESSMENT — ENCOUNTER SYMPTOMS
RECTAL PAIN: 0
TROUBLE SWALLOWING: 0
ANAL BLEEDING: 0
ABDOMINAL PAIN: 0
ABDOMINAL DISTENTION: 0
VOMITING: 0
SHORTNESS OF BREATH: 0
BLOOD IN STOOL: 0
CONSTIPATION: 0
NAUSEA: 0
CHOKING: 0
DIARRHEA: 0
COUGH: 0

## 2022-12-13 NOTE — PROGRESS NOTES
Subjective:     Patient ID: Pardeep Culver is a 44 y.o. female  PCP: Dr. Lenard Moore M.D., MD  Referring Provider: DANY Ricketts - NP    HPI  Patient presents to the office today with the following complaints: Dysphagia  Patient seen in the office today with complaints of dysphagia, reports having difficulty swallowing. Reports getting choked on foods and liquids and she has had to bring the food back up occasionally. Reports epigastric pain every time she eats. Reports she has noticed some blood when she vomits. Reports that she does take omeprazole daily. Reports feeling nauseated daily. Assessment:     1. Dysphagia, unspecified type         Plan: 1.Schedule EGD  Nothing to eat or drink after midnight. No driving for 24 hours after procedure. Bring a  to procedure. No aspirin, NSAIDs, fish oil 5 days before procedure. I have discussed the benefits, alternatives, and risks (including bleeding, perforation and death)  for pursuing Endoscopy (EGD/Colonscopy/EUS/ERCP) with the patient and they are willing to continue. We also discussed the need for anesthesia, IV access, proper dietary changes, medication changes if necessary, and need for bowel prep (if ordered) prior to their Endoscopic procedure. They are aware they must have someone accompany them to their scheduled procedure to drive them home - they agree to the above and are willing to continue. 2. Discontinue omeprazole and take protonix      Orders  No orders of the defined types were placed in this encounter. Medications  No orders of the defined types were placed in this encounter.         Patient History:     Past Medical History:   Diagnosis Date    Asthma     Autonomic dysfunction     Diabetes mellitus, type 2 (HCC)     Hyperlipidemia     Hypertension     Migraines     Orthostatic hypotension     Osteoarthritis     osteo    PCOS (polycystic ovarian syndrome)     POTS (postural orthostatic tachycardia syndrome) Restless legs     Sinus tachycardia     Spinal cord tumor        Past Surgical History:   Procedure Laterality Date     SECTION      CHOLECYSTECTOMY      COLON SURGERY  2014    Colon resection     COLONOSCOPY  2015    Divertiuclosis per patient     DILATION AND CURETTAGE OF UTERUS      HYSTERECTOMY VAGINAL N/A 2017    HYSTERECTOMY VAGINAL LAPAROSCOPIC ASSISTED (LAVH) performed by Terra Ware MD at [facility]      Dilation and ulcers per patient     UPPER GASTROINTESTINAL ENDOSCOPY N/A 2021    Dr Sabino Wallis, (-)Celiac [place]       Family History   Problem Relation Age of Onset    Diabetes Father     Colon Cancer Maternal Aunt     Breast Cancer Maternal Aunt     Cancer Maternal Aunt     Esophageal Cancer Neg Hx     Liver Cancer Neg Hx     Rectal Cancer Neg Hx     Stomach Cancer Neg Hx     Colon Polyps Neg Hx        Social History     Socioeconomic History    Marital status:     Tobacco Use    Smoking status: Never    Smokeless tobacco: Never    Tobacco comments:     Vape CBD   Vaping Use    Vaping Use: Some days    Start date: 2019    Substances: CBD    Devices: Refillable tank   Substance and Sexual Activity    Alcohol use: Yes     Comment: rare    Drug use: Yes     Comment: CBC     Sexual activity: Never       Current Outpatient Medications   Medication Sig Dispense Refill    acetaminophen (TYLENOL) 325 MG tablet Take 650 mg by mouth every 6 hours as needed for Pain      albuterol sulfate HFA (VENTOLIN HFA) 108 (90 Base) MCG/ACT inhaler Inhale 2 puffs into the lungs every 6 hours as needed for Wheezing      amitriptyline (ELAVIL) 50 MG tablet Take 50 mg by mouth nightly      atorvastatin (LIPITOR) 80 MG tablet Take 80 mg by mouth at bedtime      baclofen (LIORESAL) 20 MG tablet Take 20 mg by mouth daily      doxepin (SINEQUAN) 50 MG capsule Take 50 mg by mouth nightly      DULoxetine (CYMBALTA) 30 MG extended release capsule Take 30 mg by mouth daily      estradiol (VIVELLE) 0.05 MG/24HR Place 1 patch onto the skin Twice a Week      ezetimibe (ZETIA) 10 MG tablet Take 10 mg by mouth daily      furosemide (LASIX) 20 MG tablet Take 20 mg by mouth daily      ibuprofen (ADVIL;MOTRIN) 600 MG tablet Take 600 mg by mouth every 6 hours as needed for Pain      meclizine (ANTIVERT) 25 MG tablet Take 25 mg by mouth 3 times daily as needed for Dizziness      Rimegepant Sulfate (NURTEC) 75 MG TBDP Take by mouth as needed (MIGRAINE)      promethazine (PHENERGAN) 25 MG tablet Take 25 mg by mouth every 6 hours as needed for Nausea      Atogepant (QULIPTA) 60 MG TABS Take 60 mg by mouth daily      Lasmiditan Succinate (REYVOW) 50 MG TABS Take 50 mg by mouth as needed (FOR MIGRAINE)      HYDROcodone-acetaminophen (NORCO) 5-325 MG per tablet Take 1 tablet by mouth every 6 hours as needed for Pain. clonazePAM (KLONOPIN) 1 MG tablet Take 1 mg by mouth 2 times daily as needed for Anxiety. Liraglutide (VICTOZA) 18 MG/3ML SOPN SC injection Inject 1.8 mg into the skin daily      isosorbide mononitrate (IMDUR) 30 MG extended release tablet Take 30 mg by mouth daily      amLODIPine (NORVASC) 10 MG tablet Take 10 mg by mouth daily      carvedilol (COREG) 3.125 MG tablet Take 3.125 mg by mouth 2 times daily (with meals) 2 tab bid      QUEtiapine (SEROQUEL) 400 MG tablet Take 800 mg by mouth nightly      pramipexole (MIRAPEX) 0.25 MG tablet Take 0.25 mg by mouth daily      metFORMIN (GLUCOPHAGE) 1000 MG tablet Take 1,000 mg by mouth daily (with breakfast)       No current facility-administered medications for this visit. Allergies   Allergen Reactions    Lithium      Difficulty breathing    Tramadol Other (See Comments)     Hard to breath    Apriso [Mesalamine Er]      Nausea and Vomitting    Lamictal [Lamotrigine]        Review of Systems   Constitutional:  Negative for activity change, appetite change, fatigue, fever and unexpected weight change.    HENT: Negative for trouble swallowing. Respiratory:  Negative for cough, choking and shortness of breath. Cardiovascular:  Negative for chest pain. Gastrointestinal:  Negative for abdominal distention, abdominal pain, anal bleeding, blood in stool, constipation, diarrhea, nausea, rectal pain and vomiting. Allergic/Immunologic: Negative for food allergies. All other systems reviewed and are negative. Objective:     /70 (Site: Left Upper Arm)   Pulse (!) 113   Ht 5' 4\" (1.626 m)   Wt 200 lb (90.7 kg)   LMP 05/17/2015 Comment: implant  SpO2 97%   BMI 34.33 kg/m²     Physical Exam  Vitals reviewed. Constitutional:       General: She is not in acute distress. Appearance: She is well-developed. HENT:      Head: Normocephalic and atraumatic. Right Ear: External ear normal.      Left Ear: External ear normal.      Nose: Nose normal.   Eyes:      General: No scleral icterus. Right eye: No discharge. Left eye: No discharge. Conjunctiva/sclera: Conjunctivae normal.      Pupils: Pupils are equal, round, and reactive to light. Cardiovascular:      Rate and Rhythm: Normal rate and regular rhythm. Heart sounds: Normal heart sounds. No murmur heard. Pulmonary:      Effort: Pulmonary effort is normal. No respiratory distress. Breath sounds: Normal breath sounds. No wheezing or rales. Abdominal:      General: Bowel sounds are normal. There is no distension. Palpations: Abdomen is soft. There is no mass. Tenderness: There is no abdominal tenderness. There is no guarding or rebound. Musculoskeletal:         General: Normal range of motion. Cervical back: Normal range of motion and neck supple. Skin:     General: Skin is warm and dry. Coloration: Skin is not pale. Neurological:      Mental Status: She is alert and oriented to person, place, and time.    Psychiatric:         Behavior: Behavior normal.

## 2022-12-14 DIAGNOSIS — F51.01 PRIMARY INSOMNIA: ICD-10-CM

## 2022-12-14 RX ORDER — DOXEPIN HYDROCHLORIDE 50 MG/1
CAPSULE ORAL
Qty: 60 CAPSULE | Refills: 0 | Status: SHIPPED | OUTPATIENT
Start: 2022-12-14 | End: 2023-01-13

## 2022-12-14 NOTE — TELEPHONE ENCOUNTER
Rx Refill Note  Requested Prescriptions     Pending Prescriptions Disp Refills   • doxepin (SINEquan) 50 MG capsule [Pharmacy Med Name: DOXEPIN HCL 50MG CAPS] 60 capsule 0     Sig: TAKE ONE CAPSULE BY MOUTH NIGHTLY --TAKE 2ND DOSE AFTER 1-2 HOURS IF SLEEP NOT ACHIEVED      Last office visit with prescribing clinician: 11/29/2022   Last telemedicine visit with prescribing clinician: 2/14/2023   Next office visit with prescribing clinician: 2/14/2023       Dori Tyler MA  12/14/22, 13:49 CST

## 2022-12-20 ENCOUNTER — OFFICE VISIT (OUTPATIENT)
Dept: OBSTETRICS AND GYNECOLOGY | Facility: CLINIC | Age: 37
End: 2022-12-20
Payer: COMMERCIAL

## 2022-12-20 ENCOUNTER — TELEPHONE (OUTPATIENT)
Dept: GASTROENTEROLOGY | Age: 37
End: 2022-12-20

## 2022-12-20 VITALS
HEIGHT: 64 IN | BODY MASS INDEX: 36.33 KG/M2 | SYSTOLIC BLOOD PRESSURE: 142 MMHG | DIASTOLIC BLOOD PRESSURE: 88 MMHG | WEIGHT: 212.8 LBS

## 2022-12-20 DIAGNOSIS — Z79.890 HORMONE REPLACEMENT THERAPY (HRT): ICD-10-CM

## 2022-12-20 DIAGNOSIS — N89.8 VAGINAL ITCHING: ICD-10-CM

## 2022-12-20 DIAGNOSIS — E89.40 SURGICAL MENOPAUSE: Primary | ICD-10-CM

## 2022-12-20 DIAGNOSIS — R45.86 MOOD SWINGS: ICD-10-CM

## 2022-12-20 LAB
CANDIDA ALBICANS: NEGATIVE
GARDNERELLA VAGINALIS: POSITIVE
T VAGINALIS DNA VAG QL PROBE+SIG AMP: NEGATIVE

## 2022-12-20 PROCEDURE — 99214 OFFICE O/P EST MOD 30 MIN: CPT | Performed by: STUDENT IN AN ORGANIZED HEALTH CARE EDUCATION/TRAINING PROGRAM

## 2022-12-20 PROCEDURE — 87660 TRICHOMONAS VAGIN DIR PROBE: CPT | Performed by: STUDENT IN AN ORGANIZED HEALTH CARE EDUCATION/TRAINING PROGRAM

## 2022-12-20 PROCEDURE — 87480 CANDIDA DNA DIR PROBE: CPT | Performed by: STUDENT IN AN ORGANIZED HEALTH CARE EDUCATION/TRAINING PROGRAM

## 2022-12-20 PROCEDURE — 87510 GARDNER VAG DNA DIR PROBE: CPT | Performed by: STUDENT IN AN ORGANIZED HEALTH CARE EDUCATION/TRAINING PROGRAM

## 2022-12-20 RX ORDER — ESTRADIOL 0.07 MG/D
1 FILM, EXTENDED RELEASE TRANSDERMAL 2 TIMES WEEKLY
Qty: 8 PATCH | Refills: 5 | Status: SHIPPED | OUTPATIENT
Start: 2022-12-22 | End: 2023-01-13

## 2022-12-20 RX ORDER — METRONIDAZOLE 500 MG/1
500 TABLET ORAL 2 TIMES DAILY
Qty: 14 TABLET | Refills: 0 | Status: SHIPPED | OUTPATIENT
Start: 2022-12-20 | End: 2022-12-27

## 2022-12-20 NOTE — PROGRESS NOTES
[facility]  Gynecology  Date of Service: 2022    CC: GYN FU, hormone/pp check    HPI  Chelita Jimenes is a 37 y.o.  surgically menopausal female who presents for followup after starting estrogen patch postop after BSO for pelvic pain.      22 had LSO, RO, lysis of adhesions for pelvic pain.    In October started Estradiol patch Vivelle-Dot 0.05 mg/24 hrs. Was having significant hot flashes, vaginal dryness.    Now states doing much better. Occasional vaginal odor and dryness. Mood swings may be worse, but hot flashes essentially resolved. Some nausea but started Protonix and thinks may be related. No significant pelvic pain.    Paternal aunt had breast cancer in 30's, interested in starting mammos.    Denies any vaginal itching, burning, irritation, or discharge. Denies any abnormal uterine bleeding. Not reporting any sexual dysfunction concerns. Denies any urinary symptoms including incontinence, dysuria, frequency, urgency, nocturia.    ROS  Review of Systems   Constitutional: Negative.    HENT: Negative.    Respiratory: Negative.    Cardiovascular: Negative.    Gastrointestinal: Negative.    Genitourinary: Negative.    Skin: Negative.    Psychiatric/Behavioral: Negative.        GYN HISTORY  Surgical menopause  Last pap smear:   Last Completed Pap Smear     This patient has no relevant Health Maintenance data.        Abnormal pap smear history: reports normal prior to hyst  Contraception: hyst     OB HISTORY  OB History    Para Term  AB Living   9 3 3   6 3   SAB IAB Ectopic Molar Multiple Live Births             3      # Outcome Date GA Lbr Dipesh/2nd Weight Sex Delivery Anes PTL Lv   9 AB            8 AB            7 AB            6 AB            5 AB            4 AB            3 Term      CS-Unspec   ARGELIA   2 Term      CS-Unspec   ARGELIA   1 Term      CS-Unspec   ARGELIA     PAST MEDICAL HISTORY  Past Medical History:   Diagnosis Date   • Arrhythmia    • Arthropathy of  lumbar facet joint    • Asthma    • Autonomic dysfunction    • Bipolar disorder (HCC)    • Cervico-occipital neuralgia    • Constipation    • Crohn's colitis (HCC)    • DDD (degenerative disc disease), lumbar    • Diabetes mellitus (HCC)    • Disorder of small intestine     thicking jejunum, delayed transit      • Diverticular disease of colon    • Dysphagia    • Elevated cholesterol    • Epigastric pain    • Generalized anxiety disorder    • GERD (gastroesophageal reflux disease)    • H/O colonoscopy with polypectomy    • Hirsutism    • Hyperlipidemia    • Hyperprolactinemia (HCC)    • Hypertension    • Low back pain    • Migraine aura, persistent     Migraine - w/ aura of spots      • Myofascial pain    • Nausea and vomiting    • Osteoarthritis    • PCOS (polycystic ovarian syndrome)    • Periumbilical pain    • PONV (postoperative nausea and vomiting)    • POTS (postural orthostatic tachycardia syndrome)     followed by Dr. Francis at [facility]   • Sleep apnea      PAST SURGICAL HISTORY  Past Surgical History:   Procedure Laterality Date   • CAPSULE ENDOSCOPY  2015    GI Imaging, capsule endoscopy 01490 (1)      •  SECTION  2010     Section (3)      • CHOLECYSTECTOMY  05/15/2003    Cholecystectomy, laparoscopic (1)      • COLON RESECTION     • COLONOSCOPY     • DIAGNOSTIC LAPAROSCOPY, SALPINGO OOPHORECTOMY LAPAROSCOPIC N/A 2022    Procedure: LEFT SALPINGO OOPHORECTOMY, RIGHT OOPHORECTOMY LAPAROSCOPIC, LYSIS OF ADHESIONS;  Surgeon: Megan Cummings DO;  Location: [facility];  Service: Gynecology;  Laterality: N/A;   • DILATATION AND CURETTAGE  10/21/2013    D&C (1)      • ENDOSCOPY  2015    EGD w/ biopsy 25910 (1)      • INJECTION OF MEDICATION  2016    Injection for nerve block (1)      • SUBTOTAL HYSTERECTOMY       FAMILY HISTORY  Family History   Problem Relation Age of Onset   • Heart disease Mother    • Asthma Father    • Diabetes Father    • Heart disease Father     • Hypertension Father    • No Known Problems Brother      SOCIAL HISTORY  Social History     Socioeconomic History   • Marital status:    Tobacco Use   • Smoking status: Never   • Smokeless tobacco: Never   Vaping Use   • Vaping Use: Some days   • Substances: CBD   • Devices: Refillable tank   Substance and Sexual Activity   • Alcohol use: No   • Drug use: No   • Sexual activity: Defer     Birth control/protection: Surgical     ALLERGIES  Allergies   Allergen Reactions   • Apriso [Mesalamine Er] Nausea And Vomiting     Vomiting    • Lamotrigine Rash   • Lithium Nausea And Vomiting     Difficulty breathing   • Tramadol Other (See Comments)     Hard to breath   • Ultram [Tramadol Hcl] Itching     HOME MEDICATIONS  Prior to Admission medications    Medication Sig Start Date End Date Taking? Authorizing Provider   acetaminophen (Tylenol) 325 MG tablet Take 2 tablets by mouth Every 4 (Four) Hours As Needed for Mild Pain. 9/22/22 9/22/23 Yes Megan Cummings,    albuterol sulfate  (90 Base) MCG/ACT inhaler Inhale 2 puffs Every 4 (Four) Hours As Needed for Wheezing. 2/22/21  Yes Best Gannon MD   amitriptyline (ELAVIL) 50 MG tablet Take 1 tablet by mouth Every Night. 10/14/22  Yes Idalmis Mcdermott APRN   amLODIPine (NORVASC) 10 MG tablet Take 10 mg by mouth Daily.   Yes ProviderToby MD   Atogepant (Qulipta) 60 MG tablet Take 1 tablet by mouth Daily. 5/2/22  Yes Thien Jean Baptiste APRN   atorvastatin (LIPITOR) 80 MG tablet Take 1 tablet by mouth every night at bedtime. 11/18/22  Yes Idalmis Mcdermott APRN   azithromycin (Zithromax Z-Maksim) 250 MG tablet Take 2 tablets the first day, then 1 tablet daily for 4 days. 11/29/22  Yes Idalmis Mcdermott APRN   baclofen (LIORESAL) 20 MG tablet Take 20 mg by mouth 2 (Two) Times a Day. 8/15/22  Yes ProviderToby MD   BD Pen Needle Leena U/F 32G X 4 MM misc USE ONCE DAILY WITH VICTOZA 7/28/22  Yes Idalmis Mcdermott APRN   Blood Glucose  Monitoring Suppl (ONE TOUCH BASIC SYSTEM) w/Device kit 1 Device Daily. 10/25/17  Yes Meron Taylor APRN   carvedilol (COREG) 3.125 MG tablet Take 2 tablets by mouth 2 (Two) Times a Day With Meals. 7/12/22  Yes Idalmis Mcdermott APRN   clonazePAM (KlonoPIN) 1 MG tablet Take 1 tablet by mouth 2 (Two) Times a Day As Needed for Anxiety. 10/14/22  Yes Idalmis Mcdermott APRN   doxepin (SINEquan) 50 MG capsule TAKE ONE CAPSULE BY MOUTH NIGHTLY --TAKE 2ND DOSE AFTER 1-2 HOURS IF SLEEP NOT ACHIEVED 12/14/22  Yes Kendy Echeverria APRN   DULoxetine (CYMBALTA) 30 MG capsule Take 30 mg by mouth Daily. 4/23/22  Yes Toby Mo MD   estradiol (Vivelle-Dot) 0.05 MG/24HR patch Place 1 patch on the skin as directed by provider 2 (Two) Times a Week. 10/31/22  Yes Megan Cummings DO   ezetimibe (ZETIA) 10 MG tablet Take 1 tablet by mouth Daily. 11/18/22  Yes Idalmis Mcdermott APRN   furosemide (Lasix) 20 MG tablet Take 1 tablet by mouth Daily. 7/12/22  Yes Idalmis Mcdermott APRN   glucose blood (ONE TOUCH ULTRA TEST) test strip Use as instructed 10/25/17  Yes Meron Taylor APRN   HYDROcodone-acetaminophen (NORCO) 5-325 MG per tablet Take 1 tablet by mouth 4 (Four) Times a Day. 10/10/22  Yes Toby Mo MD   ibuprofen (ADVIL,MOTRIN) 600 MG tablet Take 1 tablet by mouth Every 6 (Six) Hours As Needed for Mild Pain.  **Take with food** 9/22/22  Yes Megan Cummings DO   isosorbide mononitrate (IMDUR) 30 MG 24 hr tablet Take 1 tablet by mouth Daily. 6/29/22  Yes Idalmis Mcdermott APRN   Lancets (onetouch ultrasoft) lancets 1 each by Other route Daily. Use as instructed   Yes Toby Mo MD   Lasmiditan Succinate (Reyvow) 50 MG tablet Take 50 mg by mouth Daily As Needed (migraine). Take 1 tablet at onset of migraine, may repeat in 24 hours if needed. 8/17/22  Yes Poncho Rodriguez MD   Liraglutide (Victoza) 18 MG/3ML solution pen-injector injection Inject 1.8 mg under the skin into the  appropriate area as directed 2 (Two) Times a Day.   Yes Provider, MD Toby   meclizine (ANTIVERT) 25 MG tablet Take 1 tablet by mouth 3 (Three) Times a Day As Needed for Dizziness. 10/14/22  Yes Idalmis Mcdermott APRN   metFORMIN (GLUCOPHAGE) 1000 MG tablet Take 1,000 mg by mouth 2 (Two) Times a Day With Meals.   Yes ProviderToby MD   pramipexole (MIRAPEX) 0.25 MG tablet Take 1 tablet by mouth Every Evening. 22  Yes Idalmis Mcdermott APRN   promethazine (PHENERGAN) 25 MG tablet Take 1 tablet by mouth Every 6 (Six) Hours As Needed for Nausea or Vomiting. 22  Yes Idalmis Mcdermott APRN   QUEtiapine (SEROquel) 400 MG tablet Take 2 tablets by mouth Every Night. 22  Yes Idalmis Mcdermott APRN   Rimegepant Sulfate (Nurtec) 75 MG tablet dispersible tablet Take 1 tablet by mouth Daily As Needed (migraine). 22  Yes Thien Jean Baptiste APRN     PE  /88   Ht 162.6 cm (64\")   Wt 96.5 kg (212 lb 12.8 oz)   LMP  (LMP Unknown)   BMI 36.53 kg/m²        General: Alert, healthy, no distress, well nourished and well developed.  Neurologic: Alert, oriented to person, place, and time.  Gait normal.  Cranial nerves II-XII grossly intact.  HEENT: Normocephalic, atraumatic.  Extraocular muscles intact.  Lungs: Normal respiratory effort.    Heart: Regular rate and rhythm.  No murmer, rub or gallop.  Abdomen: Soft, non-tender, non-distended,no masses, no hepatosplenomegaly, no hernia.  Skin: No rash, no lesions.  Extremities: No cyanosis, clubbing or edema.  PELVIC EXAM:  deferred    IMPRESSION  Chelita Jimenes is a 37 y.o.  presenting for HRT followup after BSO 2022, overall doing well on patch but feels like vaginal dryness, mood swings worse.    PLAN    1. Vaginal itching  - Suspect related to surgical menopause, will evaluate with swab as well; if not improving could consider OneSwab in future  - Discussed Replens, coconut oil, olive oil as other options for  moisturizers  - Gardnerella vaginalis, Trichomonas vaginalis, Candida albicans, DNA - Swab, Vagina; Future  - Gardnerella vaginalis, Trichomonas vaginalis, Candida albicans, DNA - Swab, Vagina    2. Surgical menopause  3. Mood swings  - Has been on Vivelle-Dot Estradiol 0.05 mg/24 hrs, feels improved hot flashes but still with significant vaginal dryness and mood swings  - Discussed goal is lowest effective dose of HRT, at young age reasonable to trial slightly higher dose 0.075 mg/24 hrs, consider decreasing in future if tolerated; if worsening mood or other changes could consider vaginal estrogen or moisturizers for vaginal dryness  - FU in 6 mos or sooner if worsening symptoms               This document has been electronically signed by Megan Cummings DO on January 8, 2023 14:49 CST

## 2022-12-20 NOTE — TELEPHONE ENCOUNTER
12-20-22 Routed to [address] [place] [address]       ----- Message from Mariangel Alvarez sent at 12/20/2022 11:37 AM CST -----  Regarding: Appointment   Egd    I need reschedule my endo appointment can't do on the 10th with Dr Michaelle Quijano

## 2023-01-04 DIAGNOSIS — G43.719 INTRACTABLE CHRONIC MIGRAINE WITHOUT AURA AND WITHOUT STATUS MIGRAINOSUS: ICD-10-CM

## 2023-01-04 RX ORDER — LASMIDITAN 50 MG/1
TABLET ORAL
Qty: 8 TABLET | Refills: 0 | Status: SHIPPED | OUTPATIENT
Start: 2023-01-04 | End: 2023-02-06

## 2023-01-06 ENCOUNTER — OFFICE VISIT (OUTPATIENT)
Dept: NEUROLOGY | Facility: CLINIC | Age: 38
End: 2023-01-06
Payer: COMMERCIAL

## 2023-01-06 VITALS
BODY MASS INDEX: 34.15 KG/M2 | DIASTOLIC BLOOD PRESSURE: 70 MMHG | SYSTOLIC BLOOD PRESSURE: 132 MMHG | HEART RATE: 103 BPM | WEIGHT: 200 LBS | HEIGHT: 64 IN | OXYGEN SATURATION: 98 %

## 2023-01-06 DIAGNOSIS — G43.719 INTRACTABLE CHRONIC MIGRAINE WITHOUT AURA AND WITHOUT STATUS MIGRAINOSUS: Primary | ICD-10-CM

## 2023-01-06 PROCEDURE — 99214 OFFICE O/P EST MOD 30 MIN: CPT | Performed by: NURSE PRACTITIONER

## 2023-01-06 RX ORDER — OMEPRAZOLE 20 MG/1
20 TABLET, DELAYED RELEASE ORAL DAILY
COMMUNITY
Start: 2022-12-02

## 2023-01-06 RX ORDER — PANTOPRAZOLE SODIUM 40 MG/1
40 TABLET, DELAYED RELEASE ORAL
COMMUNITY
Start: 2022-12-14

## 2023-01-09 ENCOUNTER — TELEPHONE (OUTPATIENT)
Dept: NEUROLOGY | Facility: CLINIC | Age: 38
End: 2023-01-09

## 2023-01-09 NOTE — TELEPHONE ENCOUNTER
Provider: REGINE LUCIO  Caller: GUIDO   Relationship to Patient: PATTIE  Phone Number: [phone]  Reason for Call: GUIDO  FROM [facility] CALLED REGARDING PA FOR VYEPTI.  PLEASE FAX CLINICAL NOTES AND PROVIDER ORDER TO     FAX # [phone]    THANK YOU

## 2023-01-09 NOTE — TELEPHONE ENCOUNTER
Provider:  REGINE LUCIO APRN    Caller: JOSE    Relationship to Patient: OTHER    Phone Number: [phone]    Reason for Call: CALLING REGARDING AN ENROLLMENT FORM FOR VYEPTI PROGRAM.  STATED IT IS AN OLD FORM THAT THEY RECEIVED AND THEY FAXED THE NEW FORM ON Friday (1-6-23).  STATED IF YOU HAVE ANY QUESTIONS PLEASE CALL.    PLEASE ADVISE

## 2023-01-10 DIAGNOSIS — A60.00 GENITAL HERPES SIMPLEX, UNSPECIFIED SITE: Primary | ICD-10-CM

## 2023-01-10 RX ORDER — VALACYCLOVIR HYDROCHLORIDE 1 G/1
1000 TABLET, FILM COATED ORAL 2 TIMES DAILY
Qty: 20 TABLET | Refills: 0 | Status: SHIPPED | OUTPATIENT
Start: 2023-01-10

## 2023-01-11 ENCOUNTER — TELEPHONE (OUTPATIENT)
Dept: FAMILY MEDICINE CLINIC | Facility: CLINIC | Age: 38
End: 2023-01-11

## 2023-01-11 RX ORDER — SODIUM CHLORIDE 9 MG/ML
250 INJECTION, SOLUTION INTRAVENOUS ONCE
OUTPATIENT
Start: 2023-01-11

## 2023-01-11 NOTE — TELEPHONE ENCOUNTER
Caller: Chelita Jimenes    Relationship: Self    Best call back number: [phone]    What is the best time to reach you: ANYTIME    Who are you requesting to speak with (clinical staff, provider,  specific staff member): CLINICAL      What was the call regarding: NONI WAS SUPPOSED TO BE DOING A LETTER FOR PATIENT. IT IS MENTIONED IN HER PATIENT MESSAGE OF 01/09/23. PATIENT IS GOING TO BE IN THE AREA TODAY AND WAS WONDERING IF SHE COULD PICK IT UP    Do you require a callback: YES

## 2023-01-11 NOTE — TELEPHONE ENCOUNTER
JOSE FROM [employer] CALLING AGAIN NEEDS FORM FILLED OUT AND SENT BACK ASAP/ FORM THE RECEIVED IS OLD AND THEY NEED THE NEW ONE.      FAX # [phone] OPTION 1     PLEASE ADVISE.

## 2023-01-11 NOTE — PROGRESS NOTES
Neurology Progress Note      Chief Complaint:    Migraine    Subjective    History of Present Illness:  Chelita Jimenes is a 37 y.o. female who presents today for migraine.  She is routinely followed by Idalmis Mcdermott APRN for primary care.     Migraine  She continues with little improvements.  She states that when she went on vacation recently she had tried some THC gummies that did help the most for her headaches.  She is seeing if she can get a letter for [place] for this.  She continues with 2-3 migraines her week with some extra headache days on top of this.  She continus to have associated nausea, vomiting, dizziness, photophobia, and phonophobia.  She continues Qulipta 60mg daily and Nurtec 75mg/Reyvow 50mg as needed for treatment.  She continues to wish to try Vyepti.  She would like to wait on referral to headache clinic.     Migraine Treatment History:  She has tried and failed Imitrex and Ubrelvy for abortive treatments.  These were unsuccessful at completely aborting her headache and Ubrelvy didn't seen to have any effect at all. For preventative treatment she has tried Aimovig, Emgality, Botox, Qulipta, elavil, topamax, depakote, lamictal, and carvedolol.  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      Allergies:    Apriso [mesalamine er], Lamotrigine, Lithium, Tramadol, and Ultram [tramadol hcl]    Medications:  Current Outpatient Medications   Medication Sig Dispense Refill   • acetaminophen (Tylenol) 325 MG tablet Take 2 tablets by mouth Every 4 (Four) Hours As Needed for Mild Pain. 100 tablet 2   • albuterol sulfate  (90 Base) MCG/ACT inhaler Inhale 2 puffs Every 4 (Four) Hours As Needed for Wheezing. 18 g 5   • amitriptyline (ELAVIL) 50 MG tablet Take 1 tablet by mouth Every Night. 30 tablet 2   • amLODIPine (NORVASC) 10 MG tablet Take 10 mg by mouth Daily.     • Atogepant (Qulipta) 60 MG tablet Take 1 tablet by mouth Daily.  60 tablet 3   • atorvastatin (LIPITOR) 80 MG tablet Take 1 tablet by mouth every night at bedtime. 90 tablet 3   • azithromycin (Zithromax Z-Maksim) 250 MG tablet Take 2 tablets the first day, then 1 tablet daily for 4 days. 6 tablet 0   • baclofen (LIORESAL) 20 MG tablet Take 20 mg by mouth 2 (Two) Times a Day.     • BD Pen Needle Leena U/F 32G X 4 MM misc USE ONCE DAILY WITH VICTOZA 30 each 5   • Blood Glucose Monitoring Suppl (ONE TOUCH BASIC SYSTEM) w/Device kit 1 Device Daily. 1 each 0   • carvedilol (COREG) 3.125 MG tablet Take 2 tablets by mouth 2 (Two) Times a Day With Meals. 180 tablet 0   • clonazePAM (KlonoPIN) 1 MG tablet Take 1 tablet by mouth 2 (Two) Times a Day As Needed for Anxiety. 60 tablet 2   • doxepin (SINEquan) 50 MG capsule TAKE ONE CAPSULE BY MOUTH NIGHTLY --TAKE 2ND DOSE AFTER 1-2 HOURS IF SLEEP NOT ACHIEVED 60 capsule 0   • DULoxetine (CYMBALTA) 30 MG capsule Take 30 mg by mouth Daily.     • estradiol (Vivelle-Dot) 0.075 MG/24HR patch Place 1 patch on the skin as directed by provider 2 (Two) Times a Week. 8 patch 5   • ezetimibe (ZETIA) 10 MG tablet Take 1 tablet by mouth Daily. 90 tablet 3   • furosemide (Lasix) 20 MG tablet Take 1 tablet by mouth Daily. 30 tablet 5   • glucose blood (ONE TOUCH ULTRA TEST) test strip Use as instructed 50 each 12   • HYDROcodone-acetaminophen (NORCO) 5-325 MG per tablet Take 1 tablet by mouth 4 (Four) Times a Day.     • ibuprofen (ADVIL,MOTRIN) 600 MG tablet Take 1 tablet by mouth Every 6 (Six) Hours As Needed for Mild Pain.  **Take with food** 40 tablet 1   • isosorbide mononitrate (IMDUR) 30 MG 24 hr tablet Take 1 tablet by mouth Daily. 90 tablet 0   • Lancets (onetouch ultrasoft) lancets 1 each by Other route Daily. Use as instructed     • Liraglutide (Victoza) 18 MG/3ML solution pen-injector injection Inject 1.8 mg under the skin into the appropriate area as directed 2 (Two) Times a Day.     • meclizine (ANTIVERT) 25 MG tablet Take 1 tablet by mouth 3  (Three) Times a Day As Needed for Dizziness. 90 tablet 1   • metFORMIN (GLUCOPHAGE) 1000 MG tablet Take 1,000 mg by mouth 2 (Two) Times a Day With Meals.     • pantoprazole (PROTONIX) 40 MG EC tablet Take 40 mg by mouth Every Morning Before Breakfast.     • pramipexole (MIRAPEX) 0.25 MG tablet Take 1 tablet by mouth Every Evening. 90 tablet 1   • promethazine (PHENERGAN) 25 MG tablet Take 1 tablet by mouth Every 6 (Six) Hours As Needed for Nausea or Vomiting. 30 tablet 2   • QUEtiapine (SEROquel) 400 MG tablet Take 2 tablets by mouth Every Night. 180 tablet 1   • Reyvow 50 MG tablet TAKE ONE TABLET BY MOUTH AT ONSET OF MIGRAINE AS NEEDED--MAY REPEAT IN 24 HOURS IF NEEDED 8 tablet 0   • Rimegepant Sulfate (Nurtec) 75 MG tablet dispersible tablet Take 1 tablet by mouth Daily As Needed (migraine). 8 tablet 1   • SM Omeprazole 20 MG tablet delayed-release Take 20 mg by mouth Daily.     • valACYclovir (VALTREX) 1000 MG tablet Take 1 tablet by mouth 2 (Two) Times a Day. 20 tablet 0     No current facility-administered medications for this visit.     Current outpatient and discharge medications have been reconciled for the patient.  Reviewed by: ARIELLE Garcia    Past Medical History:  Past Medical History:   Diagnosis Date   • Arrhythmia    • Arthropathy of lumbar facet joint    • Asthma    • Autonomic dysfunction    • Bipolar disorder (HCC)    • Cervico-occipital neuralgia    • Constipation    • Crohn's colitis (HCC)    • DDD (degenerative disc disease), lumbar    • Diabetes mellitus (HCC)    • Disorder of small intestine     thicking jejunum, delayed transit      • Diverticular disease of colon    • Dysphagia    • Elevated cholesterol    • Epigastric pain    • Generalized anxiety disorder    • GERD (gastroesophageal reflux disease)    • H/O colonoscopy with polypectomy    • Hirsutism    • Hyperlipidemia    • Hyperprolactinemia (HCC)    • Hypertension    • Low back pain    • Migraine aura, persistent     Migraine  "- w/ aura of spots      • Myofascial pain    • Nausea and vomiting    • Osteoarthritis    • PCOS (polycystic ovarian syndrome)    • Periumbilical pain    • PONV (postoperative nausea and vomiting)    • POTS (postural orthostatic tachycardia syndrome)     followed by Dr. Francis at [facility]   • Sleep apnea      Past Surgical History:   Procedure Laterality Date   • CAPSULE ENDOSCOPY  2015    [facility], capsule endoscopy 32239 (1)      •  SECTION  2010     Section (3)      • CHOLECYSTECTOMY  05/15/2003    Cholecystectomy, laparoscopic (1)      • COLON RESECTION     • COLONOSCOPY     • DIAGNOSTIC LAPAROSCOPY, SALPINGO OOPHORECTOMY LAPAROSCOPIC N/A 2022    Procedure: LEFT SALPINGO OOPHORECTOMY, RIGHT OOPHORECTOMY LAPAROSCOPIC, LYSIS OF ADHESIONS;  Surgeon: Megan Cummings DO;  Location: [facility];  Service: Gynecology;  Laterality: N/A;   • DILATATION AND CURETTAGE  10/21/2013    D&C (1)      • ENDOSCOPY  2015    EGD w/ biopsy 53978 (1)      • INJECTION OF MEDICATION  2016    Injection for nerve block (1)      • SUBTOTAL HYSTERECTOMY       Family History   Problem Relation Age of Onset   • Heart disease Mother    • Asthma Father    • Diabetes Father    • Heart disease Father    • Hypertension Father    • No Known Problems Brother      Social History     Tobacco Use   • Smoking status: Never   • Smokeless tobacco: Never   Vaping Use   • Vaping Use: Some days   • Substances: CBD   • Devices: Refillable tank   Substance Use Topics   • Alcohol use: No   • Drug use: No     Review of Systems   Eyes: Positive for photophobia.   Gastrointestinal: Positive for nausea and vomiting.   Neurological: Positive for dizziness and headache.   All other systems reviewed and are negative.        Objective   Vital Signs:         23  1526   Weight: 90.7 kg (200 lb)     162.6 cm (64\")  Body mass index is 34.33 kg/m².    Physical Exam  Vitals reviewed.   Constitutional:       Appearance: " Normal appearance.   HENT:      Head: Normocephalic and atraumatic.   Eyes:      Extraocular Movements: Extraocular movements intact.      Pupils: Pupils are equal, round, and reactive to light.   Cardiovascular:      Rate and Rhythm: Normal rate and regular rhythm.      Pulses: Normal pulses.   Pulmonary:      Effort: Pulmonary effort is normal.   Musculoskeletal:         General: Normal range of motion.      Cervical back: Normal range of motion and neck supple.   Skin:     General: Skin is warm and dry.      Capillary Refill: Capillary refill takes less than 2 seconds.   Neurological:      Mental Status: She is alert.      Gait: Gait is intact.   Psychiatric:         Mood and Affect: Mood normal.         Behavior: Behavior is cooperative.       Neurologic Exam:  Mental Status:    -Awake. Alert. Oriented to person, place & time.  -No word finding difficulties.  -No aphasia.  -No dysarthria.  -Follows simple commands.     CN II:  Full visual fields with confrontation.  Pupils equally reactive to light.  CN III, IV, VI:  Extraocular muscles function intact with no nystagmus.  CN V:  Facial sensory is symmetric.  CN VII:  Facial motor symmetric.  CN VIII:  Gross hearing intact bilaterally.  CN IX/X:  Palate elevates symmetrically.  CN XI:  Shoulder shrug symmetric.  CN XII:  Tongue is midline on protrusion.     Motor: (strength out of 5:  1= minimal movement, 2 = movement in plane of gravity, 3 = movement against gravity, 4 = movement against some resistance, 5 = full strength)     -5/5 in bilateral biceps, triceps, brachioradialis, wrist extensors and intrinsic muscles of the hand.    -5/5 in bilateral hip flexors, quadriceps, hamstrings, gastrocsoleus complex, anterior tibialis and extensor hallucis longus.       Deep Tendon Reflexes:  -Right              Biceps: 2+         Triceps: 2+      Brachioradialis: 2+              Patella: 2+       Ankle: 2+         Babinski:  negative  -Left              Biceps: 2+          Triceps: 2+      Brachioradialis: 2+              Patella: 2+       Ankle: 2+         Babinski:  negative    Tone (Modified Karen Scale):  No appreciable increase in tone or rigidity noted.     Sensory:  -Intact to light touch, pinprick BUE (C5-T1) and BLE (L2-S1).     Coordination:  -Finger to nose intact BUEs  -Heel to shin intact BLEs  -No ataxia     Gait  -No signs of ataxia  -ambulates unassisted     Results Review:    Lab Results   Component Value Date    GLUCOSE 94 11/14/2022    BUN 16 11/14/2022    CREATININE 1.03 (H) 11/14/2022    EGFRIFNONA 102 11/11/2021    EGFRIFAFRI 118 11/11/2021    BCR 16 11/14/2022    K 4.3 11/14/2022    CO2 28 11/14/2022    CALCIUM 9.7 11/14/2022    PROTENTOTREF 7.7 11/14/2022    ALBUMIN 4.2 11/14/2022    LABIL2 1.2 11/14/2022    AST 22 11/14/2022    ALT 28 11/14/2022     Lab Results   Component Value Date    WBC 8.8 11/14/2022    HGB 13.3 11/14/2022    HCT 40.0 11/14/2022    MCV 89 11/14/2022     11/14/2022     Lab Results   Component Value Date    CHOL 277 (H) 10/23/2017    CHLPL 245 (H) 11/14/2022    TRIG 214 (H) 11/14/2022    HDL 50 11/14/2022     (H) 11/14/2022     Lab Results   Component Value Date    TSH 1.420 11/14/2022     Lab Results   Component Value Date    HGBA1C 5.3 11/14/2022     No results found for: FOLATE  Lab Results   Component Value Date    SVXUYSJK20 689 03/31/2021     MRI Brain Without Contrast (08/17/2021 12:02)     Plan .  Impression:  Chelita Jimenes is a 37 y.o. female who presents with continued chronic migraine which is resistant to treatments.  She continues Qulipta, Nurtec, and Reyvow without great success.  She wishes to proceed with Vyepti.  She has tried and faily nearly every medication available at this time.  We will continue to work on Vyepti as we now have the ability to get this through the infusion center pharmacy.      Plan:  • Continue Qulipta 60mg Daily  • Continue Nurtec 75mg as needed  • Reyvow 50mg as needed for  migraine onset.  Discussed driving restriction.  • Will send orders for Vyepti as we are able to now get it through our pharmacy in the infusion center.   • Recommend regular physical activity and balanced diet.    The patient and I have discussed the plan of care and she is in full agreement at this time.     Follow-Up:  Return in about 3 months (around 4/6/2023) for Migraine.       Thien eJan Baptiste, ARIELLE  01/11/23  10:18 CST

## 2023-01-13 DIAGNOSIS — F51.01 PRIMARY INSOMNIA: ICD-10-CM

## 2023-01-13 RX ORDER — DOXEPIN HYDROCHLORIDE 50 MG/1
CAPSULE ORAL
Qty: 60 CAPSULE | Refills: 2 | Status: SHIPPED | OUTPATIENT
Start: 2023-01-13 | End: 2023-04-07

## 2023-01-13 RX ORDER — ESTRADIOL 0.07 MG/D
FILM, EXTENDED RELEASE TRANSDERMAL
Qty: 8 PATCH | Refills: 0 | Status: SHIPPED | OUTPATIENT
Start: 2023-01-13 | End: 2023-02-06

## 2023-01-13 RX ORDER — AMITRIPTYLINE HYDROCHLORIDE 50 MG/1
TABLET, FILM COATED ORAL
Qty: 90 TABLET | Refills: 1 | Status: SHIPPED | OUTPATIENT
Start: 2023-01-13

## 2023-01-13 NOTE — TELEPHONE ENCOUNTER
Rx Refill Note  Requested Prescriptions     Pending Prescriptions Disp Refills   • doxepin (SINEquan) 50 MG capsule [Pharmacy Med Name: DOXEPIN HCL 50MG CAPS] 60 capsule 0     Sig: TAKE ONE CAPSULE BY MOUTH EVERY DAY HS- TAKE 2ND DOSE AFTER 1-2 HOURS IF SLEEP NOT ACHIEVED      Last office visit with prescribing clinician: 6/1/2022   Last telemedicine visit with prescribing clinician: 2/14/2023   Next office visit with prescribing clinician: Visit date not found   {Dori Tyler MA  01/13/23, 12:47 CST

## 2023-01-13 NOTE — TELEPHONE ENCOUNTER
Rx Refill Note  Requested Prescriptions     Pending Prescriptions Disp Refills   • amitriptyline (ELAVIL) 50 MG tablet [Pharmacy Med Name: AMITRIPTYLINE HCL 50MG TABS] 30 tablet 2     Sig: TAKE ONE TABLET BY MOUTH EVERY EVENING      Last office visit with prescribing clinician: 11/29/2022   Last telemedicine visit with prescribing clinician: 1/13/2023   Next office visit with prescribing clinician: 2/14/2023    Dori Tyler MA  01/13/23, 12:46 CST

## 2023-01-16 ENCOUNTER — OFFICE VISIT (OUTPATIENT)
Dept: FAMILY MEDICINE CLINIC | Facility: CLINIC | Age: 38
End: 2023-01-16
Payer: COMMERCIAL

## 2023-01-16 VITALS
DIASTOLIC BLOOD PRESSURE: 85 MMHG | OXYGEN SATURATION: 99 % | SYSTOLIC BLOOD PRESSURE: 129 MMHG | HEART RATE: 130 BPM | HEIGHT: 64 IN | TEMPERATURE: 97.3 F | BODY MASS INDEX: 36.88 KG/M2 | WEIGHT: 216 LBS | RESPIRATION RATE: 20 BRPM

## 2023-01-16 DIAGNOSIS — J02.0 STREP PHARYNGITIS: Primary | ICD-10-CM

## 2023-01-16 DIAGNOSIS — R59.0 ANTERIOR CERVICAL LYMPHADENOPATHY: ICD-10-CM

## 2023-01-16 PROCEDURE — 99213 OFFICE O/P EST LOW 20 MIN: CPT | Performed by: NURSE PRACTITIONER

## 2023-01-16 RX ORDER — AMOXICILLIN 500 MG/1
500 CAPSULE ORAL 3 TIMES DAILY
Qty: 42 CAPSULE | Refills: 0 | Status: SHIPPED | OUTPATIENT
Start: 2023-01-16 | End: 2023-01-30

## 2023-01-16 NOTE — PROGRESS NOTES
"Chief Complaint  Sore Throat (Started with a knot on the right side and now the left seems swollen per patient started 2 days ago and tried salt water/)    Subjective        Chelita Jimenes presents to [facility]  History of Present Illness  Patient has a 3 day history of sore throat and swollen glands.  She covid tested at home with negative results. She also has had some GI problems including nausea and diarrhea.    Objective   Vital Signs:  /85 (BP Location: Left arm, Patient Position: Sitting, Cuff Size: Adult)   Pulse (!) 130   Temp 97.3 °F (36.3 °C) (Temporal)   Resp 20   Ht 162.6 cm (64.02\")   Wt 98 kg (216 lb)   SpO2 99%   BMI 37.06 kg/m²   Estimated body mass index is 37.06 kg/m² as calculated from the following:    Height as of this encounter: 162.6 cm (64.02\").    Weight as of this encounter: 98 kg (216 lb).          Physical Exam  Vitals and nursing note reviewed.   Constitutional:       General: She is not in acute distress.     Appearance: Normal appearance. She is obese. She is not ill-appearing.   HENT:      Head: Normocephalic and atraumatic.      Right Ear: Tympanic membrane and ear canal normal.      Left Ear: Tympanic membrane and ear canal normal.      Nose: Nose normal.      Mouth/Throat:      Mouth: Mucous membranes are moist.      Pharynx: Oropharyngeal exudate and posterior oropharyngeal erythema present.      Comments: Erythematous tonsils with exudate present. There is soft palate petechiae present.  Neck:      Comments: Large anterior cervical lymphadenopathy present.  Cardiovascular:      Rate and Rhythm: Regular rhythm. Tachycardia present.      Heart sounds: Normal heart sounds.   Pulmonary:      Effort: Pulmonary effort is normal.      Breath sounds: Normal breath sounds.   Lymphadenopathy:      Cervical: Cervical adenopathy present.   Skin:     General: Skin is warm and dry.   Neurological:      Mental Status: She is alert and oriented " to person, place, and time.        Result Review :                Assessment and Plan   Diagnoses and all orders for this visit:    1. Strep pharyngitis (Primary)  -     amoxicillin (AMOXIL) 500 MG capsule; Take 1 capsule by mouth 3 (Three) Times a Day for 14 days.  Dispense: 42 capsule; Refill: 0    2. Anterior cervical lymphadenopathy  -     amoxicillin (AMOXIL) 500 MG capsule; Take 1 capsule by mouth 3 (Three) Times a Day for 14 days.  Dispense: 42 capsule; Refill: 0             Follow Up   Return if symptoms worsen or fail to improve.  Patient was given instructions and counseling regarding her condition or for health maintenance advice. Please see specific information pulled into the AVS if appropriate.     ARIELLE Castro  This note is electronically signed.

## 2023-01-17 DIAGNOSIS — Z12.31 BREAST CANCER SCREENING BY MAMMOGRAM: ICD-10-CM

## 2023-01-17 DIAGNOSIS — Z80.3 FAMILY HISTORY OF BREAST CANCER IN FEMALE: ICD-10-CM

## 2023-01-23 RX ORDER — LIRAGLUTIDE 6 MG/ML
INJECTION SUBCUTANEOUS
Qty: 12 ML | Refills: 5 | Status: SHIPPED | OUTPATIENT
Start: 2023-01-23

## 2023-01-23 NOTE — TELEPHONE ENCOUNTER
Rx Refill Note  Requested Prescriptions     Pending Prescriptions Disp Refills   • Victoza 18 MG/3ML solution pen-injector injection [Pharmacy Med Name: VICTOZA 18MG/3ML SOPN] 12 mL 5     Sig: INJECT 1.8 MG UNDER THE SKIN TWICE A DAY      Last office visit with prescribing clinician: 1/16/2023   Last telemedicine visit with prescribing clinician: 2/14/2023   Next office visit with prescribing clinician: 2/14/2023     Protocol met      Hanna Vines MA  01/23/23, 13:22 CST

## 2023-01-24 ENCOUNTER — APPOINTMENT (OUTPATIENT)
Dept: OPERATING ROOM | Age: 38
End: 2023-01-24

## 2023-01-24 ENCOUNTER — HOSPITAL ENCOUNTER (OUTPATIENT)
Age: 38
Setting detail: SPECIMEN
Discharge: HOME | End: 2023-01-24
Payer: MEDICAID

## 2023-01-24 ENCOUNTER — TELEPHONE (OUTPATIENT)
Dept: GASTROENTEROLOGY | Age: 38
End: 2023-01-24

## 2023-01-24 ENCOUNTER — HOSPITAL ENCOUNTER (OUTPATIENT)
Age: 38
Setting detail: OUTPATIENT SURGERY
Discharge: HOME | End: 2023-01-24
Attending: INTERNAL MEDICINE | Admitting: INTERNAL MEDICINE

## 2023-01-24 ENCOUNTER — ANESTHESIA (OUTPATIENT)
Dept: OPERATING ROOM | Age: 38
End: 2023-01-24

## 2023-01-24 ENCOUNTER — ANESTHESIA EVENT (OUTPATIENT)
Dept: OPERATING ROOM | Age: 38
End: 2023-01-24

## 2023-01-24 VITALS
DIASTOLIC BLOOD PRESSURE: 86 MMHG | HEART RATE: 96 BPM | OXYGEN SATURATION: 97 % | TEMPERATURE: 99.2 F | BODY MASS INDEX: 35.85 KG/M2 | SYSTOLIC BLOOD PRESSURE: 141 MMHG | RESPIRATION RATE: 20 BRPM | HEIGHT: 64 IN | WEIGHT: 210 LBS

## 2023-01-24 DIAGNOSIS — G89.29 CHRONIC EPIGASTRIC PAIN: ICD-10-CM

## 2023-01-24 DIAGNOSIS — R11.10 CHRONIC VOMITING: ICD-10-CM

## 2023-01-24 DIAGNOSIS — R10.13 CHRONIC EPIGASTRIC PAIN: ICD-10-CM

## 2023-01-24 DIAGNOSIS — R11.0 CHRONIC NAUSEA: ICD-10-CM

## 2023-01-24 DIAGNOSIS — R13.10 DYSPHAGIA, UNSPECIFIED TYPE: Primary | ICD-10-CM

## 2023-01-24 PROCEDURE — 43239 EGD BIOPSY SINGLE/MULTIPLE: CPT

## 2023-01-24 PROCEDURE — 88305 TISSUE EXAM BY PATHOLOGIST: CPT

## 2023-01-24 PROCEDURE — 43450 DILATE ESOPHAGUS 1/MULT PASS: CPT

## 2023-01-24 RX ORDER — LIDOCAINE HYDROCHLORIDE 10 MG/ML
INJECTION, SOLUTION EPIDURAL; INFILTRATION; INTRACAUDAL; PERINEURAL PRN
Status: DISCONTINUED | OUTPATIENT
Start: 2023-01-24 | End: 2023-01-24 | Stop reason: SDUPTHER

## 2023-01-24 RX ORDER — PROPOFOL 10 MG/ML
INJECTION, EMULSION INTRAVENOUS PRN
Status: DISCONTINUED | OUTPATIENT
Start: 2023-01-24 | End: 2023-01-24 | Stop reason: SDUPTHER

## 2023-01-24 RX ORDER — ONDANSETRON 2 MG/ML
4 INJECTION INTRAMUSCULAR; INTRAVENOUS
Status: CANCELLED | OUTPATIENT
Start: 2023-01-24 | End: 2023-01-25

## 2023-01-24 RX ORDER — SODIUM CHLORIDE, SODIUM LACTATE, POTASSIUM CHLORIDE, CALCIUM CHLORIDE 600; 310; 30; 20 MG/100ML; MG/100ML; MG/100ML; MG/100ML
INJECTION, SOLUTION INTRAVENOUS CONTINUOUS
Status: DISCONTINUED | OUTPATIENT
Start: 2023-01-24 | End: 2023-01-24 | Stop reason: HOSPADM

## 2023-01-24 RX ADMIN — SODIUM CHLORIDE, SODIUM LACTATE, POTASSIUM CHLORIDE, CALCIUM CHLORIDE: 600; 310; 30; 20 INJECTION, SOLUTION INTRAVENOUS at 10:31

## 2023-01-24 RX ADMIN — PROPOFOL 370 MG: 10 INJECTION, EMULSION INTRAVENOUS at 12:00

## 2023-01-24 RX ADMIN — LIDOCAINE HYDROCHLORIDE 30 MG: 10 INJECTION, SOLUTION EPIDURAL; INFILTRATION; INTRACAUDAL; PERINEURAL at 12:00

## 2023-01-24 NOTE — TELEPHONE ENCOUNTER
Cliff,    Per Dr Mahamed Torres today:    Stomach:  abnormal: Suboptimal, incomplete exam due to presence of large amounts of fatty food and liquid occupying most of the gastric body antrum as well as parts of the duodenal bulb, suggestive of gastroparesis    RECOMMENDATIONS:    1. Await path results, the patient will be contacted in 7-10 days with biopsy results. 2.  Magic mouthwash 5 ml PO Swish and swallow q3h PRN ONLY IF patient has post-procedural sorethroat or chest pain. 3. Full liquids to soft diet today [facility] the [facility]; may advance  diet starting in AM tomorrow. 4. May resume other meds except any ASA/NSAIDs; may use cough drops or lozenges PRN; also OTC/prescription PPI or H2RA PO qday or BID with anti-GERD measures. 5. NO ASA/NSAIDs x 2 weeks  6. OP f/u in 4-6 weeks with Ms. Dean Will; will consider an Esophageal manometry later if the patient's dysphagia persists. 7.  Schedule a nuclear medicine gastric emptying scan study within the next week to check for gastroparesis  8. Repeat EGD in 2 to 3 weeks after a strict clear liquid diet on the day prior to the procedure     The results were discussed with the patient and family. A copy of the images obtained were given to the patient.       (Please note that portions of this note were completed with a voice recognition program. Efforts were made to edit the dictations but occasionally words may be mis-transcribed.)      Graham Agudelo MD, MD  1/24/2023  12:20 PM

## 2023-01-24 NOTE — H&P
Patient Name: Lorenzo Luna  : 1985  MRN: [id number]  DATE: 23    Allergies: Allergies   Allergen Reactions    Lithium      Difficulty breathing    Tramadol Other (See Comments)     Hard to breath    Apriso [Mesalamine Er]      Nausea and Vomitting    Lamictal [Lamotrigine]         ENDOSCOPY  History and Physical    Procedure:    [] Diagnostic Colonoscopy       [] Screening Colonoscopy  [x] EGD      [] ERCP      [] EUS       [] Other    [x] Previous office notes/History and Physical reviewed from the patients chart. Please see EMR for further details of HPI. I have examined the patient's status immediately prior to the procedure and:      Indications/HPI:    1. Dysphagia, unspecified type   2. Nausea and vomiting  3. Blood in vomit/hematemesis recently  4.  Epigastric pain    []Abdominal Pain   []Cancer- GI/Lung     []Fhx of colon CA/polyps  []History of Polyps  []Barretts            []Melena  []Abnormal Imaging              []Dysphagia              []Persistent Pneumonia   []Anemia                            []Food Impaction        []History of Polyps  [] GI Bleed             []Pulmonary nodule/Mass   []Change in bowel habits []Heartburn/Reflux  []Rectal Bleed (BRBPR)  []Chest Pain - Non Cardiac []Heme (+) Stool []Ulcers  []Constipation  []Hemoptysis  []Varices  []Diarrhea  []Hypoxemia    []Nausea/Vomiting   []Screening   []Crohns/Colitis  []Other:     Anesthesia:   [x] MAC [] Moderate Sedation   [] General   [] None     ROS: 12 pt Review of Symptoms was negative unless mentioned above    Medications:   Prior to Admission medications    Medication Sig Start Date End Date Taking?  Authorizing Provider   acetaminophen (TYLENOL) 325 MG tablet Take 650 mg by mouth every 6 hours as needed for Pain    Historical Provider, MD   albuterol sulfate HFA (PROVENTIL;VENTOLIN;PROAIR) 108 (90 Base) MCG/ACT inhaler Inhale 2 puffs into the lungs every 6 hours as needed for Wheezing    Historical Provider, MD amitriptyline (ELAVIL) 50 MG tablet Take 50 mg by mouth nightly    Historical Provider, MD   atorvastatin (LIPITOR) 80 MG tablet Take 80 mg by mouth at bedtime    Historical Provider, MD   baclofen (LIORESAL) 20 MG tablet Take 20 mg by mouth daily    Historical Provider, MD   doxepin (SINEQUAN) 50 MG capsule Take 50 mg by mouth nightly    Historical Provider, MD   DULoxetine (CYMBALTA) 30 MG extended release capsule Take 30 mg by mouth daily    Historical Provider, MD   estradiol (VIVELLE) 0.05 MG/24HR Place 1 patch onto the skin Twice a Week    Historical Provider, MD   ezetimibe (ZETIA) 10 MG tablet Take 10 mg by mouth daily    Historical Provider, MD   furosemide (LASIX) 20 MG tablet Take 20 mg by mouth daily    Historical Provider, MD   ibuprofen (ADVIL;MOTRIN) 600 MG tablet Take 600 mg by mouth every 6 hours as needed for Pain    Historical Provider, MD   meclizine (ANTIVERT) 25 MG tablet Take 25 mg by mouth 3 times daily as needed for Dizziness    Historical Provider, MD   Rimegepant Sulfate (NURTEC) 75 MG TBDP Take by mouth as needed (MIGRAINE)    Historical Provider, MD   promethazine (PHENERGAN) 25 MG tablet Take 25 mg by mouth every 6 hours as needed for Nausea    Historical Provider, MD   Atogepant (QULIPTA) 60 MG TABS Take 60 mg by mouth daily    Historical Provider, MD   Lasmiditan Succinate (REYVOW) 50 MG TABS Take 50 mg by mouth as needed (FOR MIGRAINE)    Historical Provider, MD   pantoprazole (PROTONIX) 40 MG tablet Take 1 tablet by mouth every morning (before breakfast) 12/13/22   DANY López   HYDROcodone-acetaminophen (NORCO) 5-325 MG per tablet Take 1 tablet by mouth every 6 hours as needed for Pain. Historical Provider, MD   clonazePAM (KLONOPIN) 1 MG tablet Take 1 mg by mouth 2 times daily as needed for Anxiety.     Historical Provider, MD   Liraglutide (VICTOZA) 18 MG/3ML SOPN SC injection Inject 1.8 mg into the skin daily    Historical Provider, MD   isosorbide mononitrate (IMDUR) 30 MG extended release tablet Take 30 mg by mouth daily    Historical Provider, MD   amLODIPine (NORVASC) 10 MG tablet Take 10 mg by mouth daily    Historical Provider, MD   carvedilol (COREG) 3.125 MG tablet Take 3.125 mg by mouth 2 times daily (with meals) 2 tab bid    Historical Provider, MD   QUEtiapine (SEROQUEL) 400 MG tablet Take 800 mg by mouth nightly    Historical Provider, MD   pramipexole (MIRAPEX) 0.25 MG tablet Take 0.25 mg by mouth daily    Historical Provider, MD   metFORMIN (GLUCOPHAGE) 1000 MG tablet Take 1,000 mg by mouth daily (with breakfast) 2/15/17   Historical Provider, MD       Past Medical History:  Past Medical History:   Diagnosis Date    Asthma     Autonomic dysfunction     Diabetes mellitus, type 2 (HCC)     Hyperlipidemia     Hypertension     Migraines     Orthostatic hypotension     Osteoarthritis     osteo    PCOS (polycystic ovarian syndrome)     POTS (postural orthostatic tachycardia syndrome)     Restless legs     Sinus tachycardia     Spinal cord tumor        Past Surgical History:  Past Surgical History:   Procedure Laterality Date     SECTION      CHOLECYSTECTOMY      COLON SURGERY      Colon resection     COLONOSCOPY      Divertiuclosis per patient     DILATION AND CURETTAGE OF UTERUS      HYSTERECTOMY VAGINAL N/A 2017    HYSTERECTOMY VAGINAL LAPAROSCOPIC ASSISTED (LAVH) performed by Cass Diaz MD at [address] [place] [address]      Dilation and ulcers per patient     UPPER GASTROINTESTINAL ENDOSCOPY N/A 2021    Dr Ernesto Madsen, (-)[facility]       Social History:  Social History     Tobacco Use    Smoking status: Never    Smokeless tobacco: Never    Tobacco comments:     Vape CBD   Vaping Use    Vaping Use: Some days    Start date: 2019    Substances: THC, CBD    Devices: [place] tank   Substance Use Topics    Alcohol use: Yes     Comment: rare    Drug use: Yes     Comment: CBD       Vital Signs: Vitals:    01/24/23 1019   BP: (!) 137/99   Pulse: 98   Resp: 18   Temp: 99.2 °F (37.3 °C)   SpO2: 97%        Physical Exam:  Cardiac:  [x]WNL  []Comments:  Pulmonary:  [x]WNL   []Comments:  Neuro/Mental Status:  [x]WNL  []Comments:  Abdominal:  [x]WNL    []Comments:  Other:   []WNL  []Comments:    Informed Consent:  The risks and benefits of the procedure have been discussed with either the patient or if they cannot consent, their representative. Assessment:  Patient examined and appropriate for planned sedation and procedure. Plan:  Proceed with planned sedation and procedure as above.          Annia John MD

## 2023-01-24 NOTE — OP NOTE
Endoscopic Procedure Note    Patient: Anat Min : 1985  [facility] Rec#: [id number] Acc#: [id number]     Primary Care Provider DANY Mccloud CNP    Endoscopist: Annia John MD, MD    Date of Procedure:  2023    Procedure:   1. EGD with a Travis bougie dilation of esophagus and cold biopsies    Indications: 1. Dysphagia, unspecified type   2. Nausea and vomiting  3. Blood in vomit/hematemesis recently  4.  Epigastric pain    Anesthesia:  Sedation was administered by anesthesia who monitored the patient during the procedure. Estimated Blood Loss: minimal    Procedure:   After reviewing the patient's chart and obtaining informed consent, the patient was placed in the left lateral decubitus position. A forward-viewing Olympus endoscope was lubricated and inserted through the mouth into the oropharynx. Under direct visualization, the upper esophagus was intubated. The scope was advanced to the level of the third portion of duodenum. Scope was slowly withdrawn with careful inspection of the mucosal surfaces. The scope was retroflexed for inspection of the gastric fundus and incisura. Findings and maneuvers are listed in impression below. Next, a lubricated Travis weighted Bougie dilator-54 Fr was gently introduced into the patient's mouth and passed into the Esophagus and into the proximal stomach without much resistance and then withdrawn. Repeat EGD was performed to verify dilation and scope tip was passed into the stomach. NO evidence of perforation or excessive bleeding was noted subsequent to the dilation. The patient tolerated the procedure well. The scope was removed. There were no immediate complications. Findings/IMPRESSION:  Esophagus: normal and a normal EG junction at 36 cm. NO erosions or ulcers or nodules or strictures or webs or rings or mass lesions or extrinsic compression or diverticula noted.  An empirical Travis 54 fr bougie dilation was performed and random cold biopsies were taken to check for EoE and NERD. There is no obvious hiatal hernia present. Stomach:  abnormal: Suboptimal, incomplete exam due to presence of large amounts of fatty food and liquid occupying most of the gastric body antrum as well as parts of the duodenal bulb, suggestive of gastroparesis    NO gastric outlet obstruction . Retroflexed views otherwise revealed a normal GE junction and cardia as well. Duodenum: Normal including major duodenal papilla. Random biopsies were taken to check for Celiac disease and other causes of villous atrophy. RECOMMENDATIONS:    1. Await path results, the patient will be contacted in 7-10 days with biopsy results. 2.  Magic mouthwash 5 ml PO Swish and swallow q3h PRN ONLY IF patient has post-procedural sorethroat or chest pain. 3. Full liquids to soft diet today martha discharge from the surgicenter; may advance  diet starting in AM tomorrow. 4. May resume other meds except any ASA/NSAIDs; may use cough drops or lozenges PRN; also OTC/prescription PPI or H2RA PO qday or BID with anti-GERD measures. 5. NO ASA/NSAIDs x 2 weeks  6. OP f/u in 4-6 weeks with Ms. Nell Madsen; will consider an Esophageal manometry later if the patient's dysphagia persists. 7.  Schedule a nuclear medicine gastric emptying scan study within the next week to check for gastroparesis  8. Repeat EGD in 2 to 3 weeks after a strict clear liquid diet on the day prior to the procedure    The results were discussed with the patient and family. A copy of the images obtained were given to the patient.      (Please note that portions of this note were completed with a voice recognition program. Efforts were made to edit the dictations but occasionally words may be mis-transcribed.)     April Michel MD, MD  1/24/2023  12:20 PM

## 2023-01-24 NOTE — ANESTHESIA POSTPROCEDURE EVALUATION
Department of Anesthesiology  Postprocedure Note    Patient: Alvaro Huston  MRN: [id number]  YOB: 1985  Date of evaluation: 1/24/2023      Procedure Summary     Date: 01/24/23 Room / Location: [facility] ENDO 02 / [address]    Anesthesia Start: 8053 Anesthesia Stop: 3310    Procedures:       EGD BIOPSY (Esophagus)      EGD DILATION VILLA 54 FR Diagnosis:       Dysphagia, unspecified type      (Dysphagia, unspecified type [R13.10])    Surgeons: Ynes Denniosn MD Responsible Provider: DANY Hurst CRNA    Anesthesia Type: General ASA Status: 2          Anesthesia Type: General    Thomas Phase I:      Thomas Phase II:        Anesthesia Post Evaluation    Patient location during evaluation: bedside  Patient participation: complete - patient participated  Level of consciousness: awake  Pain score: 0  Airway patency: patent  Nausea & Vomiting: no nausea and no vomiting  Complications: no  Cardiovascular status: hemodynamically stable  Respiratory status: acceptable, room air and spontaneous ventilation  Hydration status: euvolemic

## 2023-01-24 NOTE — ANESTHESIA PRE PROCEDURE
Department of Anesthesiology  Preprocedure Note       Name:  Sade Hawkins   Age:  44 y.o.  :  1985                                          MRN:  [id number]         Date:  2023      Surgeon: Laura Nettles):  Jonas Gallagher MD    Procedure: Procedure(s):  EGD BIOPSY    Medications prior to admission:   Prior to Admission medications    Medication Sig Start Date End Date Taking?  Authorizing Provider   acetaminophen (TYLENOL) 325 MG tablet Take 650 mg by mouth every 6 hours as needed for Pain    Historical Provider, MD   albuterol sulfate HFA (PROVENTIL;VENTOLIN;PROAIR) 108 (90 Base) MCG/ACT inhaler Inhale 2 puffs into the lungs every 6 hours as needed for Wheezing    Historical Provider, MD   amitriptyline (ELAVIL) 50 MG tablet Take 50 mg by mouth nightly    Historical Provider, MD   atorvastatin (LIPITOR) 80 MG tablet Take 80 mg by mouth at bedtime    Historical Provider, MD   baclofen (LIORESAL) 20 MG tablet Take 20 mg by mouth daily    Historical Provider, MD   doxepin (SINEQUAN) 50 MG capsule Take 50 mg by mouth nightly    Historical Provider, MD   DULoxetine (CYMBALTA) 30 MG extended release capsule Take 30 mg by mouth daily    Historical Provider, MD   estradiol (VIVELLE) 0.05 MG/24HR Place 1 patch onto the skin Twice a Week    Historical Provider, MD   ezetimibe (ZETIA) 10 MG tablet Take 10 mg by mouth daily    Historical Provider, MD   furosemide (LASIX) 20 MG tablet Take 20 mg by mouth daily    Historical Provider, MD   ibuprofen (ADVIL;MOTRIN) 600 MG tablet Take 600 mg by mouth every 6 hours as needed for Pain    Historical Provider, MD   meclizine (ANTIVERT) 25 MG tablet Take 25 mg by mouth 3 times daily as needed for Dizziness    Historical Provider, MD   Rimegepant Sulfate (NURTEC) 75 MG TBDP Take by mouth as needed (MIGRAINE)    Historical Provider, MD   promethazine (PHENERGAN) 25 MG tablet Take 25 mg by mouth every 6 hours as needed for Nausea    Historical Provider, MD   Atogepant (QULIPTA) 60 MG TABS Take 60 mg by mouth daily    Historical Provider, MD   Lasmiditan Succinate (REYVOW) 50 MG TABS Take 50 mg by mouth as needed (FOR MIGRAINE)    Historical Provider, MD   pantoprazole (PROTONIX) 40 MG tablet Take 1 tablet by mouth every morning (before breakfast) 12/13/22   DANY Irizarry   HYDROcodone-acetaminophen (NORCO) 5-325 MG per tablet Take 1 tablet by mouth every 6 hours as needed for Pain. Historical Provider, MD   clonazePAM (KLONOPIN) 1 MG tablet Take 1 mg by mouth 2 times daily as needed for Anxiety. Historical Provider, MD   Liraglutide (VICTOZA) 18 MG/3ML SOPN SC injection Inject 1.8 mg into the skin daily    Historical Provider, MD   isosorbide mononitrate (IMDUR) 30 MG extended release tablet Take 30 mg by mouth daily    Historical Provider, MD   amLODIPine (NORVASC) 10 MG tablet Take 10 mg by mouth daily    Historical Provider, MD   carvedilol (COREG) 3.125 MG tablet Take 3.125 mg by mouth 2 times daily (with meals) 2 tab bid    Historical Provider, MD   QUEtiapine (SEROQUEL) 400 MG tablet Take 800 mg by mouth nightly    Historical Provider, MD   pramipexole (MIRAPEX) 0.25 MG tablet Take 0.25 mg by mouth daily    Historical Provider, MD   metFORMIN (GLUCOPHAGE) 1000 MG tablet Take 1,000 mg by mouth daily (with breakfast) 2/15/17   Historical Provider, MD       Current medications:    No current facility-administered medications for this visit. No current outpatient medications on file. Facility-Administered Medications Ordered in Other Visits   Medication Dose Route Frequency Provider Last Rate Last Admin    lactated ringers IV soln infusion   IntraVENous Continuous Delphine Mckeon  mL/hr at 01/24/23 1031 New Bag at 01/24/23 1031       Allergies:     Allergies   Allergen Reactions    Lithium      Difficulty breathing    Tramadol Other (See Comments)     Hard to breath    Apriso [Mesalamine Er]      Nausea and Vomitting    Lamictal [Lamotrigine]        Problem List:    Patient Active Problem List   Diagnosis Code    Obstructive sleep apnea G47.33    Somnolence, daytime R40.0    Snoring R06.83    Insomnia G47.00    Restless leg syndrome G25.81    Tachycardia R00.0    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       Past Medical History:        Diagnosis Date    Asthma     Autonomic dysfunction     Diabetes mellitus, type 2 (HCC)     Hyperlipidemia     Hypertension     Migraines     Orthostatic hypotension     Osteoarthritis     osteo    PCOS (polycystic ovarian syndrome)     POTS (postural orthostatic tachycardia syndrome)     Restless legs     Sinus tachycardia     Spinal cord tumor        Past Surgical History:        Procedure Laterality Date     SECTION      CHOLECYSTECTOMY      COLON SURGERY  2014    Colon resection     COLONOSCOPY  2015    Divertiuclosis per patient     DILATION AND CURETTAGE OF UTERUS      HYSTERECTOMY VAGINAL N/A 2017    HYSTERECTOMY VAGINAL LAPAROSCOPIC ASSISTED (LAVH) performed by Belinda Young MD at [address] [place] [address]      Dilation and ulcers per patient     UPPER GASTROINTESTINAL ENDOSCOPY N/A 2021    Dr Nikia Mota, (-)[facility] [place]       Social History:    Social History     Tobacco Use    Smoking status: Never    Smokeless tobacco: Never    Tobacco comments:     Vape CBD   Substance Use Topics    Alcohol use: Yes     Comment: rare                                Counseling given: Not Answered  Tobacco comments: Vape CBD      Vital Signs (Current): There were no vitals filed for this visit.                                            BP Readings from Last 3 Encounters:   23 (!) 137/99   22 139/70   21 (!) 143/107       NPO Status:                                                                                 BMI:   Wt Readings from Last 3 Encounters:   23 210 lb (95.3 kg)   12/13/22 200 lb (90.7 kg)   12/03/21 185 lb (83.9 kg)     There is no height or weight on file to calculate BMI.    CBC:   Lab Results   Component Value Date/Time    WBC 15.0 11/07/2021 10:42 PM    RBC 4.68 11/07/2021 10:42 PM    HGB 13.5 11/07/2021 10:42 PM    HCT 40.7 11/07/2021 10:42 PM    MCV 87.0 11/07/2021 10:42 PM    RDW 13.0 11/07/2021 10:42 PM     11/07/2021 10:42 PM       CMP:   Lab Results   Component Value Date/Time     11/07/2021 10:42 PM    K 3.7 11/07/2021 10:42 PM     11/07/2021 10:42 PM    CO2 24 11/07/2021 10:42 PM    BUN 10 11/07/2021 10:42 PM    CREATININE 0.7 11/07/2021 10:42 PM    GFRAA >59 11/07/2021 10:42 PM    LABGLOM >60 11/07/2021 10:42 PM    GLUCOSE 118 11/07/2021 10:42 PM    PROT 7.6 11/07/2021 10:42 PM    CALCIUM 9.3 11/07/2021 10:42 PM    BILITOT 0.3 11/07/2021 10:42 PM    ALKPHOS 75 11/07/2021 10:42 PM    AST 14 11/07/2021 10:42 PM    ALT 28 11/07/2021 10:42 PM       POC Tests:   No results for input(s): POCGLU, POCNA, POCK, POCCL, POCBUN, POCHEMO, POCHCT in the last 72 hours. Coags:   Lab Results   Component Value Date/Time    PROTIME 13.3 08/25/2018 09:15 PM    INR 1.02 08/25/2018 09:15 PM       HCG (If Applicable):   Lab Results   Component Value Date    PREGTESTUR Negative 05/26/2017        ABGs: No results found for: PHART, PO2ART, EQO2GKV, GFG0KKG, BEART, R3MCALSI     Type & Screen (If Applicable):  No results found for: LABABO, LABRH    Drug/Infectious Status (If Applicable):  No results found for: HIV, HEPCAB    COVID-19 Screening (If Applicable): No results found for: COVID19        Anesthesia Evaluation     history of anesthetic complications: PONV.   Airway: Mallampati: III  TM distance: >3 FB   Neck ROM: full  Mouth opening: > = 3 FB   Dental: normal exam         Pulmonary: breath sounds clear to auscultation  (+) sleep apnea:  asthma:                            Cardiovascular:  Exercise tolerance: good (>4 METS),   (+) hypertension: no interval change, hyperlipidemia      NYHA Classification: I    Rhythm: regular  Rate: normal           Beta Blocker:  Dose within 24 Hrs      ROS comment: POTS (postural orthostatic tachycardia syndrome)  Orthostatic hypotension     Neuro/Psych:   (+) headaches:,              ROS comment: Restless leg syndrome  Autonomic dysfunction GI/Hepatic/Renal:   (+) morbid obesity          Endo/Other:    (+) DiabetesType II DM, , .                 Abdominal:   (+) obese,     Abdomen: soft. Vascular: negative vascular ROS. Other Findings:             Anesthesia Plan      TIVA and general     ASA 2       Induction: intravenous. Anesthetic plan and risks discussed with patient.                         Princess England, APRN - CRNA   1/24/2023

## 2023-01-24 NOTE — DISCHARGE INSTRUCTIONS
POST-OP ORDERS: ENDOSCOPY & COLONOSCOPY:  1. Rest today. 2. DO NOT eat or drink until wide awake; eat your usual diet today in moderate amount only. 3. DO NOT drive today. 4. Call physician if you have severe pain, vomiting, fever, rectal bleeding or black bowel movements. 5.  If a biopsy was taken or a polyp removed, you should expect to hear results in about 21 days. If you have heard nothing from your physician by then, call the office for results. 6.  Discharge home when patient awake, vitals signs stable and tolerating liquids. 1.  Await path results, the patient will be contacted in 7-10 days with biopsy results. 2.  Magic mouthwash 5 ml PO Swish and swallow q3h PRN ONLY IF patient has post-procedural sorethroat or chest pain. 3. Full liquids to soft diet today martha discharge from the surgicenter; may advance  diet starting in AM tomorrow. 4. May resume other meds except any ASA/NSAIDs; may use cough drops or lozenges PRN; also OTC/prescription PPI or H2RA PO qday or BID with anti-GERD measures. 5. NO ASA/NSAIDs x 2 weeks  6. OP f/u in 4-6 weeks with Ms. Analy Bundy; will consider an Esophageal manometry later if the patient's dysphagia persists. 7.  Schedule a nuclear medicine gastric emptying scan study within the next week to check for gastroparesis  8. Repeat EGD in 2 to 3 weeks after a strict clear liquid diet on the day prior to the procedure    NSAIDS Non-steroidal Anti-Inflammatory  You have been directed by your physician to avoid any NSAID's; the following medications are a list of those to avoid. If you think that you are taking any NSAID's notify your physician.      Over The Counter  Advil                      Motrin  Nuprin                   Ibuprofen  Midol                     Aleve  Naproxen              Orudis  Aspirin                   Erin-Monona    Prescriptions and Generics  Cataflam              Relafen  Voltaren               Clinoril  Indocin Naproxen  Arthrotec              Lodine  Daypro                 Nalfon  Toradol                Ansaid  Feldene               Meclofenamate  Fenoprofen          Ponstel  Mobic                   Celebrex  Vioxx

## 2023-01-26 ENCOUNTER — TELEPHONE (OUTPATIENT)
Dept: GASTROENTEROLOGY | Age: 38
End: 2023-01-26

## 2023-01-26 NOTE — TELEPHONE ENCOUNTER
PT called today for egd results. I went over Dr Marr Peer results. Pt advised to keep GES apt coming up and the repeat egd in 2-3 weeks.

## 2023-01-30 DIAGNOSIS — G25.81 RLS (RESTLESS LEGS SYNDROME): ICD-10-CM

## 2023-01-30 RX ORDER — PRAMIPEXOLE DIHYDROCHLORIDE 0.25 MG/1
TABLET ORAL
Qty: 90 TABLET | Refills: 1 | Status: SHIPPED | OUTPATIENT
Start: 2023-01-30 | End: 2023-02-14

## 2023-01-30 NOTE — TELEPHONE ENCOUNTER
Rx Refill Note  Requested Prescriptions     Pending Prescriptions Disp Refills   • pramipexole (MIRAPEX) 0.25 MG tablet [Pharmacy Med Name: PRAMIPEXOLE DIHYDROCHLORI 0.25 TABS] 90 tablet 1     Sig: TAKE ONE TABLET BY MOUTH EVERY EVENING      Last office visit with prescribing clinician: 1/16/2023   Last telemedicine visit with prescribing clinician: 2/14/2023   Next office visit with prescribing clinician: 2/14/2023         Hanna Vines MA  01/30/23, 12:45 CST

## 2023-02-06 DIAGNOSIS — R11.2 NAUSEA AND VOMITING, UNSPECIFIED VOMITING TYPE: ICD-10-CM

## 2023-02-06 DIAGNOSIS — F41.9 ANXIETY: ICD-10-CM

## 2023-02-06 DIAGNOSIS — G43.719 INTRACTABLE CHRONIC MIGRAINE WITHOUT AURA AND WITHOUT STATUS MIGRAINOSUS: ICD-10-CM

## 2023-02-06 RX ORDER — ESTRADIOL 0.07 MG/D
FILM, EXTENDED RELEASE TRANSDERMAL
Qty: 8 PATCH | Refills: 0 | Status: SHIPPED | OUTPATIENT
Start: 2023-02-06 | End: 2023-02-08

## 2023-02-06 RX ORDER — PROMETHAZINE HYDROCHLORIDE 25 MG/1
TABLET ORAL
Qty: 30 TABLET | Refills: 2 | Status: SHIPPED | OUTPATIENT
Start: 2023-02-06

## 2023-02-06 RX ORDER — LASMIDITAN 50 MG/1
TABLET ORAL
Qty: 8 TABLET | Refills: 0 | Status: SHIPPED | OUTPATIENT
Start: 2023-02-06 | End: 2023-03-10

## 2023-02-06 RX ORDER — CLONAZEPAM 1 MG/1
TABLET ORAL
Qty: 60 TABLET | Refills: 0 | Status: SHIPPED | OUTPATIENT
Start: 2023-02-06 | End: 2023-02-14 | Stop reason: SDUPTHER

## 2023-02-06 NOTE — TELEPHONE ENCOUNTER
Rx Refill Note  Requested Prescriptions     Pending Prescriptions Disp Refills   • promethazine (PHENERGAN) 25 MG tablet [Pharmacy Med Name: PROMETHAZINE HCL 25MG TABS] 30 tablet 2     Sig: TAKE ONE TABLET BY MOUTH EVERY 6 HOURS AS NEEDED FOR FOR NAUSEA AND VOMITING   • clonazePAM (KlonoPIN) 1 MG tablet [Pharmacy Med Name: CLONAZEPAM 1MG TABS] 60 tablet 2     Sig: TAKE ONE TABLET BY MOUTH TWICE A DAY AS NEEDED FOR ANXIETY      Last office visit with prescribing clinician: 1/16/2023         LABS:UDS/CDA-10/14/2022            Shawanda Tatum MA  02/06/23, 14:25 CST

## 2023-02-07 ENCOUNTER — HOSPITAL ENCOUNTER (OUTPATIENT)
Dept: NUCLEAR MEDICINE | Age: 38
Discharge: HOME | End: 2023-02-09
Payer: MEDICAID

## 2023-02-07 DIAGNOSIS — R11.0 CHRONIC NAUSEA: ICD-10-CM

## 2023-02-07 DIAGNOSIS — G89.29 CHRONIC EPIGASTRIC PAIN: ICD-10-CM

## 2023-02-07 DIAGNOSIS — R13.10 DYSPHAGIA, UNSPECIFIED TYPE: ICD-10-CM

## 2023-02-07 DIAGNOSIS — R11.10 CHRONIC VOMITING: ICD-10-CM

## 2023-02-07 DIAGNOSIS — R10.13 CHRONIC EPIGASTRIC PAIN: ICD-10-CM

## 2023-02-07 PROCEDURE — 3430000000 HC RX DIAGNOSTIC RADIOPHARMACEUTICAL: Performed by: INTERNAL MEDICINE

## 2023-02-07 PROCEDURE — 78264 GASTRIC EMPTYING IMG STUDY: CPT

## 2023-02-07 PROCEDURE — A9541 TC99M SULFUR COLLOID: HCPCS | Performed by: INTERNAL MEDICINE

## 2023-02-07 RX ADMIN — Medication 2 MILLICURIE: at 10:47

## 2023-02-08 ENCOUNTER — TELEPHONE (OUTPATIENT)
Dept: GASTROENTEROLOGY | Age: 38
End: 2023-02-08

## 2023-02-08 DIAGNOSIS — R42 DIZZINESS: ICD-10-CM

## 2023-02-08 DIAGNOSIS — R11.2 NAUSEA AND VOMITING, UNSPECIFIED VOMITING TYPE: ICD-10-CM

## 2023-02-08 RX ORDER — ESTRADIOL 0.07 MG/D
FILM, EXTENDED RELEASE TRANSDERMAL
Qty: 8 PATCH | Refills: 6 | Status: SHIPPED | OUTPATIENT
Start: 2023-02-08 | End: 2023-03-07

## 2023-02-08 RX ORDER — MECLIZINE HYDROCHLORIDE 25 MG/1
TABLET ORAL
Qty: 90 TABLET | Refills: 1 | Status: SHIPPED | OUTPATIENT
Start: 2023-02-08 | End: 2023-04-07

## 2023-02-08 RX ORDER — PROMETHAZINE HYDROCHLORIDE 25 MG/1
TABLET ORAL
Qty: 30 TABLET | Refills: 2 | OUTPATIENT
Start: 2023-02-08

## 2023-02-08 NOTE — TELEPHONE ENCOUNTER
Rx Refill Note  Requested Prescriptions     Pending Prescriptions Disp Refills   • metFORMIN (GLUCOPHAGE) 1000 MG tablet [Pharmacy Med Name: METFORMIN HCL 1000MG TABS] 180 tablet 0     Sig: TAKE ONE TABLET BY MOUTH TWICE A DAY      Last office visit with prescribing clinician: 1/16/2023   Next office visit with prescribing clinician: 2/14/2023                   {Shawanda Tatum MA  02/08/23, 15:44 CST

## 2023-02-08 NOTE — TELEPHONE ENCOUNTER
Rx Refill Note  Requested Prescriptions     Pending Prescriptions Disp Refills   • promethazine (PHENERGAN) 25 MG tablet [Pharmacy Med Name: PROMETHAZINE HCL 25MG TABS] 30 tablet 2     Sig: TAKE ONE TABLET BY MOUTH EVERY 6 HOURS AS NEEDED FOR FOR NAUSEA AND VOMITING   • meclizine (ANTIVERT) 25 MG tablet [Pharmacy Med Name: MECLIZINE HCL 25MG TABS] 90 tablet 1     Sig: TAKE ONE TABLET BY MOUTH THREE TIMES A DAY AS NEEDED FOR DIZZINESS      Last office visit with prescribing clinician: 1/16/2023   Next office visit with prescribing clinician: 2/14/2023                     {Shawanda Tatum MA  02/08/23, 15:33 CST

## 2023-02-08 NOTE — TELEPHONE ENCOUNTER
Pt has been advised.     ----- Message from Trey Best MD sent at 2/8/2023  9:25 AM CST -----  Yes to ensure there are no other additional gastric lesions since the gastric mucosa was not visualized well during recent EGD exam.  Thanks  ----- Message -----  From: Zhou Epstein MA  Sent: 2/7/2023   4:28 PM CST  To: Trey Best MD    Does she still need the repeat endo since this is confirmed?  ----- Message -----  From: Trey Best MD  Sent: 2/7/2023   3:14 PM CST  To: Karyn Preston MA    Delayed gastric emptying/gastroparesis confirmed

## 2023-02-13 DIAGNOSIS — E11.9 TYPE 2 DIABETES MELLITUS WITHOUT COMPLICATION, WITHOUT LONG-TERM CURRENT USE OF INSULIN: ICD-10-CM

## 2023-02-13 RX ORDER — PEN NEEDLE, DIABETIC 32GX 5/32"
NEEDLE, DISPOSABLE MISCELLANEOUS
Qty: 30 EACH | Refills: 5 | Status: SHIPPED | OUTPATIENT
Start: 2023-02-13

## 2023-02-13 NOTE — TELEPHONE ENCOUNTER
Rx Refill Note  Requested Prescriptions     Pending Prescriptions Disp Refills   • BD Pen Needle Ayde U/F 32G X 4 MM misc [Pharmacy Med Name: BD PEN NEEDLE AYDE  32G X 4 MM MISC] 30 each 5     Sig: USE AS DIRECTED WITH VICTOZA      Last office visit with prescribing clinician: 1/16/2023   Last telemedicine visit with prescribing clinician: 2/14/2023   Next office visit with prescribing clinician: 2/14/2023       Dori Tyler MA  02/13/23, 10:30 CST

## 2023-02-14 ENCOUNTER — TELEPHONE (OUTPATIENT)
Dept: FAMILY MEDICINE CLINIC | Facility: CLINIC | Age: 38
End: 2023-02-14
Payer: COMMERCIAL

## 2023-02-14 ENCOUNTER — OFFICE VISIT (OUTPATIENT)
Dept: FAMILY MEDICINE CLINIC | Facility: CLINIC | Age: 38
End: 2023-02-14
Payer: COMMERCIAL

## 2023-02-14 VITALS
TEMPERATURE: 98.2 F | DIASTOLIC BLOOD PRESSURE: 91 MMHG | HEART RATE: 101 BPM | SYSTOLIC BLOOD PRESSURE: 125 MMHG | BODY MASS INDEX: 37.9 KG/M2 | OXYGEN SATURATION: 99 % | WEIGHT: 222 LBS | HEIGHT: 64 IN

## 2023-02-14 DIAGNOSIS — L30.9 ECZEMA, UNSPECIFIED TYPE: ICD-10-CM

## 2023-02-14 DIAGNOSIS — F41.9 ANXIETY: ICD-10-CM

## 2023-02-14 DIAGNOSIS — Z79.899 LONG-TERM USE OF HIGH-RISK MEDICATION: Primary | ICD-10-CM

## 2023-02-14 DIAGNOSIS — K12.0 HERPETIFORM APHTHOUS STOMATITIS: ICD-10-CM

## 2023-02-14 DIAGNOSIS — G25.81 RLS (RESTLESS LEGS SYNDROME): ICD-10-CM

## 2023-02-14 PROCEDURE — 99214 OFFICE O/P EST MOD 30 MIN: CPT | Performed by: NURSE PRACTITIONER

## 2023-02-14 RX ORDER — PRAMIPEXOLE DIHYDROCHLORIDE 0.5 MG/1
0.5 TABLET ORAL EVERY EVENING
Qty: 30 TABLET | Refills: 5 | Status: SHIPPED | OUTPATIENT
Start: 2023-02-14

## 2023-02-14 RX ORDER — TRIAMCINOLONE ACETONIDE 0.1 %
PASTE (GRAM) DENTAL 3 TIMES DAILY
Qty: 5 G | Refills: 5 | Status: SHIPPED | OUTPATIENT
Start: 2023-02-14

## 2023-02-14 RX ORDER — TRIAMCINOLONE ACETONIDE 1 MG/G
1 CREAM TOPICAL 2 TIMES DAILY
Qty: 45 G | Refills: 2 | Status: SHIPPED | OUTPATIENT
Start: 2023-02-14

## 2023-02-14 RX ORDER — CLONAZEPAM 1 MG/1
1 TABLET ORAL 2 TIMES DAILY PRN
Qty: 60 TABLET | Refills: 2 | Status: SHIPPED | OUTPATIENT
Start: 2023-02-14

## 2023-02-14 NOTE — PROGRESS NOTES
"Chief Complaint  3 month follow up and Anxiety (herpes)    Subjective        Chelita Jimenes presents to [facility]  History of Present Illness  MED REFILL:  Patient presents for compliance visit for clonazepam.  It continues to work well for PRN use for anxiety.  UDS and controlled substance agreement are up to date.  PREM attempted to review, not available at present.  LESIONS:  Patient has an ulcerated lesion on her gum line and a rash on her right lower leg.  Both present for quite some time.  Painful in mouth.  Leg is pruritic.  RLS:  Mirapex low dose is no longer effective at controlling night time symptoms.  Patient is wanting to know if the dosage can be increased.    Objective   Vital Signs:  /91 (BP Location: Left arm, Patient Position: Sitting, Cuff Size: Adult)   Pulse 101   Temp 98.2 °F (36.8 °C)   Ht 162.6 cm (64.02\")   Wt 101 kg (222 lb)   SpO2 99%   BMI 38.08 kg/m²   Estimated body mass index is 38.08 kg/m² as calculated from the following:    Height as of this encounter: 162.6 cm (64.02\").    Weight as of this encounter: 101 kg (222 lb).      Physical Exam  Vitals and nursing note reviewed.   Constitutional:       General: She is not in acute distress.     Appearance: Normal appearance. She is obese. She is not ill-appearing.   HENT:      Head: Normocephalic and atraumatic.      Mouth/Throat:      Comments: 3 mm ulcerated lesion at the base of the lower left incisor.  Cardiovascular:      Rate and Rhythm: Regular rhythm. Tachycardia present.      Heart sounds: Normal heart sounds.   Pulmonary:      Effort: Pulmonary effort is normal.      Breath sounds: Normal breath sounds.   Skin:     General: Skin is warm.      Findings: Rash present.      Comments: Macular erythematous rash patchy to left lower anterior leg.   Neurological:      Mental Status: She is alert and oriented to person, place, and time.        Result Review :                Assessment " and Plan   Diagnoses and all orders for this visit:    1. Long-term use of high-risk medication (Primary)    2. Anxiety  -     clonazePAM (KlonoPIN) 1 MG tablet; Take 1 tablet by mouth 2 (Two) Times a Day As Needed for Anxiety.  Dispense: 60 tablet; Refill: 2    3. RLS (restless legs syndrome)  -     pramipexole (MIRAPEX) 0.5 MG tablet; Take 1 tablet by mouth Every Evening.  Dispense: 30 tablet; Refill: 5    4. Herpetiform aphthous stomatitis  -     triamcinolone (KENALOG) 0.1 % paste; Apply  to teeth 3 (Three) Times a Day.  Dispense: 5 g; Refill: 5    5. Eczema, unspecified type  -     triamcinolone (KENALOG) 0.1 % cream; Apply 1 application topically to the appropriate area as directed 2 (Two) Times a Day.  Dispense: 45 g; Refill: 2             Follow Up   Return in about 3 months (around 5/14/2023) for Next scheduled follow up.  Patient was given instructions and counseling regarding her condition or for health maintenance advice. Please see specific information pulled into the AVS if appropriate.     ARIELLE Castro  This note is electronically signed.

## 2023-02-15 ENCOUNTER — TELEPHONE (OUTPATIENT)
Dept: NEUROLOGY | Facility: CLINIC | Age: 38
End: 2023-02-15
Payer: COMMERCIAL

## 2023-02-15 NOTE — TELEPHONE ENCOUNTER
CALLED PATIENTS INSURANCE TO CHECK ON THE APPEAL FOR THE VYEPTI. SHE STATED IT IS STILL IN REVIEW AND THEY ARE WAITING FOR THE CONSENT FORM FROM THE PATIENT. LEFT PATIENT A MESSAGE ON THE 7TH. HAVE NOT HEARD BACK FROM HER YET. MAILED HER A FORM ON THE 26TH. SAID I COULD GIVE PATIENT THE APPEALS NUMBER FOR HER TO CALL. [phone]  SHE ALSO STATED THAT WE COULD FAX IT FOR PATIENT AS WELL. FAX NUMBER [phone]. CALLED PATIENT AND SHE STATED SHE DID NOT GET THE FORM. I SENT HER A MESSAGE ON [employer] WITH THE NUMBER TO CALL FOR HER TO TALK WITH THEM AND TRY AND FIGURE OUT HOW TO GET THIS SENT AGAIN

## 2023-02-21 ENCOUNTER — ANESTHESIA (OUTPATIENT)
Dept: OPERATING ROOM | Age: 38
End: 2023-02-21

## 2023-02-21 ENCOUNTER — APPOINTMENT (OUTPATIENT)
Dept: OPERATING ROOM | Age: 38
End: 2023-02-21

## 2023-02-21 ENCOUNTER — HOSPITAL ENCOUNTER (OUTPATIENT)
Age: 38
Setting detail: OUTPATIENT SURGERY
Discharge: HOME | End: 2023-02-21
Attending: INTERNAL MEDICINE | Admitting: INTERNAL MEDICINE
Payer: MEDICAID

## 2023-02-21 ENCOUNTER — ANESTHESIA EVENT (OUTPATIENT)
Dept: OPERATING ROOM | Age: 38
End: 2023-02-21

## 2023-02-21 ENCOUNTER — HOSPITAL ENCOUNTER (OUTPATIENT)
Age: 38
Setting detail: SPECIMEN
Discharge: HOME | End: 2023-02-21
Payer: MEDICAID

## 2023-02-21 VITALS
DIASTOLIC BLOOD PRESSURE: 88 MMHG | HEART RATE: 85 BPM | OXYGEN SATURATION: 95 % | BODY MASS INDEX: 37.05 KG/M2 | TEMPERATURE: 97.6 F | RESPIRATION RATE: 16 BRPM | WEIGHT: 217 LBS | HEIGHT: 64 IN | SYSTOLIC BLOOD PRESSURE: 132 MMHG

## 2023-02-21 PROCEDURE — 43239 EGD BIOPSY SINGLE/MULTIPLE: CPT | Performed by: INTERNAL MEDICINE

## 2023-02-21 PROCEDURE — 43239 EGD BIOPSY SINGLE/MULTIPLE: CPT

## 2023-02-21 PROCEDURE — 88342 IMHCHEM/IMCYTCHM 1ST ANTB: CPT

## 2023-02-21 PROCEDURE — 88305 TISSUE EXAM BY PATHOLOGIST: CPT

## 2023-02-21 RX ORDER — PROPOFOL 10 MG/ML
INJECTION, EMULSION INTRAVENOUS PRN
Status: DISCONTINUED | OUTPATIENT
Start: 2023-02-21 | End: 2023-02-21 | Stop reason: SDUPTHER

## 2023-02-21 RX ORDER — LIDOCAINE HYDROCHLORIDE 10 MG/ML
INJECTION, SOLUTION EPIDURAL; INFILTRATION; INTRACAUDAL; PERINEURAL PRN
Status: DISCONTINUED | OUTPATIENT
Start: 2023-02-21 | End: 2023-02-21 | Stop reason: SDUPTHER

## 2023-02-21 RX ORDER — ONDANSETRON 2 MG/ML
4 INJECTION INTRAMUSCULAR; INTRAVENOUS
Status: CANCELLED | OUTPATIENT
Start: 2023-02-21 | End: 2023-02-22

## 2023-02-21 RX ORDER — SODIUM CHLORIDE, SODIUM LACTATE, POTASSIUM CHLORIDE, CALCIUM CHLORIDE 600; 310; 30; 20 MG/100ML; MG/100ML; MG/100ML; MG/100ML
INJECTION, SOLUTION INTRAVENOUS CONTINUOUS
Status: DISCONTINUED | OUTPATIENT
Start: 2023-02-21 | End: 2023-02-21 | Stop reason: HOSPADM

## 2023-02-21 RX ADMIN — LIDOCAINE HYDROCHLORIDE 30 MG: 10 INJECTION, SOLUTION EPIDURAL; INFILTRATION; INTRACAUDAL; PERINEURAL at 10:33

## 2023-02-21 RX ADMIN — PROPOFOL 330 MG: 10 INJECTION, EMULSION INTRAVENOUS at 10:33

## 2023-02-21 RX ADMIN — SODIUM CHLORIDE, SODIUM LACTATE, POTASSIUM CHLORIDE, CALCIUM CHLORIDE: 600; 310; 30; 20 INJECTION, SOLUTION INTRAVENOUS at 08:47

## 2023-02-21 NOTE — OP NOTE
Endoscopic Procedure Note    Patient: Pantera Mcghee : 1985  Med Rec#: [id number] Acc#: [id number]     Primary Care Provider DANY Beatty - CNP    Endoscopist: Florin Motley MD, MD    Date of Procedure:  2023    Procedure:   1. EGD with cold biopsies    Indications:     History of dysphagia, unspecified type-improving since esophageal dilation done few weeks ago   2. Nausea and vomiting  3. History of blood in vomit/hematemesis recently  4.  Epigastric pain   5. Suboptimal EGD exam recently due to presence of large amounts of food in the stomach requiring this repeat exam  6. Gastroparesis confirmed recently by nuclear med gastric emptying scan    Anesthesia:  Sedation was administered by anesthesia who monitored the patient during the procedure. Estimated Blood Loss: minimal    Procedure:   After reviewing the patient's chart and obtaining informed consent, the patient was placed in the left lateral decubitus position. A forward-viewing Olympus endoscope was lubricated and inserted through the mouth into the oropharynx. Under direct visualization, the upper esophagus was intubated. The scope was advanced to the level of the third portion of duodenum. Scope was slowly withdrawn with careful inspection of the mucosal surfaces. The scope was retroflexed for inspection of the gastric fundus and incisura. Findings and maneuvers are listed in impression below. The patient tolerated the procedure well. The scope was removed. There were no immediate complications. Findings/IMPRESSION:  Esophagus: normal and EG junction at 36 cm also was normal.    NO erosions or ulcers or nodules or strictures or webs or rings or mass lesions or extrinsic compression or diverticula noted. Random cold biopsies were taken to check for EoE and NERD. There is no obvious hiatal hernia present.      Stomach:  abnormal: Patchy mucosal changes with linear erythema and few small 1 to 2 mm erosions in the antrum suggestive of chemical gastritis noted; also few small sessile benign-appearing 3 to 5 mm in diameter polyps were noted in the gastric body likely due to her use of acid suppression medication-  Gastric biopsies were taken from the antrum and from the polyps in the body  to rule out Helicobacter pylori infection and to confirm hyperplastic nature of the polyps. NO ulcers or masses or gastric outlet obstruction or retained food or fluid. Rugae were normal and lumen distended well with insufflation. Retroflexed views otherwise revealed a normal GE junction, fundus and cardia as well. Duodenum: normal       RECOMMENDATIONS:    1. Await path results, the patient will be contacted in 7-10 days with biopsy results. 2.  Follow a gastroparesis/low residue ADA diet with small soft/puréed frequent meals  3. Continue PPI and anti-GERD measures    - - Resume previous meds including for her DM2 the optimal control of which will be crucial to manage her gastroparesis and reduce atomic dysfunction  - GI clinic f/u 4 weeks with Ms. Angy Brewer   - Keep scheduled f/u appts with other MDs     - NO ASA/NSAIDs x 2 weeks     The results were discussed with the patient and family. A copy of the images obtained were given to the patient.      (Please note that portions of this note were completed with a voice recognition program. Efforts were made to edit the dictations but occasionally words may be mis-transcribed.)     Danise Scheuermann, MD, MD  2/21/2023  10:53 AM

## 2023-02-21 NOTE — H&P
Patient Name: Xochitl Barraza  : 1985  MRN: [id number]  DATE: 23    Allergies: Allergies   Allergen Reactions    Lithium      Difficulty breathing    Tramadol Other (See Comments)     Hard to breath    Apriso [Mesalamine Er]      Nausea and Vomitting    Lamictal [Lamotrigine]     Mesalamine      Vomiting           ENDOSCOPY  History and Physical    Procedure:    [] Diagnostic Colonoscopy       [] Screening Colonoscopy  [x] EGD      [] ERCP      [] EUS       [] Other    [x] Previous office notes/History and Physical reviewed from the patients chart. Please see EMR for further details of HPI. I have examined the patient's status immediately prior to the procedure and:      Indications/HPI:     History of dysphagia, unspecified type-improving since esophageal dilation done few weeks ago   2. Nausea and vomiting  3. History of blood in vomit/hematemesis recently  4.  Epigastric pain   5. Suboptimal EGD exam recently due to presence of large amounts of food in the stomach requiring this repeat exam  6. Gastroparesis confirmed recently by nuclear med gastric emptying scan    []Abdominal Pain   []Cancer- GI/Lung     []Fhx of colon CA/polyps  []History of Polyps  []Barretts            []Melena  []Abnormal Imaging              []Dysphagia              []Persistent Pneumonia   []Anemia                            []Food Impaction        []History of Polyps  [] GI Bleed             []Pulmonary nodule/Mass   []Change in bowel habits []Heartburn/Reflux  []Rectal Bleed (BRBPR)  []Chest Pain - Non Cardiac []Heme (+) Stool []Ulcers  []Constipation  []Hemoptysis  []Varices  []Diarrhea  []Hypoxemia    []Nausea/Vomiting   []Screening   []Crohns/Colitis  []Other:     Anesthesia:   [x] MAC [] Moderate Sedation   [] General   [] None     ROS: 12 pt Review of Symptoms was negative unless mentioned above    Medications:   Prior to Admission medications    Medication Sig Start Date End Date Taking?  Authorizing Provider acetaminophen (TYLENOL) 325 MG tablet Take 650 mg by mouth every 6 hours as needed for Pain    Historical Provider, MD   albuterol sulfate HFA (PROVENTIL;VENTOLIN;PROAIR) 108 (90 Base) MCG/ACT inhaler Inhale 2 puffs into the lungs every 6 hours as needed for Wheezing    Historical Provider, MD   amitriptyline (ELAVIL) 50 MG tablet Take 50 mg by mouth nightly    Historical Provider, MD   atorvastatin (LIPITOR) 80 MG tablet Take 80 mg by mouth at bedtime    Historical Provider, MD   baclofen (LIORESAL) 20 MG tablet Take 20 mg by mouth daily    Historical Provider, MD   doxepin (SINEQUAN) 50 MG capsule Take 50 mg by mouth nightly    Historical Provider, MD   DULoxetine (CYMBALTA) 30 MG extended release capsule Take 30 mg by mouth daily    Historical Provider, MD   estradiol (VIVELLE) 0.05 MG/24HR Place 1 patch onto the skin Twice a Week    Historical Provider, MD   ezetimibe (ZETIA) 10 MG tablet Take 10 mg by mouth daily    Historical Provider, MD   furosemide (LASIX) 20 MG tablet Take 20 mg by mouth daily    Historical Provider, MD   ibuprofen (ADVIL;MOTRIN) 600 MG tablet Take 600 mg by mouth every 6 hours as needed for Pain    Historical Provider, MD   meclizine (ANTIVERT) 25 MG tablet Take 25 mg by mouth 3 times daily as needed for Dizziness    Historical Provider, MD   Rimegepant Sulfate (NURTEC) 75 MG TBDP Take by mouth as needed (MIGRAINE)    Historical Provider, MD   promethazine (PHENERGAN) 25 MG tablet Take 25 mg by mouth every 6 hours as needed for Nausea    Historical Provider, MD   Atogepant (QULIPTA) 60 MG TABS Take 60 mg by mouth daily    Historical Provider, MD   Lasmiditan Succinate (REYVOW) 50 MG TABS Take 50 mg by mouth as needed (FOR MIGRAINE)    Historical Provider, MD   pantoprazole (PROTONIX) 40 MG tablet Take 1 tablet by mouth every morning (before breakfast) 12/13/22   DANY Floyd   HYDROcodone-acetaminophen (NORCO) 5-325 MG per tablet Take 1 tablet by mouth every 6 hours as needed for Pain. Historical Provider, MD   clonazePAM (KLONOPIN) 1 MG tablet Take 1 mg by mouth 2 times daily as needed for Anxiety.     Historical Provider, MD   Liraglutide (VICTOZA) 18 MG/3ML SOPN SC injection Inject 1.8 mg into the skin daily    Historical Provider, MD   isosorbide mononitrate (IMDUR) 30 MG extended release tablet Take 30 mg by mouth daily    Historical Provider, MD   amLODIPine (NORVASC) 10 MG tablet Take 10 mg by mouth daily    Historical Provider, MD   carvedilol (COREG) 3.125 MG tablet Take 3.125 mg by mouth 2 times daily (with meals) 2 tab bid    Historical Provider, MD   QUEtiapine (SEROQUEL) 400 MG tablet Take 800 mg by mouth nightly    Historical Provider, MD   pramipexole (MIRAPEX) 0.25 MG tablet Take 0.25 mg by mouth daily    Historical Provider, MD   metFORMIN (GLUCOPHAGE) 1000 MG tablet Take 1,000 mg by mouth daily (with breakfast) 2/15/17   Historical Provider, MD       Past Medical History:  Past Medical History:   Diagnosis Date    Asthma     Autonomic dysfunction     Diabetes mellitus, type 2 ([facility] [address]     Hyperlipidemia     Hypertension     Migraines     Orthostatic hypotension     Osteoarthritis     osteo    PCOS (polycystic ovarian syndrome)     POTS (postural orthostatic tachycardia syndrome)     Restless legs     Sinus tachycardia     Spinal cord tumor        Past Surgical History:  Past Surgical History:   Procedure Laterality Date     SECTION      CHOLECYSTECTOMY      COLON SURGERY      Colon resection     COLONOSCOPY  2015    Diverticulosis per patient    DILATION AND CURETTAGE OF UTERUS      HYSTERECTOMY VAGINAL N/A 2017    HYSTERECTOMY VAGINAL LAPAROSCOPIC ASSISTED (LAVH) performed by Betzaida Winters MD at [address]  2015    Dilation and ulcers per patient     UPPER GASTROINTESTINAL ENDOSCOPY N/A 2021    Dr Shaquille Gastelum, (-)[facility] [place]    UPPER GASTROINTESTINAL ENDOSCOPY N/A 2023     Candace-fabiana/[id number], incomplete exam due to presence of large amounts of fatty food and liquid occupying most of the gastric body antrum as well as parts of the duodenal bulb, suggestive of gastroparesis, GES ordered-GERD, no sprue, repeat egd in 2-3 wks    UPPER GASTROINTESTINAL ENDOSCOPY  01/24/2023    Dr Mendes, incomplete exam due to presence of large amounts of fatty food and liquid occupying most of the gastric body antrum as well as parts of the duodenal bulb, suggestive of gastroparesis, GES ordered-GERD, no sprue, repeat egd in 2-3 wks       Social History:  Social History     Tobacco Use    Smoking status: Never    Smokeless tobacco: Never    Tobacco comments:     Vape CBD   Vaping Use    Vaping Use: Some days    Start date: 11/17/2019    Substances: THC, CBD    Devices: [employer] tank   Substance Use Topics    Alcohol use: Yes     Comment: rare    Drug use: Yes     Types: Marijuana [place])     Comment: CBD 2/19/23       Vital Signs:   Vitals:    02/21/23 0838   BP: (!) 128/90   Pulse: 86   Resp: 16   Temp: 97.5 °F (36.4 °C)   SpO2: 99%        Physical Exam:  Cardiac:  [x]WNL  []Comments:  Pulmonary:  [x]WNL   []Comments:  Neuro/Mental Status:  [x]WNL  []Comments:  Abdominal:  [x]WNL    []Comments:  Other:   []WNL  []Comments:    Informed Consent:  The risks and benefits of the procedure have been discussed with either the patient or if they cannot consent, their representative. Assessment:  Patient examined and appropriate for planned sedation and procedure. Plan:  Proceed with planned sedation and procedure as above.          Mario Flowers MD

## 2023-02-21 NOTE — ANESTHESIA PRE PROCEDURE
Department of Anesthesiology  Preprocedure Note       Name:  Radha Matthew   Age:  44 y.o.  :  1985                                          MRN:  [id number]         Date:  2023      Surgeon: Gela Rodriguez):  Manasa Agustin MD    Procedure: Procedure(s):  EGD BIOPSY    Medications prior to admission:   Prior to Admission medications    Medication Sig Start Date End Date Taking?  Authorizing Provider   acetaminophen (TYLENOL) 325 MG tablet Take 650 mg by mouth every 6 hours as needed for Pain    Historical Provider, MD   albuterol sulfate HFA (PROVENTIL;VENTOLIN;PROAIR) 108 (90 Base) MCG/ACT inhaler Inhale 2 puffs into the lungs every 6 hours as needed for Wheezing    Historical Provider, MD   amitriptyline (ELAVIL) 50 MG tablet Take 50 mg by mouth nightly    Historical Provider, MD   atorvastatin (LIPITOR) 80 MG tablet Take 80 mg by mouth at bedtime    Historical Provider, MD   baclofen (LIORESAL) 20 MG tablet Take 20 mg by mouth daily    Historical Provider, MD   doxepin (SINEQUAN) 50 MG capsule Take 50 mg by mouth nightly    Historical Provider, MD   DULoxetine (CYMBALTA) 30 MG extended release capsule Take 30 mg by mouth daily    Historical Provider, MD   estradiol (VIVELLE) 0.05 MG/24HR Place 1 patch onto the skin Twice a Week    Historical Provider, MD   ezetimibe (ZETIA) 10 MG tablet Take 10 mg by mouth daily    Historical Provider, MD   furosemide (LASIX) 20 MG tablet Take 20 mg by mouth daily    Historical Provider, MD   ibuprofen (ADVIL;MOTRIN) 600 MG tablet Take 600 mg by mouth every 6 hours as needed for Pain    Historical Provider, MD   meclizine (ANTIVERT) 25 MG tablet Take 25 mg by mouth 3 times daily as needed for Dizziness    Historical Provider, MD   Rimegepant Sulfate (NURTEC) 75 MG TBDP Take by mouth as needed (MIGRAINE)    Historical Provider, MD   promethazine (PHENERGAN) 25 MG tablet Take 25 mg by mouth every 6 hours as needed for Nausea    Historical Provider, MD   Atogepant (QULIPTA) 60 MG TABS Take 60 mg by mouth daily    Historical Provider, MD   Lasmiditan Succinate (REYVOW) 50 MG TABS Take 50 mg by mouth as needed (FOR MIGRAINE)    Historical Provider, MD   pantoprazole (PROTONIX) 40 MG tablet Take 1 tablet by mouth every morning (before breakfast) 12/13/22   DANY Geronimo   HYDROcodone-acetaminophen (NORCO) 5-325 MG per tablet Take 1 tablet by mouth every 6 hours as needed for Pain. Historical Provider, MD   clonazePAM (KLONOPIN) 1 MG tablet Take 1 mg by mouth 2 times daily as needed for Anxiety. Historical Provider, MD   Liraglutide (VICTOZA) 18 MG/3ML SOPN SC injection Inject 1.8 mg into the skin daily    Historical Provider, MD   isosorbide mononitrate (IMDUR) 30 MG extended release tablet Take 30 mg by mouth daily    Historical Provider, MD   amLODIPine (NORVASC) 10 MG tablet Take 10 mg by mouth daily    Historical Provider, MD   carvedilol (COREG) 3.125 MG tablet Take 3.125 mg by mouth 2 times daily (with meals) 2 tab bid    Historical Provider, MD   QUEtiapine (SEROQUEL) 400 MG tablet Take 800 mg by mouth nightly    Historical Provider, MD   pramipexole (MIRAPEX) 0.25 MG tablet Take 0.25 mg by mouth daily    Historical Provider, MD   metFORMIN (GLUCOPHAGE) 1000 MG tablet Take 1,000 mg by mouth daily (with breakfast) 2/15/17   Historical Provider, MD       Current medications:    No current facility-administered medications for this visit. No current outpatient medications on file. Facility-Administered Medications Ordered in Other Visits   Medication Dose Route Frequency Provider Last Rate Last Admin    lactated ringers IV soln infusion   IntraVENous Continuous DANY Hutchinson - CRNA 100 mL/hr at 02/21/23 0847 New Bag at 02/21/23 0847       Allergies:     Allergies   Allergen Reactions    Lithium      Difficulty breathing    Tramadol Other (See Comments)     Hard to breath    Apriso [Mesalamine Er]      Nausea and Vomitting    Lamictal [Lamotrigine]     Mesalamine      Vomiting          Problem List:    Patient Active Problem List   Diagnosis Code    Obstructive sleep apnea G47.33    Somnolence, daytime R40.0    Snoring R06.83    Insomnia G47.00    Restless leg syndrome G25.81    Tachycardia R00.0    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       Past Medical History:        Diagnosis Date    Asthma     Autonomic dysfunction     Diabetes mellitus, type 2 (HCC)     Hyperlipidemia     Hypertension     Migraines     Orthostatic hypotension     Osteoarthritis     osteo    PCOS (polycystic ovarian syndrome)     POTS (postural orthostatic tachycardia syndrome)     Restless legs     Sinus tachycardia     Spinal cord tumor        Past Surgical History:        Procedure Laterality Date     SECTION      CHOLECYSTECTOMY      COLON SURGERY      Colon resection     COLONOSCOPY  2015    Diverticulosis per patient    DILATION AND CURETTAGE OF UTERUS      HYSTERECTOMY VAGINAL N/A 2017    HYSTERECTOMY VAGINAL LAPAROSCOPIC ASSISTED (LAVH) performed by Brant Delgadillo MD at [facility]      Dilation and ulcers per patient     UPPER GASTROINTESTINAL ENDOSCOPY N/A 2021    Dr Linda Campbell, (-)Celiac [place]    UPPER GASTROINTESTINAL ENDOSCOPY N/A 2023    Dr Chris Bain, incomplete exam due to presence of large amounts of fatty food and liquid occupying most of the gastric body antrum as well as parts of the duodenal bulb, suggestive of gastroparesis, GES ordered-GERD, no sprue, repeat egd in 2-3 wks    UPPER GASTROINTESTINAL ENDOSCOPY  2023    Dr Chris Bain, incomplete exam due to presence of large amounts of fatty food and liquid occupying most of the gastric body antrum as well as parts of the duodenal bulb, suggestive of gastroparesis, GES ordered-GERD, no sprue, repeat egd in 2-3 wks       Social History:    Social History     Tobacco Use    Smoking status: Never    Smokeless tobacco: Never    Tobacco comments:     Vape CBD   Substance Use Topics    Alcohol use: Yes     Comment: rare                                Counseling given: Not Answered  Tobacco comments: Vape CBD      Vital Signs (Current): There were no vitals filed for this visit. BP Readings from Last 3 Encounters:   02/21/23 (!) 128/90   01/24/23 (!) 141/86   12/13/22 139/70       NPO Status:                                                                                 BMI:   Wt Readings from Last 3 Encounters:   02/21/23 217 lb (98.4 kg)   01/24/23 210 lb (95.3 kg)   12/13/22 200 lb (90.7 kg)     There is no height or weight on file to calculate BMI.    CBC:   Lab Results   Component Value Date/Time    WBC 15.0 11/07/2021 10:42 PM    RBC 4.68 11/07/2021 10:42 PM    HGB 13.5 11/07/2021 10:42 PM    HCT 40.7 11/07/2021 10:42 PM    MCV 87.0 11/07/2021 10:42 PM    RDW 13.0 11/07/2021 10:42 PM     11/07/2021 10:42 PM       CMP:   Lab Results   Component Value Date/Time     11/07/2021 10:42 PM    K 3.7 11/07/2021 10:42 PM     11/07/2021 10:42 PM    CO2 24 11/07/2021 10:42 PM    BUN 10 11/07/2021 10:42 PM    CREATININE 0.7 11/07/2021 10:42 PM    GFRAA >59 11/07/2021 10:42 PM    LABGLOM >60 11/07/2021 10:42 PM    GLUCOSE 118 11/07/2021 10:42 PM    PROT 7.6 11/07/2021 10:42 PM    CALCIUM 9.3 11/07/2021 10:42 PM    BILITOT 0.3 11/07/2021 10:42 PM    ALKPHOS 75 11/07/2021 10:42 PM    AST 14 11/07/2021 10:42 PM    ALT 28 11/07/2021 10:42 PM       POC Tests:   No results for input(s): POCGLU, POCNA, POCK, POCCL, POCBUN, POCHEMO, POCHCT in the last 72 hours.     Coags:   Lab Results   Component Value Date/Time    PROTIME 13.3 08/25/2018 09:15 PM    INR 1.02 08/25/2018 09:15 PM       HCG (If Applicable):   Lab Results   Component Value Date    PREGTESTUR Negative 05/26/2017        ABGs: No results found for: PHART, PO2ART, MHN0RBV, GTV5MMS, BEART, C4JWCNKB     Type & Screen (If Applicable):  No results found for: LABABO, LABRH    Drug/Infectious Status (If Applicable):  No results found for: HIV, HEPCAB    COVID-19 Screening (If Applicable): No results found for: COVID19        Anesthesia Evaluation  Patient summary reviewed and Nursing notes reviewed   history of anesthetic complications: PONV. Airway: Mallampati: II  TM distance: >3 FB   Neck ROM: full  Mouth opening: > = 3 FB   Dental:    (+) poor dentition  Comment: Multiple broken teeth    Pulmonary: breath sounds clear to auscultation  (+) sleep apnea: on CPAP,  asthma:                            Cardiovascular:  Exercise tolerance: good (>4 METS),   (+) hypertension: no interval change, hyperlipidemia      NYHA Classification: I    Rhythm: regular  Rate: normal           Beta Blocker:  Dose within 24 Hrs      ROS comment: POTS (postural orthostatic tachycardia syndrome)  Orthostatic hypotension     Neuro/Psych:   (+) headaches: migraine headaches,              ROS comment: Autonomic dysfunction  Restless leg syndrome GI/Hepatic/Renal:   (+) morbid obesity          Endo/Other:    (+) DiabetesType II DM, , : arthritis: OA., .                  ROS comment: Polycystic ovarian disease  Carlton-Jacob syndrome (skin) over 30-39% of body Abdominal:   (+) obese,     Abdomen: soft. Vascular: negative vascular ROS. Other Findings:             Anesthesia Plan      TIVA and general     ASA 2       Induction: intravenous. Anesthetic plan and risks discussed with patient.                         DANY Roldan - CRNA   2/21/2023

## 2023-02-21 NOTE — DISCHARGE INSTRUCTIONS
1. Await path results, the patient will be contacted in 7-10 days with biopsy results. 2.  Follow a gastroparesis/low residue ADA diet with small soft/puréed frequent meals  3. Continue PPI and anti-GERD measures    - - Resume previous meds including for her DM2 the optimal control of which will be crucial to manage her gastroparesis and reduce atomic dysfunction  - GI clinic f/u 4 weeks with Ms. Jazlyn Whitmore   - Keep scheduled f/u appts with other MDs     - NO ASA/NSAIDs x 2 weeksNSAIDS Non-steroidal Anti-Inflammatory  You have been directed by your physician to avoid any NSAID's; the following medications are a list of those to avoid. If you think that you are taking any NSAID's notify your physician. Over The Counter  Advil                      Motrin  Nuprin                   Ibuprofen  Midol                     Aleve  Naproxen              Orudis  Aspirin                   Erin-Reddell      Prescriptions and Generics    Cataflam              Relafen  Voltaren               Clinoril  Indocin                 Naproxen  Arthrotec              Lodine  Daypro                 Nalfon  Toradol                Ansaid  Feldene               Meclofenamate  Fenoprofen          Ponstel  Mobic                   Celebrex  Vioxx     POST-OP ORDERS: ENDOSCOPY & COLONOSCOPY:    1. Rest today. 2. DO NOT eat or drink until wide awake; eat your usual diet today in moderate amount only. 3. DO NOT drive today. 4. Call physician if you have severe pain, vomiting, fever, rectal bleeding or black bowel movements. 5.  If a biopsy was taken or a polyp removed, you should expect to hear results in about 21 days. If you have heard nothing from your physician by then, call the office for results. 6.  Discharge home when patient awake, vitals signs stable and tolerating liquids. POST-OP ORDERS: ENDOSCOPY & COLONOSCOPY:    1. Rest today.   2. DO NOT eat or drink until wide awake; eat your usual diet today in moderate amount only.  3. DO NOT drive today. 4. Call physician if you have severe pain, vomiting, fever, rectal bleeding or black bowel movements. 5.  If a biopsy was taken or a polyp removed, you should expect to hear results in about 21 days. If you have heard nothing from your physician by then, call the office for results. 6.  Discharge home when patient awake, vitals signs stable and tolerating liquids.

## 2023-03-01 ENCOUNTER — PATIENT MESSAGE (OUTPATIENT)
Dept: FAMILY MEDICINE CLINIC | Facility: CLINIC | Age: 38
End: 2023-03-01
Payer: COMMERCIAL

## 2023-03-02 NOTE — TELEPHONE ENCOUNTER
From: Chelita Jimenes  To: Idalmis Mcdermott  Sent: 3/1/2023 4:02 PM CST  Subject: Sleeping meds    My therapist said I needed to stop seuroquil and try one of these meds for sleep    normal... Well appearing, awake, alert, oriented to person, place, time/situation and in no apparent distress.

## 2023-03-07 DIAGNOSIS — R60.1 GENERALIZED EDEMA: ICD-10-CM

## 2023-03-07 DIAGNOSIS — G43.719 INTRACTABLE CHRONIC MIGRAINE WITHOUT AURA AND WITHOUT STATUS MIGRAINOSUS: ICD-10-CM

## 2023-03-07 RX ORDER — FUROSEMIDE 20 MG/1
TABLET ORAL
Qty: 90 TABLET | Refills: 1 | Status: SHIPPED | OUTPATIENT
Start: 2023-03-07 | End: 2023-03-14 | Stop reason: ALTCHOICE

## 2023-03-07 RX ORDER — ESTRADIOL 0.07 MG/D
PATCH, EXTENDED RELEASE TRANSDERMAL
Qty: 8 PATCH | Refills: 12 | Status: SHIPPED | OUTPATIENT
Start: 2023-03-07

## 2023-03-07 NOTE — TELEPHONE ENCOUNTER
Rx Refill Note  Requested Prescriptions     Pending Prescriptions Disp Refills   • furosemide (LASIX) 20 MG tablet [Pharmacy Med Name: FUROSEMIDE 20MG TABS] 30 tablet 5     Sig: TAKE ONE TABLET BY MOUTH EVERY DAY      Last office visit with prescribing clinician: 2/14/2023   Last telemedicine visit with prescribing clinician: 3/8/2023   Next office visit with prescribing clinician: 3/8/2023     Dori Tyler MA  03/07/23, 14:36 CST

## 2023-03-08 DIAGNOSIS — G43.719 INTRACTABLE CHRONIC MIGRAINE WITHOUT AURA AND WITHOUT STATUS MIGRAINOSUS: ICD-10-CM

## 2023-03-08 RX ORDER — ATOGEPANT 60 MG/1
60 TABLET ORAL DAILY
Qty: 90 TABLET | Refills: 1 | Status: SHIPPED | OUTPATIENT
Start: 2023-03-08

## 2023-03-10 RX ORDER — LASMIDITAN 50 MG/1
TABLET ORAL
Qty: 8 TABLET | Refills: 0 | Status: SHIPPED | OUTPATIENT
Start: 2023-03-10 | End: 2023-04-07

## 2023-03-14 ENCOUNTER — OFFICE VISIT (OUTPATIENT)
Dept: FAMILY MEDICINE CLINIC | Facility: CLINIC | Age: 38
End: 2023-03-14
Payer: COMMERCIAL

## 2023-03-14 VITALS
HEART RATE: 108 BPM | TEMPERATURE: 98.5 F | HEIGHT: 64 IN | OXYGEN SATURATION: 99 % | DIASTOLIC BLOOD PRESSURE: 99 MMHG | WEIGHT: 228.2 LBS | SYSTOLIC BLOOD PRESSURE: 132 MMHG | BODY MASS INDEX: 38.96 KG/M2

## 2023-03-14 DIAGNOSIS — M51.36 DDD (DEGENERATIVE DISC DISEASE), LUMBAR: ICD-10-CM

## 2023-03-14 DIAGNOSIS — R60.9 PERIPHERAL EDEMA: ICD-10-CM

## 2023-03-14 DIAGNOSIS — M15.8 OTHER OSTEOARTHRITIS INVOLVING MULTIPLE JOINTS: ICD-10-CM

## 2023-03-14 DIAGNOSIS — Z79.899 LONG-TERM USE OF HIGH-RISK MEDICATION: Primary | ICD-10-CM

## 2023-03-14 DIAGNOSIS — M62.830 LUMBAR PARASPINAL MUSCLE SPASM: ICD-10-CM

## 2023-03-14 DIAGNOSIS — F51.01 PRIMARY INSOMNIA: ICD-10-CM

## 2023-03-14 PROCEDURE — 3080F DIAST BP >= 90 MM HG: CPT | Performed by: NURSE PRACTITIONER

## 2023-03-14 PROCEDURE — 1159F MED LIST DOCD IN RCRD: CPT | Performed by: NURSE PRACTITIONER

## 2023-03-14 PROCEDURE — 1160F RVW MEDS BY RX/DR IN RCRD: CPT | Performed by: NURSE PRACTITIONER

## 2023-03-14 PROCEDURE — 99214 OFFICE O/P EST MOD 30 MIN: CPT | Performed by: NURSE PRACTITIONER

## 2023-03-14 PROCEDURE — 3075F SYST BP GE 130 - 139MM HG: CPT | Performed by: NURSE PRACTITIONER

## 2023-03-14 RX ORDER — HYDROCODONE BITARTRATE AND ACETAMINOPHEN 5; 325 MG/1; MG/1
1 TABLET ORAL 4 TIMES DAILY
Qty: 120 TABLET | Refills: 0 | Status: SHIPPED | OUTPATIENT
Start: 2023-03-14

## 2023-03-14 RX ORDER — BACLOFEN 20 MG/1
20 TABLET ORAL 2 TIMES DAILY
Qty: 60 TABLET | Refills: 2 | Status: SHIPPED | OUTPATIENT
Start: 2023-03-14

## 2023-03-14 RX ORDER — BUMETANIDE 1 MG/1
1 TABLET ORAL DAILY
Qty: 30 TABLET | Refills: 2 | Status: SHIPPED | OUTPATIENT
Start: 2023-03-14

## 2023-03-14 RX ORDER — TEMAZEPAM 15 MG/1
15 CAPSULE ORAL NIGHTLY PRN
Qty: 30 CAPSULE | Refills: 2 | Status: SHIPPED | OUTPATIENT
Start: 2023-03-14

## 2023-03-14 NOTE — PROGRESS NOTES
"Chief Complaint  Medication Problem (Discuss taking over pain medication ) and Insomnia (Discuss changing sleeping medications, wants to discontinue Seroquel )    Subjective        Chelita Jimenes presents to [facility]  History of Present Illness  Patient has been on 5 mg Norco long term for back and arthritis pain.  She has been treated most recently at [facility].  She is disgruntled due to seeing a different provider each time and wants to move her treatment to PCP.  UDS and controlled substance agreement will be entered today.  PREM reviewed.    Objective   Vital Signs:  /99 (BP Location: Left arm, Patient Position: Sitting, Cuff Size: Large Adult)   Pulse 108   Temp 98.5 °F (36.9 °C)   Ht 162.6 cm (64\")   Wt 104 kg (228 lb 3.2 oz)   SpO2 99%   BMI 39.17 kg/m²   Estimated body mass index is 39.17 kg/m² as calculated from the following:    Height as of this encounter: 162.6 cm (64\").    Weight as of this encounter: 104 kg (228 lb 3.2 oz).      Physical Exam  Vitals and nursing note reviewed.   Constitutional:       General: She is not in acute distress.     Appearance: Normal appearance. She is obese. She is not ill-appearing.   HENT:      Head: Normocephalic and atraumatic.   Cardiovascular:      Rate and Rhythm: Regular rhythm. Tachycardia present.   Musculoskeletal:      Comments: Diminished lumbar ROM.   Skin:     General: Skin is warm and dry.   Neurological:      Mental Status: She is alert and oriented to person, place, and time.        Result Review :                Assessment and Plan   Diagnoses and all orders for this visit:    1. Long-term use of high-risk medication (Primary)  -     Compliance Drug Analysis, Ur - Urine, Clean Catch; Future  -     Compliance Drug Analysis, Ur - Urine, Clean Catch    2. Primary insomnia  -     temazepam (Restoril) 15 MG capsule; Take 1 capsule by mouth At Night As Needed for Sleep.  Dispense: 30 " capsule; Refill: 2  -     Compliance Drug Analysis, Ur - Urine, Clean Catch; Future  -     Compliance Drug Analysis, Ur - Urine, Clean Catch    3. DDD (degenerative disc disease), lumbar  -     HYDROcodone-acetaminophen (NORCO) 5-325 MG per tablet; Take 1 tablet by mouth 4 (Four) Times a Day.  Dispense: 120 tablet; Refill: 0  -     Compliance Drug Analysis, Ur - Urine, Clean Catch; Future  -     Compliance Drug Analysis, Ur - Urine, Clean Catch    4. Other osteoarthritis involving multiple joints  -     HYDROcodone-acetaminophen (NORCO) 5-325 MG per tablet; Take 1 tablet by mouth 4 (Four) Times a Day.  Dispense: 120 tablet; Refill: 0  -     Compliance Drug Analysis, Ur - Urine, Clean Catch; Future  -     Compliance Drug Analysis, Ur - Urine, Clean Catch    5. Lumbar paraspinal muscle spasm  -     baclofen (LIORESAL) 20 MG tablet; Take 1 tablet by mouth 2 (Two) Times a Day.  Dispense: 60 tablet; Refill: 2    6. Peripheral edema  -     bumetanide (Bumex) 1 MG tablet; Take 1 tablet by mouth Daily.  Dispense: 30 tablet; Refill: 2             Follow Up   Return for keep scheduled appt.  Patient was given instructions and counseling regarding her condition or for health maintenance advice. Please see specific information pulled into the AVS if appropriate.     ARIELLE Castro  This note is electronically signed.

## 2023-03-20 LAB — DRUGS UR: NORMAL

## 2023-04-06 DIAGNOSIS — R42 DIZZINESS: ICD-10-CM

## 2023-04-06 DIAGNOSIS — G43.719 INTRACTABLE CHRONIC MIGRAINE WITHOUT AURA AND WITHOUT STATUS MIGRAINOSUS: ICD-10-CM

## 2023-04-06 DIAGNOSIS — F51.01 PRIMARY INSOMNIA: ICD-10-CM

## 2023-04-07 DIAGNOSIS — I10 ESSENTIAL HYPERTENSION: ICD-10-CM

## 2023-04-07 DIAGNOSIS — G90.A POTS (POSTURAL ORTHOSTATIC TACHYCARDIA SYNDROME): ICD-10-CM

## 2023-04-07 RX ORDER — LASMIDITAN 50 MG/1
TABLET ORAL
Qty: 8 TABLET | Refills: 0 | Status: SHIPPED | OUTPATIENT
Start: 2023-04-07

## 2023-04-07 RX ORDER — MECLIZINE HYDROCHLORIDE 25 MG/1
TABLET ORAL
Qty: 90 TABLET | Refills: 1 | Status: SHIPPED | OUTPATIENT
Start: 2023-04-07

## 2023-04-07 RX ORDER — DOXEPIN HYDROCHLORIDE 50 MG/1
CAPSULE ORAL
Qty: 60 CAPSULE | Refills: 2 | Status: SHIPPED | OUTPATIENT
Start: 2023-04-07

## 2023-04-07 NOTE — TELEPHONE ENCOUNTER
Rx Refill Note  Requested Prescriptions     Pending Prescriptions Disp Refills   • metFORMIN (GLUCOPHAGE) 1000 MG tablet [Pharmacy Med Name: METFORMIN HCL 1000MG TABS] 180 tablet 0     Sig: TAKE ONE TABLET BY MOUTH TWICE A DAY   • doxepin (SINEquan) 50 MG capsule [Pharmacy Med Name: DOXEPIN HCL 50MG CAPS] 60 capsule 2     Sig: TAKE ONE CAPSULE BY MOUTH EVERY DAY AT BEDTIME (TAKE 2ND DOSE AFTER 1-2 HOUR(S) IF SLEEP NOT ACHIEVED)   • meclizine (ANTIVERT) 25 MG tablet [Pharmacy Med Name: MECLIZINE HCL 25MG TABS] 90 tablet 1     Sig: TAKE ONE TABLET BY MOUTH THREE TIMES A DAY AS NEEDED FOR DIZZINESS      Last office visit with prescribing clinician: 3/14/2023     Next office visit with prescribing clinician: 5/15/2023   {  Juliane Peck MA  04/07/23, 08:15 CDT

## 2023-04-10 RX ORDER — CARVEDILOL 3.12 MG/1
TABLET ORAL
Qty: 60 TABLET | Refills: 5 | Status: SHIPPED | OUTPATIENT
Start: 2023-04-10

## 2023-04-10 NOTE — TELEPHONE ENCOUNTER
Rx Refill Note  Requested Prescriptions     Pending Prescriptions Disp Refills   • carvedilol (COREG) 3.125 MG tablet [Pharmacy Med Name: CARVEDILOL 3.125MG TABS] 60 tablet 5     Sig: TAKE ONE TABLET BY MOUTH TWICE A DAY WITH BREAKFAST AND DINNER      Last office visit with prescribing clinician: 3/14/2023   Last telemedicine visit with prescribing clinician: 5/15/2023   Next office visit with prescribing clinician: 5/15/2023     Protocol met      Hanna Vines MA  04/10/23, 08:50 CDT

## 2023-04-13 DIAGNOSIS — M62.830 LUMBAR PARASPINAL MUSCLE SPASM: ICD-10-CM

## 2023-04-13 DIAGNOSIS — M15.8 OTHER OSTEOARTHRITIS INVOLVING MULTIPLE JOINTS: ICD-10-CM

## 2023-04-13 DIAGNOSIS — M51.36 DDD (DEGENERATIVE DISC DISEASE), LUMBAR: ICD-10-CM

## 2023-04-13 DIAGNOSIS — F51.01 PRIMARY INSOMNIA: ICD-10-CM

## 2023-04-13 DIAGNOSIS — K12.0 HERPETIFORM APHTHOUS STOMATITIS: ICD-10-CM

## 2023-04-13 NOTE — TELEPHONE ENCOUNTER
Rx Refill Note  Requested Prescriptions     Pending Prescriptions Disp Refills   • triamcinolone (KENALOG) 0.1 % paste 5 g 5     Sig: 3 (Three) Times a Day.   • HYDROcodone-acetaminophen (NORCO) 5-325 MG per tablet 120 tablet 0     Sig: Take 1 tablet by mouth 4 (Four) Times a Day.   • baclofen (LIORESAL) 20 MG tablet 60 tablet 2     Sig: Take 1 tablet by mouth 2 (Two) Times a Day.   • temazepam (Restoril) 15 MG capsule 30 capsule 2     Sig: Take 1 capsule by mouth At Night As Needed for Sleep.   • metFORMIN (GLUCOPHAGE) 1000 MG tablet 180 tablet 0     Sig: Take 1 tablet by mouth 2 (Two) Times a Day.      Last office visit with prescribing clinician: 3/14/2023   Last telemedicine visit with prescribing clinician: 4/13/2023   Next office visit with prescribing clinician: 4/13/2023       Last UDS 03-14-23  Possible duplicate      Hanna Vines MA  04/13/23, 15:54 CDT

## 2023-04-14 RX ORDER — TEMAZEPAM 15 MG/1
15 CAPSULE ORAL NIGHTLY PRN
Qty: 30 CAPSULE | Refills: 2 | OUTPATIENT
Start: 2023-04-14

## 2023-04-14 RX ORDER — TRIAMCINOLONE ACETONIDE 0.1 %
PASTE (GRAM) DENTAL 3 TIMES DAILY
Qty: 5 G | Refills: 5 | Status: SHIPPED | OUTPATIENT
Start: 2023-04-14

## 2023-04-14 RX ORDER — HYDROCODONE BITARTRATE AND ACETAMINOPHEN 5; 325 MG/1; MG/1
1 TABLET ORAL 4 TIMES DAILY
Qty: 120 TABLET | Refills: 0 | Status: SHIPPED | OUTPATIENT
Start: 2023-04-14

## 2023-04-14 RX ORDER — HYDROCODONE BITARTRATE AND ACETAMINOPHEN 5; 325 MG/1; MG/1
1 TABLET ORAL 4 TIMES DAILY
Qty: 120 TABLET | Refills: 0 | OUTPATIENT
Start: 2023-04-14

## 2023-04-14 RX ORDER — BACLOFEN 20 MG/1
20 TABLET ORAL 2 TIMES DAILY
Qty: 60 TABLET | Refills: 2 | Status: SHIPPED | OUTPATIENT
Start: 2023-04-14

## 2023-04-14 RX ORDER — BACLOFEN 20 MG/1
20 TABLET ORAL 2 TIMES DAILY
Qty: 60 TABLET | Refills: 2 | OUTPATIENT
Start: 2023-04-14

## 2023-04-14 RX ORDER — TEMAZEPAM 15 MG/1
15 CAPSULE ORAL NIGHTLY PRN
Qty: 30 CAPSULE | Refills: 2 | Status: SHIPPED | OUTPATIENT
Start: 2023-04-14

## 2023-04-24 RX ORDER — ESTRADIOL 0.1 MG/D
1 FILM, EXTENDED RELEASE TRANSDERMAL 2 TIMES WEEKLY
Qty: 8 PATCH | Refills: 11 | Status: SHIPPED | OUTPATIENT
Start: 2023-04-24 | End: 2023-04-24 | Stop reason: SDUPTHER

## 2023-04-24 RX ORDER — ESTRADIOL 0.1 MG/D
1 FILM, EXTENDED RELEASE TRANSDERMAL 2 TIMES WEEKLY
Qty: 8 PATCH | Refills: 11 | Status: SHIPPED | OUTPATIENT
Start: 2023-04-24

## 2023-05-15 ENCOUNTER — TELEPHONE (OUTPATIENT)
Dept: FAMILY MEDICINE CLINIC | Facility: CLINIC | Age: 38
End: 2023-05-15

## 2023-05-15 NOTE — TELEPHONE ENCOUNTER
Caller: ALEXANDRA WOOTEN     Relationship: SELF   Best call back number: [phone]    What is the best time to reach you: ANYTIME     Who are you requesting to speak with (clinical staff, provider,  specific staff member):      Do you know the name of the person who called:      What was the call regarding: STATES SHE RECEIVED A MESSAGE ABOUT MISSING HER APPOINTMENT AND STATED SHE SENT A MESSAGE THROUGH [employer]   SHE STATES SHE HAS MOVED TO [place]   STATES SHE NEEDS THE MY CHART PHONE NUMBER SENT TO MY CHART I TRIED TO PROVIDE THE NUMBER OVER THE PHONE SHE WAS NOT ABLE TO WRITE IT DOWN AT THE TIME      Do you require a callback: STATES TO SEND MESSAGE THROUGH MY CHART

## 2023-08-16 ENCOUNTER — OFFICE VISIT (OUTPATIENT)
Dept: FAMILY MEDICINE CLINIC | Facility: CLINIC | Age: 38
End: 2023-08-16
Payer: COMMERCIAL

## 2023-08-16 VITALS
BODY MASS INDEX: 40.46 KG/M2 | SYSTOLIC BLOOD PRESSURE: 158 MMHG | OXYGEN SATURATION: 99 % | DIASTOLIC BLOOD PRESSURE: 108 MMHG | HEART RATE: 90 BPM | WEIGHT: 237 LBS | HEIGHT: 64 IN

## 2023-08-16 DIAGNOSIS — K12.0 HERPETIFORM APHTHOUS STOMATITIS: ICD-10-CM

## 2023-08-16 DIAGNOSIS — Z76.89 ENCOUNTER TO ESTABLISH CARE: Primary | ICD-10-CM

## 2023-08-16 DIAGNOSIS — I10 ESSENTIAL HYPERTENSION: ICD-10-CM

## 2023-08-16 DIAGNOSIS — G90.A POTS (POSTURAL ORTHOSTATIC TACHYCARDIA SYNDROME): ICD-10-CM

## 2023-08-16 DIAGNOSIS — R60.9 PERIPHERAL EDEMA: ICD-10-CM

## 2023-08-16 DIAGNOSIS — G43.719 INTRACTABLE CHRONIC MIGRAINE WITHOUT AURA AND WITHOUT STATUS MIGRAINOSUS: ICD-10-CM

## 2023-08-16 DIAGNOSIS — E78.2 MIXED HYPERLIPIDEMIA: ICD-10-CM

## 2023-08-16 DIAGNOSIS — M62.830 LUMBAR PARASPINAL MUSCLE SPASM: ICD-10-CM

## 2023-08-16 DIAGNOSIS — E11.9 TYPE 2 DIABETES MELLITUS WITHOUT COMPLICATION, WITHOUT LONG-TERM CURRENT USE OF INSULIN: ICD-10-CM

## 2023-08-16 DIAGNOSIS — K21.9 GASTROESOPHAGEAL REFLUX DISEASE, UNSPECIFIED WHETHER ESOPHAGITIS PRESENT: ICD-10-CM

## 2023-08-16 DIAGNOSIS — F41.9 ANXIETY: ICD-10-CM

## 2023-08-16 DIAGNOSIS — G89.29 OTHER CHRONIC PAIN: ICD-10-CM

## 2023-08-16 DIAGNOSIS — J45.40 MODERATE PERSISTENT ASTHMA WITHOUT COMPLICATION: ICD-10-CM

## 2023-08-16 RX ORDER — BACLOFEN 20 MG/1
20 TABLET ORAL 2 TIMES DAILY
Qty: 60 TABLET | Refills: 2 | Status: SHIPPED | OUTPATIENT
Start: 2023-08-16

## 2023-08-16 RX ORDER — ATOGEPANT 60 MG/1
60 TABLET ORAL DAILY
Qty: 90 TABLET | Refills: 3 | Status: SHIPPED | OUTPATIENT
Start: 2023-08-16

## 2023-08-16 RX ORDER — BUMETANIDE 1 MG/1
1 TABLET ORAL DAILY
Qty: 30 TABLET | Refills: 6 | Status: SHIPPED | OUTPATIENT
Start: 2023-08-16

## 2023-08-16 RX ORDER — LASMIDITAN 50 MG/1
TABLET ORAL
Qty: 8 TABLET | Refills: 0 | Status: CANCELLED | OUTPATIENT
Start: 2023-08-16

## 2023-08-16 RX ORDER — AMLODIPINE BESYLATE 10 MG/1
10 TABLET ORAL DAILY
Qty: 30 TABLET | Refills: 11 | Status: SHIPPED | OUTPATIENT
Start: 2023-08-16

## 2023-08-16 RX ORDER — CARVEDILOL 3.12 MG/1
3.12 TABLET ORAL EVERY 12 HOURS SCHEDULED
Qty: 60 TABLET | Refills: 11 | Status: SHIPPED | OUTPATIENT
Start: 2023-08-16

## 2023-08-16 RX ORDER — TIRZEPATIDE 2.5 MG/.5ML
2.5 INJECTION, SOLUTION SUBCUTANEOUS WEEKLY
Qty: 2 ML | Refills: 1 | Status: SHIPPED | OUTPATIENT
Start: 2023-08-16

## 2023-08-16 RX ORDER — QUETIAPINE FUMARATE 400 MG/1
400 TABLET, FILM COATED ORAL 2 TIMES DAILY
Qty: 60 TABLET | Refills: 11 | Status: SHIPPED | OUTPATIENT
Start: 2023-08-16

## 2023-08-16 RX ORDER — ISOSORBIDE MONONITRATE 30 MG/1
30 TABLET, EXTENDED RELEASE ORAL DAILY
Qty: 90 TABLET | Refills: 0 | Status: SHIPPED | OUTPATIENT
Start: 2023-08-16

## 2023-08-16 RX ORDER — ATORVASTATIN CALCIUM 80 MG/1
80 TABLET, FILM COATED ORAL
Qty: 90 TABLET | Refills: 3 | Status: SHIPPED | OUTPATIENT
Start: 2023-08-16

## 2023-08-16 RX ORDER — DULOXETIN HYDROCHLORIDE 30 MG/1
30 CAPSULE, DELAYED RELEASE ORAL DAILY
Qty: 30 CAPSULE | Refills: 11 | Status: SHIPPED | OUTPATIENT
Start: 2023-08-16

## 2023-08-16 RX ORDER — VERAPAMIL HYDROCHLORIDE 240 MG/1
TABLET, FILM COATED, EXTENDED RELEASE ORAL
Status: CANCELLED | OUTPATIENT
Start: 2023-08-16

## 2023-08-16 RX ORDER — QUETIAPINE FUMARATE 400 MG/1
1 TABLET, FILM COATED ORAL EVERY 12 HOURS SCHEDULED
COMMUNITY
Start: 2023-06-22 | End: 2023-08-16 | Stop reason: SDUPTHER

## 2023-08-16 RX ORDER — BLOOD-GLUCOSE METER
1 KIT MISCELLANEOUS DAILY
Qty: 1 EACH | Refills: 0 | Status: SHIPPED | OUTPATIENT
Start: 2023-08-16 | End: 2023-08-17 | Stop reason: SDUPTHER

## 2023-08-16 RX ORDER — LANCETS
1 EACH MISCELLANEOUS DAILY
Qty: 100 EACH | Refills: 11 | Status: SHIPPED | OUTPATIENT
Start: 2023-08-16

## 2023-08-16 RX ORDER — CHLORTHALIDONE 25 MG/1
12.5 TABLET ORAL DAILY
Qty: 15 TABLET | Refills: 11 | Status: SHIPPED | OUTPATIENT
Start: 2023-08-16

## 2023-08-16 RX ORDER — VERAPAMIL HYDROCHLORIDE 240 MG/1
TABLET, FILM COATED, EXTENDED RELEASE ORAL
COMMUNITY
Start: 2023-05-17 | End: 2023-08-16

## 2023-08-16 RX ORDER — CHLORTHALIDONE 25 MG/1
12.5 TABLET ORAL DAILY
COMMUNITY
End: 2023-08-16 | Stop reason: SDUPTHER

## 2023-08-16 RX ORDER — ALBUTEROL SULFATE 90 UG/1
2 AEROSOL, METERED RESPIRATORY (INHALATION) EVERY 4 HOURS PRN
Qty: 18 G | Refills: 5 | Status: SHIPPED | OUTPATIENT
Start: 2023-08-16

## 2023-08-16 RX ORDER — PANTOPRAZOLE SODIUM 40 MG/1
40 TABLET, DELAYED RELEASE ORAL DAILY
Qty: 30 TABLET | Refills: 11 | Status: SHIPPED | OUTPATIENT
Start: 2023-08-16

## 2023-08-16 RX ORDER — ESTRADIOL 0.05 MG/D
1 PATCH, EXTENDED RELEASE TRANSDERMAL 2 TIMES WEEKLY
COMMUNITY

## 2023-08-16 RX ORDER — TRIAMCINOLONE ACETONIDE 0.1 %
PASTE (GRAM) DENTAL 3 TIMES DAILY
Qty: 5 G | Refills: 5 | Status: SHIPPED | OUTPATIENT
Start: 2023-08-16

## 2023-08-16 NOTE — PROGRESS NOTES
Chief Complaint  Establish Care    Subjective          Chelita Jimenes presents to [facility] [place] to establish care.  She has a long medical history that includes POTS syndrome, hypertension, Crohn's disease, iron deficiency, diabetes, PCOS, chronic pain and iron deficiency.  She has not had any of her medications in a while is needing refills.  Hypertension  This is a chronic problem. The current episode started more than 1 year ago. The problem has been gradually worsening since onset. The problem is uncontrolled. Risk factors for coronary artery disease include obesity, dyslipidemia, diabetes mellitus and smoking/tobacco exposure. Current antihypertension treatment includes calcium channel blockers, beta blockers and direct vasodilators. The current treatment provides moderate improvement. Compliance problems: Recently moved and unable to get medications transferred.    Diabetes  She presents for her initial diabetic visit. She has type 2 diabetes mellitus. The initial diagnosis of diabetes was made 0 years ago. Risk factors for coronary artery disease include obesity, hypertension, tobacco exposure, dyslipidemia, family history and diabetes mellitus. Current diabetic treatment includes oral agent (monotherapy).   Outpatient Medications Prior to Visit   Medication Sig Dispense Refill    acetaminophen (Tylenol) 325 MG tablet Take 2 tablets by mouth Every 4 (Four) Hours As Needed for Mild Pain. 100 tablet 2    amitriptyline (ELAVIL) 50 MG tablet TAKE ONE TABLET BY MOUTH EVERY EVENING 90 tablet 1    BD Pen Needle Leena U/F 32G X 4 MM misc USE AS DIRECTED WITH VICTOZA 30 each 5    clonazePAM (KlonoPIN) 1 MG tablet Take 1 tablet by mouth 2 (Two) Times a Day As Needed for Anxiety. 60 tablet 2    doxepin (SINEquan) 50 MG capsule TAKE ONE CAPSULE BY MOUTH EVERY DAY AT BEDTIME (TAKE 2ND DOSE AFTER 1-2 HOUR(S) IF SLEEP NOT ACHIEVED) 60 capsule 2    estradiol (MINIVELLE,  VIVELLE-DOT) 0.05 MG/24HR patch Place 1 patch on the skin as directed by provider 2 (Two) Times a Week.      HYDROcodone-acetaminophen (NORCO) 5-325 MG per tablet Take 1 tablet by mouth 4 (Four) Times a Day. 120 tablet 0    meclizine (ANTIVERT) 25 MG tablet TAKE ONE TABLET BY MOUTH THREE TIMES A DAY AS NEEDED FOR DIZZINESS 90 tablet 1    pramipexole (MIRAPEX) 0.5 MG tablet Take 1 tablet by mouth Every Evening. 30 tablet 5    promethazine (PHENERGAN) 25 MG tablet TAKE ONE TABLET BY MOUTH EVERY 6 HOURS AS NEEDED FOR FOR NAUSEA AND VOMITING 30 tablet 2    temazepam (Restoril) 15 MG capsule Take 1 capsule by mouth At Night As Needed for Sleep. 30 capsule 2    triamcinolone (KENALOG) 0.1 % cream Apply 1 application topically to the appropriate area as directed 2 (Two) Times a Day. 45 g 2    albuterol sulfate  (90 Base) MCG/ACT inhaler Inhale 2 puffs Every 4 (Four) Hours As Needed for Wheezing. 18 g 5    amLODIPine (NORVASC) 10 MG tablet Take 1 tablet by mouth Daily.      Atogepant (Qulipta) 60 MG tablet Take 1 tablet by mouth Daily. 90 tablet 1    atorvastatin (LIPITOR) 80 MG tablet Take 1 tablet by mouth every night at bedtime. 90 tablet 3    baclofen (LIORESAL) 20 MG tablet Take 1 tablet by mouth 2 (Two) Times a Day. 60 tablet 2    Blood Glucose Monitoring Suppl (ONE TOUCH BASIC SYSTEM) w/Device kit 1 Device Daily. 1 each 0    bumetanide (Bumex) 1 MG tablet Take 1 tablet by mouth Daily. 30 tablet 2    carvedilol (COREG) 3.125 MG tablet TAKE ONE TABLET BY MOUTH TWICE A DAY WITH BREAKFAST AND DINNER 60 tablet 5    chlorthalidone (HYGROTON) 25 MG tablet Take 0.5 tablets by mouth Daily.      DULoxetine (CYMBALTA) 30 MG capsule Take 1 capsule by mouth Daily.      glucose blood (ONE TOUCH ULTRA TEST) test strip Use as instructed 50 each 12    isosorbide mononitrate (IMDUR) 30 MG 24 hr tablet Take 1 tablet by mouth Daily. 90 tablet 0    Lancets (onetouch ultrasoft) lancets 1 each by Other route Daily. Use as instructed  "     metFORMIN (GLUCOPHAGE) 1000 MG tablet Take 1 tablet by mouth 2 (Two) Times a Day. 180 tablet 0    pantoprazole (PROTONIX) 40 MG EC tablet Take 1 tablet by mouth Every Morning Before Breakfast.      QUEtiapine (SEROquel) 400 MG tablet Take 1 tablet by mouth Every 12 (Twelve) Hours.      Reyvow 50 MG tablet TAKE ONE TABLET BY MOUTH AT ONSET OF MIGRAINE. MAY REPEAT DOSE IN 24 HOURS IF NEEDED 8 tablet 0    triamcinolone (KENALOG) 0.1 % paste Apply  to teeth 3 (Three) Times a Day. 5 g 5    valACYclovir (VALTREX) 1000 MG tablet Take 1 tablet by mouth 2 (Two) Times a Day. 20 tablet 0    verapamil SR (CALAN-SR) 240 MG CR tablet       Victoza 18 MG/3ML solution pen-injector injection INJECT 1.8 MG UNDER THE SKIN TWICE A DAY 12 mL 5    ezetimibe (ZETIA) 10 MG tablet Take 1 tablet by mouth Daily. 90 tablet 3    estradiol (Tonya) 0.1 MG/24HR patch Place 1 patch on the skin as directed by provider 2 (Two) Times a Week. Remove and replace patch twice weekly. 8 patch 11    ibuprofen (ADVIL,MOTRIN) 600 MG tablet Take 1 tablet by mouth Every 6 (Six) Hours As Needed for Mild Pain.  **Take with food** 40 tablet 1    Rimegepant Sulfate (Nurtec) 75 MG tablet dispersible tablet Take 1 tablet by mouth Daily As Needed (migraine). 8 tablet 1    SM Omeprazole 20 MG tablet delayed-release Take 20 mg by mouth Daily.       No facility-administered medications prior to visit.       Review of Systems      Objective   Vital Signs:   Visit Vitals  BP (!) 158/108 (BP Location: Left arm, Patient Position: Sitting, Cuff Size: Large Adult)   Pulse 90   Ht 162.6 cm (64.02\")   Wt 108 kg (237 lb)   LMP  (LMP Unknown)   SpO2 99%   BMI 40.66 kg/mý     Physical Exam  Vitals and nursing note reviewed.   Constitutional:       Appearance: She is well-developed. She is obese.   HENT:      Head: Normocephalic and atraumatic.      Mouth/Throat:      Dentition: Abnormal dentition. Dental caries present.   Eyes:      General: Lids are normal.      " Conjunctiva/sclera: Conjunctivae normal.   Neck:      Thyroid: No thyroid mass or thyromegaly.      Trachea: Trachea normal. No tracheal tenderness.   Cardiovascular:      Rate and Rhythm: Normal rate.      Pulses: Normal pulses.      Heart sounds: Normal heart sounds.   Pulmonary:      Effort: Pulmonary effort is normal. No respiratory distress.      Breath sounds: Normal breath sounds. No wheezing.   Abdominal:      General: There is no distension.      Palpations: Abdomen is soft. There is no mass.   Musculoskeletal:         General: Normal range of motion.      Cervical back: Normal range of motion. No edema.   Skin:     General: Skin is warm and dry.      Coloration: Skin is not pale.      Findings: No abrasion, erythema or lesion.   Neurological:      Mental Status: She is alert and oriented to person, place, and time.   Psychiatric:         Mood and Affect: Mood is not anxious. Affect is not inappropriate.         Speech: Speech normal.         Behavior: Behavior normal.         Thought Content: Thought content normal.         Judgment: Judgment normal. Judgment is not impulsive.      Result Review :     Common labs          10/7/2022    14:42 11/14/2022    07:02 3/30/2023    14:19   Common Labs   Glucose  94     Glucose   88       BUN  16  16       Creatinine 0.90  1.03  0.95       Sodium  142  141       Potassium  4.3  4.7       Chloride  99  103       Calcium  9.7  9.9       Total Protein  7.7     Albumin  4.2     Total Bilirubin  0.3     Alkaline Phosphatase  105     AST (SGOT)  22     ALT (SGPT)  28     WBC  8.8     Hemoglobin  13.3     Hematocrit  40.0     Platelets  335     Total Cholesterol  245     Triglycerides  214     HDL Cholesterol  50     LDL Cholesterol   156     Hemoglobin A1C  5.3     Microalbumin, Urine  19.0        Details          This result is from an external source.             CMP          10/7/2022    14:42 11/14/2022    07:02 3/30/2023    14:19   CMP   Glucose  94     Glucose   88        BUN  16  16       Creatinine 0.90  1.03  0.95       Sodium  142  141       Potassium  4.3  4.7       Chloride  99  103       Calcium  9.7  9.9       Total Protein  7.7     Albumin  4.2     Globulin  3.5     Total Bilirubin  0.3     Alkaline Phosphatase  105     AST (SGOT)  22     ALT (SGPT)  28     BUN/Creatinine Ratio  16     Anion Gap   10          Details          This result is from an external source.             CBC          9/20/2022    11:12 11/14/2022    07:02   CBC   WBC 9.98  8.8    RBC 4.51  4.50    Hemoglobin 13.3  13.3    Hematocrit 39.9  40.0    MCV 88.5  89    MCH 29.5  29.6    MCHC 33.3  33.3    RDW 13.2  12.2    Platelets 340  335                Assessment and Plan    Diagnoses and all orders for this visit:    1. Encounter to establish care (Primary)    2. Type 2 diabetes mellitus without complication, without long-term current use of insulin  -     Tirzepatide (Mounjaro) 2.5 MG/0.5ML solution pen-injector; Inject 0.5 mL under the skin into the appropriate area as directed 1 (One) Time Per Week.  Dispense: 2 mL; Refill: 1  -     Lancets (onetouch ultrasoft) lancets; 1 each by Other route Daily. Use as instructed  Dispense: 100 each; Refill: 11  -     Discontinue: Blood Glucose Monitoring Suppl (Optium Blood Glucose Monitor) w/Device kit; 1 Device Daily.  Dispense: 1 each; Refill: 0  -     glucose blood (ONE TOUCH ULTRA TEST) test strip; Use as instructed  Dispense: 50 each; Refill: 11  -     TSH; Future  -     Vitamin D,25-Hydroxy; Future  -     Comprehensive Metabolic Panel; Future  -     Hemoglobin A1c; Future  -     CBC & Differential; Future  -     Vitamin B12; Future  -     Lipid Panel; Future  -     T4, Free; Future  -     Microalbumin / Creatinine Urine Ratio - Urine, Clean Catch; Future  -     metFORMIN (GLUCOPHAGE) 1000 MG tablet; Take 1 tablet by mouth 2 (Two) Times a Day.  Dispense: 180 tablet; Refill: 3    3. Lumbar paraspinal muscle spasm  -     baclofen (LIORESAL) 20 MG tablet;  Take 1 tablet by mouth 2 (Two) Times a Day.  Dispense: 60 tablet; Refill: 2  -     TSH; Future  -     Vitamin D,25-Hydroxy; Future  -     Comprehensive Metabolic Panel; Future  -     Hemoglobin A1c; Future  -     CBC & Differential; Future  -     Vitamin B12; Future  -     Lipid Panel; Future  -     T4, Free; Future  -     Ambulatory Referral to Pain Management    4. Herpetiform aphthous stomatitis  -     triamcinolone (KENALOG) 0.1 % paste; Apply  to teeth 3 (Three) Times a Day.  Dispense: 5 g; Refill: 5  -     Vitamin D,25-Hydroxy; Future  -     Comprehensive Metabolic Panel; Future  -     Hemoglobin A1c; Future  -     CBC & Differential; Future  -     Vitamin B12; Future  -     Lipid Panel; Future  -     T4, Free; Future    5. Intractable chronic migraine without aura and without status migrainosus  -     Atogepant (Qulipta) 60 MG tablet; Take 1 tablet by mouth Daily.  Dispense: 90 tablet; Refill: 3  -     TSH; Future  -     Vitamin D,25-Hydroxy; Future  -     Comprehensive Metabolic Panel; Future  -     Hemoglobin A1c; Future  -     CBC & Differential; Future  -     Vitamin B12; Future  -     Lipid Panel; Future  -     T4, Free; Future    6. Peripheral edema  -     bumetanide (Bumex) 1 MG tablet; Take 1 tablet by mouth Daily.  Dispense: 30 tablet; Refill: 6  -     TSH; Future  -     Vitamin D,25-Hydroxy; Future  -     Comprehensive Metabolic Panel; Future  -     Hemoglobin A1c; Future  -     CBC & Differential; Future  -     Vitamin B12; Future  -     Lipid Panel; Future  -     T4, Free; Future    7. Essential hypertension  -     chlorthalidone (HYGROTON) 25 MG tablet; Take 0.5 tablets by mouth Daily.  Dispense: 15 tablet; Refill: 11  -     amLODIPine (NORVASC) 10 MG tablet; Take 1 tablet by mouth Daily.  Dispense: 30 tablet; Refill: 11  -     carvedilol (COREG) 3.125 MG tablet; Take 1 tablet by mouth Every 12 (Twelve) Hours.  Dispense: 60 tablet; Refill: 11  -     isosorbide mononitrate (IMDUR) 30 MG 24 hr  tablet; Take 1 tablet by mouth Daily.  Dispense: 90 tablet; Refill: 0  -     TSH; Future  -     Vitamin D,25-Hydroxy; Future  -     Comprehensive Metabolic Panel; Future  -     Hemoglobin A1c; Future  -     CBC & Differential; Future  -     Vitamin B12; Future  -     Lipid Panel; Future  -     T4, Free; Future    8. POTS (postural orthostatic tachycardia syndrome)  -     carvedilol (COREG) 3.125 MG tablet; Take 1 tablet by mouth Every 12 (Twelve) Hours.  Dispense: 60 tablet; Refill: 11  -     isosorbide mononitrate (IMDUR) 30 MG 24 hr tablet; Take 1 tablet by mouth Daily.  Dispense: 90 tablet; Refill: 0  -     TSH; Future  -     Vitamin D,25-Hydroxy; Future  -     Comprehensive Metabolic Panel; Future  -     Hemoglobin A1c; Future  -     CBC & Differential; Future  -     Vitamin B12; Future  -     Lipid Panel; Future  -     T4, Free; Future    9. Mixed hyperlipidemia  -     atorvastatin (LIPITOR) 80 MG tablet; Take 1 tablet by mouth every night at bedtime.  Dispense: 90 tablet; Refill: 3  -     TSH; Future  -     Vitamin D,25-Hydroxy; Future  -     Comprehensive Metabolic Panel; Future  -     Hemoglobin A1c; Future  -     CBC & Differential; Future  -     Vitamin B12; Future  -     Lipid Panel; Future  -     T4, Free; Future    10. Moderate persistent asthma without complication  -     albuterol sulfate  (90 Base) MCG/ACT inhaler; Inhale 2 puffs Every 4 (Four) Hours As Needed for Wheezing.  Dispense: 18 g; Refill: 5  -     TSH; Future    11. Gastroesophageal reflux disease, unspecified whether esophagitis present  -     pantoprazole (PROTONIX) 40 MG EC tablet; Take 1 tablet by mouth Daily.  Dispense: 30 tablet; Refill: 11  -     TSH; Future  -     Vitamin D,25-Hydroxy; Future  -     Comprehensive Metabolic Panel; Future  -     Hemoglobin A1c; Future  -     CBC & Differential; Future  -     Vitamin B12; Future  -     Lipid Panel; Future  -     T4, Free; Future  -     Microalbumin / Creatinine Urine Ratio -  Urine, Clean Catch; Future    12. Anxiety  -     QUEtiapine (SEROquel) 400 MG tablet; Take 1 tablet by mouth 2 (Two) Times a Day.  Dispense: 60 tablet; Refill: 11  -     DULoxetine (CYMBALTA) 30 MG capsule; Take 1 capsule by mouth Daily.  Dispense: 30 capsule; Refill: 11    13. Other chronic pain  -     Ambulatory Referral to Pain Management        The current medical regimen is effective;  continue present plan and medications.    Referral to pain management placed  They will call with appointment     She will be establishing care with mental health provider, we will let them take over mental health medications     Complete ordered lab work   We will call with results  Pending lab results we will discuss further treatment plan and monitoring     Instructed patient to take blood pressure medication as soon as she picks them up from pharmacy     Discussed starting GLP1, as well as potential side effects including but not limited to nausea as well as constipation   This medication slows down gastric emptying   Advised to take a daily stool softener if constipation becomes an issue as well as using otc laxatives   Increase daily water intake   If you have any issues please call the office     Discussed Mounjaro, she will start at 2.5mg weekly     Monitor blood glucose levels  Please call the office if you have any issues    Please monitor blood pressure at home     I spent 45 minutes caring for Chelita on this date of service. This time includes time spent by me in the following activities:preparing for the visit, reviewing tests, obtaining and/or reviewing a separately obtained history, performing a medically appropriate examination and/or evaluation , counseling and educating the patient/family/caregiver, ordering medications, tests, or procedures, referring and communicating with other health care professionals , and documenting information in the medical record  Follow Up   Return in about 4 weeks (around 9/13/2023),  or if symptoms worsen or fail to improve, for Next scheduled follow up.  Patient was given instructions and counseling regarding her condition or for health maintenance advice. Please see specific information pulled into the AVS if appropriate.           This document has been electronically signed by ARIELLE Urias on August 17, 2023 16:34 CDT

## 2023-08-17 DIAGNOSIS — A60.00 GENITAL HERPES SIMPLEX, UNSPECIFIED SITE: ICD-10-CM

## 2023-08-17 DIAGNOSIS — E11.9 TYPE 2 DIABETES MELLITUS WITHOUT COMPLICATION, WITHOUT LONG-TERM CURRENT USE OF INSULIN: ICD-10-CM

## 2023-08-17 RX ORDER — VALACYCLOVIR HYDROCHLORIDE 1 G/1
1000 TABLET, FILM COATED ORAL 2 TIMES DAILY
Qty: 20 TABLET | Refills: 0 | Status: SHIPPED | OUTPATIENT
Start: 2023-08-17 | End: 2023-08-17 | Stop reason: SDUPTHER

## 2023-08-17 RX ORDER — BLOOD-GLUCOSE METER
1 KIT MISCELLANEOUS DAILY
Qty: 1 EACH | Refills: 0 | Status: SHIPPED | OUTPATIENT
Start: 2023-08-17

## 2023-08-17 RX ORDER — VALACYCLOVIR HYDROCHLORIDE 1 G/1
1000 TABLET, FILM COATED ORAL 2 TIMES DAILY
Qty: 60 TABLET | Refills: 11 | Status: SHIPPED | OUTPATIENT
Start: 2023-08-17

## 2023-08-17 NOTE — TELEPHONE ENCOUNTER
Incoming Refill Request      Medication requested (name and dose):     Pharmacy where request should be sent:     Additional details provided by patient:     Best call back number:     Does the patient have less than a 3 day supply:  [] Yes  [] No    Nikole Davidson MA  08/17/23, 09:15 CDT

## 2023-08-17 NOTE — TELEPHONE ENCOUNTER
Incoming Refill Request      Medication requested (name and dose):     Pharmacy where request should be sent:     Additional details provided by patient:     Best call back number:     Does the patient have less than a 3 day supply:  [] Yes  [] No    Nikole Davidson MA  08/17/23, 09:13 CDT

## 2023-08-18 DIAGNOSIS — Z79.899 LONG-TERM USE OF HIGH-RISK MEDICATION: ICD-10-CM

## 2023-08-18 DIAGNOSIS — Z76.89 ENCOUNTER TO ESTABLISH CARE: ICD-10-CM

## 2023-08-18 DIAGNOSIS — E11.9 TYPE 2 DIABETES MELLITUS WITHOUT COMPLICATION, WITHOUT LONG-TERM CURRENT USE OF INSULIN: Primary | ICD-10-CM

## 2023-08-18 DIAGNOSIS — I10 ESSENTIAL HYPERTENSION: ICD-10-CM

## 2023-08-18 DIAGNOSIS — F51.01 PRIMARY INSOMNIA: ICD-10-CM

## 2023-08-18 DIAGNOSIS — F41.9 ANXIETY: ICD-10-CM

## 2023-08-18 RX ORDER — TEMAZEPAM 15 MG/1
15 CAPSULE ORAL NIGHTLY PRN
Qty: 30 CAPSULE | Refills: 0 | Status: SHIPPED | OUTPATIENT
Start: 2023-08-18

## 2023-08-21 ENCOUNTER — LAB (OUTPATIENT)
Dept: LAB | Facility: HOSPITAL | Age: 38
End: 2023-08-21
Payer: COMMERCIAL

## 2023-08-21 DIAGNOSIS — Z79.899 LONG-TERM USE OF HIGH-RISK MEDICATION: ICD-10-CM

## 2023-08-21 DIAGNOSIS — I10 ESSENTIAL HYPERTENSION: ICD-10-CM

## 2023-08-21 DIAGNOSIS — Z76.89 ENCOUNTER TO ESTABLISH CARE: ICD-10-CM

## 2023-08-21 DIAGNOSIS — K12.0 HERPETIFORM APHTHOUS STOMATITIS: ICD-10-CM

## 2023-08-21 DIAGNOSIS — F51.01 PRIMARY INSOMNIA: ICD-10-CM

## 2023-08-21 DIAGNOSIS — E78.2 MIXED HYPERLIPIDEMIA: ICD-10-CM

## 2023-08-21 DIAGNOSIS — M62.830 LUMBAR PARASPINAL MUSCLE SPASM: ICD-10-CM

## 2023-08-21 DIAGNOSIS — G90.A POTS (POSTURAL ORTHOSTATIC TACHYCARDIA SYNDROME): ICD-10-CM

## 2023-08-21 DIAGNOSIS — J45.40 MODERATE PERSISTENT ASTHMA WITHOUT COMPLICATION: ICD-10-CM

## 2023-08-21 DIAGNOSIS — E11.9 TYPE 2 DIABETES MELLITUS WITHOUT COMPLICATION, WITHOUT LONG-TERM CURRENT USE OF INSULIN: ICD-10-CM

## 2023-08-21 DIAGNOSIS — G43.719 INTRACTABLE CHRONIC MIGRAINE WITHOUT AURA AND WITHOUT STATUS MIGRAINOSUS: ICD-10-CM

## 2023-08-21 DIAGNOSIS — K21.9 GASTROESOPHAGEAL REFLUX DISEASE, UNSPECIFIED WHETHER ESOPHAGITIS PRESENT: ICD-10-CM

## 2023-08-21 DIAGNOSIS — F41.9 ANXIETY: ICD-10-CM

## 2023-08-21 DIAGNOSIS — R60.9 PERIPHERAL EDEMA: ICD-10-CM

## 2023-08-21 LAB
25(OH)D3 SERPL-MCNC: 11.1 NG/ML (ref 30–100)
ALBUMIN SERPL-MCNC: 4.3 G/DL (ref 3.5–5.2)
ALBUMIN UR-MCNC: 2.7 MG/DL
ALBUMIN/GLOB SERPL: 1.1 G/DL
ALP SERPL-CCNC: 127 U/L (ref 39–117)
ALT SERPL W P-5'-P-CCNC: 24 U/L (ref 1–33)
AMPHET+METHAMPHET UR QL: NEGATIVE
AMPHETAMINES UR QL: NEGATIVE
ANION GAP SERPL CALCULATED.3IONS-SCNC: 13 MMOL/L (ref 5–15)
AST SERPL-CCNC: 25 U/L (ref 1–32)
BARBITURATES UR QL SCN: NEGATIVE
BASOPHILS # BLD AUTO: 0.08 10*3/MM3 (ref 0–0.2)
BASOPHILS NFR BLD AUTO: 0.6 % (ref 0–1.5)
BENZODIAZ UR QL SCN: POSITIVE
BILIRUB SERPL-MCNC: 0.7 MG/DL (ref 0–1.2)
BUN SERPL-MCNC: 9 MG/DL (ref 6–20)
BUN/CREAT SERPL: 10.3 (ref 7–25)
BUPRENORPHINE SERPL-MCNC: NEGATIVE NG/ML
CALCIUM SPEC-SCNC: 9.9 MG/DL (ref 8.6–10.5)
CANNABINOIDS SERPL QL: POSITIVE
CHLORIDE SERPL-SCNC: 96 MMOL/L (ref 98–107)
CHOLEST SERPL-MCNC: 242 MG/DL (ref 0–200)
CO2 SERPL-SCNC: 28 MMOL/L (ref 22–29)
COCAINE UR QL: NEGATIVE
CREAT SERPL-MCNC: 0.87 MG/DL (ref 0.57–1)
CREAT UR-MCNC: 231.8 MG/DL
DEPRECATED RDW RBC AUTO: 42.3 FL (ref 37–54)
EGFRCR SERPLBLD CKD-EPI 2021: 87.6 ML/MIN/1.73
EOSINOPHIL # BLD AUTO: 0.12 10*3/MM3 (ref 0–0.4)
EOSINOPHIL NFR BLD AUTO: 0.9 % (ref 0.3–6.2)
ERYTHROCYTE [DISTWIDTH] IN BLOOD BY AUTOMATED COUNT: 14.8 % (ref 12.3–15.4)
FENTANYL UR-MCNC: NEGATIVE NG/ML
GLOBULIN UR ELPH-MCNC: 3.9 GM/DL
GLUCOSE SERPL-MCNC: 121 MG/DL (ref 65–99)
HBA1C MFR BLD: 5.8 % (ref 4.8–5.6)
HCT VFR BLD AUTO: 45.5 % (ref 34–46.6)
HDLC SERPL-MCNC: 51 MG/DL (ref 40–60)
HGB BLD-MCNC: 15.4 G/DL (ref 12–15.9)
IMM GRANULOCYTES # BLD AUTO: 0.04 10*3/MM3 (ref 0–0.05)
IMM GRANULOCYTES NFR BLD AUTO: 0.3 % (ref 0–0.5)
LDLC SERPL CALC-MCNC: 163 MG/DL (ref 0–100)
LDLC/HDLC SERPL: 3.15 {RATIO}
LYMPHOCYTES # BLD AUTO: 3.03 10*3/MM3 (ref 0.7–3.1)
LYMPHOCYTES NFR BLD AUTO: 23.3 % (ref 19.6–45.3)
MCH RBC QN AUTO: 27.5 PG (ref 26.6–33)
MCHC RBC AUTO-ENTMCNC: 33.8 G/DL (ref 31.5–35.7)
MCV RBC AUTO: 81.4 FL (ref 79–97)
METHADONE UR QL SCN: NEGATIVE
MICROALBUMIN/CREAT UR: 11.6 MG/G
MONOCYTES # BLD AUTO: 0.69 10*3/MM3 (ref 0.1–0.9)
MONOCYTES NFR BLD AUTO: 5.3 % (ref 5–12)
NEUTROPHILS NFR BLD AUTO: 69.6 % (ref 42.7–76)
NEUTROPHILS NFR BLD AUTO: 9.03 10*3/MM3 (ref 1.7–7)
NRBC BLD AUTO-RTO: 0 /100 WBC (ref 0–0.2)
OPIATES UR QL: NEGATIVE
OXYCODONE UR QL SCN: NEGATIVE
PCP UR QL SCN: NEGATIVE
PLATELET # BLD AUTO: 480 10*3/MM3 (ref 140–450)
PMV BLD AUTO: 9.3 FL (ref 6–12)
POTASSIUM SERPL-SCNC: 3.3 MMOL/L (ref 3.5–5.2)
PROPOXYPH UR QL: NEGATIVE
PROT SERPL-MCNC: 8.2 G/DL (ref 6–8.5)
RBC # BLD AUTO: 5.59 10*6/MM3 (ref 3.77–5.28)
SODIUM SERPL-SCNC: 137 MMOL/L (ref 136–145)
T4 FREE SERPL-MCNC: 1.18 NG/DL (ref 0.93–1.7)
TRICYCLICS UR QL SCN: POSITIVE
TRIGL SERPL-MCNC: 152 MG/DL (ref 0–150)
TSH SERPL DL<=0.05 MIU/L-ACNC: 0.54 UIU/ML (ref 0.27–4.2)
VIT B12 BLD-MCNC: 1650 PG/ML (ref 211–946)
VLDLC SERPL-MCNC: 28 MG/DL (ref 5–40)
WBC NRBC COR # BLD: 12.99 10*3/MM3 (ref 3.4–10.8)

## 2023-08-21 PROCEDURE — 85025 COMPLETE CBC W/AUTO DIFF WBC: CPT

## 2023-08-21 PROCEDURE — 80061 LIPID PANEL: CPT

## 2023-08-21 PROCEDURE — 80307 DRUG TEST PRSMV CHEM ANLYZR: CPT

## 2023-08-21 PROCEDURE — 82306 VITAMIN D 25 HYDROXY: CPT

## 2023-08-21 PROCEDURE — 84443 ASSAY THYROID STIM HORMONE: CPT

## 2023-08-21 PROCEDURE — 83036 HEMOGLOBIN GLYCOSYLATED A1C: CPT

## 2023-08-21 PROCEDURE — 82043 UR ALBUMIN QUANTITATIVE: CPT

## 2023-08-21 PROCEDURE — 80053 COMPREHEN METABOLIC PANEL: CPT

## 2023-08-21 PROCEDURE — 36415 COLL VENOUS BLD VENIPUNCTURE: CPT

## 2023-08-21 PROCEDURE — 84439 ASSAY OF FREE THYROXINE: CPT

## 2023-08-21 PROCEDURE — 82607 VITAMIN B-12: CPT

## 2023-08-21 PROCEDURE — 82570 ASSAY OF URINE CREATININE: CPT

## 2023-08-22 ENCOUNTER — TELEPHONE (OUTPATIENT)
Dept: FAMILY MEDICINE CLINIC | Facility: CLINIC | Age: 38
End: 2023-08-22
Payer: COMMERCIAL

## 2023-08-22 DIAGNOSIS — E55.9 VITAMIN D DEFICIENCY: ICD-10-CM

## 2023-08-22 DIAGNOSIS — R79.89 ABNORMAL CBC: ICD-10-CM

## 2023-08-22 DIAGNOSIS — E87.6 HYPOKALEMIA: Primary | ICD-10-CM

## 2023-08-22 NOTE — TELEPHONE ENCOUNTER
Patient called asking for someone to call her and go over her lab results. Please call [phone]

## 2023-08-27 DIAGNOSIS — F51.01 PRIMARY INSOMNIA: ICD-10-CM

## 2023-08-28 NOTE — TELEPHONE ENCOUNTER
Rx Refill Note  Requested Prescriptions     Pending Prescriptions Disp Refills    amitriptyline (ELAVIL) 50 MG tablet 90 tablet 1     Sig: Take 1 tablet by mouth Every Evening.      Last office visit with prescribing clinician: 3/14/2023         Joycelyn Taylor MA  08/28/23, 08:08 CDT

## 2023-08-30 ENCOUNTER — PATIENT MESSAGE (OUTPATIENT)
Dept: FAMILY MEDICINE CLINIC | Facility: CLINIC | Age: 38
End: 2023-08-30
Payer: COMMERCIAL

## 2023-08-30 RX ORDER — AMITRIPTYLINE HYDROCHLORIDE 50 MG/1
50 TABLET, FILM COATED ORAL EVERY EVENING
Qty: 90 TABLET | Refills: 1 | Status: SHIPPED | OUTPATIENT
Start: 2023-08-30

## 2023-08-30 NOTE — TELEPHONE ENCOUNTER
From: Chelita Jimenes  To: Idalmis Mcdermott  Sent: 8/30/2023 4:44 PM CDT  Subject: Meds    My new pharmacy is [facility]

## 2023-09-08 ENCOUNTER — APPOINTMENT (OUTPATIENT)
Dept: CT IMAGING | Facility: HOSPITAL | Age: 38
End: 2023-09-08
Payer: COMMERCIAL

## 2023-09-08 ENCOUNTER — APPOINTMENT (OUTPATIENT)
Dept: GENERAL RADIOLOGY | Facility: HOSPITAL | Age: 38
End: 2023-09-08
Payer: COMMERCIAL

## 2023-09-08 ENCOUNTER — HOSPITAL ENCOUNTER (EMERGENCY)
Facility: HOSPITAL | Age: 38
Discharge: HOME | End: 2023-09-08
Attending: STUDENT IN AN ORGANIZED HEALTH CARE EDUCATION/TRAINING PROGRAM | Admitting: STUDENT IN AN ORGANIZED HEALTH CARE EDUCATION/TRAINING PROGRAM
Payer: COMMERCIAL

## 2023-09-08 VITALS
HEIGHT: 64 IN | DIASTOLIC BLOOD PRESSURE: 108 MMHG | TEMPERATURE: 98.2 F | BODY MASS INDEX: 36.88 KG/M2 | RESPIRATION RATE: 18 BRPM | WEIGHT: 216 LBS | OXYGEN SATURATION: 99 % | SYSTOLIC BLOOD PRESSURE: 182 MMHG | HEART RATE: 131 BPM

## 2023-09-08 DIAGNOSIS — R11.2 CANNABINOID HYPEREMESIS SYNDROME: Primary | ICD-10-CM

## 2023-09-08 DIAGNOSIS — F12.90 CANNABINOID HYPEREMESIS SYNDROME: Primary | ICD-10-CM

## 2023-09-08 LAB
ALBUMIN SERPL-MCNC: 4.1 G/DL (ref 3.5–5.2)
ALBUMIN/GLOB SERPL: 0.8 G/DL
ALP SERPL-CCNC: 139 U/L (ref 39–117)
ALT SERPL W P-5'-P-CCNC: 17 U/L (ref 1–33)
ANION GAP SERPL CALCULATED.3IONS-SCNC: 12 MMOL/L (ref 5–15)
AST SERPL-CCNC: 18 U/L (ref 1–32)
BACTERIA UR QL AUTO: ABNORMAL /HPF
BASOPHILS # BLD AUTO: 0.08 10*3/MM3 (ref 0–0.2)
BASOPHILS NFR BLD AUTO: 0.6 % (ref 0–1.5)
BILIRUB SERPL-MCNC: 0.3 MG/DL (ref 0–1.2)
BILIRUB UR QL STRIP: NEGATIVE
BUN SERPL-MCNC: 8 MG/DL (ref 6–20)
BUN/CREAT SERPL: 9.1 (ref 7–25)
CALCIUM SPEC-SCNC: 10 MG/DL (ref 8.6–10.5)
CHLORIDE SERPL-SCNC: 97 MMOL/L (ref 98–107)
CLARITY UR: ABNORMAL
CO2 SERPL-SCNC: 29 MMOL/L (ref 22–29)
COLOR UR: YELLOW
CREAT SERPL-MCNC: 0.88 MG/DL (ref 0.57–1)
DEPRECATED RDW RBC AUTO: 42.8 FL (ref 37–54)
EGFRCR SERPLBLD CKD-EPI 2021: 86.4 ML/MIN/1.73
EOSINOPHIL # BLD AUTO: 0.05 10*3/MM3 (ref 0–0.4)
EOSINOPHIL NFR BLD AUTO: 0.4 % (ref 0.3–6.2)
ERYTHROCYTE [DISTWIDTH] IN BLOOD BY AUTOMATED COUNT: 14.2 % (ref 12.3–15.4)
FLUAV SUBTYP SPEC NAA+PROBE: NOT DETECTED
FLUBV RNA ISLT QL NAA+PROBE: NOT DETECTED
GEN 5 2HR TROPONIN T REFLEX: <6 NG/L
GLOBULIN UR ELPH-MCNC: 5.2 GM/DL
GLUCOSE SERPL-MCNC: 109 MG/DL (ref 65–99)
GLUCOSE UR STRIP-MCNC: NEGATIVE MG/DL
HCT VFR BLD AUTO: 45.2 % (ref 34–46.6)
HGB BLD-MCNC: 14.8 G/DL (ref 12–15.9)
HGB UR QL STRIP.AUTO: NEGATIVE
HOLD SPECIMEN: NORMAL
HOLD SPECIMEN: NORMAL
HYALINE CASTS UR QL AUTO: ABNORMAL /LPF
IMM GRANULOCYTES # BLD AUTO: 0.09 10*3/MM3 (ref 0–0.05)
IMM GRANULOCYTES NFR BLD AUTO: 0.7 % (ref 0–0.5)
KETONES UR QL STRIP: NEGATIVE
LEUKOCYTE ESTERASE UR QL STRIP.AUTO: ABNORMAL
LIPASE SERPL-CCNC: 17 U/L (ref 13–60)
LYMPHOCYTES # BLD AUTO: 2.97 10*3/MM3 (ref 0.7–3.1)
LYMPHOCYTES NFR BLD AUTO: 22.5 % (ref 19.6–45.3)
MCH RBC QN AUTO: 27.3 PG (ref 26.6–33)
MCHC RBC AUTO-ENTMCNC: 32.7 G/DL (ref 31.5–35.7)
MCV RBC AUTO: 83.2 FL (ref 79–97)
MONOCYTES # BLD AUTO: 0.59 10*3/MM3 (ref 0.1–0.9)
MONOCYTES NFR BLD AUTO: 4.5 % (ref 5–12)
NEUTROPHILS NFR BLD AUTO: 71.3 % (ref 42.7–76)
NEUTROPHILS NFR BLD AUTO: 9.43 10*3/MM3 (ref 1.7–7)
NITRITE UR QL STRIP: NEGATIVE
NRBC BLD AUTO-RTO: 0 /100 WBC (ref 0–0.2)
NT-PROBNP SERPL-MCNC: 129 PG/ML (ref 0–450)
PH UR STRIP.AUTO: 7.5 [PH] (ref 5–9)
PLATELET # BLD AUTO: 460 10*3/MM3 (ref 140–450)
PMV BLD AUTO: 8.3 FL (ref 6–12)
POTASSIUM SERPL-SCNC: 3.8 MMOL/L (ref 3.5–5.2)
PROT SERPL-MCNC: 9.3 G/DL (ref 6–8.5)
PROT UR QL STRIP: ABNORMAL
RBC # BLD AUTO: 5.43 10*6/MM3 (ref 3.77–5.28)
RBC # UR STRIP: ABNORMAL /HPF
REF LAB TEST METHOD: ABNORMAL
SARS-COV-2 RNA RESP QL NAA+PROBE: NOT DETECTED
SODIUM SERPL-SCNC: 138 MMOL/L (ref 136–145)
SP GR UR STRIP: 1.02 (ref 1–1.03)
SQUAMOUS #/AREA URNS HPF: ABNORMAL /HPF
TROPONIN T DELTA: NORMAL
TROPONIN T SERPL HS-MCNC: <6 NG/L
UROBILINOGEN UR QL STRIP: ABNORMAL
WBC # UR STRIP: ABNORMAL /HPF
WBC NRBC COR # BLD: 13.21 10*3/MM3 (ref 3.4–10.8)
WHOLE BLOOD HOLD COAG: NORMAL
WHOLE BLOOD HOLD SPECIMEN: NORMAL

## 2023-09-08 PROCEDURE — 25010000002 HALOPERIDOL LACTATE PER 5 MG: Performed by: STUDENT IN AN ORGANIZED HEALTH CARE EDUCATION/TRAINING PROGRAM

## 2023-09-08 PROCEDURE — 74177 CT ABD & PELVIS W/CONTRAST: CPT

## 2023-09-08 PROCEDURE — 96374 THER/PROPH/DIAG INJ IV PUSH: CPT

## 2023-09-08 PROCEDURE — 25510000001 IOPAMIDOL 61 % SOLUTION: Performed by: STUDENT IN AN ORGANIZED HEALTH CARE EDUCATION/TRAINING PROGRAM

## 2023-09-08 PROCEDURE — 87636 SARSCOV2 & INF A&B AMP PRB: CPT | Performed by: STUDENT IN AN ORGANIZED HEALTH CARE EDUCATION/TRAINING PROGRAM

## 2023-09-08 PROCEDURE — 81001 URINALYSIS AUTO W/SCOPE: CPT | Performed by: STUDENT IN AN ORGANIZED HEALTH CARE EDUCATION/TRAINING PROGRAM

## 2023-09-08 PROCEDURE — 85025 COMPLETE CBC W/AUTO DIFF WBC: CPT

## 2023-09-08 PROCEDURE — 96372 THER/PROPH/DIAG INJ SC/IM: CPT

## 2023-09-08 PROCEDURE — 71045 X-RAY EXAM CHEST 1 VIEW: CPT

## 2023-09-08 PROCEDURE — 83880 ASSAY OF NATRIURETIC PEPTIDE: CPT

## 2023-09-08 PROCEDURE — 83690 ASSAY OF LIPASE: CPT | Performed by: STUDENT IN AN ORGANIZED HEALTH CARE EDUCATION/TRAINING PROGRAM

## 2023-09-08 PROCEDURE — 84484 ASSAY OF TROPONIN QUANT: CPT | Performed by: STUDENT IN AN ORGANIZED HEALTH CARE EDUCATION/TRAINING PROGRAM

## 2023-09-08 PROCEDURE — 84484 ASSAY OF TROPONIN QUANT: CPT

## 2023-09-08 PROCEDURE — 36415 COLL VENOUS BLD VENIPUNCTURE: CPT

## 2023-09-08 PROCEDURE — 93005 ELECTROCARDIOGRAM TRACING: CPT | Performed by: STUDENT IN AN ORGANIZED HEALTH CARE EDUCATION/TRAINING PROGRAM

## 2023-09-08 PROCEDURE — 80053 COMPREHEN METABOLIC PANEL: CPT

## 2023-09-08 PROCEDURE — 93005 ELECTROCARDIOGRAM TRACING: CPT

## 2023-09-08 PROCEDURE — 99285 EMERGENCY DEPT VISIT HI MDM: CPT

## 2023-09-08 PROCEDURE — 25010000002 ONDANSETRON PER 1 MG: Performed by: STUDENT IN AN ORGANIZED HEALTH CARE EDUCATION/TRAINING PROGRAM

## 2023-09-08 RX ORDER — SODIUM CHLORIDE 0.9 % (FLUSH) 0.9 %
10 SYRINGE (ML) INJECTION AS NEEDED
Status: DISCONTINUED | OUTPATIENT
Start: 2023-09-08 | End: 2023-09-09 | Stop reason: HOSPADM

## 2023-09-08 RX ORDER — HALOPERIDOL 5 MG/ML
2.5 INJECTION INTRAMUSCULAR ONCE
Status: COMPLETED | OUTPATIENT
Start: 2023-09-08 | End: 2023-09-08

## 2023-09-08 RX ORDER — ONDANSETRON 2 MG/ML
4 INJECTION INTRAMUSCULAR; INTRAVENOUS ONCE
Status: COMPLETED | OUTPATIENT
Start: 2023-09-08 | End: 2023-09-08

## 2023-09-08 RX ADMIN — SODIUM CHLORIDE 1000 ML: 9 INJECTION, SOLUTION INTRAVENOUS at 20:39

## 2023-09-08 RX ADMIN — IOPAMIDOL 90 ML: 612 INJECTION, SOLUTION INTRAVENOUS at 21:01

## 2023-09-08 RX ADMIN — ONDANSETRON 4 MG: 2 INJECTION INTRAMUSCULAR; INTRAVENOUS at 20:34

## 2023-09-08 RX ADMIN — SODIUM CHLORIDE 1000 ML: 9 INJECTION, SOLUTION INTRAVENOUS at 21:47

## 2023-09-08 RX ADMIN — HALOPERIDOL LACTATE 2.5 MG: 5 INJECTION, SOLUTION INTRAMUSCULAR at 21:44

## 2023-09-08 NOTE — Clinical Note
[facility] EMERGENCY DEPARTMENT  [address] [place] [id number]  Phone: [phone]    Chelita Jimenes was seen and treated in our emergency department on 9/8/2023.  She may return to work on 09/11/2023.         Thank you for choosing [facility].    Nataly Sanchez MD

## 2023-09-09 LAB
QT INTERVAL: 286 MS
QTC INTERVAL: 429 MS

## 2023-09-09 NOTE — DISCHARGE INSTRUCTIONS
Look up the article is in high [employer] magazine about cannabinoid hyperemesis syndrome and see if the symptoms sound like they correlate with yours.  Abstain from marijuana to see if the symptoms go away.  While the marijuana is metabolizing out of your body, for the next 2 or 3 weeks after you ingested the marijuana, you may have the symptoms.  Call your primary care for follow-up.  Return to ED as needed.

## 2023-09-09 NOTE — ED PROVIDER NOTES
Subjective   History of Present Illness  Patient presents with vomiting and epigastric and mid abdominal pain since early this morning.  Patient states she can still feel the food in her stomach but she has been vomiting liquids only.  She endorses chills and constipation with no bowel movement for the last 2 to 3 days.  She has gastroparesis and has not been placed on any meds yet.  She recently moved into this area and has not been affiliated with a GI doctor yet.    Review of Systems   Gastrointestinal:  Positive for abdominal pain, nausea and vomiting.   All other systems reviewed and are negative.    Past Medical History:   Diagnosis Date    Arrhythmia     Arthropathy of lumbar facet joint     Asthma     Autonomic dysfunction     Bipolar disorder     Cervico-occipital neuralgia     Constipation     Crohn's colitis     DDD (degenerative disc disease), lumbar     Diabetes mellitus     Disorder of small intestine     thicking jejunum, delayed transit       Diverticular disease of colon     Dysphagia     Elevated cholesterol     Epigastric pain     Generalized anxiety disorder     GERD (gastroesophageal reflux disease)     H/O colonoscopy with polypectomy     Hirsutism     Hyperlipidemia     Hyperprolactinemia     Hypertension     Low back pain     Migraine aura, persistent     Migraine - w/ aura of spots       Myofascial pain     Nausea and vomiting     Osteoarthritis     PCOS (polycystic ovarian syndrome)     Periumbilical pain     PONV (postoperative nausea and vomiting)     POTS (postural orthostatic tachycardia syndrome)     followed by Dr. Francis at [place]    Sleep apnea        Allergies   Allergen Reactions    Apriso [Mesalamine Er] Nausea And Vomiting     Vomiting     Lamotrigine Rash    Lithium Nausea And Vomiting     Difficulty breathing    Tramadol Other (See Comments)     Hard to breath    Ultram [Tramadol Hcl] Itching       Past Surgical History:   Procedure Laterality Date    CAPSULE ENDOSCOPY   2015    GI Imaging, capsule endoscopy 77291 (1)        SECTION  2010     Section (3)       CHOLECYSTECTOMY  05/15/2003    Cholecystectomy, laparoscopic (1)       COLON RESECTION      COLONOSCOPY      DIAGNOSTIC LAPAROSCOPY, SALPINGO OOPHORECTOMY LAPAROSCOPIC N/A 2022    Procedure: LEFT SALPINGO OOPHORECTOMY, RIGHT OOPHORECTOMY LAPAROSCOPIC, LYSIS OF ADHESIONS;  Surgeon: Megan Cummings DO;  Location: [facility] OR;  Service: Gynecology;  Laterality: N/A;    DILATATION AND CURETTAGE  10/21/2013    D&C (1)       ENDOSCOPY  2015    EGD w/ biopsy 95758 (1)       INJECTION OF MEDICATION  2016    Injection for nerve block (1)       SUBTOTAL HYSTERECTOMY         Family History   Problem Relation Age of Onset    Heart disease Mother     Asthma Father     Diabetes Father     Heart disease Father     Hypertension Father     No Known Problems Brother        Social History     Socioeconomic History    Marital status:    Tobacco Use    Smoking status: Never    Smokeless tobacco: Never   Vaping Use    Vaping Use: Some days    Substances: CBD    Devices: Refillable tank   Substance and Sexual Activity    Alcohol use: No    Drug use: No    Sexual activity: Defer     Birth control/protection: Surgical           Objective   Physical Exam  Vitals and nursing note reviewed.   Constitutional:       General: She is in acute distress.      Appearance: She is well-developed.   HENT:      Head: Normocephalic.      Mouth/Throat:      Mouth: Mucous membranes are moist.   Pulmonary:      Effort: Pulmonary effort is normal.      Breath sounds: Normal breath sounds.   Abdominal:      General: Bowel sounds are normal.      Palpations: Abdomen is soft.      Tenderness:  in the epigastric area and periumbilical area   Neurological:      General: No focal deficit present.      Mental Status: She is alert.   Psychiatric:         Mood and Affect: Mood normal.       ECG 12 Lead      Date/Time: 2023  10:17 PM  Performed by: Nataly Sanchez MD  Authorized by: Nataly Sanchez MD   Interpreted by physician  Comparison: compared with previous ECG from 9/20/2022  Rhythm: sinus tachycardia  Comments: Sinus tachycardia ventricular rate 135 bpm, QRS 92 ms, QTc 429 ms, no STEMI.  This is my independent interpretation.             ED Course      Vitals:    09/08/23 2001 09/08/23 2042 09/08/23 2046 09/08/23 2047   BP: (!) 173/112 177/96 (!) 185/100 (!) 182/108   BP Location:       Patient Position:  Lying Sitting Standing   Pulse: (!) 124 108 (!) 124 (!) 131   Resp: 18      Temp:  98.2 °F (36.8 °C)     TempSrc:  Oral     SpO2: 99%      Weight:       Height:          Lab Results (last 24 hours)       Procedure Component Value Units Date/Time    Urinalysis, Microscopic Only - Urine, Clean Catch [[id number]]  (Abnormal) Collected: 09/08/23 2052    Specimen: Urine, Clean Catch Updated: 09/08/23 2133     RBC, UA 6-12 /HPF      WBC, UA 3-5 /HPF      Bacteria, UA 4+ /HPF      Squamous Epithelial Cells, UA 6-12 /HPF      Hyaline Casts, UA 0-2 /LPF      Methodology Manual Light Microscopy    COVID-19 and FLU A/B PCR - Swab, Nasopharynx [[id number]]  (Normal) Collected: 09/08/23 2037    Specimen: Swab from Nasopharynx Updated: 09/08/23 2107     COVID19 Not Detected     Influenza A PCR Not Detected     Influenza B PCR Not Detected    Narrative:      Fact sheet for providers: https://www.fda.gov/media/486156/download    Fact sheet for patients: https://www.fda.gov/media/311057/download    Test performed by PCR.    Urinalysis With Microscopic If Indicated (No Culture) - Urine, Clean Catch [[id number]]  (Abnormal) Collected: 09/08/23 2052    Specimen: Urine, Clean Catch Updated: 09/08/23 2105     Color, UA Yellow     Appearance, UA Turbid     pH, UA 7.5     Specific Gravity, UA 1.020     Glucose, UA Negative     Ketones, UA Negative     Bilirubin, UA Negative     Blood, UA Negative     Protein, UA Trace     Leuk Esterase, UA  Trace     Nitrite, UA Negative     Urobilinogen, UA 1.0 E.U./dL    Lipase [[id number]]  (Normal) Collected: 09/08/23 2000    Specimen: Blood Updated: 09/08/23 2031     Lipase 17 U/L     High Sensitivity Troponin T 2Hr [[id number]] Collected: 09/08/23 2000    Specimen: Blood Updated: 09/08/23 2024     HS Troponin T <6 ng/L      Troponin T Delta --     Comment: Unable to calculate.       Narrative:      High Sensitive Troponin T Reference Range:  <10.0 ng/L- Negative Female for AMI  <15.0 ng/L- Negative Male for AMI  >=10 - Abnormal Female indicating possible myocardial injury.  >=15 - Abnormal Male indicating possible myocardial injury.   Clinicians would have to utilize clinical acumen, EKG, Troponin, and serial changes to determine if it is an Acute Myocardial Infarction or myocardial injury due to an underlying chronic condition.         [place] Draw [[id number]] Collected: 09/08/23 1801    Specimen: Blood Updated: 09/08/23 1915    Narrative:      The following orders were created for panel order [place] Draw.  Procedure                               Abnormality         Status                     ---------                               -----------         ------                     Green Top (Gel)[[id number]]                                  Final result               Lavender Top[[id number]]                                     Final result               Gold Top - SST[[id number]]                                   Final result               Light Blue Top[[id number]]                                   Final result                 Please view results for these tests on the individual orders.    Green Top (Gel) [[id number]] Collected: 09/08/23 1801    Specimen: Blood Updated: 09/08/23 1915     Extra Tube Hold for add-ons.     Comment: Auto resulted.       Lavender Top [[id number]] Collected: 09/08/23 1801    Specimen: Blood Updated: 09/08/23 1915     Extra Tube hold for add-on     Comment: Auto resulted       Gold Top - SST  [[id number]] Collected: 09/08/23 1801    Specimen: Blood Updated: 09/08/23 1915     Extra Tube Hold for add-ons.     Comment: Auto resulted.       Light Blue Top [[id number]] Collected: 09/08/23 1801    Specimen: Blood Updated: 09/08/23 1915     Extra Tube Hold for add-ons.     Comment: Auto resulted       Comprehensive Metabolic Panel [[id number]]  (Abnormal) Collected: 09/08/23 1801    Specimen: Blood Updated: 09/08/23 1834     Glucose 109 mg/dL      BUN 8 mg/dL      Creatinine 0.88 mg/dL      Sodium 138 mmol/L      Potassium 3.8 mmol/L      Chloride 97 mmol/L      CO2 29.0 mmol/L      Calcium 10.0 mg/dL      Total Protein 9.3 g/dL      Albumin 4.1 g/dL      ALT (SGPT) 17 U/L      AST (SGOT) 18 U/L      Alkaline Phosphatase 139 U/L      Total Bilirubin 0.3 mg/dL      Globulin 5.2 gm/dL      A/G Ratio 0.8 g/dL      BUN/Creatinine Ratio 9.1     Anion Gap 12.0 mmol/L      eGFR 86.4 mL/min/1.73     Narrative:      GFR Normal >60  Chronic Kidney Disease <60  Kidney Failure <15      High Sensitivity Troponin T [[id number]]  (Normal) Collected: 09/08/23 1801    Specimen: Blood Updated: 09/08/23 1834     HS Troponin T <6 ng/L     Narrative:      High Sensitive Troponin T Reference Range:  <10.0 ng/L- Negative Female for AMI  <15.0 ng/L- Negative Male for AMI  >=10 - Abnormal Female indicating possible myocardial injury.  >=15 - Abnormal Male indicating possible myocardial injury.   Clinicians would have to utilize clinical acumen, EKG, Troponin, and serial changes to determine if it is an Acute Myocardial Infarction or myocardial injury due to an underlying chronic condition.         BNP [[id number]]  (Normal) Collected: 09/08/23 1801    Specimen: Blood Updated: 09/08/23 1832     proBNP 129.0 pg/mL     Narrative:      Among patients with dyspnea, NT-proBNP is highly sensitive for the detection of acute congestive heart failure. In addition NT-proBNP of <300 pg/ml effectively rules out acute congestive heart failure with 99%  negative predictive value.      CBC & Differential [[id number]]  (Abnormal) Collected: 09/08/23 1801    Specimen: Blood Updated: 09/08/23 1815    Narrative:      The following orders were created for panel order CBC & Differential.  Procedure                               Abnormality         Status                     ---------                               -----------         ------                     CBC Auto Differential[[id number]]        Abnormal            Final result                 Please view results for these tests on the individual orders.    CBC Auto Differential [[id number]]  (Abnormal) Collected: 09/08/23 1801    Specimen: Blood Updated: 09/08/23 1815     WBC 13.21 10*3/mm3      RBC 5.43 10*6/mm3      Hemoglobin 14.8 g/dL      Hematocrit 45.2 %      MCV 83.2 fL      MCH 27.3 pg      MCHC 32.7 g/dL      RDW 14.2 %      RDW-SD 42.8 fl      MPV 8.3 fL      Platelets 460 10*3/mm3      Neutrophil % 71.3 %      Lymphocyte % 22.5 %      Monocyte % 4.5 %      Eosinophil % 0.4 %      Basophil % 0.6 %      Immature Grans % 0.7 %      Neutrophils, Absolute 9.43 10*3/mm3      Lymphocytes, Absolute 2.97 10*3/mm3      Monocytes, Absolute 0.59 10*3/mm3      Eosinophils, Absolute 0.05 10*3/mm3      Basophils, Absolute 0.08 10*3/mm3      Immature Grans, Absolute 0.09 10*3/mm3      nRBC 0.0 /100 WBC            CT Abdomen Pelvis With Contrast    Result Date: 9/8/2023  No acute abdominal pelvic findings..     XR Chest 1 View    Result Date: 9/8/2023  1. No radiographic evidence of acute cardiopulmonary process.                                          Medical Decision Making  Patient with probable cannabinoid hyperemesis syndrome given abrupt resolution of all symptoms after Haldol IM.  Labs, x-ray and CT scans unremarkable.  Patient was encouraged to read about cannabinoid hyperemesis syndrome and see if her symptoms match the description of the syndrome.  Encourage close follow-up with primary care. No identifiable cause  noted for hospitalization or transfer noted during today's visit.       Problems Addressed:  Cannabinoid hyperemesis syndrome: complicated acute illness or injury    Amount and/or Complexity of Data Reviewed  Labs: ordered.  Radiology: ordered.  ECG/medicine tests: ordered and independent interpretation performed.    Risk  Prescription drug management.        Final diagnoses:   Cannabinoid hyperemesis syndrome       ED Disposition  ED Disposition       ED Disposition   Discharge    Condition   Stable    Comment   --               Jo Pang, APRN  [address] [place] [address] [phone]    Call in 1 day  for follow up         Medication List      No changes were made to your prescriptions during this visit.         This document has been electronically signed by Nataly Sanchez MD on September 9, 2023 06:31 CDT  .sig  Natureline  Part of this note may be an electronic transcription/translation of spoken language to printed text using the Dragon Dictation System.        Nataly Sanchez MD  09/09/23 0686

## 2023-09-11 ENCOUNTER — OFFICE VISIT (OUTPATIENT)
Dept: OBSTETRICS AND GYNECOLOGY | Facility: CLINIC | Age: 38
End: 2023-09-11
Payer: COMMERCIAL

## 2023-09-11 VITALS
WEIGHT: 230.8 LBS | HEIGHT: 64 IN | SYSTOLIC BLOOD PRESSURE: 130 MMHG | BODY MASS INDEX: 39.4 KG/M2 | DIASTOLIC BLOOD PRESSURE: 90 MMHG

## 2023-09-11 DIAGNOSIS — E89.40 SURGICAL MENOPAUSE: Primary | ICD-10-CM

## 2023-09-11 DIAGNOSIS — E89.40 PREMATURE SURGICAL MENOPAUSE ON HRT: ICD-10-CM

## 2023-09-11 DIAGNOSIS — Z79.890 PREMATURE SURGICAL MENOPAUSE ON HRT: ICD-10-CM

## 2023-09-11 DIAGNOSIS — R23.2 HOT FLASHES: ICD-10-CM

## 2023-09-11 PROCEDURE — 3080F DIAST BP >= 90 MM HG: CPT | Performed by: STUDENT IN AN ORGANIZED HEALTH CARE EDUCATION/TRAINING PROGRAM

## 2023-09-11 PROCEDURE — 99214 OFFICE O/P EST MOD 30 MIN: CPT | Performed by: STUDENT IN AN ORGANIZED HEALTH CARE EDUCATION/TRAINING PROGRAM

## 2023-09-11 PROCEDURE — 3075F SYST BP GE 130 - 139MM HG: CPT | Performed by: STUDENT IN AN ORGANIZED HEALTH CARE EDUCATION/TRAINING PROGRAM

## 2023-09-11 RX ORDER — ESTRADIOL 0.1 MG/D
FILM, EXTENDED RELEASE TRANSDERMAL
COMMUNITY
Start: 2023-08-30

## 2023-09-11 NOTE — PROGRESS NOTES
[facility]  Gynecology  Date of Service: 2023    CC: followup on HRT    HPI  Chelita Jimenes is a 37 y.o.  surgical postmenopausal female who presents with concerns about her hormonal therapy.     Patient with some persistent hot flashes, tend to be middle of day to later in day. May be anywhere from 0 to several times per day. Also noting vaginal dryness. Some variation in mood, h/o mood disorder and unsure cause.     23 TSH 0.535, free T4 1.18    22 had LSO, RO, lysis of adhesions for pelvic pain. Previously had hysterectomy.     In October started Estradiol patch Vivelle-Dot 0.05 mg/24 hrs. Was having significant hot flashes, vaginal dryness.  Increased to 0.075 mg/24 hr, then ultimately 0.1mg/24 hrs.     Denies any vaginal itching, burning, irritation, or discharge. No vaginal bleeding. Denies any sexual dysfunction concerns. Not reporting any urinary symptoms.    ROS  Review of Systems   Constitutional: Negative.    HENT: Negative.     Respiratory: Negative.     Cardiovascular: Negative.    Gastrointestinal: Negative.    Endocrine: Positive for heat intolerance.   Skin: Negative.      GYN HISTORY  Surgical menopause  Last pap smear:   Last Completed Pap Smear       This patient has no relevant Health Maintenance data.          Abnormal pap smear history: reports normal prior to hyst  Contraception: hyst     OB HISTORY  OB History    Para Term  AB Living   9 3 3   6 3   SAB IAB Ectopic Molar Multiple Live Births             3      # Outcome Date GA Lbr Dipesh/2nd Weight Sex Delivery Anes PTL Lv   9 AB            8 AB            7 AB            6 AB            5 AB            4 AB            3 Term      CS-Unspec   ARGELIA   2 Term      CS-Unspec   ARGELIA   1 Term      CS-Unspec   ARGELIA     PAST MEDICAL HISTORY  Past Medical History:   Diagnosis Date    Arrhythmia     Arthropathy of lumbar facet joint     Asthma     Autonomic dysfunction     Bipolar disorder      Cervico-occipital neuralgia     Constipation     Crohn's colitis     DDD (degenerative disc disease), lumbar     Diabetes mellitus     Disorder of small intestine     thicking jejunum, delayed transit       Diverticular disease of colon     Dysphagia     Elevated cholesterol     Epigastric pain     Generalized anxiety disorder     GERD (gastroesophageal reflux disease)     H/O colonoscopy with polypectomy     Hirsutism     Hyperlipidemia     Hyperprolactinemia     Hypertension     Low back pain     Migraine aura, persistent     Migraine - w/ aura of spots       Myofascial pain     Nausea and vomiting     Osteoarthritis     PCOS (polycystic ovarian syndrome)     Periumbilical pain     PONV (postoperative nausea and vomiting)     POTS (postural orthostatic tachycardia syndrome)     followed by Dr. Francis at [place]    Sleep apnea      PAST SURGICAL HISTORY  Past Surgical History:   Procedure Laterality Date    CAPSULE ENDOSCOPY  2015    GI Imaging, capsule endoscopy 48205 (1)        SECTION  2010     Section (3)       CHOLECYSTECTOMY  05/15/2003    Cholecystectomy, laparoscopic (1)       COLON RESECTION      COLONOSCOPY      DIAGNOSTIC LAPAROSCOPY, SALPINGO OOPHORECTOMY LAPAROSCOPIC N/A 2022    Procedure: LEFT SALPINGO OOPHORECTOMY, RIGHT OOPHORECTOMY LAPAROSCOPIC, LYSIS OF ADHESIONS;  Surgeon: Megan Cummings DO;  Location: [facility];  Service: Gynecology;  Laterality: N/A;    DILATATION AND CURETTAGE  10/21/2013    D&C (1)       ENDOSCOPY  2015    EGD w/ biopsy [id number] (1)       INJECTION OF MEDICATION  2016    Injection for nerve block (1)       SUBTOTAL HYSTERECTOMY       FAMILY HISTORY  Family History   Problem Relation Age of Onset    Heart disease Mother     Asthma Father     Diabetes Father     Heart disease Father     Hypertension Father     No Known Problems Brother      SOCIAL HISTORY  Social History     Socioeconomic History    Marital status:     Tobacco Use    Smoking status: Never    Smokeless tobacco: Never   Vaping Use    Vaping Use: Some days    Substances: CBD    Devices: Refillable tank   Substance and Sexual Activity    Alcohol use: No    Drug use: No    Sexual activity: Defer     Birth control/protection: Surgical     ALLERGIES  Allergies   Allergen Reactions    Apriso [Mesalamine Er] Nausea And Vomiting     Vomiting     Lamotrigine Rash    Lithium Nausea And Vomiting     Difficulty breathing    Tramadol Other (See Comments)     Hard to breath    Ultram [Tramadol Hcl] Itching    Adhesive Tape Rash     HOME MEDICATIONS  Prior to Admission medications    Medication Sig Start Date End Date Taking? Authorizing Provider   acetaminophen (Tylenol) 325 MG tablet Take 2 tablets by mouth Every 4 (Four) Hours As Needed for Mild Pain. 9/22/22 9/22/23 Yes Megan Cummings,    albuterol sulfate  (90 Base) MCG/ACT inhaler Inhale 2 puffs Every 4 (Four) Hours As Needed for Wheezing. 8/16/23  Yes Jo Pang APRN   amitriptyline (ELAVIL) 50 MG tablet Take 1 tablet by mouth Every Evening. 8/30/23  Yes Jo Pang APRN   amLODIPine (NORVASC) 10 MG tablet Take 1 tablet by mouth Daily. 8/16/23  Yes Jo Pang APRN   Atogepant (Qulipta) 60 MG tablet Take 1 tablet by mouth Daily. 8/16/23  Yes Jo Pang APRN   atorvastatin (LIPITOR) 80 MG tablet Take 1 tablet by mouth every night at bedtime. 8/16/23  Yes Jo Pang APRN   baclofen (LIORESAL) 20 MG tablet Take 1 tablet by mouth 2 (Two) Times a Day. 8/16/23  Yes Jo Pang APRN   BD Pen Needle Leena U/F 32G X 4 MM misc USE AS DIRECTED WITH VICTOZA 2/13/23  Yes Idalmis Mcdermott APRN   Blood Glucose Monitoring Suppl (Optium Blood Glucose Monitor) w/Device kit 1 Device Daily. 8/17/23  Yes Jo Pang APRN   bumetanide (Bumex) 1 MG tablet Take 1 tablet by mouth Daily. 8/16/23  Yes Jo Pang APRN   carvedilol (COREG) 3.125 MG tablet Take 1 tablet by mouth Every 12 (Twelve) Hours. 8/16/23   Yes Jo Pang APRN   chlorthalidone (HYGROTON) 25 MG tablet Take 0.5 tablets by mouth Daily. 8/16/23  Yes Jo Pang APRN   clonazePAM (KlonoPIN) 1 MG tablet Take 1 tablet by mouth 2 (Two) Times a Day As Needed for Anxiety. 2/14/23  Yes Idalmis Mcdermott APRN   doxepin (SINEquan) 50 MG capsule TAKE ONE CAPSULE BY MOUTH EVERY DAY AT BEDTIME (TAKE 2ND DOSE AFTER 1-2 HOUR(S) IF SLEEP NOT ACHIEVED) 4/7/23  Yes Idalmis Mcdermott APRN   DULoxetine (CYMBALTA) 30 MG capsule Take 1 capsule by mouth Daily. 8/16/23  Yes Jo Pang APRN   estradiol (VIVELLE-DOT) 0.1 MG/24HR patch  8/30/23  Yes Provider, MD Toby   ezetimibe (ZETIA) 10 MG tablet Take 1 tablet by mouth Daily. 11/18/22  Yes Idalmis Mcdermott APRN   glucose blood (ONE TOUCH ULTRA TEST) test strip Use as instructed 8/16/23  Yes oJ Pang APRN   HYDROcodone-acetaminophen (NORCO) 5-325 MG per tablet Take 1 tablet by mouth 4 (Four) Times a Day. 4/14/23  Yes Idalmis Mcdermott APRN   isosorbide mononitrate (IMDUR) 30 MG 24 hr tablet Take 1 tablet by mouth Daily. 8/16/23  Yes Jo Pang APRN   Lancets (onetouch ultrasoft) lancets 1 each by Other route Daily. Use as instructed 8/16/23  Yes Jo Pang APRN   meclizine (ANTIVERT) 25 MG tablet TAKE ONE TABLET BY MOUTH THREE TIMES A DAY AS NEEDED FOR DIZZINESS 4/7/23  Yes Idalmis Mcdermott APRN   metFORMIN (GLUCOPHAGE) 1000 MG tablet Take 1 tablet by mouth 2 (Two) Times a Day. 8/16/23  Yes Jo Pang APRN   pantoprazole (PROTONIX) 40 MG EC tablet Take 1 tablet by mouth Daily. 8/16/23  Yes Jo Pang APRN   pramipexole (MIRAPEX) 0.5 MG tablet Take 1 tablet by mouth Every Evening. 2/14/23  Yes Idalmis Mcdermott APRN   promethazine (PHENERGAN) 25 MG tablet TAKE ONE TABLET BY MOUTH EVERY 6 HOURS AS NEEDED FOR FOR NAUSEA AND VOMITING 2/6/23  Yes Idalmis Mcdermott APRN   QUEtiapine (SEROquel) 400 MG tablet Take 1 tablet by mouth 2 (Two) Times a Day. 8/16/23  Yes Poly  "ARIELLE Osorio   temazepam (Restoril) 15 MG capsule Take 1 capsule by mouth At Night As Needed for Sleep. 23  Yes Jo Pang APRN   Tirzepatide (Mounjaro) 2.5 MG/0.5ML solution pen-injector Inject 0.5 mL under the skin into the appropriate area as directed 1 (One) Time Per Week. 23  Yes Jo Pang APRN   triamcinolone (KENALOG) 0.1 % cream Apply 1 application topically to the appropriate area as directed 2 (Two) Times a Day. 23  Yes Idalmis Mcdermott APRN   triamcinolone (KENALOG) 0.1 % paste Apply  to teeth 3 (Three) Times a Day. 23  Yes Jo Pang APRN   valACYclovir (VALTREX) 1000 MG tablet Take 1 tablet by mouth 2 (Two) Times a Day. 23  Yes Jo Pang APRN   verapamil SR (CALAN-SR) 120 MG CR tablet Take 1 tablet by mouth Daily. 23  Yes ProviderToby MD   estradiol (MINIVELLE, VIVELLE-DOT) 0.05 MG/24HR patch Place 1 patch on the skin as directed by provider 2 (Two) Times a Week.  23  ProviderToby MD     PE  /90 (BP Location: Left arm, Patient Position: Sitting, Cuff Size: Adult)   Ht 162.6 cm (64\")   Wt 105 kg (230 lb 12.8 oz)   LMP  (LMP Unknown)   BMI 39.62 kg/m²        General: Alert, healthy, no distress, well nourished and well developed.  Neurologic: Alert, oriented to person, place, and time.  Gait normal.  Cranial nerves II-XII grossly intact.  HEENT: Normocephalic, atraumatic.  Extraocular muscles intact.  Lungs: Normal respiratory effort.    Skin: No rash, no lesions.  Extremities: No cyanosis, clubbing or edema.  PELVIC EXAM:  deferred    IMPRESSION  Chelita Jimenes is a 37 y.o.  with surgical menopause, on HRT, presenting with persistent hot flashes, some mood swings.    PLAN    1. Surgical menopause  2. Premature surgical menopause on HRT  3. Hot flashes  4. Mood changes  - Patient had strongly desired BSO for pelvic pain, performed 2022; had discussed risks of premature menopause  - Initially started Estradiol " patch 0.05 mg, has increased and now at 0.1 mg/day  - Reports predominantly hot flashes and mood swings  - Following up with ARIELLE Urias next week for mood then seeing Emiliana for intake next week; had been off meds for a period and Jo has been trying to get her restarted on mood meds  - Discussed with patient on max dose of estradiol patch; could consider switching to estradiol pill with different absorption to see if this improved symptoms any better  - Due to CHTN and obesity, some increased risk of VTE with estradiol pill, mitigated with patch  - Discussed alternative option is to consider increase or change in SSRI; discussed studies don't show specific benefit of one SSRI over another although Paroxetine is FDA approved for vasomotor symptoms of menopause  - Patient will discuss with Jo next week, consider options and decide if she wants to trial estradiol pill or continue on patch               This document has been electronically signed by Megan Cummings DO on September 15, 2023 22:27 CDT

## 2023-09-18 ENCOUNTER — OFFICE VISIT (OUTPATIENT)
Dept: FAMILY MEDICINE CLINIC | Facility: CLINIC | Age: 38
End: 2023-09-18
Payer: COMMERCIAL

## 2023-09-18 VITALS
HEIGHT: 64 IN | DIASTOLIC BLOOD PRESSURE: 76 MMHG | SYSTOLIC BLOOD PRESSURE: 138 MMHG | BODY MASS INDEX: 40.92 KG/M2 | HEART RATE: 113 BPM | WEIGHT: 239.7 LBS | OXYGEN SATURATION: 99 %

## 2023-09-18 DIAGNOSIS — M15.8 OTHER OSTEOARTHRITIS INVOLVING MULTIPLE JOINTS: ICD-10-CM

## 2023-09-18 DIAGNOSIS — E11.9 TYPE 2 DIABETES MELLITUS WITHOUT COMPLICATION, WITHOUT LONG-TERM CURRENT USE OF INSULIN: Primary | ICD-10-CM

## 2023-09-18 DIAGNOSIS — I10 HYPERTENSION, UNSPECIFIED TYPE: ICD-10-CM

## 2023-09-18 DIAGNOSIS — M51.36 DDD (DEGENERATIVE DISC DISEASE), LUMBAR: ICD-10-CM

## 2023-09-18 PROBLEM — R55 SYNCOPE AND COLLAPSE: Status: ACTIVE | Noted: 2023-03-30

## 2023-09-18 PROBLEM — G47.33 OBSTRUCTIVE SLEEP APNEA SYNDROME: Status: ACTIVE | Noted: 2017-03-14

## 2023-09-18 PROBLEM — G25.81 RESTLESS LEGS SYNDROME: Status: ACTIVE | Noted: 2017-03-14

## 2023-09-18 PROBLEM — F41.9 ANXIETY: Status: ACTIVE | Noted: 2023-03-30

## 2023-09-18 PROBLEM — M19.90 OSTEOARTHRITIS: Status: ACTIVE | Noted: 2023-03-30

## 2023-09-18 PROBLEM — R60.0 LOWER EXTREMITY EDEMA: Status: ACTIVE | Noted: 2023-03-30

## 2023-09-18 PROBLEM — R00.0 TACHYCARDIA: Status: ACTIVE | Noted: 2021-07-01

## 2023-09-18 PROBLEM — R06.83 SNORING: Status: ACTIVE | Noted: 2017-03-14

## 2023-09-18 PROCEDURE — 3044F HG A1C LEVEL LT 7.0%: CPT | Performed by: NURSE PRACTITIONER

## 2023-09-18 PROCEDURE — 1160F RVW MEDS BY RX/DR IN RCRD: CPT | Performed by: NURSE PRACTITIONER

## 2023-09-18 PROCEDURE — 3078F DIAST BP <80 MM HG: CPT | Performed by: NURSE PRACTITIONER

## 2023-09-18 PROCEDURE — 99214 OFFICE O/P EST MOD 30 MIN: CPT | Performed by: NURSE PRACTITIONER

## 2023-09-18 PROCEDURE — 3075F SYST BP GE 130 - 139MM HG: CPT | Performed by: NURSE PRACTITIONER

## 2023-09-18 PROCEDURE — 1159F MED LIST DOCD IN RCRD: CPT | Performed by: NURSE PRACTITIONER

## 2023-09-18 RX ORDER — TIRZEPATIDE 5 MG/.5ML
5 INJECTION, SOLUTION SUBCUTANEOUS WEEKLY
Qty: 2 ML | Refills: 0 | Status: SHIPPED | OUTPATIENT
Start: 2023-09-18

## 2023-09-18 RX ORDER — HYDROCODONE BITARTRATE AND ACETAMINOPHEN 5; 325 MG/1; MG/1
1 TABLET ORAL 3 TIMES DAILY
Qty: 90 TABLET | Refills: 0 | Status: SHIPPED | OUTPATIENT
Start: 2023-09-18 | End: 2023-10-18

## 2023-09-18 RX ORDER — RISPERIDONE 1 MG/1
1 TABLET ORAL EVERY 12 HOURS SCHEDULED
COMMUNITY
Start: 2023-09-14

## 2023-09-18 NOTE — PROGRESS NOTES
Chief Complaint  Hypertension (1 mo f/u )    Subjective          Chelita Jimenes presents to [facility] [place] for follow-up regarding hypertension.  She is also wanting to know if we can refill her pain medication until she is able to get in with pain management.  Hypertension  This is a chronic problem. The current episode started more than 1 year ago. The problem has been gradually improving since onset. The problem is uncontrolled. Risk factors for coronary artery disease include obesity, dyslipidemia, diabetes mellitus and smoking/tobacco exposure. Current antihypertension treatment includes calcium channel blockers, beta blockers and direct vasodilators. The current treatment provides moderate improvement. Compliance problems: Recently moved and unable to get medications transferred.    Diabetes  She presents for her follow-up diabetic visit. She has type 2 diabetes mellitus. The initial diagnosis of diabetes was made 0 years ago. Her disease course has been improving. Risk factors for coronary artery disease include obesity, hypertension, tobacco exposure, dyslipidemia, family history and diabetes mellitus. Current diabetic treatment includes oral agent (monotherapy).   Pain  This is a chronic problem. The current episode started more than 1 year ago. The problem occurs constantly. The problem has been unchanged. Associated symptoms include myalgias. Nothing aggravates the symptoms. She has tried oral narcotics for the symptoms. The treatment provided mild relief.   Outpatient Medications Prior to Visit   Medication Sig Dispense Refill    acetaminophen (Tylenol) 325 MG tablet Take 2 tablets by mouth Every 4 (Four) Hours As Needed for Mild Pain. 100 tablet 2    albuterol sulfate  (90 Base) MCG/ACT inhaler Inhale 2 puffs Every 4 (Four) Hours As Needed for Wheezing. 18 g 5    amitriptyline (ELAVIL) 50 MG tablet Take 1 tablet by mouth Every Evening. 90  tablet 1    amLODIPine (NORVASC) 10 MG tablet Take 1 tablet by mouth Daily. 30 tablet 11    Atogepant (Qulipta) 60 MG tablet Take 1 tablet by mouth Daily. 90 tablet 3    atorvastatin (LIPITOR) 80 MG tablet Take 1 tablet by mouth every night at bedtime. 90 tablet 3    baclofen (LIORESAL) 20 MG tablet Take 1 tablet by mouth 2 (Two) Times a Day. 60 tablet 2    BD Pen Needle Leena U/F 32G X 4 MM misc USE AS DIRECTED WITH VICTOZA 30 each 5    Blood Glucose Monitoring Suppl (Optium Blood Glucose Monitor) w/Device kit 1 Device Daily. 1 each 0    bumetanide (Bumex) 1 MG tablet Take 1 tablet by mouth Daily. 30 tablet 6    carvedilol (COREG) 3.125 MG tablet Take 1 tablet by mouth Every 12 (Twelve) Hours. 60 tablet 11    chlorthalidone (HYGROTON) 25 MG tablet Take 0.5 tablets by mouth Daily. 15 tablet 11    doxepin (SINEquan) 50 MG capsule TAKE ONE CAPSULE BY MOUTH EVERY DAY AT BEDTIME (TAKE 2ND DOSE AFTER 1-2 HOUR(S) IF SLEEP NOT ACHIEVED) 60 capsule 2    DULoxetine (CYMBALTA) 30 MG capsule Take 1 capsule by mouth Daily. 30 capsule 11    estradiol (VIVELLE-DOT) 0.1 MG/24HR patch       glucose blood (ONE TOUCH ULTRA TEST) test strip Use as instructed 50 each 11    Lancets (onetouch ultrasoft) lancets 1 each by Other route Daily. Use as instructed 100 each 11    meclizine (ANTIVERT) 25 MG tablet TAKE ONE TABLET BY MOUTH THREE TIMES A DAY AS NEEDED FOR DIZZINESS 90 tablet 1    metFORMIN (GLUCOPHAGE) 1000 MG tablet Take 1 tablet by mouth 2 (Two) Times a Day. 180 tablet 3    pantoprazole (PROTONIX) 40 MG EC tablet Take 1 tablet by mouth Daily. 30 tablet 11    pramipexole (MIRAPEX) 0.5 MG tablet Take 1 tablet by mouth Every Evening. 30 tablet 5    promethazine (PHENERGAN) 25 MG tablet TAKE ONE TABLET BY MOUTH EVERY 6 HOURS AS NEEDED FOR FOR NAUSEA AND VOMITING 30 tablet 2    QUEtiapine (SEROquel) 400 MG tablet Take 1 tablet by mouth 2 (Two) Times a Day. 60 tablet 11    risperiDONE (risperDAL) 1 MG tablet Take 1 tablet by mouth Every  "12 (Twelve) Hours.      triamcinolone (KENALOG) 0.1 % cream Apply 1 application topically to the appropriate area as directed 2 (Two) Times a Day. 45 g 2    triamcinolone (KENALOG) 0.1 % paste Apply  to teeth 3 (Three) Times a Day. 5 g 5    valACYclovir (VALTREX) 1000 MG tablet Take 1 tablet by mouth 2 (Two) Times a Day. 60 tablet 11    verapamil SR (CALAN-SR) 120 MG CR tablet Take 1 tablet by mouth Daily.      HYDROcodone-acetaminophen (NORCO) 5-325 MG per tablet Take 1 tablet by mouth 4 (Four) Times a Day. 120 tablet 0    Tirzepatide (Mounjaro) 2.5 MG/0.5ML solution pen-injector Inject 0.5 mL under the skin into the appropriate area as directed 1 (One) Time Per Week. 2 mL 1    clonazePAM (KlonoPIN) 1 MG tablet Take 1 tablet by mouth 2 (Two) Times a Day As Needed for Anxiety. 60 tablet 2    ezetimibe (ZETIA) 10 MG tablet Take 1 tablet by mouth Daily. 90 tablet 3    isosorbide mononitrate (IMDUR) 30 MG 24 hr tablet Take 1 tablet by mouth Daily. 90 tablet 0    temazepam (Restoril) 15 MG capsule Take 1 capsule by mouth At Night As Needed for Sleep. 30 capsule 0     No facility-administered medications prior to visit.       Review of Systems   Musculoskeletal:  Positive for myalgias.       Objective   Vital Signs:   Visit Vitals  /76   Pulse 113   Ht 162.6 cm (64\")   Wt 109 kg (239 lb 11.2 oz)   LMP  (LMP Unknown)   SpO2 99%   BMI 41.14 kg/m²     Physical Exam  Vitals and nursing note reviewed.   Constitutional:       Appearance: She is well-developed.   HENT:      Head: Normocephalic and atraumatic.   Eyes:      General: Lids are normal.      Conjunctiva/sclera: Conjunctivae normal.   Neck:      Thyroid: No thyroid mass or thyromegaly.      Trachea: Trachea normal. No tracheal tenderness.   Cardiovascular:      Rate and Rhythm: Tachycardia present.      Pulses: Normal pulses.      Heart sounds: Normal heart sounds.   Pulmonary:      Effort: Pulmonary effort is normal. No respiratory distress.      Breath sounds: " Normal breath sounds. No wheezing.   Abdominal:      General: There is no distension.      Palpations: Abdomen is soft. There is no mass.   Musculoskeletal:         General: Normal range of motion.      Cervical back: Normal range of motion. No edema.   Skin:     General: Skin is warm and dry.      Coloration: Skin is not pale.      Findings: No abrasion, erythema or lesion.   Neurological:      Mental Status: She is alert and oriented to person, place, and time.   Psychiatric:         Mood and Affect: Mood is not anxious. Affect is not inappropriate.         Speech: Speech normal.         Behavior: Behavior normal.         Thought Content: Thought content normal.         Judgment: Judgment normal. Judgment is not impulsive.      Result Review :     Common labs          3/30/2023    14:19 8/21/2023    08:36 8/21/2023    10:10 9/8/2023    18:01   Common Labs   Glucose  121   109    Glucose 88          BUN 16     9   8    Creatinine 0.95     0.87   0.88    Sodium 141     137   138    Potassium 4.7     3.3   3.8    Chloride 103     96   97    Calcium 9.9     9.9   10.0    Albumin  4.3   4.1    Total Bilirubin  0.7   0.3    Alkaline Phosphatase  127   139    AST (SGOT)  25   18    ALT (SGPT)  24   17    WBC  12.99   13.21    Hemoglobin  15.4   14.8    Hematocrit  45.5   45.2    Platelets  480   460    Total Cholesterol  242      Triglycerides  152      HDL Cholesterol  51      LDL Cholesterol   163      Hemoglobin A1C  5.80      Microalbumin, Urine   2.7        Details          This result is from an external source.                       Assessment and Plan    Diagnoses and all orders for this visit:    1. Type 2 diabetes mellitus without complication, without long-term current use of insulin (Primary)  -     Tirzepatide (Mounjaro) 5 MG/0.5ML solution pen-injector; Inject 0.5 mL under the skin into the appropriate area as directed 1 (One) Time Per Week.  Dispense: 2 mL; Refill: 0    2. DDD (degenerative disc disease),  lumbar  -     HYDROcodone-acetaminophen (NORCO) 5-325 MG per tablet; Take 1 tablet by mouth 3 (Three) Times a Day for 30 days.  Dispense: 90 tablet; Refill: 0    3. Other osteoarthritis involving multiple joints  -     HYDROcodone-acetaminophen (NORCO) 5-325 MG per tablet; Take 1 tablet by mouth 3 (Three) Times a Day for 30 days.  Dispense: 90 tablet; Refill: 0    4. Hypertension, unspecified type    We we will increase Mounjaro to 5 mg weekly  Please call the office if you have issues with this medication    Pain medication sent into pharmacy    Follow Up   Return in about 3 months (around 12/18/2023), or if symptoms worsen or fail to improve, for Next scheduled follow up.  Patient was given instructions and counseling regarding her condition or for health maintenance advice. Please see specific information pulled into the AVS if appropriate.           This document has been electronically signed by ARIELLE Urias on September 19, 2023 12:54 CDT

## 2023-09-20 ENCOUNTER — TELEPHONE (OUTPATIENT)
Dept: FAMILY MEDICINE CLINIC | Facility: CLINIC | Age: 38
End: 2023-09-20
Payer: COMMERCIAL

## 2023-09-20 NOTE — TELEPHONE ENCOUNTER
Patient called stating [employer] will be sending papers for Jo to fill out so she can receive transportation to her heart doctor. Patent # [phone]

## 2023-09-21 ENCOUNTER — TELEPHONE (OUTPATIENT)
Dept: FAMILY MEDICINE CLINIC | Facility: CLINIC | Age: 38
End: 2023-09-21
Payer: COMMERCIAL

## 2023-09-21 NOTE — TELEPHONE ENCOUNTER
Patient called to let you know that [employer] is faxing over a form that needs Jo's signature for her to go to the Cardiologist.

## 2023-09-22 DIAGNOSIS — R42 DIZZINESS: ICD-10-CM

## 2023-09-22 RX ORDER — MECLIZINE HYDROCHLORIDE 25 MG/1
TABLET ORAL
Qty: 90 TABLET | Refills: 1 | OUTPATIENT
Start: 2023-09-22

## 2023-09-25 ENCOUNTER — TELEPHONE (OUTPATIENT)
Dept: FAMILY MEDICINE CLINIC | Facility: CLINIC | Age: 38
End: 2023-09-25
Payer: COMMERCIAL

## 2023-09-25 NOTE — TELEPHONE ENCOUNTER
Ms. Chelita Jimenes called and wants to know if Jo Pang received a form from [employer] to approve a trip to [place] to see her heart doctor    Pt call back [phone]

## 2023-10-15 DIAGNOSIS — K12.0 HERPETIFORM APHTHOUS STOMATITIS: ICD-10-CM

## 2023-10-16 RX ORDER — TRIAMCINOLONE ACETONIDE 0.1 %
PASTE (GRAM) DENTAL
Qty: 25 G | OUTPATIENT
Start: 2023-10-16

## 2023-10-16 NOTE — TELEPHONE ENCOUNTER
Rx Refill Note  Requested Prescriptions     Pending Prescriptions Disp Refills    triamcinolone (KENALOG) 0.1 % paste [Pharmacy Med Name: TRIAMCIN/ORABAS 0.1% DENT-PAST 5GM] 25 g      Sig: APPLY TO TEETH THREE TIMES DAILY      Last office visit with prescribing clinician: 3/14/2023     PCP Jo Taylor MA  10/16/23, 07:35 CDT

## 2023-10-24 DIAGNOSIS — F51.01 PRIMARY INSOMNIA: ICD-10-CM

## 2023-10-24 RX ORDER — DOXEPIN HYDROCHLORIDE 50 MG/1
CAPSULE ORAL
Qty: 120 CAPSULE | OUTPATIENT
Start: 2023-10-24

## (undated) DEVICE — STERILE POLYISOPRENE POWDER-FREE SURGICAL GLOVES WITH EMOLLIENT COATING: Brand: PROTEXIS

## (undated) DEVICE — CANNULA NSL AD L7FT DIV O2 CO2 W/ M LUERLOCK TRMPT CONN

## (undated) DEVICE — FORCEPS BX 240CM 2.4MM L NDL RAD JAW 4 M00513334

## (undated) DEVICE — ENDOPATH XCEL BLADELESS TROCARS WITH STABILITY SLEEVES: Brand: ENDOPATH XCEL

## (undated) DEVICE — ADHS SKIN PREMIERPRO EXOFIN TOPICAL HI/VISC .5ML

## (undated) DEVICE — SUT MNCRYL 3/0 Y936H

## (undated) DEVICE — SINGLE PORT MANIFOLD: Brand: NEPTUNE 2

## (undated) DEVICE — UNDYED BRAIDED (POLYGLACTIN 910), SYNTHETIC ABSORBABLE SUTURE: Brand: COATED VICRYL

## (undated) DEVICE — CORE TRUMPET FOR SINGLE SOLUTION BAG: Brand: CORE DYNAMICS

## (undated) DEVICE — GLV SURG SENSICARE POLYISPRN W/ALOE PF LF 6.5 GRN STRL

## (undated) DEVICE — BITE BLOCK ENDOSCP AD 60 FR W/ ADJ STRP PLAS GRN BLOX

## (undated) DEVICE — LAPAROVUE VISIBILITY SYSTEM LAPAROSCOPIC SOLUTIONS: Brand: LAPAROVUE

## (undated) DEVICE — ENDO KIT: Brand: MEDLINE INDUSTRIES, INC.

## (undated) DEVICE — ENSEAL X1 TISSUE SEALER, CURVED JAW, 37 CM SHAFT LENGTH: Brand: ENSEAL

## (undated) DEVICE — GLV SURG SENSICARE PI ORTHO SZ6.5 LF STRL

## (undated) DEVICE — TOTAL TRAY, 16FR 10ML SIL FOLEY, URN: Brand: MEDLINE

## (undated) DEVICE — DRAPE UNDERBUTTOCKS W FLUID POUCH 40X44IN

## (undated) DEVICE — SYS CLS PORTSITE CT CLOSESURE 5AND10/12

## (undated) DEVICE — ENDOPOUCH RETRIEVER SPECIMEN RETRIEVAL BAGS: Brand: ENDOPOUCH RETRIEVER